# Patient Record
Sex: FEMALE | Race: WHITE | Employment: OTHER
[De-identification: names, ages, dates, MRNs, and addresses within clinical notes are randomized per-mention and may not be internally consistent; named-entity substitution may affect disease eponyms.]

---

## 2017-01-09 ENCOUNTER — OFFICE VISIT (OUTPATIENT)
Dept: FAMILY MEDICINE CLINIC | Facility: CLINIC | Age: 67
End: 2017-01-09

## 2017-01-09 VITALS
HEIGHT: 63 IN | WEIGHT: 182 LBS | SYSTOLIC BLOOD PRESSURE: 138 MMHG | DIASTOLIC BLOOD PRESSURE: 88 MMHG | BODY MASS INDEX: 32.25 KG/M2 | HEART RATE: 80 BPM | RESPIRATION RATE: 16 BRPM

## 2017-01-09 DIAGNOSIS — L30.4 ECZEMA INTERTRIGO: ICD-10-CM

## 2017-01-09 DIAGNOSIS — I10 ESSENTIAL HYPERTENSION: Primary | ICD-10-CM

## 2017-01-09 DIAGNOSIS — Z12.39 SCREENING FOR BREAST CANCER: ICD-10-CM

## 2017-01-09 DIAGNOSIS — K21.9 GASTROESOPHAGEAL REFLUX DISEASE WITHOUT ESOPHAGITIS: ICD-10-CM

## 2017-01-09 DIAGNOSIS — L72.0 EPIDERMOID CYST OF SKIN: ICD-10-CM

## 2017-01-09 DIAGNOSIS — M47.817 LUMBAR AND SACRAL OSTEOARTHRITIS: ICD-10-CM

## 2017-01-09 DIAGNOSIS — G57.91 NEUROPATHY OF RIGHT LOWER EXTREMITY: ICD-10-CM

## 2017-01-09 DIAGNOSIS — M79.7 FIBROMYALGIA SYNDROME: ICD-10-CM

## 2017-01-09 PROCEDURE — 99214 OFFICE O/P EST MOD 30 MIN: CPT | Performed by: FAMILY MEDICINE

## 2017-01-09 RX ORDER — LISINOPRIL AND HYDROCHLOROTHIAZIDE 25; 20 MG/1; MG/1
TABLET ORAL
Qty: 90 TABLET | Refills: 1 | Status: SHIPPED | OUTPATIENT
Start: 2017-01-09 | End: 2017-05-30 | Stop reason: SDUPTHER

## 2017-01-09 RX ORDER — GABAPENTIN 300 MG/1
300 CAPSULE ORAL 3 TIMES DAILY
Qty: 270 CAPSULE | Refills: 3 | Status: SHIPPED | OUTPATIENT
Start: 2017-01-09 | End: 2017-06-08 | Stop reason: SDUPTHER

## 2017-01-09 RX ORDER — TRAMADOL HYDROCHLORIDE 50 MG/1
50 TABLET ORAL EVERY 8 HOURS PRN
Qty: 270 TABLET | Refills: 1 | Status: SHIPPED | OUTPATIENT
Start: 2017-01-09 | End: 2017-06-08 | Stop reason: SDUPTHER

## 2017-01-09 RX ORDER — TRAMADOL HYDROCHLORIDE 50 MG/1
50 TABLET ORAL EVERY 8 HOURS PRN
Qty: 30 TABLET | Refills: 0 | Status: SHIPPED | OUTPATIENT
Start: 2017-01-09 | End: 2017-01-19

## 2017-01-09 RX ORDER — CLOTRIMAZOLE AND BETAMETHASONE DIPROPIONATE 10; .64 MG/G; MG/G
CREAM TOPICAL
Qty: 45 G | Refills: 1 | Status: SHIPPED | OUTPATIENT
Start: 2017-01-09 | End: 2017-02-06 | Stop reason: SDUPTHER

## 2017-01-09 RX ORDER — CLOTRIMAZOLE AND BETAMETHASONE DIPROPIONATE 10; .64 MG/G; MG/G
CREAM TOPICAL
Qty: 45 G | Refills: 1 | Status: SHIPPED | OUTPATIENT
Start: 2017-01-09 | End: 2017-01-09 | Stop reason: SDUPTHER

## 2017-01-09 ASSESSMENT — ENCOUNTER SYMPTOMS
CHOKING: 0
ANAL BLEEDING: 0
CHANGE IN BOWEL HABIT: 0
SWOLLEN GLANDS: 0
ALLERGIC/IMMUNOLOGIC NEGATIVE: 1
WHEEZING: 0
EYE REDNESS: 0
BACK PAIN: 1
SORE THROAT: 0
RHINORRHEA: 0
SINUS PRESSURE: 0
FACIAL SWELLING: 0
COUGH: 0
COLOR CHANGE: 0
BOWEL INCONTINENCE: 0
ORTHOPNEA: 0
EYE PAIN: 0
ABDOMINAL DISTENTION: 0
VISUAL CHANGE: 0
CONSTIPATION: 0
TROUBLE SWALLOWING: 0
RECTAL PAIN: 0
VOMITING: 0
DIARRHEA: 0
NAIL CHANGES: 0
VOICE CHANGE: 0
EYE DISCHARGE: 0
BLOOD IN STOOL: 0
PHOTOPHOBIA: 0
APNEA: 0
STRIDOR: 0
SHORTNESS OF BREATH: 0
BLURRED VISION: 0
EYE ITCHING: 0
CHEST TIGHTNESS: 0
ABDOMINAL PAIN: 0
NAUSEA: 0

## 2017-01-10 ENCOUNTER — TELEPHONE (OUTPATIENT)
Dept: FAMILY MEDICINE CLINIC | Facility: CLINIC | Age: 67
End: 2017-01-10

## 2017-01-10 RX ORDER — OMEPRAZOLE 20 MG/1
20 CAPSULE, DELAYED RELEASE ORAL DAILY
Qty: 90 CAPSULE | Refills: 1 | Status: SHIPPED | OUTPATIENT
Start: 2017-01-10 | End: 2017-05-30 | Stop reason: SDUPTHER

## 2017-01-16 LAB
CHOLESTEROL, TOTAL: 250 MG/DL
CHOLESTEROL/HDL RATIO: 4
HDLC SERPL-MCNC: 63 MG/DL (ref 35–70)
LDL CHOLESTEROL CALCULATED: 159 MG/DL (ref 0–160)
TRIGL SERPL-MCNC: 140 MG/DL
TSH SERPL DL<=0.05 MIU/L-ACNC: 5.86 UIU/ML
VLDLC SERPL CALC-MCNC: 28 MG/DL

## 2017-01-24 ENCOUNTER — OFFICE VISIT (OUTPATIENT)
Dept: FAMILY MEDICINE CLINIC | Facility: CLINIC | Age: 67
End: 2017-01-24

## 2017-01-24 VITALS
HEART RATE: 68 BPM | RESPIRATION RATE: 18 BRPM | DIASTOLIC BLOOD PRESSURE: 80 MMHG | HEIGHT: 63 IN | BODY MASS INDEX: 32.43 KG/M2 | WEIGHT: 183 LBS | SYSTOLIC BLOOD PRESSURE: 120 MMHG

## 2017-01-24 DIAGNOSIS — E03.9 ACQUIRED HYPOTHYROIDISM: Primary | ICD-10-CM

## 2017-01-24 DIAGNOSIS — E78.2 MIXED HYPERLIPIDEMIA: ICD-10-CM

## 2017-01-24 PROCEDURE — G8419 CALC BMI OUT NRM PARAM NOF/U: HCPCS | Performed by: FAMILY MEDICINE

## 2017-01-24 PROCEDURE — G8484 FLU IMMUNIZE NO ADMIN: HCPCS | Performed by: FAMILY MEDICINE

## 2017-01-24 PROCEDURE — 3017F COLORECTAL CA SCREEN DOC REV: CPT | Performed by: FAMILY MEDICINE

## 2017-01-24 PROCEDURE — 99212 OFFICE O/P EST SF 10 MIN: CPT | Performed by: FAMILY MEDICINE

## 2017-01-24 PROCEDURE — 1036F TOBACCO NON-USER: CPT | Performed by: FAMILY MEDICINE

## 2017-01-24 PROCEDURE — G8427 DOCREV CUR MEDS BY ELIG CLIN: HCPCS | Performed by: FAMILY MEDICINE

## 2017-01-24 PROCEDURE — 3014F SCREEN MAMMO DOC REV: CPT | Performed by: FAMILY MEDICINE

## 2017-01-24 RX ORDER — LEVOTHYROXINE SODIUM 0.03 MG/1
25 TABLET ORAL DAILY
Qty: 90 TABLET | Refills: 1 | Status: SHIPPED | OUTPATIENT
Start: 2017-01-24 | End: 2017-01-26 | Stop reason: SDUPTHER

## 2017-01-26 DIAGNOSIS — E03.9 ACQUIRED HYPOTHYROIDISM: ICD-10-CM

## 2017-01-26 RX ORDER — LEVOTHYROXINE SODIUM 0.03 MG/1
25 TABLET ORAL DAILY
Qty: 90 TABLET | Refills: 1 | Status: SHIPPED | OUTPATIENT
Start: 2017-01-26 | End: 2017-03-27 | Stop reason: DRUGHIGH

## 2017-02-06 DIAGNOSIS — L30.4 ECZEMA INTERTRIGO: ICD-10-CM

## 2017-02-06 RX ORDER — CLOTRIMAZOLE AND BETAMETHASONE DIPROPIONATE 10; .64 MG/G; MG/G
CREAM TOPICAL
Qty: 45 G | Refills: 1 | Status: SHIPPED | OUTPATIENT
Start: 2017-02-06 | End: 2017-06-08

## 2017-03-17 ENCOUNTER — OFFICE VISIT (OUTPATIENT)
Dept: FAMILY MEDICINE CLINIC | Age: 67
End: 2017-03-17
Payer: MEDICARE

## 2017-03-17 VITALS
BODY MASS INDEX: 30.39 KG/M2 | HEIGHT: 64 IN | TEMPERATURE: 98.9 F | HEART RATE: 80 BPM | DIASTOLIC BLOOD PRESSURE: 82 MMHG | RESPIRATION RATE: 16 BRPM | SYSTOLIC BLOOD PRESSURE: 128 MMHG | WEIGHT: 178 LBS

## 2017-03-17 DIAGNOSIS — H92.01 OTALGIA, RIGHT: ICD-10-CM

## 2017-03-17 DIAGNOSIS — L08.9 INFECTED SEBACEOUS CYST OF SKIN: ICD-10-CM

## 2017-03-17 DIAGNOSIS — J06.9 UPPER RESPIRATORY TRACT INFECTION, UNSPECIFIED TYPE: ICD-10-CM

## 2017-03-17 DIAGNOSIS — E55.9 VITAMIN D DEFICIENCY: ICD-10-CM

## 2017-03-17 DIAGNOSIS — E03.9 ACQUIRED HYPOTHYROIDISM: ICD-10-CM

## 2017-03-17 DIAGNOSIS — R49.0 VOICE HOARSENESSES: Primary | ICD-10-CM

## 2017-03-17 DIAGNOSIS — L72.3 INFECTED SEBACEOUS CYST OF SKIN: ICD-10-CM

## 2017-03-17 PROCEDURE — G8400 PT W/DXA NO RESULTS DOC: HCPCS | Performed by: FAMILY MEDICINE

## 2017-03-17 PROCEDURE — 1090F PRES/ABSN URINE INCON ASSESS: CPT | Performed by: FAMILY MEDICINE

## 2017-03-17 PROCEDURE — G8427 DOCREV CUR MEDS BY ELIG CLIN: HCPCS | Performed by: FAMILY MEDICINE

## 2017-03-17 PROCEDURE — 4040F PNEUMOC VAC/ADMIN/RCVD: CPT | Performed by: FAMILY MEDICINE

## 2017-03-17 PROCEDURE — 3017F COLORECTAL CA SCREEN DOC REV: CPT | Performed by: FAMILY MEDICINE

## 2017-03-17 PROCEDURE — 1123F ACP DISCUSS/DSCN MKR DOCD: CPT | Performed by: FAMILY MEDICINE

## 2017-03-17 PROCEDURE — G8484 FLU IMMUNIZE NO ADMIN: HCPCS | Performed by: FAMILY MEDICINE

## 2017-03-17 PROCEDURE — 1036F TOBACCO NON-USER: CPT | Performed by: FAMILY MEDICINE

## 2017-03-17 PROCEDURE — G8419 CALC BMI OUT NRM PARAM NOF/U: HCPCS | Performed by: FAMILY MEDICINE

## 2017-03-17 PROCEDURE — 99213 OFFICE O/P EST LOW 20 MIN: CPT | Performed by: FAMILY MEDICINE

## 2017-03-17 PROCEDURE — 3014F SCREEN MAMMO DOC REV: CPT | Performed by: FAMILY MEDICINE

## 2017-03-17 RX ORDER — CEPHALEXIN 500 MG/1
500 CAPSULE ORAL 3 TIMES DAILY
Qty: 21 CAPSULE | Refills: 0 | Status: SHIPPED | OUTPATIENT
Start: 2017-03-17 | End: 2017-03-28 | Stop reason: ALTCHOICE

## 2017-03-17 ASSESSMENT — ENCOUNTER SYMPTOMS
COLOR CHANGE: 0
VOICE CHANGE: 0
EYE REDNESS: 0
RHINORRHEA: 0
COUGH: 1
NAUSEA: 0
ALLERGIC/IMMUNOLOGIC NEGATIVE: 1
RECTAL PAIN: 0
STRIDOR: 0
APNEA: 0
HEARTBURN: 0
ANAL BLEEDING: 0
EYE PAIN: 0
EYE ITCHING: 0
BACK PAIN: 0
FACIAL SWELLING: 0
SORE THROAT: 0
ABDOMINAL PAIN: 0
TROUBLE SWALLOWING: 0
WHEEZING: 0
CONSTIPATION: 0
PHOTOPHOBIA: 0
BLOOD IN STOOL: 0
HEMOPTYSIS: 0
SHORTNESS OF BREATH: 0
VOMITING: 0
DIARRHEA: 0
EYE DISCHARGE: 0
CHEST TIGHTNESS: 0
SINUS PRESSURE: 0
CHOKING: 0
ABDOMINAL DISTENTION: 0

## 2017-03-27 DIAGNOSIS — E03.9 ACQUIRED HYPOTHYROIDISM: Primary | ICD-10-CM

## 2017-03-27 RX ORDER — LEVOTHYROXINE SODIUM 0.05 MG/1
50 TABLET ORAL DAILY
Qty: 90 TABLET | Refills: 1 | Status: SHIPPED | OUTPATIENT
Start: 2017-03-27 | End: 2017-07-10 | Stop reason: SDUPTHER

## 2017-03-28 ENCOUNTER — OFFICE VISIT (OUTPATIENT)
Dept: FAMILY MEDICINE CLINIC | Age: 67
End: 2017-03-28
Payer: MEDICARE

## 2017-03-28 ENCOUNTER — HOSPITAL ENCOUNTER (OUTPATIENT)
Age: 67
Setting detail: SPECIMEN
Discharge: HOME | End: 2017-03-28
Payer: MEDICARE

## 2017-03-28 VITALS
BODY MASS INDEX: 30.39 KG/M2 | HEIGHT: 64 IN | RESPIRATION RATE: 16 BRPM | DIASTOLIC BLOOD PRESSURE: 80 MMHG | WEIGHT: 178 LBS | HEART RATE: 80 BPM | SYSTOLIC BLOOD PRESSURE: 136 MMHG | TEMPERATURE: 98.9 F

## 2017-03-28 DIAGNOSIS — L02.92 BOIL: Primary | ICD-10-CM

## 2017-03-28 PROCEDURE — G8484 FLU IMMUNIZE NO ADMIN: HCPCS | Performed by: FAMILY MEDICINE

## 2017-03-28 PROCEDURE — 99212 OFFICE O/P EST SF 10 MIN: CPT | Performed by: FAMILY MEDICINE

## 2017-03-28 PROCEDURE — 3017F COLORECTAL CA SCREEN DOC REV: CPT | Performed by: FAMILY MEDICINE

## 2017-03-28 PROCEDURE — G8427 DOCREV CUR MEDS BY ELIG CLIN: HCPCS | Performed by: FAMILY MEDICINE

## 2017-03-28 PROCEDURE — 87070 CULTURE OTHR SPECIMN AEROBIC: CPT

## 2017-03-28 PROCEDURE — 1090F PRES/ABSN URINE INCON ASSESS: CPT | Performed by: FAMILY MEDICINE

## 2017-03-28 PROCEDURE — G8400 PT W/DXA NO RESULTS DOC: HCPCS | Performed by: FAMILY MEDICINE

## 2017-03-28 PROCEDURE — 86403 PARTICLE AGGLUT ANTBDY SCRN: CPT

## 2017-03-28 PROCEDURE — 1123F ACP DISCUSS/DSCN MKR DOCD: CPT | Performed by: FAMILY MEDICINE

## 2017-03-28 PROCEDURE — 1036F TOBACCO NON-USER: CPT | Performed by: FAMILY MEDICINE

## 2017-03-28 PROCEDURE — G8419 CALC BMI OUT NRM PARAM NOF/U: HCPCS | Performed by: FAMILY MEDICINE

## 2017-03-28 PROCEDURE — 87205 SMEAR GRAM STAIN: CPT

## 2017-03-28 PROCEDURE — 3014F SCREEN MAMMO DOC REV: CPT | Performed by: FAMILY MEDICINE

## 2017-03-28 PROCEDURE — 4040F PNEUMOC VAC/ADMIN/RCVD: CPT | Performed by: FAMILY MEDICINE

## 2017-03-28 RX ORDER — SULFAMETHOXAZOLE AND TRIMETHOPRIM 800; 160 MG/1; MG/1
1 TABLET ORAL 2 TIMES DAILY
Qty: 14 TABLET | Refills: 0 | Status: SHIPPED | OUTPATIENT
Start: 2017-03-28 | End: 2017-04-04

## 2017-03-31 LAB
CULTURE: ABNORMAL
CULTURE: ABNORMAL
DIRECT EXAM: ABNORMAL
DIRECT EXAM: ABNORMAL
Lab: ABNORMAL
SPECIMEN DESCRIPTION: ABNORMAL
SPECIMEN DESCRIPTION: ABNORMAL
STATUS: ABNORMAL

## 2017-05-30 DIAGNOSIS — I10 ESSENTIAL HYPERTENSION: ICD-10-CM

## 2017-05-30 DIAGNOSIS — K21.9 GASTROESOPHAGEAL REFLUX DISEASE WITHOUT ESOPHAGITIS: ICD-10-CM

## 2017-05-31 RX ORDER — OMEPRAZOLE 20 MG/1
CAPSULE, DELAYED RELEASE ORAL
Qty: 90 CAPSULE | Refills: 1 | Status: SHIPPED | OUTPATIENT
Start: 2017-05-31 | End: 2017-09-26 | Stop reason: DRUGHIGH

## 2017-05-31 RX ORDER — LISINOPRIL AND HYDROCHLOROTHIAZIDE 25; 20 MG/1; MG/1
TABLET ORAL
Qty: 90 TABLET | Refills: 1 | Status: SHIPPED | OUTPATIENT
Start: 2017-05-31 | End: 2017-06-16 | Stop reason: SINTOL

## 2017-06-08 ENCOUNTER — OFFICE VISIT (OUTPATIENT)
Dept: FAMILY MEDICINE CLINIC | Age: 67
End: 2017-06-08
Payer: MEDICARE

## 2017-06-08 VITALS
WEIGHT: 180 LBS | DIASTOLIC BLOOD PRESSURE: 82 MMHG | BODY MASS INDEX: 31.89 KG/M2 | HEART RATE: 68 BPM | HEIGHT: 63 IN | SYSTOLIC BLOOD PRESSURE: 160 MMHG

## 2017-06-08 DIAGNOSIS — L29.9 ITCHING OF EAR: ICD-10-CM

## 2017-06-08 DIAGNOSIS — I10 ESSENTIAL HYPERTENSION: ICD-10-CM

## 2017-06-08 DIAGNOSIS — G89.29 CHRONIC MIDLINE LOW BACK PAIN WITH RIGHT-SIDED SCIATICA: Primary | ICD-10-CM

## 2017-06-08 DIAGNOSIS — F33.1 MODERATE EPISODE OF RECURRENT MAJOR DEPRESSIVE DISORDER (HCC): ICD-10-CM

## 2017-06-08 DIAGNOSIS — Z13.31 POSITIVE DEPRESSION SCREENING: ICD-10-CM

## 2017-06-08 DIAGNOSIS — M54.41 CHRONIC MIDLINE LOW BACK PAIN WITH RIGHT-SIDED SCIATICA: Primary | ICD-10-CM

## 2017-06-08 DIAGNOSIS — M79.7 FIBROMYALGIA SYNDROME: ICD-10-CM

## 2017-06-08 DIAGNOSIS — G57.91 NEUROPATHY OF RIGHT LOWER EXTREMITY: ICD-10-CM

## 2017-06-08 PROCEDURE — 1123F ACP DISCUSS/DSCN MKR DOCD: CPT | Performed by: FAMILY MEDICINE

## 2017-06-08 PROCEDURE — G0444 DEPRESSION SCREEN ANNUAL: HCPCS | Performed by: FAMILY MEDICINE

## 2017-06-08 PROCEDURE — 99213 OFFICE O/P EST LOW 20 MIN: CPT | Performed by: FAMILY MEDICINE

## 2017-06-08 PROCEDURE — G8427 DOCREV CUR MEDS BY ELIG CLIN: HCPCS | Performed by: FAMILY MEDICINE

## 2017-06-08 PROCEDURE — 3014F SCREEN MAMMO DOC REV: CPT | Performed by: FAMILY MEDICINE

## 2017-06-08 PROCEDURE — 1036F TOBACCO NON-USER: CPT | Performed by: FAMILY MEDICINE

## 2017-06-08 PROCEDURE — G8431 POS CLIN DEPRES SCRN F/U DOC: HCPCS | Performed by: FAMILY MEDICINE

## 2017-06-08 PROCEDURE — G8417 CALC BMI ABV UP PARAM F/U: HCPCS | Performed by: FAMILY MEDICINE

## 2017-06-08 PROCEDURE — 4040F PNEUMOC VAC/ADMIN/RCVD: CPT | Performed by: FAMILY MEDICINE

## 2017-06-08 PROCEDURE — 3017F COLORECTAL CA SCREEN DOC REV: CPT | Performed by: FAMILY MEDICINE

## 2017-06-08 PROCEDURE — G8400 PT W/DXA NO RESULTS DOC: HCPCS | Performed by: FAMILY MEDICINE

## 2017-06-08 PROCEDURE — 1090F PRES/ABSN URINE INCON ASSESS: CPT | Performed by: FAMILY MEDICINE

## 2017-06-08 RX ORDER — GABAPENTIN 300 MG/1
300 CAPSULE ORAL 3 TIMES DAILY
Qty: 270 CAPSULE | Refills: 3 | Status: SHIPPED | OUTPATIENT
Start: 2017-06-08 | End: 2017-12-08 | Stop reason: SDUPTHER

## 2017-06-08 RX ORDER — SERTRALINE HYDROCHLORIDE 25 MG/1
25 TABLET, FILM COATED ORAL DAILY
Qty: 30 TABLET | Refills: 3 | Status: SHIPPED | OUTPATIENT
Start: 2017-06-08 | End: 2017-09-26 | Stop reason: ALTCHOICE

## 2017-06-08 RX ORDER — TRAMADOL HYDROCHLORIDE 50 MG/1
50 TABLET ORAL EVERY 8 HOURS PRN
Qty: 270 TABLET | Refills: 1 | Status: SHIPPED | OUTPATIENT
Start: 2017-06-08 | End: 2017-12-08 | Stop reason: SDUPTHER

## 2017-06-08 ASSESSMENT — ENCOUNTER SYMPTOMS
RHINORRHEA: 0
PHOTOPHOBIA: 0
EYE DISCHARGE: 0
CHOKING: 0
COLOR CHANGE: 0
SORE THROAT: 0
EYE PAIN: 0
ANAL BLEEDING: 0
SINUS PRESSURE: 0
ORTHOPNEA: 0
SHORTNESS OF BREATH: 0
CHEST TIGHTNESS: 0
RECTAL PAIN: 0
BACK PAIN: 1
EYE ITCHING: 0
STRIDOR: 0
EYE REDNESS: 0
NAUSEA: 0
APNEA: 0
DIARRHEA: 0
BLURRED VISION: 0
FACIAL SWELLING: 0
VOMITING: 0
BLOOD IN STOOL: 0
TROUBLE SWALLOWING: 0
ABDOMINAL DISTENTION: 0
ALLERGIC/IMMUNOLOGIC NEGATIVE: 1
VOICE CHANGE: 0
COUGH: 0
WHEEZING: 0
ABDOMINAL PAIN: 0
BOWEL INCONTINENCE: 0
CONSTIPATION: 0

## 2017-06-08 ASSESSMENT — PATIENT HEALTH QUESTIONNAIRE - PHQ9
9. THOUGHTS THAT YOU WOULD BE BETTER OFF DEAD, OR OF HURTING YOURSELF: 0
SUM OF ALL RESPONSES TO PHQ9 QUESTIONS 1 & 2: 6
1. LITTLE INTEREST OR PLEASURE IN DOING THINGS: 3
5. POOR APPETITE OR OVEREATING: 2
4. FEELING TIRED OR HAVING LITTLE ENERGY: 3
3. TROUBLE FALLING OR STAYING ASLEEP: 3
10. IF YOU CHECKED OFF ANY PROBLEMS, HOW DIFFICULT HAVE THESE PROBLEMS MADE IT FOR YOU TO DO YOUR WORK, TAKE CARE OF THINGS AT HOME, OR GET ALONG WITH OTHER PEOPLE: 1
7. TROUBLE CONCENTRATING ON THINGS, SUCH AS READING THE NEWSPAPER OR WATCHING TELEVISION: 0
8. MOVING OR SPEAKING SO SLOWLY THAT OTHER PEOPLE COULD HAVE NOTICED. OR THE OPPOSITE, BEING SO FIGETY OR RESTLESS THAT YOU HAVE BEEN MOVING AROUND A LOT MORE THAN USUAL: 0
SUM OF ALL RESPONSES TO PHQ QUESTIONS 1-9: 14
2. FEELING DOWN, DEPRESSED OR HOPELESS: 3
6. FEELING BAD ABOUT YOURSELF - OR THAT YOU ARE A FAILURE OR HAVE LET YOURSELF OR YOUR FAMILY DOWN: 0

## 2017-06-13 ENCOUNTER — HOSPITAL ENCOUNTER (OUTPATIENT)
Dept: PHYSICAL THERAPY | Age: 67
Discharge: HOME | End: 2017-06-13

## 2017-06-16 ENCOUNTER — OFFICE VISIT (OUTPATIENT)
Dept: FAMILY MEDICINE CLINIC | Age: 67
End: 2017-06-16
Payer: MEDICARE

## 2017-06-16 VITALS
HEIGHT: 63 IN | WEIGHT: 180 LBS | BODY MASS INDEX: 31.89 KG/M2 | HEART RATE: 70 BPM | DIASTOLIC BLOOD PRESSURE: 64 MMHG | SYSTOLIC BLOOD PRESSURE: 128 MMHG

## 2017-06-16 DIAGNOSIS — E87.6 HYPOKALEMIA: ICD-10-CM

## 2017-06-16 DIAGNOSIS — B97.89 VIRAL LARYNGITIS: ICD-10-CM

## 2017-06-16 DIAGNOSIS — R59.0 LYMPHADENOPATHY, CERVICAL: Primary | ICD-10-CM

## 2017-06-16 DIAGNOSIS — I10 ESSENTIAL HYPERTENSION: ICD-10-CM

## 2017-06-16 DIAGNOSIS — J04.0 VIRAL LARYNGITIS: ICD-10-CM

## 2017-06-16 PROCEDURE — 4040F PNEUMOC VAC/ADMIN/RCVD: CPT | Performed by: FAMILY MEDICINE

## 2017-06-16 PROCEDURE — 1090F PRES/ABSN URINE INCON ASSESS: CPT | Performed by: FAMILY MEDICINE

## 2017-06-16 PROCEDURE — 1123F ACP DISCUSS/DSCN MKR DOCD: CPT | Performed by: FAMILY MEDICINE

## 2017-06-16 PROCEDURE — 99213 OFFICE O/P EST LOW 20 MIN: CPT | Performed by: FAMILY MEDICINE

## 2017-06-16 PROCEDURE — 1036F TOBACCO NON-USER: CPT | Performed by: FAMILY MEDICINE

## 2017-06-16 PROCEDURE — 3014F SCREEN MAMMO DOC REV: CPT | Performed by: FAMILY MEDICINE

## 2017-06-16 PROCEDURE — G8400 PT W/DXA NO RESULTS DOC: HCPCS | Performed by: FAMILY MEDICINE

## 2017-06-16 PROCEDURE — G8427 DOCREV CUR MEDS BY ELIG CLIN: HCPCS | Performed by: FAMILY MEDICINE

## 2017-06-16 PROCEDURE — G8417 CALC BMI ABV UP PARAM F/U: HCPCS | Performed by: FAMILY MEDICINE

## 2017-06-16 PROCEDURE — 3017F COLORECTAL CA SCREEN DOC REV: CPT | Performed by: FAMILY MEDICINE

## 2017-06-16 RX ORDER — POTASSIUM CHLORIDE 20 MEQ/1
20 TABLET, EXTENDED RELEASE ORAL 3 TIMES DAILY
Qty: 30 TABLET | Refills: 0 | Status: SHIPPED | OUTPATIENT
Start: 2017-06-16 | End: 2017-09-26 | Stop reason: ALTCHOICE

## 2017-06-16 RX ORDER — BENAZEPRIL HYDROCHLORIDE 20 MG/1
20 TABLET ORAL DAILY
Qty: 90 TABLET | Refills: 3 | Status: SHIPPED | OUTPATIENT
Start: 2017-06-16 | End: 2017-09-26 | Stop reason: SDUPTHER

## 2017-06-16 ASSESSMENT — ENCOUNTER SYMPTOMS
CHOKING: 0
STRIDOR: 0
WHEEZING: 0
PHOTOPHOBIA: 0
CONSTIPATION: 0
APNEA: 0
EYE DISCHARGE: 0
ABDOMINAL DISTENTION: 0
SHORTNESS OF BREATH: 0
RECTAL PAIN: 0
BLOOD IN STOOL: 0
NAUSEA: 0
COLOR CHANGE: 0
EYE REDNESS: 0
COUGH: 0
SORE THROAT: 0
ANAL BLEEDING: 0
SINUS PRESSURE: 0
ALLERGIC/IMMUNOLOGIC NEGATIVE: 1
FACIAL SWELLING: 0
BACK PAIN: 0
EYE PAIN: 0
VOMITING: 0
ABDOMINAL PAIN: 0
CHEST TIGHTNESS: 0
EYE ITCHING: 0
TROUBLE SWALLOWING: 0
RHINORRHEA: 0
VOICE CHANGE: 0
DIARRHEA: 0

## 2017-07-06 ENCOUNTER — OFFICE VISIT (OUTPATIENT)
Dept: FAMILY MEDICINE CLINIC | Age: 67
End: 2017-07-06
Payer: MEDICARE

## 2017-07-06 VITALS
BODY MASS INDEX: 31.57 KG/M2 | DIASTOLIC BLOOD PRESSURE: 82 MMHG | SYSTOLIC BLOOD PRESSURE: 128 MMHG | HEIGHT: 63 IN | HEART RATE: 88 BPM | WEIGHT: 178.2 LBS | RESPIRATION RATE: 18 BRPM

## 2017-07-06 DIAGNOSIS — E55.9 VITAMIN D DEFICIENCY: ICD-10-CM

## 2017-07-06 DIAGNOSIS — E87.6 HYPOKALEMIA: ICD-10-CM

## 2017-07-06 DIAGNOSIS — R49.1 LOSS OF VOICE: Primary | ICD-10-CM

## 2017-07-06 PROCEDURE — G8417 CALC BMI ABV UP PARAM F/U: HCPCS | Performed by: FAMILY MEDICINE

## 2017-07-06 PROCEDURE — 4040F PNEUMOC VAC/ADMIN/RCVD: CPT | Performed by: FAMILY MEDICINE

## 2017-07-06 PROCEDURE — 1036F TOBACCO NON-USER: CPT | Performed by: FAMILY MEDICINE

## 2017-07-06 PROCEDURE — 3014F SCREEN MAMMO DOC REV: CPT | Performed by: FAMILY MEDICINE

## 2017-07-06 PROCEDURE — 99213 OFFICE O/P EST LOW 20 MIN: CPT | Performed by: FAMILY MEDICINE

## 2017-07-06 PROCEDURE — 1090F PRES/ABSN URINE INCON ASSESS: CPT | Performed by: FAMILY MEDICINE

## 2017-07-06 PROCEDURE — 3017F COLORECTAL CA SCREEN DOC REV: CPT | Performed by: FAMILY MEDICINE

## 2017-07-06 PROCEDURE — G8427 DOCREV CUR MEDS BY ELIG CLIN: HCPCS | Performed by: FAMILY MEDICINE

## 2017-07-06 PROCEDURE — 1123F ACP DISCUSS/DSCN MKR DOCD: CPT | Performed by: FAMILY MEDICINE

## 2017-07-06 PROCEDURE — G8400 PT W/DXA NO RESULTS DOC: HCPCS | Performed by: FAMILY MEDICINE

## 2017-07-06 RX ORDER — LEVOTHYROXINE SODIUM 0.03 MG/1
TABLET ORAL
COMMUNITY
Start: 2017-06-14 | End: 2017-09-26 | Stop reason: ALTCHOICE

## 2017-07-06 ASSESSMENT — ENCOUNTER SYMPTOMS
CHOKING: 0
RHINORRHEA: 0
ALLERGIC/IMMUNOLOGIC NEGATIVE: 1
EYE ITCHING: 0
BACK PAIN: 0
APNEA: 0
WHEEZING: 0
FACIAL SWELLING: 0
RECTAL PAIN: 0
STRIDOR: 0
PHOTOPHOBIA: 0
ABDOMINAL PAIN: 0
CHEST TIGHTNESS: 0
ABDOMINAL DISTENTION: 0
SHORTNESS OF BREATH: 0
EYE DISCHARGE: 0
EYE REDNESS: 0
SINUS PRESSURE: 0
TROUBLE SWALLOWING: 0
COUGH: 0
COLOR CHANGE: 0
BLOOD IN STOOL: 0
VOMITING: 0
DIARRHEA: 0
CONSTIPATION: 0
VOICE CHANGE: 1
EYE PAIN: 0
SORE THROAT: 0
ANAL BLEEDING: 0
NAUSEA: 0

## 2017-07-10 DIAGNOSIS — E03.9 ACQUIRED HYPOTHYROIDISM: ICD-10-CM

## 2017-07-11 RX ORDER — LEVOTHYROXINE SODIUM 0.05 MG/1
TABLET ORAL
Qty: 90 TABLET | Refills: 1 | Status: SHIPPED | OUTPATIENT
Start: 2017-07-11 | End: 2017-12-11 | Stop reason: SDUPTHER

## 2017-09-26 ENCOUNTER — OFFICE VISIT (OUTPATIENT)
Dept: FAMILY MEDICINE CLINIC | Age: 67
End: 2017-09-26
Payer: MEDICARE

## 2017-09-26 VITALS
HEART RATE: 68 BPM | SYSTOLIC BLOOD PRESSURE: 150 MMHG | WEIGHT: 177 LBS | BODY MASS INDEX: 31.36 KG/M2 | DIASTOLIC BLOOD PRESSURE: 90 MMHG | HEIGHT: 63 IN | RESPIRATION RATE: 18 BRPM

## 2017-09-26 DIAGNOSIS — R59.0 SUBMENTAL ADENOPATHY: ICD-10-CM

## 2017-09-26 DIAGNOSIS — I10 ESSENTIAL HYPERTENSION: Primary | ICD-10-CM

## 2017-09-26 DIAGNOSIS — Z78.0 POST-MENOPAUSE: ICD-10-CM

## 2017-09-26 PROCEDURE — 4040F PNEUMOC VAC/ADMIN/RCVD: CPT | Performed by: FAMILY MEDICINE

## 2017-09-26 PROCEDURE — 1123F ACP DISCUSS/DSCN MKR DOCD: CPT | Performed by: FAMILY MEDICINE

## 2017-09-26 PROCEDURE — 3014F SCREEN MAMMO DOC REV: CPT | Performed by: FAMILY MEDICINE

## 2017-09-26 PROCEDURE — 1036F TOBACCO NON-USER: CPT | Performed by: FAMILY MEDICINE

## 2017-09-26 PROCEDURE — 1090F PRES/ABSN URINE INCON ASSESS: CPT | Performed by: FAMILY MEDICINE

## 2017-09-26 PROCEDURE — G8417 CALC BMI ABV UP PARAM F/U: HCPCS | Performed by: FAMILY MEDICINE

## 2017-09-26 PROCEDURE — G8427 DOCREV CUR MEDS BY ELIG CLIN: HCPCS | Performed by: FAMILY MEDICINE

## 2017-09-26 PROCEDURE — G8400 PT W/DXA NO RESULTS DOC: HCPCS | Performed by: FAMILY MEDICINE

## 2017-09-26 PROCEDURE — 3017F COLORECTAL CA SCREEN DOC REV: CPT | Performed by: FAMILY MEDICINE

## 2017-09-26 PROCEDURE — 99213 OFFICE O/P EST LOW 20 MIN: CPT | Performed by: FAMILY MEDICINE

## 2017-09-26 RX ORDER — AMOXICILLIN AND CLAVULANATE POTASSIUM 875; 125 MG/1; MG/1
1 TABLET, FILM COATED ORAL 3 TIMES DAILY
Qty: 14 TABLET | Refills: 0 | Status: SHIPPED | OUTPATIENT
Start: 2017-09-26 | End: 2017-10-03

## 2017-09-26 RX ORDER — BENAZEPRIL HYDROCHLORIDE 20 MG/1
20 TABLET ORAL DAILY
Qty: 90 TABLET | Refills: 3 | Status: SHIPPED | OUTPATIENT
Start: 2017-09-26 | End: 2017-11-03 | Stop reason: SDUPTHER

## 2017-09-26 RX ORDER — RANITIDINE 300 MG/1
300 CAPSULE ORAL EVERY EVENING
COMMUNITY
End: 2017-11-03

## 2017-09-26 RX ORDER — AMOXICILLIN AND CLAVULANATE POTASSIUM 875; 125 MG/1; MG/1
1 TABLET, FILM COATED ORAL 2 TIMES DAILY
Qty: 14 TABLET | Refills: 0 | Status: SHIPPED | OUTPATIENT
Start: 2017-09-26 | End: 2017-10-06

## 2017-09-26 ASSESSMENT — ENCOUNTER SYMPTOMS
STRIDOR: 0
BLURRED VISION: 0
NAUSEA: 0
PHOTOPHOBIA: 0
BACK PAIN: 0
ANAL BLEEDING: 0
COLOR CHANGE: 0
VOICE CHANGE: 0
EYE PAIN: 0
SINUS PRESSURE: 0
ABDOMINAL PAIN: 0
CHEST TIGHTNESS: 0
APNEA: 0
FACIAL SWELLING: 0
SORE THROAT: 0
COUGH: 0
RHINORRHEA: 0
ALLERGIC/IMMUNOLOGIC NEGATIVE: 1
VOMITING: 0
CHOKING: 0
EYE REDNESS: 0
WHEEZING: 0
TROUBLE SWALLOWING: 0
ABDOMINAL DISTENTION: 0
BLOOD IN STOOL: 0
EYE ITCHING: 0
EYE DISCHARGE: 0
ORTHOPNEA: 0
RECTAL PAIN: 0
DIARRHEA: 0
SHORTNESS OF BREATH: 0
CONSTIPATION: 0

## 2017-09-29 ENCOUNTER — TELEPHONE (OUTPATIENT)
Dept: CARDIOLOGY CLINIC | Age: 67
End: 2017-09-29

## 2017-09-29 DIAGNOSIS — E78.5 HYPERLIPIDEMIA, UNSPECIFIED HYPERLIPIDEMIA TYPE: ICD-10-CM

## 2017-09-29 DIAGNOSIS — E87.6 HYPOKALEMIA: ICD-10-CM

## 2017-09-29 DIAGNOSIS — I10 ESSENTIAL HYPERTENSION: ICD-10-CM

## 2017-09-29 DIAGNOSIS — R06.02 SHORTNESS OF BREATH: Primary | ICD-10-CM

## 2017-09-29 DIAGNOSIS — E55.9 VITAMIN D DEFICIENCY: ICD-10-CM

## 2017-09-29 DIAGNOSIS — E03.9 ACQUIRED HYPOTHYROIDISM: ICD-10-CM

## 2017-10-20 DIAGNOSIS — I10 ESSENTIAL HYPERTENSION: ICD-10-CM

## 2017-10-20 DIAGNOSIS — E03.9 ACQUIRED HYPOTHYROIDISM: ICD-10-CM

## 2017-10-20 DIAGNOSIS — E87.6 HYPOKALEMIA: ICD-10-CM

## 2017-10-20 DIAGNOSIS — R06.02 SHORTNESS OF BREATH: ICD-10-CM

## 2017-10-20 DIAGNOSIS — E55.9 VITAMIN D DEFICIENCY: ICD-10-CM

## 2017-10-20 DIAGNOSIS — E78.5 HYPERLIPIDEMIA, UNSPECIFIED HYPERLIPIDEMIA TYPE: ICD-10-CM

## 2017-11-02 DIAGNOSIS — E55.9 VITAMIN D DEFICIENCY: ICD-10-CM

## 2017-11-02 DIAGNOSIS — E03.9 ACQUIRED HYPOTHYROIDISM: ICD-10-CM

## 2017-11-02 DIAGNOSIS — E78.5 HYPERLIPIDEMIA, UNSPECIFIED HYPERLIPIDEMIA TYPE: ICD-10-CM

## 2017-11-02 DIAGNOSIS — R06.02 SHORTNESS OF BREATH: ICD-10-CM

## 2017-11-02 DIAGNOSIS — I10 ESSENTIAL HYPERTENSION: ICD-10-CM

## 2017-11-02 DIAGNOSIS — E87.6 HYPOKALEMIA: ICD-10-CM

## 2017-11-03 ENCOUNTER — OFFICE VISIT (OUTPATIENT)
Dept: CARDIOLOGY CLINIC | Age: 67
End: 2017-11-03
Payer: MEDICARE

## 2017-11-03 VITALS
DIASTOLIC BLOOD PRESSURE: 90 MMHG | SYSTOLIC BLOOD PRESSURE: 180 MMHG | OXYGEN SATURATION: 97 % | BODY MASS INDEX: 30.82 KG/M2 | WEIGHT: 174 LBS | HEART RATE: 88 BPM

## 2017-11-03 DIAGNOSIS — I10 ESSENTIAL HYPERTENSION: ICD-10-CM

## 2017-11-03 DIAGNOSIS — R94.31 ABNORMAL EKG: ICD-10-CM

## 2017-11-03 DIAGNOSIS — R06.02 SOB (SHORTNESS OF BREATH): Primary | ICD-10-CM

## 2017-11-03 PROCEDURE — G8400 PT W/DXA NO RESULTS DOC: HCPCS | Performed by: INTERNAL MEDICINE

## 2017-11-03 PROCEDURE — 99204 OFFICE O/P NEW MOD 45 MIN: CPT | Performed by: INTERNAL MEDICINE

## 2017-11-03 PROCEDURE — 3014F SCREEN MAMMO DOC REV: CPT | Performed by: INTERNAL MEDICINE

## 2017-11-03 PROCEDURE — 3017F COLORECTAL CA SCREEN DOC REV: CPT | Performed by: INTERNAL MEDICINE

## 2017-11-03 PROCEDURE — 4040F PNEUMOC VAC/ADMIN/RCVD: CPT | Performed by: INTERNAL MEDICINE

## 2017-11-03 PROCEDURE — 1090F PRES/ABSN URINE INCON ASSESS: CPT | Performed by: INTERNAL MEDICINE

## 2017-11-03 PROCEDURE — 1036F TOBACCO NON-USER: CPT | Performed by: INTERNAL MEDICINE

## 2017-11-03 PROCEDURE — G8427 DOCREV CUR MEDS BY ELIG CLIN: HCPCS | Performed by: INTERNAL MEDICINE

## 2017-11-03 PROCEDURE — 1123F ACP DISCUSS/DSCN MKR DOCD: CPT | Performed by: INTERNAL MEDICINE

## 2017-11-03 PROCEDURE — G8484 FLU IMMUNIZE NO ADMIN: HCPCS | Performed by: INTERNAL MEDICINE

## 2017-11-03 PROCEDURE — G8417 CALC BMI ABV UP PARAM F/U: HCPCS | Performed by: INTERNAL MEDICINE

## 2017-11-03 RX ORDER — BENAZEPRIL HYDROCHLORIDE 40 MG/1
40 TABLET, FILM COATED ORAL DAILY
Qty: 30 TABLET | Refills: 11 | Status: SHIPPED | OUTPATIENT
Start: 2017-11-03 | End: 2017-11-30 | Stop reason: DRUGHIGH

## 2017-11-03 NOTE — LETTER
VITAL SIGNS:  Her blood pressure was 180/90, with a heart rate of 80 and regular. Respiratory rate 18. O2 saturation 97%. Weight 174 pounds. GENERAL:  She is a pleasant 72-year-old female. Denied pain. She was oriented to person, place and time. Answered questions appropriately. SKIN:  No unusual skin changes. HEENT:  The pupils are equally round and reactive to light and accommodation. Extraocular movements were intact. Mucous membranes were dry. NECK:  No JVD. Good carotid pulses. No carotid bruits. No lymphadenopathy or thyromegaly. CARDIOVASCULAR EXAM:  S1 and S2 were normal.  No S3 or S4. Soft systolic blowing type murmur. No diastolic murmur. PMI was normal.  No lifts, thrusts or pericardial friction rub. LUNGS:  Quite clear to auscultation and percussion. ABDOMEN:  Soft and nontender. Good bowel sounds. The aorta was not enlarged. No hepatomegaly, splenomegaly. EXTREMITIES:  Good femoral pulses. Good pedal pulses. No pedal edema. Skin was warm and dry. No calf tenderness. Nail beds pink. Good cap refill. PULSES:  Bilaterally symmetrical radial, brachial and carotid pulses. No carotid bruits. Good femoral and pedal pulses. NEUROLOGIC EXAM:  Within normal limits. PSYCHIATRIC EXAM:  Within normal limits. LABORATORY DATA:  Glucose was 90, BUN 8, creatinine 0.70. GFR greater than 60. Calcium 8.8, sodium 141, potassium 3.7. AST was 22, ALT was 27. Magnesium 2.0. Cholesterol 367 with triglycerides of 166, HDL was 68, . TSH was 1.36. White count was 5.1, hemoglobin 14.1, with a platelet count of 355,008. Vitamin D was 31. EKG showed sinus rhythm with nonspecific ST changes. Chest x-ray was normal.    IMPRESSION:  1.  Marked loss of energy with shortness of breath with exertion and chest pain, all consistent with angina, worse in the last 6 months. 2.  Severe hyperlipidemia, untreated, with her developing complications from side effects with Lipitor. Job#: [id number]     Doc#: [id number]

## 2017-11-03 NOTE — PROGRESS NOTES
Ov Dr Avtar Greene   Want check up was seen 5 years ago  Just got insurance  Feels tired a lot past 6mths. 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

## 2017-11-07 NOTE — PROGRESS NOTES
Ysabel Shepherd M.D. [address] [place] [address] [address], [facility] 80  [phone]          November 3, 2017      Zaira New MD  [address] [facility], [address] [place] [address]      RE:  Prabhjot Barry  :  1950      Dear Dr. Mandy Maya:    CHIEF COMPLAINT:  Fatigue, shortness of breath, chest pain. HISTORY OF PRESENT ILLNESS:  I had the pleasure of seeing Mrs. Irma Barry in our office on 2017. She is a pleasant 72-year-old female who I saw on 2012 for a cardiac evaluation. She also had hyperlipidemia, which we treated with pitavastatin. We did a Lexiscan Cardiolite stress test and echocardiogram, both of which were essentially normal.    She had no insurance until recently. She is coming back to [facility] and wanted a cardiac evaluation because of her above symptoms. She has never had a myocardial infarction, never had a cardiac catheterization. She has retired from [employer] 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 Four years ago, she adopted a girl that her mother did not want. She has been much more fatigued over the last 6 months. She has also noted more shortness of breath with activity and developed some chest heaviness with activity. Her chest heaviness is somewhat atypical in that it can occur also at rest.  Her fatigue has worsened and she has difficulty with doing her housework because of her fatigue and shortness of breath. This has worsened over the past several months. She denies any palpitations. She has had no syncope or near syncope. She did have an EGD done 2 days ago by Dr. Margarita Pena in [place] and was told she had a \"swollen esophagus. \"  She sees him again next week. She does have a history of hyperlipidemia and hypertension. We did give her Lipitor in , but she developed severe pain in her legs.   She believes that she tried several other statins, although I do not 80 and regular. Respiratory rate 18. O2 saturation 97%. Weight 174 pounds. GENERAL:  She is a pleasant 72-year-old female. Denied pain. She was oriented to person, place and time. Answered questions appropriately. SKIN:  No unusual skin changes. HEENT:  The pupils are equally round and reactive to light and accommodation. Extraocular movements were intact. Mucous membranes were dry. NECK:  No JVD. Good carotid pulses. No carotid bruits. No lymphadenopathy or thyromegaly. CARDIOVASCULAR EXAM:  S1 and S2 were normal.  No S3 or S4. Soft systolic blowing type murmur. No diastolic murmur. PMI was normal.  No lifts, thrusts or pericardial friction rub. LUNGS:  Quite clear to auscultation and percussion. ABDOMEN:  Soft and nontender. Good bowel sounds. The aorta was not enlarged. No hepatomegaly, splenomegaly. EXTREMITIES:  Good femoral pulses. Good pedal pulses. No pedal edema. Skin was warm and dry. No calf tenderness. Nail beds pink. Good cap refill. PULSES:  Bilaterally symmetrical radial, brachial and carotid pulses. No carotid bruits. Good femoral and pedal pulses. NEUROLOGIC EXAM:  Within normal limits. PSYCHIATRIC EXAM:  Within normal limits. LABORATORY DATA:  Glucose was 90, BUN 8, creatinine 0.70. GFR greater than 60. Calcium 8.8, sodium 141, potassium 3.7. AST was 22, ALT was 27. Magnesium 2.0. Cholesterol 367 with triglycerides of 166, HDL was 68, . TSH was 1.36. White count was 5.1, hemoglobin 14.1, with a platelet count of 424,918. Vitamin D was 31. EKG showed sinus rhythm with nonspecific ST changes. Chest x-ray was normal.    IMPRESSION:  1.  Marked loss of energy with shortness of breath with exertion and chest pain, all consistent with angina, worse in the last 6 months. 2.  Severe hyperlipidemia, untreated, with her developing complications from side effects with Lipitor. 3.  Long history of hypertension, markedly elevated today.   4.  Status post EGD 2 days ago, by Dr. Kristy Easley, with seeing him in 1 week and told she had \"swollen esophagus. \"  5.  Strong family history of coronary artery disease with father and son, both having myocardial infarctions. PLAN:  1. Lexiscan Cardiolite stress test (cannot exercise because of leg pain). 2.  Echocardiogram.  3.  See her in followup, decide on any procedures at that time. 4.  We will need to treat her hyperlipidemia. We will give a trial of statins again, and if she does not tolerate the usual statins, we will give her pitavastatin. 5.  Increase Lotensin to 40 mg daily. DISCUSSION:  She has multiple risk factors for CAD, including family history, hypertension, and severe hyperlipidemia. She has developed worsening symptoms in the last 6 months. She has had marked loss of energy along with shortness of breath and now chest pain. These are all consistent with angina. We will do the [place] and echocardiogram and decide on further therapy or procedures after they are done. She will need to be treated for hyperlipidemia. She was unable to take Lipitor and believes that she was on several other statins, although I do not have a record of that. She will need to be treated with her LDL at 170. Her ASCVD 10-year risk is 15.2%. I will discuss her hyperlipidemia when I see her in followup. With her multiple risk factors and her symptoms worse in the last 6 months, I would anticipate we would eventually need a cardiac catheterization. I would try full medical therapy first and if we could not control her symptoms, then would proceed with cardiac catheterization. Thank you very much for allowing me the privilege of seeing Mrs. Christoph Hansen. Any questions on my thoughts, please do not hesitate to contact me.     Sincerely,        [facility]    D: 11/05/2017 7:36:56       T: 11/06/2017 0:38:12     [email or web address]  Job#: [id number]     Doc#: [id number]

## 2017-11-10 ENCOUNTER — HOSPITAL ENCOUNTER (OUTPATIENT)
Dept: NON INVASIVE DIAGNOSTICS | Age: 67
Discharge: HOME | End: 2017-11-10
Payer: MEDICARE

## 2017-11-10 ENCOUNTER — HOSPITAL ENCOUNTER (OUTPATIENT)
Dept: NUCLEAR MEDICINE | Age: 67
Discharge: HOME | End: 2017-11-10
Payer: MEDICARE

## 2017-11-10 VITALS — HEART RATE: 80 BPM | DIASTOLIC BLOOD PRESSURE: 75 MMHG | SYSTOLIC BLOOD PRESSURE: 167 MMHG

## 2017-11-10 DIAGNOSIS — R94.31 ABNORMAL EKG: ICD-10-CM

## 2017-11-10 DIAGNOSIS — I10 ESSENTIAL HYPERTENSION: ICD-10-CM

## 2017-11-10 DIAGNOSIS — R06.02 SOB (SHORTNESS OF BREATH): ICD-10-CM

## 2017-11-10 LAB
LV EF: 55 %
LVEF MODALITY: NORMAL

## 2017-11-10 PROCEDURE — A9500 TC99M SESTAMIBI: HCPCS | Performed by: INTERNAL MEDICINE

## 2017-11-10 PROCEDURE — 2580000003 HC RX 258: Performed by: INTERNAL MEDICINE

## 2017-11-10 PROCEDURE — 3430000000 HC RX DIAGNOSTIC RADIOPHARMACEUTICAL: Performed by: INTERNAL MEDICINE

## 2017-11-10 PROCEDURE — 6360000002 HC RX W HCPCS: Performed by: INTERNAL MEDICINE

## 2017-11-10 PROCEDURE — 78452 HT MUSCLE IMAGE SPECT MULT: CPT

## 2017-11-10 PROCEDURE — 93306 TTE W/DOPPLER COMPLETE: CPT

## 2017-11-10 PROCEDURE — 93017 CV STRESS TEST TRACING ONLY: CPT

## 2017-11-10 RX ORDER — 0.9 % SODIUM CHLORIDE 0.9 %
10 VIAL (ML) INJECTION PRN
Status: DISCONTINUED | OUTPATIENT
Start: 2017-11-10 | End: 2017-11-11 | Stop reason: HOSPADM

## 2017-11-10 RX ORDER — AMINOPHYLLINE DIHYDRATE 25 MG/ML
100 INJECTION, SOLUTION INTRAVENOUS
Status: ACTIVE | OUTPATIENT
Start: 2017-11-10 | End: 2017-11-10

## 2017-11-10 RX ORDER — AMINOPHYLLINE DIHYDRATE 25 MG/ML
50 INJECTION, SOLUTION INTRAVENOUS
Status: DISPENSED | OUTPATIENT
Start: 2017-11-10 | End: 2017-11-10

## 2017-11-10 RX ADMIN — Medication 10 ML: at 10:36

## 2017-11-10 RX ADMIN — REGADENOSON 0.4 MG: 0.08 INJECTION, SOLUTION INTRAVENOUS at 10:36

## 2017-11-10 RX ADMIN — TETRAKIS(2-METHOXYISOBUTYLISOCYANIDE)COPPER(I) TETRAFLUOROBORATE 10 MILLICURIE: 1 INJECTION, POWDER, LYOPHILIZED, FOR SOLUTION INTRAVENOUS at 08:57

## 2017-11-10 RX ADMIN — TETRAKIS(2-METHOXYISOBUTYLISOCYANIDE)COPPER(I) TETRAFLUOROBORATE 30 MILLICURIE: 1 INJECTION, POWDER, LYOPHILIZED, FOR SOLUTION INTRAVENOUS at 10:36

## 2017-11-10 NOTE — PROGRESS NOTES
Stress test complete. Pt. C/o shortness of breath at beginning of test, but now states, \"back to normal\".

## 2017-11-14 ENCOUNTER — TELEPHONE (OUTPATIENT)
Dept: CARDIOLOGY CLINIC | Age: 67
End: 2017-11-14

## 2017-11-14 DIAGNOSIS — I20.9 ANGINA, CLASS III (HCC): Primary | ICD-10-CM

## 2017-11-14 RX ORDER — ISOSORBIDE MONONITRATE 30 MG/1
15 TABLET, EXTENDED RELEASE ORAL DAILY
Qty: 30 TABLET | Refills: 6 | Status: SHIPPED | OUTPATIENT
Start: 2017-11-14 | End: 2018-02-09 | Stop reason: CLARIF

## 2017-11-14 NOTE — PROCEDURES
[facility]                              [id number]  [address] [facility] 80                                CARDIAC STRESS TEST    PATIENT NAME: Mani Banda                      :        1950  MED REC NO:   [id number]                              ROOM:  ACCOUNT NO:   [de-identified]                           ADMIT DATE: 11/10/2017  PROVIDER:     Ceci Khan      DATE OF STUDY:  11/10/2017    LEXISCAN CARDIOLITE STRESS TEST:    INDICATION:  1. Chest pain. 2.  Abnormal EKG. 3.  Unable to walk the treadmill because of arthritis. IMPRESSION:  1. We gave 0.4 mg of Lexiscan intravenously. 2.  This was followed in 20 seconds by Cardiolite infusion. 3.  There was no chest pain. 4.  There was no ST depression. 5.  It was an overall negative Lexiscan stress test.  6.  Cardiolite to follow.     Kristine Serra    D: 2017 7:07:37       T: 2017 9:40:58     [email or web address]  Job#: [id number]     Doc#: [id number]    CC:  Alix Johnson
intermediate risk  for significant coronary artery disease. Depending on the patient symptoms and level of clinical suspicion,  aggressive medical management vs. additional testing by coronary  angiography may be indicated. The results of this test were discussed with Dr. Spring Rojo on 11/10/2017 at  1430. FLORENTIN HENRY    D: 11/13/2017 9:11:13       T: 11/13/2017 9:11:47     [email or web address]  Job#: [id number]    Doc#: Unknown    CC:  Jerald Mayfield

## 2017-11-30 ENCOUNTER — OFFICE VISIT (OUTPATIENT)
Dept: CARDIOLOGY CLINIC | Age: 67
End: 2017-11-30
Payer: MEDICARE

## 2017-11-30 ENCOUNTER — HOSPITAL ENCOUNTER (OUTPATIENT)
Age: 67
Setting detail: SPECIMEN
Discharge: HOME | End: 2017-11-30
Payer: MEDICARE

## 2017-11-30 VITALS
OXYGEN SATURATION: 95 % | DIASTOLIC BLOOD PRESSURE: 92 MMHG | SYSTOLIC BLOOD PRESSURE: 140 MMHG | HEART RATE: 74 BPM | WEIGHT: 180 LBS | BODY MASS INDEX: 31.89 KG/M2

## 2017-11-30 DIAGNOSIS — R94.39 ABNORMAL STRESS ECG: ICD-10-CM

## 2017-11-30 DIAGNOSIS — R07.9 CHEST PAIN, UNSPECIFIED TYPE: ICD-10-CM

## 2017-11-30 DIAGNOSIS — R55 SYNCOPE, UNSPECIFIED SYNCOPE TYPE: ICD-10-CM

## 2017-11-30 DIAGNOSIS — I10 ESSENTIAL HYPERTENSION: ICD-10-CM

## 2017-11-30 DIAGNOSIS — D63.1 ANEMIA IN CHRONIC KIDNEY DISEASE, UNSPECIFIED CKD STAGE: ICD-10-CM

## 2017-11-30 DIAGNOSIS — N18.9 ANEMIA IN CHRONIC KIDNEY DISEASE, UNSPECIFIED CKD STAGE: ICD-10-CM

## 2017-11-30 DIAGNOSIS — R55 SYNCOPE, UNSPECIFIED SYNCOPE TYPE: Primary | ICD-10-CM

## 2017-11-30 LAB
ABSOLUTE EOS #: 0.3 K/UL (ref 0–0.4)
ABSOLUTE IMMATURE GRANULOCYTE: NORMAL K/UL (ref 0–0.3)
ABSOLUTE LYMPH #: 2.1 K/UL (ref 1–4.8)
ABSOLUTE MONO #: 0.5 K/UL (ref 0–1)
ALBUMIN SERPL-MCNC: 4.1 G/DL (ref 3.5–5.2)
ALBUMIN/GLOBULIN RATIO: ABNORMAL (ref 1–2.5)
ALP BLD-CCNC: 111 U/L (ref 35–104)
ALT SERPL-CCNC: 16 U/L (ref 5–33)
ANION GAP SERPL CALCULATED.3IONS-SCNC: 12 MMOL/L (ref 9–17)
AST SERPL-CCNC: 24 U/L
BASOPHILS # BLD: 1 % (ref 0–2)
BASOPHILS ABSOLUTE: 0.1 K/UL (ref 0–0.2)
BILIRUB SERPL-MCNC: 0.37 MG/DL (ref 0.3–1.2)
BUN BLDV-MCNC: 10 MG/DL (ref 8–23)
BUN/CREAT BLD: 18 (ref 9–20)
CALCIUM SERPL-MCNC: 9.1 MG/DL (ref 8.6–10.4)
CHLORIDE BLD-SCNC: 105 MMOL/L (ref 98–107)
CO2: 25 MMOL/L (ref 20–31)
CREAT SERPL-MCNC: 0.56 MG/DL (ref 0.5–0.9)
DIFFERENTIAL TYPE: YES
EOSINOPHILS RELATIVE PERCENT: 5 % (ref 0–5)
GFR AFRICAN AMERICAN: >60 ML/MIN
GFR NON-AFRICAN AMERICAN: >60 ML/MIN
GFR SERPL CREATININE-BSD FRML MDRD: ABNORMAL ML/MIN/{1.73_M2}
GFR SERPL CREATININE-BSD FRML MDRD: ABNORMAL ML/MIN/{1.73_M2}
GLUCOSE BLD-MCNC: 131 MG/DL (ref 70–99)
HCT VFR BLD CALC: 39.9 % (ref 36–46)
HEMOGLOBIN: 13.4 G/DL (ref 12–16)
IMMATURE GRANULOCYTES: NORMAL %
LYMPHOCYTES # BLD: 34 % (ref 15–40)
MCH RBC QN AUTO: 28.3 PG (ref 26–34)
MCHC RBC AUTO-ENTMCNC: 33.6 G/DL (ref 31–37)
MCV RBC AUTO: 84.3 FL (ref 80–100)
MONOCYTES # BLD: 8 % (ref 4–8)
PDW BLD-RTO: 14.4 % (ref 12.1–15.2)
PLATELET # BLD: 330 K/UL (ref 140–450)
PLATELET ESTIMATE: NORMAL
PMV BLD AUTO: NORMAL FL (ref 6–12)
POTASSIUM SERPL-SCNC: 4.2 MMOL/L (ref 3.7–5.3)
RBC # BLD: 4.74 M/UL (ref 4–5.2)
RBC # BLD: NORMAL 10*6/UL
SEG NEUTROPHILS: 52 % (ref 47–75)
SEGMENTED NEUTROPHILS ABSOLUTE COUNT: 3.2 K/UL (ref 2.5–7)
SODIUM BLD-SCNC: 142 MMOL/L (ref 135–144)
TOTAL PROTEIN: 6.5 G/DL (ref 6.4–8.3)
TROPONIN INTERP: NORMAL
TROPONIN T: <0.03 NG/ML
TSH SERPL DL<=0.05 MIU/L-ACNC: 1.6 MIU/L (ref 0.3–5)
WBC # BLD: 6.1 K/UL (ref 3.5–11)
WBC # BLD: NORMAL 10*3/UL

## 2017-11-30 PROCEDURE — G8427 DOCREV CUR MEDS BY ELIG CLIN: HCPCS | Performed by: INTERNAL MEDICINE

## 2017-11-30 PROCEDURE — G8484 FLU IMMUNIZE NO ADMIN: HCPCS | Performed by: INTERNAL MEDICINE

## 2017-11-30 PROCEDURE — 85025 COMPLETE CBC W/AUTO DIFF WBC: CPT

## 2017-11-30 PROCEDURE — 3014F SCREEN MAMMO DOC REV: CPT | Performed by: INTERNAL MEDICINE

## 2017-11-30 PROCEDURE — 1123F ACP DISCUSS/DSCN MKR DOCD: CPT | Performed by: INTERNAL MEDICINE

## 2017-11-30 PROCEDURE — 1090F PRES/ABSN URINE INCON ASSESS: CPT | Performed by: INTERNAL MEDICINE

## 2017-11-30 PROCEDURE — G8400 PT W/DXA NO RESULTS DOC: HCPCS | Performed by: INTERNAL MEDICINE

## 2017-11-30 PROCEDURE — G8599 NO ASA/ANTIPLAT THER USE RNG: HCPCS | Performed by: INTERNAL MEDICINE

## 2017-11-30 PROCEDURE — 84443 ASSAY THYROID STIM HORMONE: CPT

## 2017-11-30 PROCEDURE — 1036F TOBACCO NON-USER: CPT | Performed by: INTERNAL MEDICINE

## 2017-11-30 PROCEDURE — 4040F PNEUMOC VAC/ADMIN/RCVD: CPT | Performed by: INTERNAL MEDICINE

## 2017-11-30 PROCEDURE — 80053 COMPREHEN METABOLIC PANEL: CPT

## 2017-11-30 PROCEDURE — 99213 OFFICE O/P EST LOW 20 MIN: CPT | Performed by: INTERNAL MEDICINE

## 2017-11-30 PROCEDURE — 84484 ASSAY OF TROPONIN QUANT: CPT

## 2017-11-30 PROCEDURE — 36415 COLL VENOUS BLD VENIPUNCTURE: CPT

## 2017-11-30 PROCEDURE — 3017F COLORECTAL CA SCREEN DOC REV: CPT | Performed by: INTERNAL MEDICINE

## 2017-11-30 PROCEDURE — G8417 CALC BMI ABV UP PARAM F/U: HCPCS | Performed by: INTERNAL MEDICINE

## 2017-11-30 RX ORDER — OMEPRAZOLE 40 MG/1
40 CAPSULE, DELAYED RELEASE ORAL
COMMUNITY
Start: 2017-08-21 | End: 2018-02-09 | Stop reason: CLARIF

## 2017-11-30 RX ORDER — RANITIDINE 150 MG/1
300 TABLET ORAL
COMMUNITY
Start: 2017-09-25 | End: 2018-02-09 | Stop reason: CLARIF

## 2017-11-30 RX ORDER — BENAZEPRIL HYDROCHLORIDE 40 MG/1
20 TABLET, FILM COATED ORAL DAILY
Status: SHIPPED | COMMUNITY
Start: 2017-11-30 | End: 2017-12-18 | Stop reason: SINTOL

## 2017-11-30 NOTE — PATIENT INSTRUCTIONS
Surgery on HOLD until after heart cath  Will decrease LOTENSIN to 1/2 tablet daily   Will proceed with heart cath on Dec   Will get labs and EKG 1-2 week prior to cath

## 2017-12-01 NOTE — PROGRESS NOTES
Marco Ramon M.D. [address] [place] [address], [facility] 80  [phone]          2017          Aminata Quach, [address] [facility],, [address] [place] [address] Drive        RE:  Edna Cotto  : 2017    Dear Dr. Akin Quach:    CHIEF COMPLAINT:  Chest pain, shortness of breath, fatigue. HISTORY OF PRESENT ILLNESS:  I met with Mrs. Radha Cotto in the office on 2017. As you know, I had seen her in the office on  because of fatigue, chest pain, and shortness of breath. She had a marked increase in shortness of breath and chest pain and fatigue over the last six months, which had markedly worsened in the last two months. She also developed much more shortness of breath with activity. She had an EGD done, which showed a hiatal hernia and she is pending having hiatal hernia surgery laparoscopically by Dr. Yosi Ugalde on  at [place]. After my initial consult, I did a Lexiscan Cardiolite stress test and echocardiogram.  Echocardiogram on , showed EF of 55% with moderately dilated left atrium and mildly dilated right atrium. She had mild-to-moderate mitral regurgitation. Her stress test was abnormal showing an area of lateral wall ischemia. It was at low to intermediate risk of significant coronary artery disease. She continues to be markedly fatigued with shortness with any activity. I placed her on Imdur 30 mg half a tablet daily along with Lopressor 25 mg one tablet b.i.d. She continues to have her chest pain with activity. She can do light house work, but if she attempts to do more activity then she develops her chest pain and marked shortness of breath. When she was in our office, she became fairly weak and had a vasovagal reaction with her blood pressure decreasing to the 80s from 140.   We laid her down and I gave her fluids and cold compress and her blood pressure gradually

## 2017-12-08 ENCOUNTER — OFFICE VISIT (OUTPATIENT)
Dept: FAMILY MEDICINE CLINIC | Age: 67
End: 2017-12-08
Payer: MEDICARE

## 2017-12-08 VITALS
SYSTOLIC BLOOD PRESSURE: 160 MMHG | HEIGHT: 63 IN | DIASTOLIC BLOOD PRESSURE: 100 MMHG | HEART RATE: 72 BPM | RESPIRATION RATE: 18 BRPM | WEIGHT: 179 LBS | BODY MASS INDEX: 31.71 KG/M2

## 2017-12-08 DIAGNOSIS — I10 ESSENTIAL HYPERTENSION: Primary | ICD-10-CM

## 2017-12-08 DIAGNOSIS — G57.91 NEUROPATHY OF RIGHT LOWER EXTREMITY: ICD-10-CM

## 2017-12-08 DIAGNOSIS — F32.A DEPRESSION, UNSPECIFIED DEPRESSION TYPE: ICD-10-CM

## 2017-12-08 DIAGNOSIS — Z23 NEED FOR PNEUMOCOCCAL VACCINATION: ICD-10-CM

## 2017-12-08 DIAGNOSIS — Z23 NEED FOR INFLUENZA VACCINATION: ICD-10-CM

## 2017-12-08 DIAGNOSIS — M79.7 FIBROMYALGIA SYNDROME: ICD-10-CM

## 2017-12-08 PROCEDURE — 1090F PRES/ABSN URINE INCON ASSESS: CPT | Performed by: INTERNAL MEDICINE

## 2017-12-08 PROCEDURE — 1123F ACP DISCUSS/DSCN MKR DOCD: CPT | Performed by: INTERNAL MEDICINE

## 2017-12-08 PROCEDURE — 3017F COLORECTAL CA SCREEN DOC REV: CPT | Performed by: INTERNAL MEDICINE

## 2017-12-08 PROCEDURE — G0008 ADMIN INFLUENZA VIRUS VAC: HCPCS | Performed by: INTERNAL MEDICINE

## 2017-12-08 PROCEDURE — 90670 PCV13 VACCINE IM: CPT | Performed by: INTERNAL MEDICINE

## 2017-12-08 PROCEDURE — G8599 NO ASA/ANTIPLAT THER USE RNG: HCPCS | Performed by: INTERNAL MEDICINE

## 2017-12-08 PROCEDURE — G8484 FLU IMMUNIZE NO ADMIN: HCPCS | Performed by: INTERNAL MEDICINE

## 2017-12-08 PROCEDURE — G8427 DOCREV CUR MEDS BY ELIG CLIN: HCPCS | Performed by: INTERNAL MEDICINE

## 2017-12-08 PROCEDURE — 4040F PNEUMOC VAC/ADMIN/RCVD: CPT | Performed by: INTERNAL MEDICINE

## 2017-12-08 PROCEDURE — G0009 ADMIN PNEUMOCOCCAL VACCINE: HCPCS | Performed by: INTERNAL MEDICINE

## 2017-12-08 PROCEDURE — G8417 CALC BMI ABV UP PARAM F/U: HCPCS | Performed by: INTERNAL MEDICINE

## 2017-12-08 PROCEDURE — 1036F TOBACCO NON-USER: CPT | Performed by: INTERNAL MEDICINE

## 2017-12-08 PROCEDURE — 90686 IIV4 VACC NO PRSV 0.5 ML IM: CPT | Performed by: INTERNAL MEDICINE

## 2017-12-08 PROCEDURE — G8400 PT W/DXA NO RESULTS DOC: HCPCS | Performed by: INTERNAL MEDICINE

## 2017-12-08 PROCEDURE — 3014F SCREEN MAMMO DOC REV: CPT | Performed by: INTERNAL MEDICINE

## 2017-12-08 PROCEDURE — 99213 OFFICE O/P EST LOW 20 MIN: CPT | Performed by: INTERNAL MEDICINE

## 2017-12-08 RX ORDER — GABAPENTIN 300 MG/1
300 CAPSULE ORAL 3 TIMES DAILY
Qty: 270 CAPSULE | Refills: 1 | Status: SHIPPED | OUTPATIENT
Start: 2017-12-08 | End: 2018-06-15 | Stop reason: SDUPTHER

## 2017-12-08 RX ORDER — TRAMADOL HYDROCHLORIDE 50 MG/1
50 TABLET ORAL EVERY 8 HOURS PRN
Qty: 270 TABLET | Refills: 1 | Status: SHIPPED | OUTPATIENT
Start: 2017-12-08 | End: 2018-06-15 | Stop reason: SDUPTHER

## 2017-12-08 ASSESSMENT — ENCOUNTER SYMPTOMS
WHEEZING: 0
COUGH: 1
BELCHING: 1
SHORTNESS OF BREATH: 0
HOARSE VOICE: 1
SORE THROAT: 0
HEARTBURN: 0
ABDOMINAL PAIN: 1
BLURRED VISION: 0
NAUSEA: 0

## 2017-12-08 NOTE — PROGRESS NOTES
wheezing. This is a chronic problem. The current episode started more than 1 year ago. The problem occurs frequently. The problem has been gradually worsening. The symptoms are aggravated by certain foods, medications and stress. Pertinent negatives include no anemia, melena or weight loss. Past procedures include an EGD. Past Medical History:     Past Medical History:   Diagnosis Date    Allergic rhinitis 2011    Anxiety 2011    Carpal tunnel syndrome 2011    Depression 2011    Dyslipidemia 2011    Fibromyalgia 2011    Hyperlipidemia     Hypertension 2011    SOB (shortness of breath)       Reviewed all health maintenance requirements and ordered appropriate tests  Health Maintenance Due   Topic Date Due    DEXA (modify frequency per FRAX score)  2017    Flu vaccine (1) 2017       Past Surgical History:     Past Surgical History:   Procedure Laterality Date     SECTION      x 4    ELBOW SURGERY Right 1996    GALLBLADDER SURGERY  1975    HYSTERECTOMY      total hysterectomy    ROTATOR CUFF REPAIR      Right    SMALL INTESTINE SURGERY      twisted bowel         Medications:       Prior to Admission medications    Medication Sig Start Date End Date Taking?  Authorizing Provider   omeprazole (PRILOSEC) 40 MG delayed release capsule Take 40 mg by mouth 17 Yes Historical Provider, MD   ranitidine (ZANTAC) 150 MG tablet Take 300 mg by mouth 17 Yes Historical Provider, MD   metoprolol tartrate (LOPRESSOR) 25 MG tablet Take 0.5 tablets by mouth 2 times daily 17  Yes Mal Vergara MD   benazepril (LOTENSIN) 40 MG tablet Take 0.5 tablets by mouth daily 17  Yes Mal Vergara MD   isosorbide mononitrate (IMDUR) 30 MG extended release tablet Take 0.5 tablets by mouth daily 17  Yes Mal Vergara MD   vitamin D (CHOLECALCIFEROL) 1000 UNIT TABS tablet Take 1,000 Units by mouth daily   Yes Historical normal.   Mouth/Throat: Oropharynx is clear and moist.   Neck: No thyromegaly present. Cardiovascular: Normal rate, regular rhythm and normal heart sounds. No murmur heard. Pulmonary/Chest: Effort normal and breath sounds normal. She has no wheezes. She has no rales. Abdominal: Soft. Bowel sounds are normal. She exhibits no distension and no mass. There is no tenderness. Musculoskeletal: Normal range of motion. She exhibits no edema or deformity. Lymphadenopathy:     She has no cervical adenopathy. Neurological: She is alert and oriented to person, place, and time. Skin: Skin is warm and dry. No rash noted. Psychiatric: She has a normal mood and affect. Her behavior is normal. Judgment normal.   Vitals reviewed. Data:     Lab Results   Component Value Date     11/30/2017    K 4.2 11/30/2017     11/30/2017    CO2 25 11/30/2017    BUN 10 11/30/2017    CREATININE 0.56 11/30/2017    GLUCOSE 131 11/30/2017    GLUCOSE 99 01/19/2012    PROT 6.5 11/30/2017    LABALBU 4.1 11/30/2017    LABALBU 4.4 01/19/2012    BILITOT 0.37 11/30/2017    ALKPHOS 111 11/30/2017    AST 24 11/30/2017    ALT 16 11/30/2017     Lab Results   Component Value Date    WBC 6.1 11/30/2017    RBC 4.74 11/30/2017    RBC 4.86 01/19/2012    HGB 13.4 11/30/2017    HCT 39.9 11/30/2017    MCV 84.3 11/30/2017    MCH 28.3 11/30/2017    MCHC 33.6 11/30/2017    RDW 14.4 11/30/2017     11/30/2017     01/19/2012    MPV NOT REPORTED 11/30/2017     Lab Results   Component Value Date    TSH 1.60 11/30/2017     Lab Results   Component Value Date    CHOL 250 01/16/2017    HDL 63 01/16/2017          Assessment & Plan       1. Essential hypertension     Continue current meds, follow up with Dr. Julianne Quevedo    2. Neuropathy of right lower extremity  gabapentin (NEURONTIN) 300 MG capsule   3. Fibromyalgia syndrome  traMADol (ULTRAM) 50 MG tablet   4.  Need for influenza vaccination  INFLUENZA, QUADV, 3 YRS AND OLDER, IM, PF,

## 2017-12-08 NOTE — LETTER
MEDICATION AGREEMENT     Roxanne Barrios  [birth year]      For certain conditions, multiple classes of medications may be used to help better manage your symptoms, and to improve your ability to function at home, work and in social settings. However, these medications do have risks, which will be discussed with you, including addiction and dependency. The following prescribed medications need frequent monitoring and will require you to partner and assist in your healthcare. Medication  Dose, instructions and quantity as indicated on current prescription bottle Diagnosis/Reason(s) for Taking Category   traMADol (ULTRAM) 50 MG tablet   Fibromyalgia syndrome                             Benefits and goals of Controlled Substance Medications: There are two potential goals for your treatment: (1) decreased pain and suffering (2) improved daily life functions. There are many possible treatments for your chronic condition(s), and, in addition to controlled substance medications, we will try alternatives such as physical therapy, yoga, massage, home daily exercise, meditation, relaxation techniques, injections, chiropractic manipulations, surgery, cognitive therapy, hypnosis and many medications that are not habit-forming. Use of controlled substance medications may be helpful, but they are unlikely to resolve all of your symptoms or restore all function. Risks of Controlled Substance Medications:    Opioid pain medications: These medications can lead to problems such as addiction/dependence, sedation, lightheadedness/dizziness, memory issues, falls, constipation, nausea, or vomiting. They may also impair the ability to drive or operate machinery. Additionally, these medications may lower testosterone levels, leading to loss of bone strength, stamina and sex drive.   They may cause problems with breathing, sleep apnea and reduced coughing, which are especially dangerous for patients with lung disease. Overdose or dangerous interactions with alcohol and other medications may occur, leading to death. Hyperalgesia may develop, in which patients receiving opioids for the treatment of pain may actually become more sensitive to certain painful stimuli, and in some cases, experience pain from ordinarily non-painful stimuli. Women between the ages of 14-53 who could become pregnant should carefully weigh the risks and benefits of opioids with their physicians, as these medications increase the risk of pregnancy complications, including miscarriage,  delivery and stillbirth. It is also possible for babies to be born addicted to opioids. Opioid dependence withdrawal symptoms may include; feelings of uneasiness, increased pain, irritability, belly pain, diarrhea, sweats and goose-flesh. Benzodiazepines and non-benzodiazepine sleep medications: These medications can lead to problems such as addiction/dependence, sedation, fatigue, lightheadedness, dizziness, incoordination, falls, depression, hallucinations, and impaired judgment, memory and concentration. The ability to drive and operate machinery may also be affected. Abnormal sleep-related behaviors have been reported, including sleep walking, driving, making telephone calls, eating, or having sex while not fully awake. These medications can suppress breathing and worsen sleep apnea, particularly when combined with alcohol or other sedating medications, potentially leading to death. Dependence withdrawal symptoms may include tremors, anxiety, hallucinations and seizures. Stimulants:  Common adverse effects include addiction/dependence, increased blood pressure and heart rate, decreased appetite, nausea, involuntary weight loss, insomnia, irritability, and headaches.   These risks may increase when these medications are combined with other stimulants, such as caffeine pills or energy drinks, certain weight loss supplements and oral decongestants. Dependence withdrawal symptoms may include depressed mood, loss of interest, suicidal thoughts, anxiety, fatigue, appetite changes and agitation. Testosterone replacement therapy:  Potential side effects include increased risk of stroke and heart attack, blood clots, increased blood pressure, increased cholesterol, enlarged prostate, sleep apnea, irritability/aggression and other mood disorders, and decreased fertility. Other:     1. I understand that I have the following responsibilities:  · I will take medications at the dose and frequency prescribed. · I will not increase or change how I take my medications without the approval of the health care provider who signs this Medication Agreement. · I will arrange for refills at the prescribed interval ONLY during regular office hours. I will not ask for refills earlier than agreed, after-hours, on holidays or on weekends. · I will obtain all refills for these medications at  [place], [place]_____  pharmacy (phone number  ·  _[phone]___________), with full consent for my provider and pharmacist to exchange information in writing or verbally. · I will not request any pain medications or controlled substances from other providers and will inform this provider of all other medications I am taking. · I will inform my other health care providers that I am taking these medications and of the existence of this [facility]. In the event of an emergency, I will provide the same information to the emergency department providers. · I will protect my prescriptions and medications. I understand that lost or misplaced prescriptions will not be replaced. · I will keep medications only for my own use and will not share them with others. I will keep all medications away from children.   · I agree to participate in any medical, psychological or psychiatric assessments recommended by my provider. · I will actively participate in any program designed to improve function, including social, physical, psychological and daily or work activities. 2. I will not use illegal or street drugs or another person's prescription. If I have an addiction problem with drugs or alcohol and my provider asks me to enter a program to address this issue, I agree to follow through. Such programs may include:  · 12-Step program and securing a sponsor  · Individual counseling   · Inpatient or outpatient treatment  · Other:_____________________________________________________________________________________________________________________________________________    If in treatment, I will request that a copy of the programs initial evaluation and treatment recommendations be sent to this provider and will not expect refills until that is received. I will also request written monthly updates be sent to this provider to verify my continuing treatment. 3. I will consent to drug screening upon my providers request to assure I am only taking the prescribed drugs, described in this MEDICATION AGREEMENT. I understand that a drug screen is a laboratory test in which a sample of my urine, blood or saliva is checked to see what drugs I have been taking. 4. I agree that I will treat the providers and staff at this office with respect at all times. I will keep all of my scheduled appointments, but if I need to cancel my appointment, I will do so a minimum of 24 hours before it is scheduled. 5. I understand that this provider may stop prescribing the medications listed if:  · I do not show any improvement in pain, or my activity has not improved. · I develop rapid tolerance or loss of improvement, as described in my treatment plan. · I develop significant side effects from the medication.   · My behavior is inconsistent with the responsibilities outlined above, which may also result in my being prevented from receiving further care from this office. · Other:____________________________________________________________________    AGREEMENT:    I have read the above and have had all of my questions answered. For chronic disease management, I know that my symptoms can be managed with many types of treatments. A chronic medication trial may be part of my treatment, but I must be an active participant in my care. Medication therapy is only one part of my symptom management plan. In some cases, there may be limited scientific evidence to support the chronic use of certain medications to improve symptoms and daily function. Furthermore, in certain circumstances, there may be scientific information that suggests that use of chronic controlled substances may actually worsen my symptoms and increase my risk of unintentional death directly related to this medication therapy. I know that if my provider feels my risk from controlled medications is greater than my benefit, I will have my controlled substance medication(s) compassionately lowered or removed altogether. I agree to a controlled substance medication trial.      I further agree to allow this office to contact family or friends if there are concerns about my safety and use of the controlled medications. I have agreed to use the following medications above as instructed by my physician and as stated in this [facility] [address].      Patient Signature:  ______________________  Date:12/8/2017 or _____________    Provider Signature:______________________  Date:12/8/2017 or _____________

## 2017-12-11 DIAGNOSIS — E03.9 ACQUIRED HYPOTHYROIDISM: ICD-10-CM

## 2017-12-12 RX ORDER — LEVOTHYROXINE SODIUM 0.05 MG/1
TABLET ORAL
Qty: 90 TABLET | Refills: 1 | Status: SHIPPED | OUTPATIENT
Start: 2017-12-12 | End: 2018-06-15 | Stop reason: SDUPTHER

## 2017-12-12 NOTE — TELEPHONE ENCOUNTER
Health Maintenance   Topic Date Due    DEXA (modify frequency per FRAX score)  01/25/2017    Cervical cancer screen  06/08/2018 (Originally 2/10/2017)    Pneumococcal low/med risk (2 of 2 - PPSV23) 12/08/2018    Breast cancer screen  01/11/2019    Diabetes screen  06/30/2019    Colon cancer screen colonoscopy  10/26/2019    Lipid screen  01/16/2022    DTaP/Tdap/Td vaccine (2 - Td) 02/10/2024    Zostavax vaccine  Addressed    Flu vaccine  Completed    Hepatitis C screen  Addressed    HIV screen  Addressed             (applicable per patient's age: Cancer Screenings, Depression Screening, Fall Risk Screening, Immunizations)    LDL Cholesterol (mg/dL)   Date Value   01/19/2012 178 (H)     LDL Calculated (mg/dL)   Date Value   01/16/2017 159     AST (U/L)   Date Value   11/30/2017 24     ALT (U/L)   Date Value   11/30/2017 16     BUN (mg/dL)   Date Value   11/30/2017 10      (goal A1C is < 7)   (goal LDL is <100) need 30-50% reduction from baseline     BP Readings from Last 3 Encounters:   12/08/17 (!) 160/100   11/30/17 (!) 140/92   11/10/17 (!) 167/75    (goal /80)      All Future Testing planned in [facility]:  Lab Frequency Next Occurrence   PEREZ Digital Screen Bilateral Once 01/09/2017   Lipid Panel Once 81/03/9812   Basic Metabolic Panel Once 19/51/6915   TSH without Reflex Once 03/18/2018   Vitamin D 25 Hydroxy Once 03/18/2018   XR LUMBAR SPINE STANDARD EXTENDED VW Once 06/08/2017   Potassium Once 01/06/2018   DEXA BONE DENSITY PERIPHERAL Once 09/26/2018   EKG 12 Lead Once 09/29/2018   CBC Auto Differential Once 07/30/2018   Comprehensive Metabolic Panel Once 51/26/8776   Lipid Panel Once 07/30/2018   EKG 12 Lead Once 02/28/2018   Type And Crossmatch Once 11/30/2017       Next Visit Date:  Future Appointments  Date Time Provider Linda Merchant   12/20/2017 10:00 AM CHAU Torres [facility] CARD PROCEDURE Evert VANGWMARCIA   6/8/2018 1:30 PM Nicholas Khan, Ligia Mckeon            Patient

## 2017-12-18 RX ORDER — CLONIDINE HYDROCHLORIDE 0.1 MG/1
0.1 TABLET ORAL 2 TIMES DAILY PRN
Qty: 10 TABLET | Refills: 0 | Status: SHIPPED | OUTPATIENT
Start: 2017-12-18 | End: 2020-02-24

## 2017-12-18 RX ORDER — LISINOPRIL 20 MG/1
40 TABLET ORAL DAILY
Qty: 30 TABLET | Refills: 3 | Status: SHIPPED | OUTPATIENT
Start: 2017-12-18 | End: 2018-01-02 | Stop reason: SDUPTHER

## 2017-12-21 ENCOUNTER — TELEPHONE (OUTPATIENT)
Dept: CARDIOLOGY CLINIC | Age: 67
End: 2017-12-21

## 2017-12-21 DIAGNOSIS — I20.9 ANGINA, CLASS III (HCC): Primary | ICD-10-CM

## 2017-12-22 DIAGNOSIS — I71.21 ASCENDING AORTIC ANEURYSM: Primary | ICD-10-CM

## 2017-12-28 ENCOUNTER — HOSPITAL ENCOUNTER (OUTPATIENT)
Dept: CT IMAGING | Age: 67
Discharge: HOME | End: 2017-12-28
Payer: MEDICARE

## 2017-12-28 DIAGNOSIS — I71.21 ASCENDING AORTIC ANEURYSM: ICD-10-CM

## 2017-12-28 PROCEDURE — 6360000004 HC RX CONTRAST MEDICATION: Performed by: INTERNAL MEDICINE

## 2017-12-28 PROCEDURE — 71275 CT ANGIOGRAPHY CHEST: CPT

## 2017-12-28 RX ADMIN — IOVERSOL 100 ML: 741 INJECTION INTRA-ARTERIAL; INTRAVENOUS at 13:58

## 2018-01-02 ENCOUNTER — TELEPHONE (OUTPATIENT)
Dept: CARDIOLOGY CLINIC | Age: 68
End: 2018-01-02

## 2018-01-02 DIAGNOSIS — I10 ESSENTIAL HYPERTENSION: ICD-10-CM

## 2018-01-02 RX ORDER — LISINOPRIL 20 MG/1
20 TABLET ORAL DAILY
Qty: 30 TABLET | Refills: 2 | Status: SHIPPED | OUTPATIENT
Start: 2018-01-02 | End: 2018-03-12 | Stop reason: SDUPTHER

## 2018-01-02 RX ORDER — BLOOD PRESSURE TEST KIT
1 KIT MISCELLANEOUS ONCE
Qty: 1 KIT | Refills: 0 | OUTPATIENT
Start: 2018-01-02 | End: 2018-01-02

## 2018-01-02 RX ORDER — LISINOPRIL AND HYDROCHLOROTHIAZIDE 25; 20 MG/1; MG/1
TABLET ORAL
Qty: 90 TABLET | Refills: 1 | OUTPATIENT
Start: 2018-01-02

## 2018-01-11 ENCOUNTER — HOSPITAL ENCOUNTER (OUTPATIENT)
Dept: CARDIAC REHAB | Age: 68
Setting detail: THERAPIES SERIES
Discharge: HOME | End: 2018-01-11
Payer: MEDICARE

## 2018-02-09 ENCOUNTER — OFFICE VISIT (OUTPATIENT)
Dept: CARDIOLOGY CLINIC | Age: 68
End: 2018-02-09
Payer: MEDICARE

## 2018-02-09 VITALS
SYSTOLIC BLOOD PRESSURE: 120 MMHG | HEART RATE: 80 BPM | OXYGEN SATURATION: 95 % | WEIGHT: 167 LBS | BODY MASS INDEX: 29.58 KG/M2 | DIASTOLIC BLOOD PRESSURE: 60 MMHG

## 2018-02-09 DIAGNOSIS — E78.5 HYPERLIPIDEMIA, UNSPECIFIED HYPERLIPIDEMIA TYPE: Primary | ICD-10-CM

## 2018-02-09 PROCEDURE — 4040F PNEUMOC VAC/ADMIN/RCVD: CPT | Performed by: INTERNAL MEDICINE

## 2018-02-09 PROCEDURE — 1090F PRES/ABSN URINE INCON ASSESS: CPT | Performed by: INTERNAL MEDICINE

## 2018-02-09 PROCEDURE — G8427 DOCREV CUR MEDS BY ELIG CLIN: HCPCS | Performed by: INTERNAL MEDICINE

## 2018-02-09 PROCEDURE — G8417 CALC BMI ABV UP PARAM F/U: HCPCS | Performed by: INTERNAL MEDICINE

## 2018-02-09 PROCEDURE — 1123F ACP DISCUSS/DSCN MKR DOCD: CPT | Performed by: INTERNAL MEDICINE

## 2018-02-09 PROCEDURE — G8400 PT W/DXA NO RESULTS DOC: HCPCS | Performed by: INTERNAL MEDICINE

## 2018-02-09 PROCEDURE — 99213 OFFICE O/P EST LOW 20 MIN: CPT | Performed by: INTERNAL MEDICINE

## 2018-02-09 PROCEDURE — G8484 FLU IMMUNIZE NO ADMIN: HCPCS | Performed by: INTERNAL MEDICINE

## 2018-02-09 PROCEDURE — 3017F COLORECTAL CA SCREEN DOC REV: CPT | Performed by: INTERNAL MEDICINE

## 2018-02-09 PROCEDURE — 1036F TOBACCO NON-USER: CPT | Performed by: INTERNAL MEDICINE

## 2018-02-09 PROCEDURE — 3014F SCREEN MAMMO DOC REV: CPT | Performed by: INTERNAL MEDICINE

## 2018-02-09 RX ORDER — LOVASTATIN 20 MG/1
20 TABLET ORAL NIGHTLY
Qty: 30 TABLET | Refills: 11 | Status: SHIPPED | OUTPATIENT
Start: 2018-02-09 | End: 2018-11-12 | Stop reason: SINTOL

## 2018-02-09 NOTE — PROGRESS NOTES
Ov DR Macario Shukla follow up  Post cath on 12/20  Had hiatal hernia repair  On 1/25 per DR Sachin Urban  Since surgery no chest pain  Just now starting to feel   Little better. Still on soft diet.   Tried lipitor   Caused pain in legs  Stopped med  Will start mevacor 20mg daily  Repeat lipids in 3 mths

## 2018-02-09 NOTE — LETTER
Malorie Chandler M.D. [address] [place], [place] [address]   [phone]        2018        Zohra Brito MD  [address] [facility], [address] [facility]    RE:   Justin Pruett  :  1950    Dear Dr. Claudell Macho:    CHIEF COMPLAINT:  Coronary artery disease. HISTORY OF PRESENT ILLNESS:  I met with Mrs. Inez Frey on 2018. As you know, she was having shortness of breath, had an abnormal stress test, and therefore, I did a cardiac catheterization on 2017. This showed 60% disease in the ostium of the right coronary artery, 60% proximal LAD, and 60% diagonal disease. Her circumflex had mild plaque disease and her EF was 60%. The FFR was 0.94 in the right coronary artery, 0.92 in the LAD, and 0.91 in the diagonal.  Therefore, we continued medical therapy with aggressive risk modification. She did have a dilated ascending aorta and therefore, I did a CTA of her chest, which showed her ascending aorta measured 4 cm, which was mildly enlarged. She had a hiatal hernia repair 2 weeks ago by Dr. Ankita Simons in [place]. She is, therefore, still very sore and tender from her surgery. We had recommended cardiac rehab, which of course she did not do because of her surgery. We had prescribed Lipitor 20 mg daily. She developed severe myalgias approximately 2 weeks after starting and stopped the Lipitor. MEDICATIONS:  Today are Neurontin 300 mg t.i.d., Synthroid 50 mcg daily, Prinivil 20 mg daily, Lopressor 25 mg half a tablet b.i.d., Ultram p.r.n., vitamin D 1000 units daily. DISCUSSION:  I went over her catheterization diagram with her. At this point, she has got disease in both the right coronary artery and LAD, which is moderate in severity at 60%. The goal, of course, is aggressive risk modification to prevent progression of her disease. This was explained in detail. Her LDL was 170. Therefore, it is going to be important with her documented coronary artery disease to be on a statin. She developed severe myalgias with Lipitor. I explained that she would need to try three statins and if she had myalgias with all three statins, then we could try pitavastatin. I would give her samples of pitavastatin at 2 mg to see if she tolerated that. If she did not tolerate any statins including pitavastatin, then she would be a candidate for one of the PCSK3 inhibitors such as Repatha. Because of her documented coronary artery disease, she should be on beta-blocker long term and she is currently on Lopressor 25 mg half a tablet b.i.d. Today, I gave her Mevacor 20 mg daily. If she begins to develop myalgias, she will call me and we will switch to pravastatin 20 mg daily. If she does tolerate Mevacor 20 mg daily, then I would see her for repeat lipid profile in three months. Once her lipids are treated, then I would see her on a yearly basis. If she would start developing further chest pain, shortness of breath, etc., in the future, I would want to see her earlier and we would rule out any progression of her coronary artery disease. Again, she was in a fair amount of pain from her recent hiatal hernia surgery. She will start the Mevacor in approximately two to three weeks after she is healed more from her hiatal hernia surgery. Thank you very much for allowing me the privilege of seeing Mrs. Shelton. If you have any questions on my thoughts, please do not hesitate to contact me.     Sincerely,        Sharon Vaughn    D: 02/09/2018 14:02:16       T: 02/10/2018 1:48:08     [email or web address]  Job#: [id number]     Doc#: [id number]

## 2018-02-13 NOTE — PROGRESS NOTES
Jacinda Vernon M.D. [address] [place] [address], [facility] [phone]        2018        Rose Ji MD  [address] [facility], [address] [facility]    RE:   Mary Faulkner  :  1950    Dear Dr. Ladonna Rojo:    CHIEF COMPLAINT:  Coronary artery disease. HISTORY OF PRESENT ILLNESS:  I met with Mrs. Liida Akhtar on 2018. As you know, she was having shortness of breath, had an abnormal stress test, and therefore, I did a cardiac catheterization on 2017. This showed 60% disease in the ostium of the right coronary artery, 60% proximal LAD, and 60% diagonal disease. Her circumflex had mild plaque disease and her EF was 60%. The FFR was 0.94 in the right coronary artery, 0.92 in the LAD, and 0.91 in the diagonal.  Therefore, we continued medical therapy with aggressive risk modification. She did have a dilated ascending aorta and therefore, I did a CTA of her chest, which showed her ascending aorta measured 4 cm, which was mildly enlarged. She had a hiatal hernia repair 2 weeks ago by Dr. Eugene Ceron in [place]. She is, therefore, still very sore and tender from her surgery. We had recommended cardiac rehab, which of course she did not do because of her surgery. We had prescribed Lipitor 20 mg daily. She developed severe myalgias approximately 2 weeks after starting and stopped the Lipitor. MEDICATIONS:  Today are Neurontin 300 mg t.i.d., Synthroid 50 mcg daily, Prinivil 20 mg daily, Lopressor 25 mg half a tablet b.i.d., Ultram p.r.n., vitamin D 1000 units daily. DISCUSSION:  I went over her catheterization diagram with her. At this point, she has got disease in both the right coronary artery and LAD, which is moderate in severity at 60%. The goal, of course, is aggressive risk modification to prevent progression of her disease. This was explained in detail. Her LDL was 170.   Therefore, it is going to be

## 2018-03-12 RX ORDER — LISINOPRIL 20 MG/1
TABLET ORAL
Qty: 90 TABLET | Refills: 2 | Status: SHIPPED | OUTPATIENT
Start: 2018-03-12 | End: 2018-08-13 | Stop reason: SDUPTHER

## 2018-03-20 ENCOUNTER — OFFICE VISIT (OUTPATIENT)
Dept: FAMILY MEDICINE CLINIC | Age: 68
End: 2018-03-20
Payer: MEDICARE

## 2018-03-20 VITALS
RESPIRATION RATE: 20 BRPM | SYSTOLIC BLOOD PRESSURE: 136 MMHG | BODY MASS INDEX: 28.88 KG/M2 | HEART RATE: 72 BPM | DIASTOLIC BLOOD PRESSURE: 80 MMHG | WEIGHT: 163 LBS | HEIGHT: 63 IN

## 2018-03-20 DIAGNOSIS — J20.9 ACUTE BRONCHITIS, UNSPECIFIED ORGANISM: Primary | ICD-10-CM

## 2018-03-20 DIAGNOSIS — J01.91 ACUTE RECURRENT SINUSITIS, UNSPECIFIED LOCATION: ICD-10-CM

## 2018-03-20 PROCEDURE — G8417 CALC BMI ABV UP PARAM F/U: HCPCS | Performed by: INTERNAL MEDICINE

## 2018-03-20 PROCEDURE — G8427 DOCREV CUR MEDS BY ELIG CLIN: HCPCS | Performed by: INTERNAL MEDICINE

## 2018-03-20 PROCEDURE — 4040F PNEUMOC VAC/ADMIN/RCVD: CPT | Performed by: INTERNAL MEDICINE

## 2018-03-20 PROCEDURE — 3014F SCREEN MAMMO DOC REV: CPT | Performed by: INTERNAL MEDICINE

## 2018-03-20 PROCEDURE — 1036F TOBACCO NON-USER: CPT | Performed by: INTERNAL MEDICINE

## 2018-03-20 PROCEDURE — 1123F ACP DISCUSS/DSCN MKR DOCD: CPT | Performed by: INTERNAL MEDICINE

## 2018-03-20 PROCEDURE — G8482 FLU IMMUNIZE ORDER/ADMIN: HCPCS | Performed by: INTERNAL MEDICINE

## 2018-03-20 PROCEDURE — 99213 OFFICE O/P EST LOW 20 MIN: CPT | Performed by: INTERNAL MEDICINE

## 2018-03-20 PROCEDURE — G8400 PT W/DXA NO RESULTS DOC: HCPCS | Performed by: INTERNAL MEDICINE

## 2018-03-20 PROCEDURE — 3017F COLORECTAL CA SCREEN DOC REV: CPT | Performed by: INTERNAL MEDICINE

## 2018-03-20 PROCEDURE — 1090F PRES/ABSN URINE INCON ASSESS: CPT | Performed by: INTERNAL MEDICINE

## 2018-03-20 RX ORDER — LEVOFLOXACIN 500 MG/1
500 TABLET, FILM COATED ORAL DAILY
Qty: 5 TABLET | Refills: 0 | Status: SHIPPED | OUTPATIENT
Start: 2018-03-20 | End: 2018-03-25

## 2018-03-20 RX ORDER — GUAIFENESIN AND CODEINE PHOSPHATE 100; 10 MG/5ML; MG/5ML
SOLUTION ORAL
Qty: 118 ML | Refills: 0 | Status: SHIPPED | OUTPATIENT
Start: 2018-03-20 | End: 2018-03-25

## 2018-03-20 RX ORDER — OXYCODONE HYDROCHLORIDE AND ACETAMINOPHEN 5; 325 MG/1; MG/1
TABLET ORAL
COMMUNITY
Start: 2018-01-27 | End: 2018-03-20 | Stop reason: ALTCHOICE

## 2018-03-20 ASSESSMENT — ENCOUNTER SYMPTOMS
SHORTNESS OF BREATH: 1
BLURRED VISION: 0
RHINORRHEA: 1
SPUTUM PRODUCTION: 1
DIARRHEA: 0
SINUS PAIN: 1
EYE PAIN: 0
ABDOMINAL PAIN: 0
SINUS COMPLAINT: 1
NAUSEA: 1
BLOOD IN STOOL: 0
HEMOPTYSIS: 0
COUGH: 1
HEARTBURN: 0
SORE THROAT: 1
SINUS PRESSURE: 1
WHEEZING: 0
VOMITING: 1

## 2018-03-20 NOTE — PROGRESS NOTES
(CATAPRES) 0.1 MG tablet Take 1 tablet by mouth 2 times daily as needed for High Blood Pressure If systolic BP above 938 24/86/71 12/23/17  Pam Juares MD        Allergies:       Patient has no known allergies. Social History:     Tobacco:    reports that she has never smoked. She has never used smokeless tobacco.  Alcohol:      reports that she does not drink alcohol. Drug Use:  reports that she does not use drugs. Family History:     Family History   Problem Relation Age of Onset    Stroke Mother     High Cholesterol Mother     Heart Attack Father     High Cholesterol Father     High Cholesterol Sister     High Cholesterol Brother        Review of Systems:         Review of Systems   Constitutional: Positive for chills and diaphoresis. Negative for fever and weight loss. HENT: Positive for congestion, ear pain, rhinorrhea, sinus pain, sinus pressure and sore throat. Negative for ear discharge, hearing loss, sneezing and tinnitus. Eyes: Negative for blurred vision and pain. Respiratory: Positive for cough, sputum production and shortness of breath. Negative for hemoptysis and wheezing. Cardiovascular: Negative for chest pain, palpitations and leg swelling. Gastrointestinal: Positive for nausea and vomiting. Negative for abdominal pain, blood in stool, diarrhea and heartburn. Genitourinary: Negative for dysuria. Musculoskeletal: Positive for myalgias. Skin: Negative for rash. Neurological: Positive for headaches. Negative for dizziness. Psychiatric/Behavioral: Negative for depression. The patient is not nervous/anxious. Physical Exam:     Vitals:  /80 (Site: Left Arm, Position: Sitting, Cuff Size: Medium Adult)   Pulse 72   Resp 20   Ht 5' 3\" (1.6 m)   Wt 163 lb (73.9 kg)   BMI 28.87 kg/m²       Physical Exam   Constitutional: She is oriented to person, place, and time. She appears well-developed and well-nourished. No distress.    HENT:   Head:

## 2018-03-23 ENCOUNTER — TELEPHONE (OUTPATIENT)
Dept: FAMILY MEDICINE CLINIC | Age: 68
End: 2018-03-23

## 2018-03-26 ENCOUNTER — TELEPHONE (OUTPATIENT)
Dept: FAMILY MEDICINE CLINIC | Age: 68
End: 2018-03-26

## 2018-03-26 NOTE — TELEPHONE ENCOUNTER
No need for more antibiotics, cough and congestion may persist for 1-2 more weeks
MD Lexis Andres [facility]            Patient Active Problem List:     Hypertension     Allergic rhinitis     Anxiety     Depression     Fibromyalgia     Carpal tunnel syndrome     Insomnia     Hyperlipidemia     Fibromyalgia syndrome     Depression, major     Abdominal pain, acute, epigastric     Chronic low back pain     Rotator cuff tendinitis     Neuropathy of right lower extremity     Epidermoid cyst of skin     Eczema intertrigo     Lumbar and sacral osteoarthritis     Acquired hypothyroidism     Lymphadenopathy, cervical

## 2018-04-23 ENCOUNTER — OFFICE VISIT (OUTPATIENT)
Dept: FAMILY MEDICINE CLINIC | Age: 68
End: 2018-04-23
Payer: MEDICARE

## 2018-04-23 VITALS
SYSTOLIC BLOOD PRESSURE: 120 MMHG | BODY MASS INDEX: 28.17 KG/M2 | WEIGHT: 159 LBS | HEIGHT: 63 IN | RESPIRATION RATE: 18 BRPM | DIASTOLIC BLOOD PRESSURE: 80 MMHG | HEART RATE: 72 BPM

## 2018-04-23 DIAGNOSIS — H60.501 ACUTE OTITIS EXTERNA OF RIGHT EAR, UNSPECIFIED TYPE: Primary | ICD-10-CM

## 2018-04-23 PROCEDURE — 1123F ACP DISCUSS/DSCN MKR DOCD: CPT | Performed by: INTERNAL MEDICINE

## 2018-04-23 PROCEDURE — 3017F COLORECTAL CA SCREEN DOC REV: CPT | Performed by: INTERNAL MEDICINE

## 2018-04-23 PROCEDURE — G8427 DOCREV CUR MEDS BY ELIG CLIN: HCPCS | Performed by: INTERNAL MEDICINE

## 2018-04-23 PROCEDURE — 99213 OFFICE O/P EST LOW 20 MIN: CPT | Performed by: INTERNAL MEDICINE

## 2018-04-23 PROCEDURE — 1036F TOBACCO NON-USER: CPT | Performed by: INTERNAL MEDICINE

## 2018-04-23 PROCEDURE — G8417 CALC BMI ABV UP PARAM F/U: HCPCS | Performed by: INTERNAL MEDICINE

## 2018-04-23 PROCEDURE — 4130F TOPICAL PREP RX AOE: CPT | Performed by: INTERNAL MEDICINE

## 2018-04-23 PROCEDURE — 4040F PNEUMOC VAC/ADMIN/RCVD: CPT | Performed by: INTERNAL MEDICINE

## 2018-04-23 PROCEDURE — 1090F PRES/ABSN URINE INCON ASSESS: CPT | Performed by: INTERNAL MEDICINE

## 2018-04-23 PROCEDURE — G8400 PT W/DXA NO RESULTS DOC: HCPCS | Performed by: INTERNAL MEDICINE

## 2018-04-23 RX ORDER — AMOXICILLIN AND CLAVULANATE POTASSIUM 875; 125 MG/1; MG/1
1 TABLET, FILM COATED ORAL 2 TIMES DAILY
Qty: 14 TABLET | Refills: 0 | Status: SHIPPED | OUTPATIENT
Start: 2018-04-23 | End: 2018-04-30

## 2018-04-23 RX ORDER — RANITIDINE 150 MG/1
TABLET ORAL
COMMUNITY
Start: 2018-04-16 | End: 2018-06-08 | Stop reason: ALTCHOICE

## 2018-04-23 ASSESSMENT — ENCOUNTER SYMPTOMS
HEARTBURN: 0
ABDOMINAL PAIN: 0
EYE DISCHARGE: 1
NAUSEA: 0
SORE THROAT: 1
COUGH: 1
SHORTNESS OF BREATH: 0
DIARRHEA: 0
SINUS PAIN: 0
RHINORRHEA: 0
VOMITING: 0
BLURRED VISION: 0

## 2018-05-08 ENCOUNTER — OFFICE VISIT (OUTPATIENT)
Dept: FAMILY MEDICINE CLINIC | Age: 68
End: 2018-05-08
Payer: MEDICARE

## 2018-05-08 VITALS
HEART RATE: 68 BPM | WEIGHT: 159 LBS | RESPIRATION RATE: 18 BRPM | HEIGHT: 63 IN | SYSTOLIC BLOOD PRESSURE: 120 MMHG | DIASTOLIC BLOOD PRESSURE: 74 MMHG | BODY MASS INDEX: 28.17 KG/M2

## 2018-05-08 DIAGNOSIS — H92.09 EAR ACHE: Primary | ICD-10-CM

## 2018-05-08 DIAGNOSIS — R59.0 LYMPHADENOPATHY, CERVICAL: ICD-10-CM

## 2018-05-08 PROCEDURE — 1036F TOBACCO NON-USER: CPT | Performed by: INTERNAL MEDICINE

## 2018-05-08 PROCEDURE — 1123F ACP DISCUSS/DSCN MKR DOCD: CPT | Performed by: INTERNAL MEDICINE

## 2018-05-08 PROCEDURE — 3017F COLORECTAL CA SCREEN DOC REV: CPT | Performed by: INTERNAL MEDICINE

## 2018-05-08 PROCEDURE — 1090F PRES/ABSN URINE INCON ASSESS: CPT | Performed by: INTERNAL MEDICINE

## 2018-05-08 PROCEDURE — 4040F PNEUMOC VAC/ADMIN/RCVD: CPT | Performed by: INTERNAL MEDICINE

## 2018-05-08 PROCEDURE — G8427 DOCREV CUR MEDS BY ELIG CLIN: HCPCS | Performed by: INTERNAL MEDICINE

## 2018-05-08 PROCEDURE — G8417 CALC BMI ABV UP PARAM F/U: HCPCS | Performed by: INTERNAL MEDICINE

## 2018-05-08 PROCEDURE — 99213 OFFICE O/P EST LOW 20 MIN: CPT | Performed by: INTERNAL MEDICINE

## 2018-05-08 PROCEDURE — G8400 PT W/DXA NO RESULTS DOC: HCPCS | Performed by: INTERNAL MEDICINE

## 2018-05-08 ASSESSMENT — ENCOUNTER SYMPTOMS
NAUSEA: 0
HEARTBURN: 0
COUGH: 0
SHORTNESS OF BREATH: 0
BLURRED VISION: 0
ABDOMINAL PAIN: 0
SORE THROAT: 0

## 2018-05-10 ENCOUNTER — HOSPITAL ENCOUNTER (OUTPATIENT)
Age: 68
Discharge: HOME | End: 2018-05-10
Payer: MEDICARE

## 2018-05-10 ENCOUNTER — HOSPITAL ENCOUNTER (OUTPATIENT)
Dept: CT IMAGING | Age: 68
Discharge: HOME | End: 2018-05-12
Payer: MEDICARE

## 2018-05-10 DIAGNOSIS — R59.0 LYMPHADENOPATHY, CERVICAL: ICD-10-CM

## 2018-05-10 DIAGNOSIS — H92.09 EAR ACHE: ICD-10-CM

## 2018-05-10 LAB
ANION GAP SERPL CALCULATED.3IONS-SCNC: 9 MMOL/L (ref 9–17)
BUN BLDV-MCNC: 8 MG/DL (ref 8–23)
BUN/CREAT BLD: 17 (ref 9–20)
CALCIUM SERPL-MCNC: 9.3 MG/DL (ref 8.6–10.4)
CHLORIDE BLD-SCNC: 101 MMOL/L (ref 98–107)
CO2: 31 MMOL/L (ref 20–31)
CREAT SERPL-MCNC: 0.47 MG/DL (ref 0.5–0.9)
GFR AFRICAN AMERICAN: >60 ML/MIN
GFR NON-AFRICAN AMERICAN: >60 ML/MIN
GFR SERPL CREATININE-BSD FRML MDRD: ABNORMAL ML/MIN/{1.73_M2}
GFR SERPL CREATININE-BSD FRML MDRD: ABNORMAL ML/MIN/{1.73_M2}
GLUCOSE BLD-MCNC: 97 MG/DL (ref 70–99)
POTASSIUM SERPL-SCNC: 3.3 MMOL/L (ref 3.7–5.3)
SODIUM BLD-SCNC: 141 MMOL/L (ref 135–144)

## 2018-05-10 PROCEDURE — 80048 BASIC METABOLIC PNL TOTAL CA: CPT

## 2018-05-10 PROCEDURE — 6360000004 HC RX CONTRAST MEDICATION: Performed by: INTERNAL MEDICINE

## 2018-05-10 PROCEDURE — 70491 CT SOFT TISSUE NECK W/DYE: CPT

## 2018-05-10 PROCEDURE — 36415 COLL VENOUS BLD VENIPUNCTURE: CPT

## 2018-05-10 RX ADMIN — IOPAMIDOL 75 ML: 755 INJECTION, SOLUTION INTRAVENOUS at 14:28

## 2018-05-17 DIAGNOSIS — E04.1 THYROID NODULE: Primary | ICD-10-CM

## 2018-06-07 DIAGNOSIS — R94.39 ABNORMAL STRESS ECG: ICD-10-CM

## 2018-06-07 DIAGNOSIS — R07.9 CHEST PAIN, UNSPECIFIED TYPE: ICD-10-CM

## 2018-06-07 DIAGNOSIS — I10 ESSENTIAL HYPERTENSION: ICD-10-CM

## 2018-06-08 ENCOUNTER — OFFICE VISIT (OUTPATIENT)
Dept: CARDIOLOGY CLINIC | Age: 68
End: 2018-06-08
Payer: MEDICARE

## 2018-06-08 VITALS
WEIGHT: 153 LBS | DIASTOLIC BLOOD PRESSURE: 70 MMHG | OXYGEN SATURATION: 97 % | HEART RATE: 82 BPM | SYSTOLIC BLOOD PRESSURE: 142 MMHG | BODY MASS INDEX: 27.1 KG/M2

## 2018-06-08 DIAGNOSIS — E55.9 VITAMIN D DEFICIENCY DISEASE: ICD-10-CM

## 2018-06-08 DIAGNOSIS — I10 ESSENTIAL HYPERTENSION: Primary | ICD-10-CM

## 2018-06-08 DIAGNOSIS — E78.5 HYPERLIPIDEMIA, UNSPECIFIED HYPERLIPIDEMIA TYPE: ICD-10-CM

## 2018-06-08 PROCEDURE — 1036F TOBACCO NON-USER: CPT | Performed by: INTERNAL MEDICINE

## 2018-06-08 PROCEDURE — 99213 OFFICE O/P EST LOW 20 MIN: CPT | Performed by: INTERNAL MEDICINE

## 2018-06-08 PROCEDURE — G8400 PT W/DXA NO RESULTS DOC: HCPCS | Performed by: INTERNAL MEDICINE

## 2018-06-08 PROCEDURE — 1090F PRES/ABSN URINE INCON ASSESS: CPT | Performed by: INTERNAL MEDICINE

## 2018-06-08 PROCEDURE — 4040F PNEUMOC VAC/ADMIN/RCVD: CPT | Performed by: INTERNAL MEDICINE

## 2018-06-08 PROCEDURE — G8417 CALC BMI ABV UP PARAM F/U: HCPCS | Performed by: INTERNAL MEDICINE

## 2018-06-08 PROCEDURE — G8427 DOCREV CUR MEDS BY ELIG CLIN: HCPCS | Performed by: INTERNAL MEDICINE

## 2018-06-08 PROCEDURE — 1123F ACP DISCUSS/DSCN MKR DOCD: CPT | Performed by: INTERNAL MEDICINE

## 2018-06-08 PROCEDURE — 3017F COLORECTAL CA SCREEN DOC REV: CPT | Performed by: INTERNAL MEDICINE

## 2018-06-15 ENCOUNTER — OFFICE VISIT (OUTPATIENT)
Dept: FAMILY MEDICINE CLINIC | Age: 68
End: 2018-06-15
Payer: MEDICARE

## 2018-06-15 VITALS
WEIGHT: 151 LBS | DIASTOLIC BLOOD PRESSURE: 80 MMHG | HEART RATE: 75 BPM | SYSTOLIC BLOOD PRESSURE: 130 MMHG | HEIGHT: 63 IN | RESPIRATION RATE: 18 BRPM | BODY MASS INDEX: 26.75 KG/M2

## 2018-06-15 DIAGNOSIS — I10 ESSENTIAL HYPERTENSION: Primary | ICD-10-CM

## 2018-06-15 DIAGNOSIS — G57.91 NEUROPATHY OF RIGHT LOWER EXTREMITY: ICD-10-CM

## 2018-06-15 DIAGNOSIS — M79.7 FIBROMYALGIA SYNDROME: ICD-10-CM

## 2018-06-15 DIAGNOSIS — G89.29 CHRONIC MIDLINE LOW BACK PAIN WITH RIGHT-SIDED SCIATICA: ICD-10-CM

## 2018-06-15 DIAGNOSIS — F33.1 MAJOR DEPRESSIVE DISORDER, RECURRENT, MODERATE (HCC): ICD-10-CM

## 2018-06-15 DIAGNOSIS — M54.41 CHRONIC MIDLINE LOW BACK PAIN WITH RIGHT-SIDED SCIATICA: ICD-10-CM

## 2018-06-15 DIAGNOSIS — F33.9 RECURRENT MAJOR DEPRESSIVE DISORDER, REMISSION STATUS UNSPECIFIED (HCC): ICD-10-CM

## 2018-06-15 DIAGNOSIS — E03.9 ACQUIRED HYPOTHYROIDISM: ICD-10-CM

## 2018-06-15 PROCEDURE — G8417 CALC BMI ABV UP PARAM F/U: HCPCS | Performed by: INTERNAL MEDICINE

## 2018-06-15 PROCEDURE — G8400 PT W/DXA NO RESULTS DOC: HCPCS | Performed by: INTERNAL MEDICINE

## 2018-06-15 PROCEDURE — 1090F PRES/ABSN URINE INCON ASSESS: CPT | Performed by: INTERNAL MEDICINE

## 2018-06-15 PROCEDURE — 4040F PNEUMOC VAC/ADMIN/RCVD: CPT | Performed by: INTERNAL MEDICINE

## 2018-06-15 PROCEDURE — 3017F COLORECTAL CA SCREEN DOC REV: CPT | Performed by: INTERNAL MEDICINE

## 2018-06-15 PROCEDURE — 1036F TOBACCO NON-USER: CPT | Performed by: INTERNAL MEDICINE

## 2018-06-15 PROCEDURE — 1123F ACP DISCUSS/DSCN MKR DOCD: CPT | Performed by: INTERNAL MEDICINE

## 2018-06-15 PROCEDURE — 99214 OFFICE O/P EST MOD 30 MIN: CPT | Performed by: INTERNAL MEDICINE

## 2018-06-15 PROCEDURE — G8427 DOCREV CUR MEDS BY ELIG CLIN: HCPCS | Performed by: INTERNAL MEDICINE

## 2018-06-15 RX ORDER — FLUTICASONE PROPIONATE 50 MCG
2 SPRAY, SUSPENSION (ML) NASAL
COMMUNITY
Start: 2018-06-15 | End: 2018-11-12 | Stop reason: SDUPTHER

## 2018-06-15 RX ORDER — LEVOTHYROXINE SODIUM 0.05 MG/1
TABLET ORAL
Qty: 90 TABLET | Refills: 1 | Status: SHIPPED | OUTPATIENT
Start: 2018-06-15 | End: 2018-11-05 | Stop reason: SDUPTHER

## 2018-06-15 RX ORDER — GABAPENTIN 300 MG/1
300 CAPSULE ORAL 3 TIMES DAILY
Qty: 270 CAPSULE | Refills: 1 | Status: SHIPPED | OUTPATIENT
Start: 2018-06-15 | End: 2018-11-12 | Stop reason: SDUPTHER

## 2018-06-15 RX ORDER — TRAMADOL HYDROCHLORIDE 50 MG/1
50 TABLET ORAL EVERY 8 HOURS PRN
Qty: 90 TABLET | Refills: 3 | Status: SHIPPED | OUTPATIENT
Start: 2018-06-15 | End: 2018-07-15

## 2018-06-15 ASSESSMENT — ENCOUNTER SYMPTOMS
HEARTBURN: 0
ABDOMINAL PAIN: 0
NAUSEA: 0
COUGH: 0
SORE THROAT: 0
SHORTNESS OF BREATH: 0
BLURRED VISION: 0
ORTHOPNEA: 0

## 2018-08-09 ENCOUNTER — OFFICE VISIT (OUTPATIENT)
Dept: FAMILY MEDICINE CLINIC | Age: 68
End: 2018-08-09
Payer: MEDICARE

## 2018-08-09 VITALS
HEART RATE: 72 BPM | DIASTOLIC BLOOD PRESSURE: 84 MMHG | HEIGHT: 63 IN | BODY MASS INDEX: 26.22 KG/M2 | WEIGHT: 148 LBS | SYSTOLIC BLOOD PRESSURE: 136 MMHG | RESPIRATION RATE: 18 BRPM

## 2018-08-09 DIAGNOSIS — F41.9 ANXIETY: ICD-10-CM

## 2018-08-09 DIAGNOSIS — R10.13 EPIGASTRIC PAIN: Primary | ICD-10-CM

## 2018-08-09 PROCEDURE — 1090F PRES/ABSN URINE INCON ASSESS: CPT | Performed by: INTERNAL MEDICINE

## 2018-08-09 PROCEDURE — G8400 PT W/DXA NO RESULTS DOC: HCPCS | Performed by: INTERNAL MEDICINE

## 2018-08-09 PROCEDURE — G8417 CALC BMI ABV UP PARAM F/U: HCPCS | Performed by: INTERNAL MEDICINE

## 2018-08-09 PROCEDURE — 99213 OFFICE O/P EST LOW 20 MIN: CPT | Performed by: INTERNAL MEDICINE

## 2018-08-09 PROCEDURE — 1101F PT FALLS ASSESS-DOCD LE1/YR: CPT | Performed by: INTERNAL MEDICINE

## 2018-08-09 PROCEDURE — 1036F TOBACCO NON-USER: CPT | Performed by: INTERNAL MEDICINE

## 2018-08-09 PROCEDURE — 3017F COLORECTAL CA SCREEN DOC REV: CPT | Performed by: INTERNAL MEDICINE

## 2018-08-09 PROCEDURE — 1123F ACP DISCUSS/DSCN MKR DOCD: CPT | Performed by: INTERNAL MEDICINE

## 2018-08-09 PROCEDURE — G8427 DOCREV CUR MEDS BY ELIG CLIN: HCPCS | Performed by: INTERNAL MEDICINE

## 2018-08-09 PROCEDURE — 4040F PNEUMOC VAC/ADMIN/RCVD: CPT | Performed by: INTERNAL MEDICINE

## 2018-08-09 RX ORDER — OMEPRAZOLE 40 MG/1
40 CAPSULE, DELAYED RELEASE ORAL DAILY
Qty: 30 CAPSULE | Refills: 2 | Status: SHIPPED | OUTPATIENT
Start: 2018-08-09 | End: 2019-02-12 | Stop reason: SDUPTHER

## 2018-08-09 RX ORDER — HYDROXYZINE HYDROCHLORIDE 25 MG/1
25 TABLET, FILM COATED ORAL 3 TIMES DAILY PRN
Qty: 90 TABLET | Refills: 0 | Status: SHIPPED | OUTPATIENT
Start: 2018-08-09 | End: 2018-08-19

## 2018-08-09 ASSESSMENT — PATIENT HEALTH QUESTIONNAIRE - PHQ9
SUM OF ALL RESPONSES TO PHQ QUESTIONS 1-9: 2
1. LITTLE INTEREST OR PLEASURE IN DOING THINGS: 1
SUM OF ALL RESPONSES TO PHQ QUESTIONS 1-9: 2
2. FEELING DOWN, DEPRESSED OR HOPELESS: 1
SUM OF ALL RESPONSES TO PHQ9 QUESTIONS 1 & 2: 2

## 2018-08-09 ASSESSMENT — ENCOUNTER SYMPTOMS
CONSTIPATION: 0
ABDOMINAL PAIN: 1
COUGH: 0
SHORTNESS OF BREATH: 0
VOMITING: 0
HEMATOCHEZIA: 0
BELCHING: 0
FLATUS: 0
BLURRED VISION: 0
SORE THROAT: 0
HEARTBURN: 0
DIARRHEA: 1
NAUSEA: 1

## 2018-08-09 NOTE — PROGRESS NOTES
HPI Notes    Name: Eduardo Bundy  : 1950         Chief Complaint:     Chief Complaint   Patient presents with    Abdominal Pain     after eating a meal-gets diarrhea-has had this problem since her hernia surgery in        History of Present Illness:        Roxanne presents to office for evaluation of abdominal pain and anxiety. She says she had hiatal hernia surgery/repair by Dr. Eusebio Agustin in January of this year and since then has been Having intermittent pains in epigastric area. Last 1 month or so the pain is more frequent and more severe  She states that after each meal her stomach hurts, she develops bloating and diarrhea. She lost almost 20 pounds in the past 10 months. She denies vomiting but does feel nauseous. She is not taking omeprazole since her surgery    She also complains of episodes of significant anxiety due to her 's alcohol problems. She is resting her 11year-old child with her  and he has been drinking a lot lately  She states that he is not physically or verbally abusive but she is worried about his health issues and her family well-being. She is Asking for Xanax to be used as needed During anxiety attacks. She reports no problems with insomnia. She does not feel she is depressed. Has no suicidal ideations      Abdominal Pain   This is a new problem. The current episode started more than 1 month ago. The onset quality is gradual. The problem occurs 2 to 4 times per day. The problem has been gradually worsening. The pain is located in the epigastric region and LUQ. The pain is moderate. The quality of the pain is aching and cramping. The abdominal pain does not radiate. Associated symptoms include diarrhea, nausea and weight loss. Pertinent negatives include no anorexia, arthralgias, belching, constipation, dysuria, fever, flatus, headaches, hematochezia, melena or vomiting. The pain is aggravated by eating. The pain is relieved by nothing.  She has tried nothing for the symptoms. Her past medical history is significant for abdominal surgery. Past Medical History:     Past Medical History:   Diagnosis Date    Allergic rhinitis 2011    Anxiety 2011    Carpal tunnel syndrome 2011    Depression 2011    Dyslipidemia 2011    Fibromyalgia 2011    Hyperlipidemia     Hypertension 2011    SOB (shortness of breath)       Reviewed all health maintenance requirements and ordered appropriate tests  Health Maintenance Due   Topic Date Due    Shingles Vaccine (1 of 2 - 2 Dose Series) 2002    DEXA (modify frequency per FRAX score)  2017       Past Surgical History:     Past Surgical History:   Procedure Laterality Date    CARDIAC CATHETERIZATION Left 2017    Dr. Darren Weir @ [facility]--CAD, 50-60% ostial right CAD with an FFR of 0.94.  60% disease in the proximal LAD with an FFR of 0.92 with bridging of the mid LAD. Normal LV function at 60%. Mildly dilated ascending aorta -will do a CT scan to measure.   SECTION      x 4    ELBOW SURGERY Right 1996    GALLBLADDER SURGERY      HYSTERECTOMY      total hysterectomy    ROTATOR CUFF REPAIR      Right    SMALL INTESTINE SURGERY      twisted bowel         Medications:       Prior to Admission medications    Medication Sig Start Date End Date Taking?  Authorizing Provider   omeprazole (PRILOSEC) 40 MG delayed release capsule Take 1 capsule by mouth daily 18 Yes Rosey Larkin MD   hydrOXYzine (ATARAX) 25 MG tablet Take 1 tablet by mouth 3 times daily as needed for Itching 18 Yes Rosey Larkin MD   fluticasone (FLONASE) 50 MCG/ACT nasal spray 2 sprays by Nasal route 6/15/18 6/15/19 Yes Historical Provider, MD   metoprolol tartrate (LOPRESSOR) 25 MG tablet Take 0.5 tablets by mouth 2 times daily 6/15/18  Yes Rosey Larkin MD   levothyroxine (SYNTHROID) 50 MCG tablet TAKE 1 TABLET EVERY DAY 6/15/18  Yes Rosey Larkin MD Calcium Polycarbophil (FIBER-CAPS PO) Take by mouth   Yes Historical Provider, MD   lisinopril (PRINIVIL;ZESTRIL) 20 MG tablet TAKE 1 TABLET ONE TIME DAILY 3/12/18  Yes Carolina Casey MD   lovastatin (MEVACOR) 20 MG tablet Take 1 tablet by mouth nightly 2/9/18  Yes Renella Dancer, MD   vitamin D (CHOLECALCIFEROL) 1000 UNIT TABS tablet Take 1,000 Units by mouth daily   Yes Historical Provider, MD   gabapentin (NEURONTIN) 300 MG capsule Take 1 capsule by mouth 3 times daily for 30 days. . 6/15/18 7/15/18  Carolina Casey MD   cloNIDine (CATAPRES) 0.1 MG tablet Take 1 tablet by mouth 2 times daily as needed for High Blood Pressure If systolic BP above 408 30/82/86 12/23/17  Carolina Casey MD        Allergies:       Patient has no known allergies. Social History:     Tobacco:    reports that she has never smoked. She has never used smokeless tobacco.  Alcohol:      reports that she does not drink alcohol. Drug Use:  reports that she does not use drugs. Family History:     Family History   Problem Relation Age of Onset    Stroke Mother     High Cholesterol Mother     Heart Attack Father     High Cholesterol Father     High Cholesterol Sister     High Cholesterol Brother        Review of Systems:         Review of Systems   Constitutional: Positive for weight loss. Negative for chills, fever and malaise/fatigue. HENT: Negative for congestion and sore throat. Eyes: Negative for blurred vision. Respiratory: Negative for cough and shortness of breath. Cardiovascular: Negative for chest pain and palpitations. Gastrointestinal: Positive for abdominal pain, diarrhea and nausea. Negative for anorexia, constipation, flatus, heartburn, hematochezia, melena and vomiting. Genitourinary: Negative for dysuria. Musculoskeletal: Negative for arthralgias. Skin: Negative for rash. Neurological: Negative for dizziness and headaches.    Psychiatric/Behavioral: Negative for depression, substance abuse tablet 90 tablet 0     Sig: Take 1 tablet by mouth 3 times daily as needed for Itching

## 2018-08-14 RX ORDER — LISINOPRIL 20 MG/1
TABLET ORAL
Qty: 90 TABLET | Refills: 1 | Status: SHIPPED | OUTPATIENT
Start: 2018-08-14 | End: 2019-01-08 | Stop reason: SDUPTHER

## 2018-08-27 ENCOUNTER — TELEPHONE (OUTPATIENT)
Dept: FAMILY MEDICINE CLINIC | Age: 68
End: 2018-08-27

## 2018-08-27 NOTE — TELEPHONE ENCOUNTER
Since she had the hiatal hernia repair done by Dr. Li Quintero, she needs to follow up with him regarding epigastric pain  Thanks

## 2018-11-05 DIAGNOSIS — E03.9 ACQUIRED HYPOTHYROIDISM: ICD-10-CM

## 2018-11-05 RX ORDER — LEVOTHYROXINE SODIUM 0.05 MG/1
TABLET ORAL
Qty: 90 TABLET | Refills: 1 | Status: SHIPPED | OUTPATIENT
Start: 2018-11-05 | End: 2019-03-19 | Stop reason: SDUPTHER

## 2018-11-12 ENCOUNTER — OFFICE VISIT (OUTPATIENT)
Dept: FAMILY MEDICINE CLINIC | Age: 68
End: 2018-11-12
Payer: MEDICARE

## 2018-11-12 VITALS
WEIGHT: 143 LBS | SYSTOLIC BLOOD PRESSURE: 120 MMHG | HEIGHT: 63 IN | BODY MASS INDEX: 25.34 KG/M2 | HEART RATE: 68 BPM | DIASTOLIC BLOOD PRESSURE: 66 MMHG | RESPIRATION RATE: 18 BRPM

## 2018-11-12 DIAGNOSIS — I10 ESSENTIAL HYPERTENSION: Primary | ICD-10-CM

## 2018-11-12 DIAGNOSIS — M79.7 FIBROMYALGIA SYNDROME: ICD-10-CM

## 2018-11-12 DIAGNOSIS — R14.0 BLOATING: ICD-10-CM

## 2018-11-12 DIAGNOSIS — E03.9 HYPOTHYROIDISM, UNSPECIFIED TYPE: ICD-10-CM

## 2018-11-12 DIAGNOSIS — F33.9 RECURRENT MAJOR DEPRESSIVE DISORDER, REMISSION STATUS UNSPECIFIED (HCC): ICD-10-CM

## 2018-11-12 DIAGNOSIS — M47.817 LUMBAR AND SACRAL OSTEOARTHRITIS: ICD-10-CM

## 2018-11-12 DIAGNOSIS — E78.5 HYPERLIPIDEMIA, UNSPECIFIED HYPERLIPIDEMIA TYPE: ICD-10-CM

## 2018-11-12 PROCEDURE — 4040F PNEUMOC VAC/ADMIN/RCVD: CPT | Performed by: INTERNAL MEDICINE

## 2018-11-12 PROCEDURE — G8400 PT W/DXA NO RESULTS DOC: HCPCS | Performed by: INTERNAL MEDICINE

## 2018-11-12 PROCEDURE — 1036F TOBACCO NON-USER: CPT | Performed by: INTERNAL MEDICINE

## 2018-11-12 PROCEDURE — 1123F ACP DISCUSS/DSCN MKR DOCD: CPT | Performed by: INTERNAL MEDICINE

## 2018-11-12 PROCEDURE — G8484 FLU IMMUNIZE NO ADMIN: HCPCS | Performed by: INTERNAL MEDICINE

## 2018-11-12 PROCEDURE — G8428 CUR MEDS NOT DOCUMENT: HCPCS | Performed by: INTERNAL MEDICINE

## 2018-11-12 PROCEDURE — 99214 OFFICE O/P EST MOD 30 MIN: CPT | Performed by: INTERNAL MEDICINE

## 2018-11-12 PROCEDURE — 3017F COLORECTAL CA SCREEN DOC REV: CPT | Performed by: INTERNAL MEDICINE

## 2018-11-12 PROCEDURE — G8417 CALC BMI ABV UP PARAM F/U: HCPCS | Performed by: INTERNAL MEDICINE

## 2018-11-12 PROCEDURE — 1090F PRES/ABSN URINE INCON ASSESS: CPT | Performed by: INTERNAL MEDICINE

## 2018-11-12 PROCEDURE — 1101F PT FALLS ASSESS-DOCD LE1/YR: CPT | Performed by: INTERNAL MEDICINE

## 2018-11-12 RX ORDER — FLUTICASONE PROPIONATE 50 MCG
2 SPRAY, SUSPENSION (ML) NASAL DAILY
Qty: 3 BOTTLE | Refills: 1 | Status: SHIPPED | OUTPATIENT
Start: 2018-11-12 | End: 2019-04-01 | Stop reason: SDUPTHER

## 2018-11-12 RX ORDER — MULTIVITAMIN WITH IRON
100 TABLET ORAL DAILY
COMMUNITY
End: 2021-02-05 | Stop reason: SDUPTHER

## 2018-11-12 RX ORDER — TRAMADOL HYDROCHLORIDE 50 MG/1
50 TABLET ORAL EVERY 8 HOURS PRN
Qty: 90 TABLET | Refills: 1 | Status: SHIPPED | OUTPATIENT
Start: 2018-11-12 | End: 2018-12-12

## 2018-11-12 RX ORDER — CHOLECALCIFEROL (VITAMIN D3) 125 MCG
1 CAPSULE ORAL DAILY
COMMUNITY
End: 2021-02-05 | Stop reason: SDUPTHER

## 2018-11-12 RX ORDER — GABAPENTIN 300 MG/1
300 CAPSULE ORAL 3 TIMES DAILY
Qty: 270 CAPSULE | Refills: 1 | Status: SHIPPED | OUTPATIENT
Start: 2018-11-12 | End: 2019-01-08 | Stop reason: SDUPTHER

## 2018-11-12 ASSESSMENT — ENCOUNTER SYMPTOMS
SORE THROAT: 0
SHORTNESS OF BREATH: 0
VOMITING: 0
ABDOMINAL DISTENTION: 1
NAUSEA: 1
COUGH: 0
BOWEL INCONTINENCE: 0
ABDOMINAL PAIN: 0
BLURRED VISION: 0
BACK PAIN: 1

## 2019-01-08 DIAGNOSIS — M79.7 FIBROMYALGIA SYNDROME: ICD-10-CM

## 2019-01-10 RX ORDER — GABAPENTIN 300 MG/1
CAPSULE ORAL
Qty: 270 CAPSULE | Refills: 1 | Status: SHIPPED | OUTPATIENT
Start: 2019-01-10 | End: 2019-02-12 | Stop reason: SDUPTHER

## 2019-01-10 RX ORDER — LISINOPRIL 20 MG/1
TABLET ORAL
Qty: 90 TABLET | Refills: 1 | Status: SHIPPED | OUTPATIENT
Start: 2019-01-10 | End: 2019-09-12 | Stop reason: SDUPTHER

## 2019-01-14 ENCOUNTER — OFFICE VISIT (OUTPATIENT)
Dept: FAMILY MEDICINE CLINIC | Age: 69
End: 2019-01-14
Payer: MEDICARE

## 2019-01-14 VITALS
WEIGHT: 141 LBS | SYSTOLIC BLOOD PRESSURE: 162 MMHG | HEIGHT: 63 IN | DIASTOLIC BLOOD PRESSURE: 92 MMHG | BODY MASS INDEX: 24.98 KG/M2

## 2019-01-14 DIAGNOSIS — Z23 NEED FOR PNEUMOCOCCAL VACCINE: ICD-10-CM

## 2019-01-14 DIAGNOSIS — J01.00 ACUTE MAXILLARY SINUSITIS, RECURRENCE NOT SPECIFIED: Primary | ICD-10-CM

## 2019-01-14 DIAGNOSIS — Z23 NEED FOR INFLUENZA VACCINATION: ICD-10-CM

## 2019-01-14 PROCEDURE — G8420 CALC BMI NORM PARAMETERS: HCPCS | Performed by: INTERNAL MEDICINE

## 2019-01-14 PROCEDURE — G8484 FLU IMMUNIZE NO ADMIN: HCPCS | Performed by: INTERNAL MEDICINE

## 2019-01-14 PROCEDURE — 4040F PNEUMOC VAC/ADMIN/RCVD: CPT | Performed by: INTERNAL MEDICINE

## 2019-01-14 PROCEDURE — 1101F PT FALLS ASSESS-DOCD LE1/YR: CPT | Performed by: INTERNAL MEDICINE

## 2019-01-14 PROCEDURE — G8400 PT W/DXA NO RESULTS DOC: HCPCS | Performed by: INTERNAL MEDICINE

## 2019-01-14 PROCEDURE — 1090F PRES/ABSN URINE INCON ASSESS: CPT | Performed by: INTERNAL MEDICINE

## 2019-01-14 PROCEDURE — G8427 DOCREV CUR MEDS BY ELIG CLIN: HCPCS | Performed by: INTERNAL MEDICINE

## 2019-01-14 PROCEDURE — 1123F ACP DISCUSS/DSCN MKR DOCD: CPT | Performed by: INTERNAL MEDICINE

## 2019-01-14 PROCEDURE — 1036F TOBACCO NON-USER: CPT | Performed by: INTERNAL MEDICINE

## 2019-01-14 PROCEDURE — 3017F COLORECTAL CA SCREEN DOC REV: CPT | Performed by: INTERNAL MEDICINE

## 2019-01-14 PROCEDURE — 99213 OFFICE O/P EST LOW 20 MIN: CPT | Performed by: INTERNAL MEDICINE

## 2019-01-14 RX ORDER — PREDNISONE 20 MG/1
20 TABLET ORAL DAILY
Qty: 5 TABLET | Refills: 0 | Status: SHIPPED | OUTPATIENT
Start: 2019-01-14 | End: 2019-01-19

## 2019-01-14 RX ORDER — AMOXICILLIN AND CLAVULANATE POTASSIUM 875; 125 MG/1; MG/1
1 TABLET, FILM COATED ORAL 2 TIMES DAILY
Qty: 14 TABLET | Refills: 0 | Status: SHIPPED | OUTPATIENT
Start: 2019-01-14 | End: 2019-01-21

## 2019-01-14 ASSESSMENT — ENCOUNTER SYMPTOMS
SINUS PRESSURE: 1
SHORTNESS OF BREATH: 0
ABDOMINAL PAIN: 0
VISUAL CHANGE: 0
PHOTOPHOBIA: 1
NAUSEA: 0
SORE THROAT: 0
COUGH: 1
SINUS COMPLAINT: 1
FACIAL SWEATING: 0
RHINORRHEA: 1

## 2019-02-12 ENCOUNTER — OFFICE VISIT (OUTPATIENT)
Dept: FAMILY MEDICINE CLINIC | Age: 69
End: 2019-02-12
Payer: MEDICARE

## 2019-02-12 VITALS
HEIGHT: 63 IN | OXYGEN SATURATION: 98 % | HEART RATE: 58 BPM | BODY MASS INDEX: 24.1 KG/M2 | DIASTOLIC BLOOD PRESSURE: 68 MMHG | SYSTOLIC BLOOD PRESSURE: 158 MMHG | WEIGHT: 136 LBS

## 2019-02-12 DIAGNOSIS — M47.817 LUMBAR AND SACRAL OSTEOARTHRITIS: ICD-10-CM

## 2019-02-12 DIAGNOSIS — Z23 NEED FOR INFLUENZA VACCINATION: ICD-10-CM

## 2019-02-12 DIAGNOSIS — Z12.39 SCREENING FOR BREAST CANCER: ICD-10-CM

## 2019-02-12 DIAGNOSIS — R10.13 EPIGASTRIC PAIN: ICD-10-CM

## 2019-02-12 DIAGNOSIS — M79.7 FIBROMYALGIA SYNDROME: ICD-10-CM

## 2019-02-12 DIAGNOSIS — F33.1 MAJOR DEPRESSIVE DISORDER, RECURRENT, MODERATE (HCC): ICD-10-CM

## 2019-02-12 DIAGNOSIS — F33.9 RECURRENT MAJOR DEPRESSIVE DISORDER, REMISSION STATUS UNSPECIFIED (HCC): ICD-10-CM

## 2019-02-12 DIAGNOSIS — Z23 NEED FOR PNEUMOCOCCAL VACCINATION: ICD-10-CM

## 2019-02-12 DIAGNOSIS — I10 ESSENTIAL HYPERTENSION: Primary | ICD-10-CM

## 2019-02-12 DIAGNOSIS — E03.9 ACQUIRED HYPOTHYROIDISM: ICD-10-CM

## 2019-02-12 PROBLEM — G57.91 NEUROPATHY OF RIGHT LOWER EXTREMITY: Status: RESOLVED | Noted: 2017-01-09 | Resolved: 2019-02-12

## 2019-02-12 PROBLEM — L72.0 EPIDERMOID CYST OF SKIN: Status: RESOLVED | Noted: 2017-01-09 | Resolved: 2019-02-12

## 2019-02-12 PROCEDURE — 90732 PPSV23 VACC 2 YRS+ SUBQ/IM: CPT | Performed by: INTERNAL MEDICINE

## 2019-02-12 PROCEDURE — G8420 CALC BMI NORM PARAMETERS: HCPCS | Performed by: INTERNAL MEDICINE

## 2019-02-12 PROCEDURE — 1090F PRES/ABSN URINE INCON ASSESS: CPT | Performed by: INTERNAL MEDICINE

## 2019-02-12 PROCEDURE — G8482 FLU IMMUNIZE ORDER/ADMIN: HCPCS | Performed by: INTERNAL MEDICINE

## 2019-02-12 PROCEDURE — G8400 PT W/DXA NO RESULTS DOC: HCPCS | Performed by: INTERNAL MEDICINE

## 2019-02-12 PROCEDURE — 4040F PNEUMOC VAC/ADMIN/RCVD: CPT | Performed by: INTERNAL MEDICINE

## 2019-02-12 PROCEDURE — 1101F PT FALLS ASSESS-DOCD LE1/YR: CPT | Performed by: INTERNAL MEDICINE

## 2019-02-12 PROCEDURE — G8427 DOCREV CUR MEDS BY ELIG CLIN: HCPCS | Performed by: INTERNAL MEDICINE

## 2019-02-12 PROCEDURE — 1036F TOBACCO NON-USER: CPT | Performed by: INTERNAL MEDICINE

## 2019-02-12 PROCEDURE — 99214 OFFICE O/P EST MOD 30 MIN: CPT | Performed by: INTERNAL MEDICINE

## 2019-02-12 PROCEDURE — 1123F ACP DISCUSS/DSCN MKR DOCD: CPT | Performed by: INTERNAL MEDICINE

## 2019-02-12 PROCEDURE — 3017F COLORECTAL CA SCREEN DOC REV: CPT | Performed by: INTERNAL MEDICINE

## 2019-02-12 PROCEDURE — 90686 IIV4 VACC NO PRSV 0.5 ML IM: CPT | Performed by: INTERNAL MEDICINE

## 2019-02-12 PROCEDURE — G0008 ADMIN INFLUENZA VIRUS VAC: HCPCS | Performed by: INTERNAL MEDICINE

## 2019-02-12 PROCEDURE — G0009 ADMIN PNEUMOCOCCAL VACCINE: HCPCS | Performed by: INTERNAL MEDICINE

## 2019-02-12 RX ORDER — GABAPENTIN 300 MG/1
CAPSULE ORAL
Qty: 270 CAPSULE | Refills: 1 | Status: SHIPPED | OUTPATIENT
Start: 2019-02-12 | End: 2019-08-13 | Stop reason: SDUPTHER

## 2019-02-12 RX ORDER — OMEPRAZOLE 40 MG/1
40 CAPSULE, DELAYED RELEASE ORAL DAILY
Qty: 30 CAPSULE | Refills: 2 | Status: SHIPPED | OUTPATIENT
Start: 2019-02-12 | End: 2019-04-15 | Stop reason: SDUPTHER

## 2019-02-12 RX ORDER — TRAMADOL HYDROCHLORIDE 50 MG/1
50 TABLET ORAL 3 TIMES DAILY PRN
Qty: 90 TABLET | Refills: 2 | Status: SHIPPED | OUTPATIENT
Start: 2019-02-12 | End: 2019-04-12 | Stop reason: SDUPTHER

## 2019-02-12 RX ORDER — HYDRALAZINE HYDROCHLORIDE 25 MG/1
25 TABLET, FILM COATED ORAL 2 TIMES DAILY
Qty: 60 TABLET | Refills: 1 | Status: SHIPPED | OUTPATIENT
Start: 2019-02-12 | End: 2019-03-28 | Stop reason: SDUPTHER

## 2019-02-12 RX ORDER — TRAMADOL HYDROCHLORIDE 50 MG/1
TABLET ORAL
COMMUNITY
Start: 2019-01-07 | End: 2019-02-12 | Stop reason: SDUPTHER

## 2019-02-12 ASSESSMENT — ENCOUNTER SYMPTOMS
VOMITING: 0
ABDOMINAL DISTENTION: 1
SORE THROAT: 0
COUGH: 0
SHORTNESS OF BREATH: 0
DIARRHEA: 0
ABDOMINAL PAIN: 1
BACK PAIN: 1
BELCHING: 0
NAUSEA: 0

## 2019-03-04 ENCOUNTER — TELEPHONE (OUTPATIENT)
Dept: FAMILY MEDICINE CLINIC | Age: 69
End: 2019-03-04

## 2019-03-04 DIAGNOSIS — Z12.39 SCREENING FOR BREAST CANCER: ICD-10-CM

## 2019-03-04 LAB — TSH SERPL DL<=0.05 MIU/L-ACNC: 2.36 UIU/ML

## 2019-03-04 RX ORDER — ADHESIVE BANDAGE 3/4"
BANDAGE TOPICAL
Qty: 1 EACH | Refills: 0 | Status: SHIPPED | OUTPATIENT
Start: 2019-03-04

## 2019-03-05 DIAGNOSIS — E03.9 ACQUIRED HYPOTHYROIDISM: ICD-10-CM

## 2019-03-19 DIAGNOSIS — E03.9 ACQUIRED HYPOTHYROIDISM: ICD-10-CM

## 2019-03-20 RX ORDER — LEVOTHYROXINE SODIUM 0.05 MG/1
TABLET ORAL
Qty: 90 TABLET | Refills: 1 | Status: SHIPPED | OUTPATIENT
Start: 2019-03-20 | End: 2019-08-21 | Stop reason: SDUPTHER

## 2019-03-28 DIAGNOSIS — I10 ESSENTIAL HYPERTENSION: ICD-10-CM

## 2019-03-28 RX ORDER — LOVASTATIN 20 MG/1
TABLET ORAL
Qty: 30 TABLET | Refills: 9 | Status: SHIPPED | OUTPATIENT
Start: 2019-03-28 | End: 2019-12-27 | Stop reason: SDUPTHER

## 2019-03-28 RX ORDER — HYDRALAZINE HYDROCHLORIDE 25 MG/1
TABLET, FILM COATED ORAL
Qty: 120 TABLET | Refills: 1 | Status: SHIPPED | OUTPATIENT
Start: 2019-03-28 | End: 2019-11-06 | Stop reason: ALTCHOICE

## 2019-04-01 RX ORDER — FLUTICASONE PROPIONATE 50 MCG
SPRAY, SUSPENSION (ML) NASAL
Qty: 48 G | Refills: 1 | Status: SHIPPED | OUTPATIENT
Start: 2019-04-01 | End: 2019-08-12 | Stop reason: SDUPTHER

## 2019-04-01 NOTE — TELEPHONE ENCOUNTER
Last OV: 2/12/2019  Last RX: 11/12/18   Next scheduled apt: 4/12/2019      Medication pending      Health Maintenance   Topic Date Due    Shingles Vaccine (1 of 2) 01/25/2002    DEXA (modify frequency per FRAX score)  01/25/2017    Potassium monitoring  05/10/2019    Creatinine monitoring  05/10/2019    Colon cancer screen colonoscopy  10/26/2019    TSH testing  03/04/2020    Breast cancer screen  03/05/2021    Lipid screen  06/04/2023    DTaP/Tdap/Td vaccine (2 - Td) 02/10/2024    Flu vaccine  Completed    Pneumococcal 65+ years Vaccine  Completed    Hepatitis C screen  Addressed             (applicable per patient's age: Cancer Screenings, Depression Screening, Fall Risk Screening, Immunizations)    LDL Cholesterol (mg/dL)   Date Value   01/19/2012 178 (H)     LDL Calculated (mg/dL)   Date Value   01/16/2017 159     AST (U/L)   Date Value   11/30/2017 24     ALT (U/L)   Date Value   11/30/2017 16     BUN (mg/dL)   Date Value   05/10/2018 8      (goal A1C is < 7)   (goal LDL is <100) need 30-50% reduction from baseline     BP Readings from Last 3 Encounters:   02/12/19 (!) 158/68   01/14/19 (!) 162/92   11/12/18 120/66    (goal /80)      All Future Testing planned in [facility]:  Lab Frequency Next Occurrence   CBC Auto Differential Once 06/08/2019   Comprehensive Metabolic Panel Once 84/98/3944   Vitamin D 25 Hydroxy Once 06/08/2019   Lipid Panel Once 06/08/2019   TSH with Reflex Once 06/08/2019   Magnesium Once 06/08/2019   EKG 12 Lead Once 06/08/2019   XR CHEST STANDARD (2 VW) Once 06/08/2019   CBC Auto Differential Once 37/20/1373   Basic Metabolic Panel Once 93/93/2056   Hepatic Function Panel Once 05/01/2019       Next Visit Date:  Future Appointments   Date Time Provider Linda Merchant   4/12/2019 10:00 AM MD Price Griffin  [facility]   5/13/2019 11:00 AM MD Price Griffin  [facility]   6/7/2019  2:00 PM MD Ashley Marinelli [facility]            Patient Active

## 2019-04-12 ENCOUNTER — OFFICE VISIT (OUTPATIENT)
Dept: FAMILY MEDICINE CLINIC | Age: 69
End: 2019-04-12
Payer: MEDICARE

## 2019-04-12 VITALS
DIASTOLIC BLOOD PRESSURE: 80 MMHG | WEIGHT: 137 LBS | HEART RATE: 64 BPM | BODY MASS INDEX: 24.27 KG/M2 | HEIGHT: 63 IN | SYSTOLIC BLOOD PRESSURE: 132 MMHG | OXYGEN SATURATION: 98 %

## 2019-04-12 DIAGNOSIS — M47.817 LUMBAR AND SACRAL OSTEOARTHRITIS: ICD-10-CM

## 2019-04-12 DIAGNOSIS — F33.1 MAJOR DEPRESSIVE DISORDER, RECURRENT, MODERATE (HCC): ICD-10-CM

## 2019-04-12 DIAGNOSIS — G47.00 INSOMNIA, UNSPECIFIED TYPE: ICD-10-CM

## 2019-04-12 DIAGNOSIS — I10 ESSENTIAL HYPERTENSION: Primary | ICD-10-CM

## 2019-04-12 PROCEDURE — 4040F PNEUMOC VAC/ADMIN/RCVD: CPT | Performed by: INTERNAL MEDICINE

## 2019-04-12 PROCEDURE — G8400 PT W/DXA NO RESULTS DOC: HCPCS | Performed by: INTERNAL MEDICINE

## 2019-04-12 PROCEDURE — 1090F PRES/ABSN URINE INCON ASSESS: CPT | Performed by: INTERNAL MEDICINE

## 2019-04-12 PROCEDURE — 3017F COLORECTAL CA SCREEN DOC REV: CPT | Performed by: INTERNAL MEDICINE

## 2019-04-12 PROCEDURE — 1036F TOBACCO NON-USER: CPT | Performed by: INTERNAL MEDICINE

## 2019-04-12 PROCEDURE — 99214 OFFICE O/P EST MOD 30 MIN: CPT | Performed by: INTERNAL MEDICINE

## 2019-04-12 PROCEDURE — 1123F ACP DISCUSS/DSCN MKR DOCD: CPT | Performed by: INTERNAL MEDICINE

## 2019-04-12 PROCEDURE — 3014F SCREEN MAMMO DOC REV: CPT | Performed by: INTERNAL MEDICINE

## 2019-04-12 PROCEDURE — G8510 SCR DEP NEG, NO PLAN REQD: HCPCS | Performed by: INTERNAL MEDICINE

## 2019-04-12 PROCEDURE — G8420 CALC BMI NORM PARAMETERS: HCPCS | Performed by: INTERNAL MEDICINE

## 2019-04-12 PROCEDURE — G8427 DOCREV CUR MEDS BY ELIG CLIN: HCPCS | Performed by: INTERNAL MEDICINE

## 2019-04-12 RX ORDER — TRAMADOL HYDROCHLORIDE 50 MG/1
50 TABLET ORAL 3 TIMES DAILY PRN
Qty: 90 TABLET | Refills: 1 | Status: SHIPPED | OUTPATIENT
Start: 2019-04-12 | End: 2019-05-12

## 2019-04-12 RX ORDER — TRAZODONE HYDROCHLORIDE 50 MG/1
50 TABLET ORAL NIGHTLY PRN
Qty: 30 TABLET | Refills: 1 | Status: SHIPPED | OUTPATIENT
Start: 2019-04-12 | End: 2019-08-13 | Stop reason: DRUGHIGH

## 2019-04-12 RX ORDER — VENLAFAXINE HYDROCHLORIDE 75 MG/1
75 CAPSULE, EXTENDED RELEASE ORAL DAILY
Qty: 30 CAPSULE | Refills: 2 | Status: SHIPPED | OUTPATIENT
Start: 2019-04-12 | End: 2019-08-13 | Stop reason: SDUPTHER

## 2019-04-12 ASSESSMENT — PATIENT HEALTH QUESTIONNAIRE - PHQ9
SUM OF ALL RESPONSES TO PHQ QUESTIONS 1-9: 2
1. LITTLE INTEREST OR PLEASURE IN DOING THINGS: 1
SUM OF ALL RESPONSES TO PHQ9 QUESTIONS 1 & 2: 2
SUM OF ALL RESPONSES TO PHQ QUESTIONS 1-9: 2
2. FEELING DOWN, DEPRESSED OR HOPELESS: 1

## 2019-04-12 ASSESSMENT — ENCOUNTER SYMPTOMS
COUGH: 0
BACK PAIN: 1
ABDOMINAL PAIN: 0
SORE THROAT: 0
NAUSEA: 0
BOWEL INCONTINENCE: 0
SHORTNESS OF BREATH: 0

## 2019-04-12 NOTE — PROGRESS NOTES
HPI Notes    Name: Jose Delacruz  : 1950         Chief Complaint:     Chief Complaint   Patient presents with    Hypertension    Weight Loss    Back Pain     has been having alot of rt sided back pain that goes all the way down the leg. History of Present Illness:        Roxanne came to office to follow up for HTN, depression, chronic back pain and  insomnia. Also reports weight loss, not eating much due to high stress level. Says feels stressed because her brother and his family still  live with her. They don't work and it's causing financial strain to her family. In addition her 51 yo son lives with her, he does not work, he is addicted to drugs . Roxanne says his friends come to her house often, they are on drugs too. The even dare to eat,sleep and  take shower in her house. She is unable to sleep due to above-mentioned stressors. She has hard time falling asleep, staying asleep. She sleeps on average 2 hours. Says her heart is racing, her mind does not rest, she is afraid of her sons drug addicted friends, she is crying a lot. She wishes she could get some rest to help with stress. Roxanne presents with the complaint of depression. Current symptoms of depression include anhedonia, depressed mood, fatigue, feelings of worthlessness/guilt, hopelessness, insomnia and weight loss. Roxanne has felt depressed for many years years. Roxanne does have a history of depression. There is  a family history of depression or anxiety. Current identifiable stressors include problems with her son, family members, financial problems. Roxanne  has a  sleep disturbance and sleeps 2 hours per night. Roxanne denies the use of drugs or alcohol. Roxanne denies current suicidal ideation. She is not in counseling. She's not interested in seeing a psychiatrist at this time. Hypertension   This is a chronic problem. The current episode started more than 1 year ago. The problem is unchanged.  The problem is controlled. Associated symptoms include anxiety and palpitations. Pertinent negatives include no chest pain, headaches, peripheral edema or shortness of breath. There are no associated agents to hypertension. Past treatments include ACE inhibitors and beta blockers. The current treatment provides significant improvement. There are no compliance problems. Back Pain   This is a chronic problem. The current episode started more than 1 year ago. The problem occurs constantly. The pain is present in the lumbar spine. The quality of the pain is described as stabbing. The pain radiates to the right thigh. The pain is at a severity of 8/10. The symptoms are aggravated by bending, standing and position. Associated symptoms include paresthesias and tingling. Pertinent negatives include no abdominal pain, bladder incontinence, bowel incontinence, chest pain, dysuria, fever, headaches or perianal numbness. She has tried heat (tramadol) for the symptoms. The treatment provided significant relief. Past Medical History:     Past Medical History:   Diagnosis Date    Allergic rhinitis 5/4/2011    Anxiety 5/4/2011    Carpal tunnel syndrome 5/4/2011    Depression 5/4/2011    Dyslipidemia 5/4/2011    Fibromyalgia 5/4/2011    Hyperlipidemia     Hypertension 5/4/2011    SOB (shortness of breath)       Reviewed all health maintenance requirements and orderedappropriate tests  Health Maintenance Due   Topic Date Due    Shingles Vaccine (1 of 2) 01/25/2002    DEXA (modify frequency per FRAX score)  01/25/2017       Past Surgical History:     Past Surgical History:   Procedure Laterality Date    CARDIAC CATHETERIZATION Left 12/20/2017    Dr. Alvarez Hair @ [facility]--CAD, 50-60% ostial right CAD with an FFR of 0.94.  60% disease in the proximal LAD with an FFR of 0.92 with bridging of the mid LAD. Normal LV function at 60%. Mildly dilated ascending aorta -will do a CT scan to measure.    [address] [facility] x 4    ELBOW SURGERY Right 1996    GALLBLADDER SURGERY  1975    HYSTERECTOMY      total hysterectomy    ROTATOR CUFF REPAIR      Right    SMALL INTESTINE SURGERY      twisted bowel 1985        Medications:       Prior to Admission medications    Medication Sig Start Date End Date Taking? Authorizing Provider   traMADol (ULTRAM) 50 MG tablet Take 1 tablet by mouth 3 times daily as needed for Pain for up to 30 days. 4/12/19 5/12/19 Yes Rodrick Liu MD   venlafaxine (EFFEXOR XR) 75 MG extended release capsule Take 1 capsule by mouth daily 4/12/19  Yes Rodrick Liu MD   traZODone (DESYREL) 50 MG tablet Take 1 tablet by mouth nightly as needed for Sleep 4/12/19  Yes Rodrick Liu MD   fluticasone (FLONASE) 50 MCG/ACT nasal spray USE 2 SPRAYS  NASALLY   DAILY 4/1/19  Yes Rodrick Liu MD   lovastatin (MEVACOR) 20 MG tablet TAKE 1 TABLET BY MOUTH ONCE DAILY AT  NIGHT 3/28/19  Yes Nico Beal MD   hydrALAZINE (APRESOLINE) 25 MG tablet TAKE 1 TABLET TWICE DAILY 3/28/19  Yes Rodrick Liu MD   metoprolol tartrate (LOPRESSOR) 25 MG tablet TAKE 1/2 TABLET TWICE DAILY 3/20/19  Yes Rodrick Liu MD   levothyroxine (SYNTHROID) 50 MCG tablet TAKE 1 TABLET EVERY DAY 3/20/19  Yes Rodrick Liu MD   Blood Pressure Monitoring (BLOOD PRESSURE CUFF) MISC Use to monitor Blood pressure 1-2 times daily 3/4/19  Yes Rodrick Liu MD   gabapentin (NEURONTIN) 300 MG capsule TAKE 1 CAPSULE THREE TIMES DAILY.  2/12/19 5/12/19 Yes Rodrick Liu MD   omeprazole (PRILOSEC) 40 MG delayed release capsule Take 1 capsule by mouth daily 2/12/19 4/12/19 Yes Rodrick Liu MD   lisinopril (PRINIVIL;ZESTRIL) 20 MG tablet TAKE 1 TABLET EVERY DAY 1/10/19  Yes Rodrick Liu MD   vitamin B-6 (PYRIDOXINE) 100 MG tablet Take 100 mg by mouth daily   Yes Historical Provider, MD   Coenzyme Q-10 100 MG CAPS Take 1 capsule by mouth daily   Yes Historical Provider, MD   Calcium Polycarbophil (FIBER-CAPS PO) Take by mouth Yes Historical Provider, MD   cloNIDine (CATAPRES) 0.1 MG tablet Take 1 tablet by mouth 2 times daily as needed for High Blood Pressure If systolic BP above 522 59/71/52 4/12/19 Yes Dequan Farr MD   vitamin D (CHOLECALCIFEROL) 1000 UNIT TABS tablet Take 1,000 Units by mouth daily   Yes Historical Provider, MD   Simethicone 80 MG TABS Take 80 mg by mouth 2 times daily as needed (bloating) 11/12/18   Dequan Farr MD        Allergies:       Patient has no known allergies. Social History:     Tobacco: reports that she has never smoked. She has never used smokeless tobacco.  Alcohol:      reports that she does not drink alcohol. Drug Use:  reports that she does not use drugs. Family History:     Family History   Problem Relation Age of Onset    Stroke Mother     High Cholesterol Mother     Heart Attack Father     High Cholesterol Father     High Cholesterol Sister     High Cholesterol Brother        Review of Systems:         Review of Systems   Constitutional: Positive for appetite change, fatigue and unexpected weight change. Negative for activity change, chills and fever. HENT: Negative for congestion and sore throat. Respiratory: Negative for cough, chest tightness and shortness of breath. Cardiovascular: Positive for palpitations. Negative for chest pain. Gastrointestinal: Negative for abdominal pain, bowel incontinence and nausea. Genitourinary: Negative for bladder incontinence and dysuria. Musculoskeletal: Positive for back pain. Skin: Negative for rash. Neurological: Positive for tingling and paresthesias. Negative for dizziness, tremors and headaches. Psychiatric/Behavioral: Positive for dysphoric mood and sleep disturbance. Negative for agitation, self-injury and suicidal ideas. The patient is nervous/anxious.           Physical Exam:     Vitals:  /80 (Site: Left Upper Arm, Position: Sitting, Cuff Size: Medium Adult)   Pulse 64   Ht 5' 3\" (1.6 m)   Wt 137 lb (62.1 kg)   SpO2 98%   BMI 24.27 kg/m²       Physical Exam   Constitutional: She is oriented to person, place, and time. She appears well-developed and well-nourished. No distress. HENT:   Head: Normocephalic and atraumatic. Neck: No thyromegaly present. Cardiovascular: Normal rate, regular rhythm and normal heart sounds. No murmur heard. Pulmonary/Chest: Effort normal and breath sounds normal. She has no wheezes. She has no rales. Abdominal: Soft. Bowel sounds are normal. She exhibits no distension and no mass. There is no tenderness. Musculoskeletal: Normal range of motion. Lymphadenopathy:     She has no cervical adenopathy. Neurological: She is alert and oriented to person, place, and time. Skin: Skin is warm and dry. No rash noted. Psychiatric: She has a normal mood and affect. Her behavior is normal. Judgment normal.   Vitals reviewed. Data:     Lab Results   Component Value Date     05/10/2018    K 3.3 05/10/2018     05/10/2018    CO2 31 05/10/2018    BUN 8 05/10/2018    CREATININE 0.47 05/10/2018    GLUCOSE 97 05/10/2018    GLUCOSE 99 01/19/2012    PROT 6.5 11/30/2017    LABALBU 4.1 11/30/2017    LABALBU 4.4 01/19/2012    BILITOT 0.37 11/30/2017    ALKPHOS 111 11/30/2017    AST 24 11/30/2017    ALT 16 11/30/2017     Lab Results   Component Value Date    WBC 6.1 11/30/2017    RBC 4.74 11/30/2017    RBC 4.86 01/19/2012    HGB 13.4 11/30/2017    HCT 39.9 11/30/2017    MCV 84.3 11/30/2017    MCH 28.3 11/30/2017    MCHC 33.6 11/30/2017    RDW 14.4 11/30/2017     11/30/2017     01/19/2012    MPV NOT REPORTED 11/30/2017     Lab Results   Component Value Date    TSH 2.36 03/04/2019     Lab Results   Component Value Date    CHOL 250 01/16/2017    HDL 63 01/16/2017          Assessment & Plan        Diagnosis Orders   1. Essential hypertension   Continue on current medications, blood pressure controlled      2.  Lumbar and sacral osteoarthritis   Will order as needed tramadol for pain control. OARRS reviewed  traMADol (ULTRAM) 50 MG tablet   3. Major depressive disorder, recurrent, moderate ([address]   Started on Effexor. . She is advised to try to make changes in her life to eliminate stressors contributing to her depression and anxiety, she is planning on having her son to leave her house and be on his own. She is also hoping her brother and sister-in-law  will move out soon . She is told to call 911 if she ever experiences suicidal thoughts/plans  venlafaxine (EFFEXOR XR) 75 MG extended release capsule   4. Insomnia, unspecified type   Ordered trazodone  traZODone (DESYREL) 50 MG tablet                   Completed Refills   Requested Prescriptions     Signed Prescriptions Disp Refills    traMADol (ULTRAM) 50 MG tablet 90 tablet 1     Sig: Take 1 tablet by mouth 3 times daily as needed for Pain for up to 30 days.  venlafaxine (EFFEXOR XR) 75 MG extended release capsule 30 capsule 2     Sig: Take 1 capsule by mouth daily    traZODone (DESYREL) 50 MG tablet 30 tablet 1     Sig: Take 1 tablet by mouth nightly as needed for Sleep     Return in about 1 month (around 5/10/2019) for anxiety, depression. Orders Placed This Encounter   Medications    traMADol (ULTRAM) 50 MG tablet     Sig: Take 1 tablet by mouth 3 times daily as needed for Pain for up to 30 days. Dispense:  90 tablet     Refill:  1     Reduce doses taken as pain becomes manageable    venlafaxine (EFFEXOR XR) 75 MG extended release capsule     Sig: Take 1 capsule by mouth daily     Dispense:  30 capsule     Refill:  2    traZODone (DESYREL) 50 MG tablet     Sig: Take 1 tablet by mouth nightly as needed for Sleep     Dispense:  30 tablet     Refill:  1     No orders of the defined types were placed in this encounter. Patient Instructions     SURVEY:    You may be receiving a survey from [employer] regarding your visit today.     Please complete the survey to enable us to provide the highest quality of care to you and your family. If you cannot score us a very good on any question, please call the office to discuss how we could of made your experience a very good one. Thank you. Electronically signed by Maribel Wilks MD on 4/14/2019 at 11:40 AM           Completed Refills      Requested Prescriptions     Signed Prescriptions Disp Refills    traMADol (ULTRAM) 50 MG tablet 90 tablet 1     Sig: Take 1 tablet by mouth 3 times daily as needed for Pain for up to 30 days.     venlafaxine (EFFEXOR XR) 75 MG extended release capsule 30 capsule 2     Sig: Take 1 capsule by mouth daily    traZODone (DESYREL) 50 MG tablet 30 tablet 1     Sig: Take 1 tablet by mouth nightly as needed for Sleep

## 2019-04-12 NOTE — PATIENT INSTRUCTIONS
SURVEY:    You may be receiving a survey from [employer] regarding your visit today. Please complete the survey to enable us to provide the highest quality of care to you and your family. If you cannot score us a very good on any question, please call the office to discuss how we could of made your experience a very good one. Thank you.

## 2019-04-14 ASSESSMENT — ENCOUNTER SYMPTOMS: CHEST TIGHTNESS: 0

## 2019-04-15 DIAGNOSIS — R10.13 EPIGASTRIC PAIN: ICD-10-CM

## 2019-04-16 RX ORDER — OMEPRAZOLE 40 MG/1
CAPSULE, DELAYED RELEASE ORAL
Qty: 90 CAPSULE | Refills: 2 | Status: SHIPPED | OUTPATIENT
Start: 2019-04-16 | End: 2020-04-24 | Stop reason: ALTCHOICE

## 2019-04-16 NOTE — TELEPHONE ENCOUNTER
Last OV: 4/12/2019  Last RX: 02/12/19   Next scheduled apt: 5/13/2019    Medication pending        Health Maintenance   Topic Date Due    Shingles Vaccine (1 of 2) 01/25/2002    DEXA (modify frequency per FRAX score)  01/25/2017    Potassium monitoring  05/10/2019    Creatinine monitoring  05/10/2019    Colon cancer screen colonoscopy  10/26/2019    TSH testing  03/04/2020    Breast cancer screen  03/05/2021    Lipid screen  06/04/2023    DTaP/Tdap/Td vaccine (2 - Td) 02/10/2024    Flu vaccine  Completed    Pneumococcal 65+ years Vaccine  Completed    Hepatitis C screen  Addressed             (applicable per patient's age: Cancer Screenings, Depression Screening, Fall Risk Screening, Immunizations)    LDL Cholesterol (mg/dL)   Date Value   01/19/2012 178 (H)     LDL Calculated (mg/dL)   Date Value   01/16/2017 159     AST (U/L)   Date Value   11/30/2017 24     ALT (U/L)   Date Value   11/30/2017 16     BUN (mg/dL)   Date Value   05/10/2018 8      (goal A1C is < 7)   (goal LDL is <100) need 30-50% reduction from baseline     BP Readings from Last 3 Encounters:   04/12/19 132/80   02/12/19 (!) 158/68   01/14/19 (!) 162/92    (goal /80)      All Future Testing planned in [facility]:  Lab Frequency Next Occurrence   CBC Auto Differential Once 06/08/2019   Comprehensive Metabolic Panel Once 80/11/5387   Vitamin D 25 Hydroxy Once 06/08/2019   Lipid Panel Once 06/08/2019   TSH with Reflex Once 06/08/2019   Magnesium Once 06/08/2019   EKG 12 Lead Once 06/08/2019   XR CHEST STANDARD (2 VW) Once 06/08/2019   CBC Auto Differential Once 87/22/9910   Basic Metabolic Panel Once 20/47/7425   Hepatic Function Panel Once 05/01/2019       Next Visit Date:  Future Appointments   Date Time Provider Linda Merchant   5/13/2019 11:00 AM Otilio Cano, [address] [place]   6/7/2019  2:00 PM MD Db Wilson [facility]            Patient Active Problem List:     Essential hypertension     Allergic rhinitis     Anxiety     Insomnia     Hyperlipidemia     Fibromyalgia syndrome     Depression, major     Chronic low back pain     Eczema intertrigo     Lumbar and sacral osteoarthritis     Acquired hypothyroidism     Lymphadenopathy, cervical

## 2019-05-13 ENCOUNTER — OFFICE VISIT (OUTPATIENT)
Dept: FAMILY MEDICINE CLINIC | Age: 69
End: 2019-05-13
Payer: MEDICARE

## 2019-05-13 ENCOUNTER — TELEPHONE (OUTPATIENT)
Dept: CARDIOLOGY CLINIC | Age: 69
End: 2019-05-13

## 2019-05-13 VITALS
SYSTOLIC BLOOD PRESSURE: 130 MMHG | OXYGEN SATURATION: 98 % | BODY MASS INDEX: 23.74 KG/M2 | HEIGHT: 63 IN | WEIGHT: 134 LBS | DIASTOLIC BLOOD PRESSURE: 80 MMHG | HEART RATE: 68 BPM

## 2019-05-13 DIAGNOSIS — E03.9 ACQUIRED HYPOTHYROIDISM: ICD-10-CM

## 2019-05-13 DIAGNOSIS — Z87.19 HISTORY OF REPAIR OF HIATAL HERNIA: ICD-10-CM

## 2019-05-13 DIAGNOSIS — I10 ESSENTIAL HYPERTENSION: Primary | ICD-10-CM

## 2019-05-13 DIAGNOSIS — E78.5 HYPERLIPIDEMIA, UNSPECIFIED HYPERLIPIDEMIA TYPE: ICD-10-CM

## 2019-05-13 DIAGNOSIS — E55.9 VITAMIN D DEFICIENCY: ICD-10-CM

## 2019-05-13 DIAGNOSIS — I25.10 CORONARY ARTERY DISEASE INVOLVING NATIVE CORONARY ARTERY OF NATIVE HEART WITHOUT ANGINA PECTORIS: ICD-10-CM

## 2019-05-13 DIAGNOSIS — F33.1 MAJOR DEPRESSIVE DISORDER, RECURRENT, MODERATE (HCC): ICD-10-CM

## 2019-05-13 DIAGNOSIS — R10.13 EPIGASTRIC PAIN: ICD-10-CM

## 2019-05-13 DIAGNOSIS — Z98.890 HISTORY OF REPAIR OF HIATAL HERNIA: ICD-10-CM

## 2019-05-13 PROCEDURE — 4040F PNEUMOC VAC/ADMIN/RCVD: CPT | Performed by: INTERNAL MEDICINE

## 2019-05-13 PROCEDURE — 1123F ACP DISCUSS/DSCN MKR DOCD: CPT | Performed by: INTERNAL MEDICINE

## 2019-05-13 PROCEDURE — G8400 PT W/DXA NO RESULTS DOC: HCPCS | Performed by: INTERNAL MEDICINE

## 2019-05-13 PROCEDURE — G8420 CALC BMI NORM PARAMETERS: HCPCS | Performed by: INTERNAL MEDICINE

## 2019-05-13 PROCEDURE — 3017F COLORECTAL CA SCREEN DOC REV: CPT | Performed by: INTERNAL MEDICINE

## 2019-05-13 PROCEDURE — 3014F SCREEN MAMMO DOC REV: CPT | Performed by: INTERNAL MEDICINE

## 2019-05-13 PROCEDURE — 1090F PRES/ABSN URINE INCON ASSESS: CPT | Performed by: INTERNAL MEDICINE

## 2019-05-13 PROCEDURE — G8427 DOCREV CUR MEDS BY ELIG CLIN: HCPCS | Performed by: INTERNAL MEDICINE

## 2019-05-13 PROCEDURE — 99214 OFFICE O/P EST MOD 30 MIN: CPT | Performed by: INTERNAL MEDICINE

## 2019-05-13 PROCEDURE — 1036F TOBACCO NON-USER: CPT | Performed by: INTERNAL MEDICINE

## 2019-05-13 ASSESSMENT — ENCOUNTER SYMPTOMS
TROUBLE SWALLOWING: 0
VOMITING: 0
ABDOMINAL PAIN: 1
SHORTNESS OF BREATH: 0
VOICE CHANGE: 0
CONSTIPATION: 0
BLOOD IN STOOL: 0
BLURRED VISION: 0
SORE THROAT: 0
CHEST TIGHTNESS: 0
FLATUS: 1
NAUSEA: 0
DIARRHEA: 0
COUGH: 0
BELCHING: 1

## 2019-05-13 NOTE — PROGRESS NOTES
has been gradually worsening. The pain is located in the epigastric region. The pain is moderate. The quality of the pain is burning. The abdominal pain does not radiate. Associated symptoms include belching, flatus and headaches. Pertinent negatives include no constipation, diarrhea, dysuria, fever, nausea or vomiting. The pain is aggravated by eating. The pain is relieved by certain positions. She has tried proton pump inhibitors for the symptoms. The treatment provided no relief. Her past medical history is significant for abdominal surgery. Past Medical History:     Past Medical History:   Diagnosis Date    Allergic rhinitis 2011    Anxiety 2011    Carpal tunnel syndrome 2011    Depression 2011    Dyslipidemia 2011    Fibromyalgia 2011    Hyperlipidemia     Hypertension 2011    SOB (shortness of breath)       Reviewed all health maintenance requirements and orderedappropriate tests  Health Maintenance Due   Topic Date Due    Shingles Vaccine (1 of 2) 2002    DEXA (modify frequency per FRAX score)  2017    Potassium monitoring  05/10/2019    Creatinine monitoring  05/10/2019       Past Surgical History:     Past Surgical History:   Procedure Laterality Date    CARDIAC CATHETERIZATION Left 2017    [facility] Alvarez Hair [facility]--CAD, 50-60% ostial right CAD with an FFR of 0.94.  60% disease in the proximal LAD with an FFR of 0.92 with bridging of the mid LAD. Normal LV function at 60%. Mildly dilated ascending aorta -will do a CT scan to measure.   SECTION      x 4    ELBOW SURGERY Right     GALLBLADDER SURGERY      HYSTERECTOMY      total hysterectomy    ROTATOR CUFF REPAIR      Right    SMALL INTESTINE SURGERY      twisted bowel         Medications:       Prior to Admission medications    Medication Sig Start Date End Date Taking?  Authorizing Provider   omeprazole (PRILOSEC) 40 MG delayed release capsule TAKE 1 CAPSULE EVERY DAY 4/16/19  Yes Kalyani Leyva MD   venlafaxine (EFFEXOR XR) 75 MG extended release capsule Take 1 capsule by mouth daily 4/12/19  Yes Kalyani Leyva MD   traZODone (DESYREL) 50 MG tablet Take 1 tablet by mouth nightly as needed for Sleep 4/12/19  Yes Kalyani Leyva MD   fluticasone (FLONASE) 50 MCG/ACT nasal spray USE 2 SPRAYS  NASALLY   DAILY 4/1/19  Yes Kalyani Leyva MD   lovastatin (MEVACOR) 20 MG tablet TAKE 1 TABLET BY MOUTH ONCE DAILY AT  NIGHT 3/28/19  Yes Gibson Noble MD   hydrALAZINE (APRESOLINE) 25 MG tablet TAKE 1 TABLET TWICE DAILY 3/28/19  Yes Kalyani Leyva MD   metoprolol tartrate (LOPRESSOR) 25 MG tablet TAKE 1/2 TABLET TWICE DAILY 3/20/19  Yes Kalyani Leyva MD   levothyroxine (SYNTHROID) 50 MCG tablet TAKE 1 TABLET EVERY DAY 3/20/19  Yes Kalyani Leyva MD   Blood Pressure Monitoring (BLOOD PRESSURE CUFF) MISC Use to monitor Blood pressure 1-2 times daily 3/4/19  Yes Kalyani Leyva MD   gabapentin (NEURONTIN) 300 MG capsule TAKE 1 CAPSULE THREE TIMES DAILY. 2/12/19 5/13/19 Yes Kalyani Leyva MD   lisinopril (PRINIVIL;ZESTRIL) 20 MG tablet TAKE 1 TABLET EVERY DAY 1/10/19  Yes Kalyani Leyva MD   vitamin B-6 (PYRIDOXINE) 100 MG tablet Take 100 mg by mouth daily   Yes Historical Provider, MD   Coenzyme Q-10 100 MG CAPS Take 1 capsule by mouth daily   Yes Historical Provider, MD   Simethicone 80 MG TABS Take 80 mg by mouth 2 times daily as needed (bloating) 11/12/18  Yes Kalyani Leyva MD   Calcium Polycarbophil (FIBER-CAPS PO) Take by mouth   Yes Historical Provider, MD   cloNIDine (CATAPRES) 0.1 MG tablet Take 1 tablet by mouth 2 times daily as needed for High Blood Pressure If systolic BP above 343 57/81/32 5/13/19 Yes Kalyani Leyva MD   vitamin D (CHOLECALCIFEROL) 1000 UNIT TABS tablet Take 1,000 Units by mouth daily   Yes Historical Provider, MD        Allergies:       Patient has no known allergies.     Social History:     Tobacco: reports that she has never regular rhythm and normal heart sounds. No murmur heard. Pulmonary/Chest: Effort normal and breath sounds normal. She has no wheezes. She has no rales. Abdominal: Soft. Bowel sounds are normal. She exhibits no distension and no mass. There is no tenderness. Musculoskeletal: Normal range of motion. She exhibits no edema. Lymphadenopathy:     She has no cervical adenopathy. Neurological: She is alert and oriented to person, place, and time. No cranial nerve deficit. Skin: Skin is warm and dry. No rash noted. Psychiatric: She has a normal mood and affect. Her behavior is normal. Judgment normal.   Vitals reviewed. Data:     Lab Results   Component Value Date     05/10/2018    K 3.3 05/10/2018     05/10/2018    CO2 31 05/10/2018    BUN 8 05/10/2018    CREATININE 0.47 05/10/2018    GLUCOSE 97 05/10/2018    GLUCOSE 99 01/19/2012    PROT 6.5 11/30/2017    LABALBU 4.1 11/30/2017    LABALBU 4.4 01/19/2012    BILITOT 0.37 11/30/2017    ALKPHOS 111 11/30/2017    AST 24 11/30/2017    ALT 16 11/30/2017     Lab Results   Component Value Date    WBC 6.1 11/30/2017    RBC 4.74 11/30/2017    RBC 4.86 01/19/2012    HGB 13.4 11/30/2017    HCT 39.9 11/30/2017    MCV 84.3 11/30/2017    MCH 28.3 11/30/2017    MCHC 33.6 11/30/2017    RDW 14.4 11/30/2017     11/30/2017     01/19/2012    MPV NOT REPORTED 11/30/2017     Lab Results   Component Value Date    TSH 2.36 03/04/2019     Lab Results   Component Value Date    CHOL 250 01/16/2017    HDL 63 01/16/2017          Assessment & Plan        Diagnosis Orders   1. Essential hypertension   BP stable on current meds, continue same, check BMP Basic Metabolic Panel   2. Major depressive disorder, recurrent, moderate (HCC)   Symptoms improved with Effexor, continue    3.  Acquired hypothyroidism   TSH normal, continue on current dose Synthroid    4. Epigastric pain   Will check labs, refer to GI for evaluation CBC Auto Differential    External Referral To Gastroenterology    Hepatic Function Panel   5. History of repair of hiatal hernia  External Referral To Gastroenterology                   Completed Refills   Requested Prescriptions      No prescriptions requested or ordered in this encounter     Return in about 3 months (around 8/13/2019) for hyperlipidemia,fibromyalgia, depression. No orders of the defined types were placed in this encounter. Orders Placed This Encounter   Procedures    Basic Metabolic Panel     Standing Status:   Future     Standing Expiration Date:   5/13/2020    CBC Auto Differential     Standing Status:   Future     Standing Expiration Date:   5/12/2020    Hepatic Function Panel     Standing Status:   Future     Standing Expiration Date:   5/13/2020    External Referral To Gastroenterology     Referral Priority:   Routine     Referral Type:   Eval and Treat     Referral Reason:   Specialty Services Required     Referred to Provider:   Amando Padron MD     Requested Specialty:   Gastroenterology     Number of Visits Requested:   1         There are no Patient Instructions on file for this visit.     Electronically signed by Kalani Scott MD on 5/13/2019 at 11:47 AM           Completed Refills      Requested Prescriptions      No prescriptions requested or ordered in this encounter

## 2019-05-28 LAB
A/G RATIO: 1.6
ALBUMIN SERPL-MCNC: 3.8 G/DL
ALP BLD-CCNC: 74 U/L
ALT SERPL-CCNC: 26 U/L
AST SERPL-CCNC: 25 U/L
BASOPHILS ABSOLUTE: ABNORMAL /ΜL
BASOPHILS RELATIVE PERCENT: ABNORMAL %
BILIRUB SERPL-MCNC: 0.8 MG/DL (ref 0.1–1.4)
BILIRUBIN DIRECT: 0.1 MG/DL
BILIRUBIN, INDIRECT: 0.7
BUN BLDV-MCNC: 12 MG/DL
CALCIUM SERPL-MCNC: NORMAL MG/DL
CHLORIDE BLD-SCNC: 105 MMOL/L
CO2: 27 MMOL/L
CREAT SERPL-MCNC: 0.5 MG/DL
EOSINOPHILS ABSOLUTE: ABNORMAL /ΜL
EOSINOPHILS RELATIVE PERCENT: ABNORMAL %
GFR CALCULATED: NORMAL
GLOBULIN: 2.4
GLUCOSE BLD-MCNC: NORMAL MG/DL
HCT VFR BLD CALC: 40.5 % (ref 36–46)
HEMOGLOBIN: 13.6 G/DL (ref 12–16)
LYMPHOCYTES ABSOLUTE: ABNORMAL /ΜL
LYMPHOCYTES RELATIVE PERCENT: ABNORMAL %
MCH RBC QN AUTO: 29 PG
MCHC RBC AUTO-ENTMCNC: 33.6 G/DL
MCV RBC AUTO: 86.1 FL
MONOCYTES ABSOLUTE: ABNORMAL /ΜL
MONOCYTES RELATIVE PERCENT: ABNORMAL %
NEUTROPHILS ABSOLUTE: ABNORMAL /ΜL
NEUTROPHILS RELATIVE PERCENT: ABNORMAL %
PDW BLD-RTO: 14.9 %
PLATELET # BLD: 429 K/ΜL
PMV BLD AUTO: 7.8 FL
POTASSIUM SERPL-SCNC: 3.6 MMOL/L
PROTEIN TOTAL: 6.2 G/DL
RBC # BLD: 4.7 10^6/ΜL
SODIUM BLD-SCNC: 139 MMOL/L
TSH SERPL DL<=0.05 MIU/L-ACNC: 0.9 UIU/ML
WBC # BLD: 5.9 10^3/ML

## 2019-05-29 DIAGNOSIS — I10 ESSENTIAL HYPERTENSION: ICD-10-CM

## 2019-05-29 DIAGNOSIS — R10.13 EPIGASTRIC PAIN: ICD-10-CM

## 2019-05-30 ENCOUNTER — TELEPHONE (OUTPATIENT)
Dept: FAMILY MEDICINE CLINIC | Age: 69
End: 2019-05-30

## 2019-05-30 NOTE — TELEPHONE ENCOUNTER
Guerda Sat not to follow up with me as long as follows up with Dr. Femi St  Thanks
Pt informed
Metabolic Panel Once 52/19/3265   TSH with Reflex Once 07/22/2019   EKG 12 Lead Once 07/22/2019   XR CHEST STANDARD (2 VW) Once 07/22/2019   Magnesium Once 07/22/2019       Next Visit Date:  Future Appointments   Date Time Provider Linda Merchant   7/22/2019  1:00 PM MD [facility]   8/13/2019 11:15 AM [facility]            Patient Active Problem List:     Essential hypertension     Allergic rhinitis     Anxiety     Insomnia     Hyperlipidemia     Fibromyalgia syndrome     Depression, major     Chronic low back pain     Eczema intertrigo     Lumbar and sacral osteoarthritis     Acquired hypothyroidism     Lymphadenopathy, cervical

## 2019-07-08 ENCOUNTER — TELEPHONE (OUTPATIENT)
Dept: FAMILY MEDICINE CLINIC | Age: 69
End: 2019-07-08

## 2019-07-09 NOTE — TELEPHONE ENCOUNTER
Pt notified and scheduled 7/16/19
Hyperlipidemia     Fibromyalgia syndrome     Depression, major     Chronic low back pain     Eczema intertrigo     Lumbar and sacral osteoarthritis     Acquired hypothyroidism     Lymphadenopathy, cervical

## 2019-07-18 ENCOUNTER — TELEPHONE (OUTPATIENT)
Dept: FAMILY MEDICINE CLINIC | Age: 69
End: 2019-07-18

## 2019-07-18 NOTE — TELEPHONE ENCOUNTER
Pt decline AWV    Health Maintenance   Topic Date Due    Shingles Vaccine (1 of 2) 01/25/2002    Annual Wellness Visit (AWV)  01/25/2015    DEXA (modify frequency per FRAX score)  01/25/2017    Flu vaccine (1) 09/01/2019    Colon cancer screen colonoscopy  10/26/2019    TSH testing  05/28/2020    Potassium monitoring  05/28/2020    Creatinine monitoring  05/28/2020    Breast cancer screen  03/05/2021    Lipid screen  06/04/2023    DTaP/Tdap/Td vaccine (2 - Td) 02/10/2024    Pneumococcal 65+ years Vaccine  Completed    Hepatitis C screen  Addressed             (applicable per patient's age: Cancer Screenings, Depression Screening, Fall Risk Screening, Immunizations)    LDL Cholesterol (mg/dL)   Date Value   01/19/2012 178 (H)     LDL Calculated (mg/dL)   Date Value   01/16/2017 159     AST (U/L)   Date Value   05/28/2019 25     ALT (U/L)   Date Value   05/28/2019 26     BUN (mg/dL)   Date Value   05/28/2019 12      (goal A1C is < 7)   (goal LDL is <100) need 30-50% reduction from baseline     BP Readings from Last 3 Encounters:   05/13/19 130/80   04/12/19 132/80   02/12/19 (!) 158/68    (goal /80)      All Future Testing planned in [facility]:  Lab Frequency Next Occurrence   EKG 12 Lead Once 07/22/2019   CBC Auto Differential Once 07/22/2019   Lipid Panel Once 07/22/2019   Vitamin D 25 Hydroxy Once 07/22/2019   Comprehensive Metabolic Panel Once 19/06/5364   TSH with Reflex Once 07/22/2019   EKG 12 Lead Once 07/22/2019   XR CHEST STANDARD (2 VW) Once 07/22/2019   Magnesium Once 07/22/2019       Next Visit Date:  Future Appointments   Date Time Provider Linda Merchant   7/22/2019  1:00 PM [facility]   8/13/2019 11:15 AM [facility]            Patient Active Problem List:     Essential hypertension     Allergic rhinitis     Anxiety     Insomnia     Hyperlipidemia     Fibromyalgia syndrome     Depression, major     Chronic low back pain

## 2019-07-22 ENCOUNTER — OFFICE VISIT (OUTPATIENT)
Dept: CARDIOLOGY CLINIC | Age: 69
End: 2019-07-22
Payer: MEDICARE

## 2019-07-22 VITALS — OXYGEN SATURATION: 96 % | WEIGHT: 133 LBS | BODY MASS INDEX: 23.56 KG/M2 | HEART RATE: 60 BPM

## 2019-07-22 DIAGNOSIS — E78.5 HYPERLIPIDEMIA, UNSPECIFIED HYPERLIPIDEMIA TYPE: ICD-10-CM

## 2019-07-22 DIAGNOSIS — E55.9 VITAMIN D DEFICIENCY: ICD-10-CM

## 2019-07-22 DIAGNOSIS — I10 ESSENTIAL HYPERTENSION: Primary | ICD-10-CM

## 2019-07-22 DIAGNOSIS — E03.9 ACQUIRED HYPOTHYROIDISM: ICD-10-CM

## 2019-07-22 PROCEDURE — 1036F TOBACCO NON-USER: CPT | Performed by: INTERNAL MEDICINE

## 2019-07-22 PROCEDURE — 3017F COLORECTAL CA SCREEN DOC REV: CPT | Performed by: INTERNAL MEDICINE

## 2019-07-22 PROCEDURE — G8420 CALC BMI NORM PARAMETERS: HCPCS | Performed by: INTERNAL MEDICINE

## 2019-07-22 PROCEDURE — G8599 NO ASA/ANTIPLAT THER USE RNG: HCPCS | Performed by: INTERNAL MEDICINE

## 2019-07-22 PROCEDURE — G8400 PT W/DXA NO RESULTS DOC: HCPCS | Performed by: INTERNAL MEDICINE

## 2019-07-22 PROCEDURE — G8427 DOCREV CUR MEDS BY ELIG CLIN: HCPCS | Performed by: INTERNAL MEDICINE

## 2019-07-22 PROCEDURE — 99214 OFFICE O/P EST MOD 30 MIN: CPT | Performed by: INTERNAL MEDICINE

## 2019-07-22 PROCEDURE — 3014F SCREEN MAMMO DOC REV: CPT | Performed by: INTERNAL MEDICINE

## 2019-07-22 PROCEDURE — 4040F PNEUMOC VAC/ADMIN/RCVD: CPT | Performed by: INTERNAL MEDICINE

## 2019-07-22 PROCEDURE — 1123F ACP DISCUSS/DSCN MKR DOCD: CPT | Performed by: INTERNAL MEDICINE

## 2019-07-22 PROCEDURE — 1090F PRES/ABSN URINE INCON ASSESS: CPT | Performed by: INTERNAL MEDICINE

## 2019-07-22 NOTE — LETTER
diverticulosis, which was otherwise negative. An endoscopy was done. She had mild gastritis in her stomach biopsy. She had moderate active Candida esophagitis by her esophageal biopsy. An antral polyp biopsy showed hyperplastic gastric mucosa with intestinal metaplasia and mild chronic inflammation. She has not had followup yet with Dr. Bertha Hurt.    She has had no exertional chest pain or chest discomfort. No PND, orthopnea or pedal edema. No syncope or near syncope. Her main complaint is that of difficulty swallowing with pain in her throat when she swallows. She has been dealing with this for approximately a year. She does have a mildly dilated aorta of 4 cm in the ascending aorta. She also has hepatitic steatosis by CT scans. She has a history of intermittently mildly elevated liver function tests. CARDIAC RISK FACTORS:  Known CAD:  Positive. Hypertension:  Positive. Hyperlipidemia:  Positive. Other Family Members:  Positive. Peripheral Vascular Disease:  Negative. Smoking:  Negative. Diabetes:  Negative. MEDICATIONS AT HOME:  At this time, she is on Catapres 0.1 mg b.i.d., CoQ10 daily, Flonase daily, Neurontin 300 mg t.i.d., hydralazine 25 mg b.i.d., Synthroid 50 mcg daily, lisinopril 20 mg daily, Mevacor 20 mg at night, Lopressor 25 mg half a tablet b.i.d., Prilosec 40 mg daily, Desyrel 50 mg p.r.n. for sleep, Effexor XR 75 mg daily, vitamin D 1000 units daily. PAST MEDICAL HISTORY:  1. She had hypertension for many years, is very labile. 2.  Long history of hyperlipidemia. 3.  Four C-sections in 1970, 1971, 1973 and [address] [place] [address], she had a torsion. 5.  Right shoulder surgery in 1987 and 1999.  6.  Hysterectomy in 1987.  7.  Several surgeries for adhesions. 8.  She has fibromyalgia. 9.  Depression. 10. She had right elbow surgery in 1996. 11.  Cholecystectomy in 1975. 12. She has candidal esophagitis, just diagnosed. 13.  Small intestine surgery in 1985.

## 2019-07-29 NOTE — PROGRESS NOTES
Kimani Carrillo M.D. [address] [place], [place] [address]   [phone]        2019        Marylene Minder, MD  [address] [facility], [address] [facility]    RE:   Devora Walters  :  1950    Dear Dr. Jed Smith:    CHIEF COMPLAINT:  1. Hypertension. 2.  Coronary artery disease. HISTORY OF PRESENT ILLNESS:  I had the pleasure of seeing Mrs. Aung Ibarra in our office on 2019. She is a pleasant 28-year-old female who I first met on 2012, for cardiac evaluation. We did a stress test and echocardiogram, which were unremarkable. I did not see her again until 2017. She had lost her insurance, but got insurance back, and therefore, she came back to be reevaluated. She had marked fatigue in 2017. She has history of hyperlipidemia and hypertension. She had marked myalgias with the Lipitor. We did a Lexiscan Cardiolite stress test and an echocardiogram, which were abnormal showing lateral wall ischemia and a low to intermediate risk for coronary artery disease. An echocardiogram on 11/10/2017, showed an EF of 55% with mild-to-moderate mitral regurgitation. I then did a cardiac catheterization on 2017, that showed 60% disease at the ostium of the right coronary artery, 60% disease in the proximal LAD and 60% disease in the circumflex. The FFR of 0.94 in the right coronary artery, 0.92 in the LAD, 0.91 in the diagonal and medical therapy was recommended. I last saw her on 2018. Since that time, she has done well. She has occasional chest pain but it is more of a fleeting discomfort, not related to activity. She has had abdominal pain and also pain with swallowing, and therefore, she had an EGD by Dr. Charlotte Driver at [facility]. They could not do colonoscopy, and therefore, a barium edema was done on 2019, that showed mild sigmoid diverticulosis, which was otherwise negative.   An endoscopy was monitored. She does have a history of steatosis. She has no symptoms to indicate I need to repeat a stress test at this time. Her blood pressure was fairly elevated in my office and I asked her to monitor it and follow it at home to make sure it is less than 130/80 range. She does state that when she takes it, it generally is in this range. Thank you very much for allowing me the privilege of seeing Mrs. Devaughn Salazar. If you have any questions on my thoughts, please do not hesitate to contact me.     Sincerely,        Benito Ba    D: 07/22/2019 13:39:34     T: 07/23/2019 2:03:16     [id number]  Job#: [id number]   Doc#: [id number]

## 2019-08-12 RX ORDER — FLUTICASONE PROPIONATE 50 MCG
SPRAY, SUSPENSION (ML) NASAL
Qty: 48 G | Refills: 1 | Status: SHIPPED | OUTPATIENT
Start: 2019-08-12 | End: 2019-12-28

## 2019-08-13 ENCOUNTER — OFFICE VISIT (OUTPATIENT)
Dept: FAMILY MEDICINE CLINIC | Age: 69
End: 2019-08-13
Payer: MEDICARE

## 2019-08-13 VITALS
OXYGEN SATURATION: 98 % | HEIGHT: 63 IN | DIASTOLIC BLOOD PRESSURE: 76 MMHG | HEART RATE: 66 BPM | BODY MASS INDEX: 23.04 KG/M2 | WEIGHT: 130 LBS | SYSTOLIC BLOOD PRESSURE: 130 MMHG

## 2019-08-13 DIAGNOSIS — M47.817 LUMBAR AND SACRAL OSTEOARTHRITIS: ICD-10-CM

## 2019-08-13 DIAGNOSIS — M79.7 FIBROMYALGIA SYNDROME: ICD-10-CM

## 2019-08-13 DIAGNOSIS — G47.00 INSOMNIA, UNSPECIFIED TYPE: ICD-10-CM

## 2019-08-13 DIAGNOSIS — F33.1 MAJOR DEPRESSIVE DISORDER, RECURRENT, MODERATE (HCC): ICD-10-CM

## 2019-08-13 DIAGNOSIS — M25.551 PAIN OF RIGHT HIP JOINT: Primary | ICD-10-CM

## 2019-08-13 PROCEDURE — 1090F PRES/ABSN URINE INCON ASSESS: CPT | Performed by: INTERNAL MEDICINE

## 2019-08-13 PROCEDURE — 3014F SCREEN MAMMO DOC REV: CPT | Performed by: INTERNAL MEDICINE

## 2019-08-13 PROCEDURE — 3288F FALL RISK ASSESSMENT DOCD: CPT | Performed by: INTERNAL MEDICINE

## 2019-08-13 PROCEDURE — 4040F PNEUMOC VAC/ADMIN/RCVD: CPT | Performed by: INTERNAL MEDICINE

## 2019-08-13 PROCEDURE — G8400 PT W/DXA NO RESULTS DOC: HCPCS | Performed by: INTERNAL MEDICINE

## 2019-08-13 PROCEDURE — G8599 NO ASA/ANTIPLAT THER USE RNG: HCPCS | Performed by: INTERNAL MEDICINE

## 2019-08-13 PROCEDURE — 3017F COLORECTAL CA SCREEN DOC REV: CPT | Performed by: INTERNAL MEDICINE

## 2019-08-13 PROCEDURE — 1036F TOBACCO NON-USER: CPT | Performed by: INTERNAL MEDICINE

## 2019-08-13 PROCEDURE — G8427 DOCREV CUR MEDS BY ELIG CLIN: HCPCS | Performed by: INTERNAL MEDICINE

## 2019-08-13 PROCEDURE — 99214 OFFICE O/P EST MOD 30 MIN: CPT | Performed by: INTERNAL MEDICINE

## 2019-08-13 PROCEDURE — 1123F ACP DISCUSS/DSCN MKR DOCD: CPT | Performed by: INTERNAL MEDICINE

## 2019-08-13 PROCEDURE — G8420 CALC BMI NORM PARAMETERS: HCPCS | Performed by: INTERNAL MEDICINE

## 2019-08-13 RX ORDER — TRAMADOL HYDROCHLORIDE 50 MG/1
TABLET ORAL
Refills: 2 | COMMUNITY
Start: 2019-07-15 | End: 2019-11-05 | Stop reason: SDUPTHER

## 2019-08-13 RX ORDER — TRAZODONE HYDROCHLORIDE 100 MG/1
100 TABLET ORAL NIGHTLY PRN
Qty: 90 TABLET | Refills: 1 | Status: SHIPPED | OUTPATIENT
Start: 2019-08-13 | End: 2020-04-24 | Stop reason: SDUPTHER

## 2019-08-13 RX ORDER — TRAMADOL HYDROCHLORIDE 50 MG/1
TABLET ORAL
Refills: 2 | Status: CANCELLED | OUTPATIENT
Start: 2019-08-13

## 2019-08-13 RX ORDER — TRAMADOL HYDROCHLORIDE 50 MG/1
50 TABLET ORAL 3 TIMES DAILY PRN
Qty: 90 TABLET | Refills: 2 | Status: SHIPPED | OUTPATIENT
Start: 2019-08-13 | End: 2019-09-12

## 2019-08-13 RX ORDER — TRAZODONE HYDROCHLORIDE 50 MG/1
50 TABLET ORAL NIGHTLY PRN
Qty: 30 TABLET | Refills: 1 | Status: CANCELLED | OUTPATIENT
Start: 2019-08-13

## 2019-08-13 RX ORDER — VENLAFAXINE HYDROCHLORIDE 75 MG/1
75 CAPSULE, EXTENDED RELEASE ORAL DAILY
Qty: 30 CAPSULE | Refills: 2 | Status: SHIPPED
Start: 2019-08-13 | End: 2020-03-05

## 2019-08-13 RX ORDER — GABAPENTIN 300 MG/1
CAPSULE ORAL
Qty: 270 CAPSULE | Refills: 1 | Status: SHIPPED | OUTPATIENT
Start: 2019-08-13 | End: 2020-04-06 | Stop reason: SDUPTHER

## 2019-08-13 ASSESSMENT — ENCOUNTER SYMPTOMS
BOWEL INCONTINENCE: 0
COUGH: 0
SORE THROAT: 0
CHEST TIGHTNESS: 0
BACK PAIN: 1
NAUSEA: 0
SHORTNESS OF BREATH: 0
ABDOMINAL PAIN: 0
DIARRHEA: 0

## 2019-08-13 NOTE — PROGRESS NOTES
1,000 Units by mouth daily   Yes Historical Provider, MD   cloNIDine (CATAPRES) 0.1 MG tablet Take 1 tablet by mouth 2 times daily as needed for High Blood Pressure If systolic BP above 165 55/40/50 5/13/19  Edie León MD        Allergies:       Patient has no known allergies. Social History:     Tobacco: reports that she has never smoked. She has never used smokeless tobacco.  Alcohol:      reports that she does not drink alcohol. Drug Use:  reports that she does not use drugs. Family History:     Family History   Problem Relation Age of Onset    Stroke Mother     High Cholesterol Mother     Heart Attack Father     High Cholesterol Father     High Cholesterol Sister     High Cholesterol Brother        Review of Systems:         Review of Systems   Constitutional: Negative for activity change, appetite change, chills, fatigue, fever and unexpected weight change. HENT: Negative for congestion and sore throat. Respiratory: Negative for cough, chest tightness and shortness of breath. Cardiovascular: Negative for chest pain, palpitations and leg swelling. Gastrointestinal: Negative for abdominal pain, bowel incontinence, diarrhea and nausea. Genitourinary: Negative for bladder incontinence and dysuria. Musculoskeletal: Positive for arthralgias, back pain, gait problem and myalgias. Negative for joint swelling. Skin: Negative for rash. Neurological: Positive for numbness (right leg). Negative for dizziness, tingling and headaches. Psychiatric/Behavioral: Positive for dysphoric mood and sleep disturbance. The patient is not nervous/anxious. Physical Exam:     Vitals:  /76 (Site: Left Upper Arm, Position: Sitting, Cuff Size: Medium Adult)   Pulse 66   Ht 5' 3\" (1.6 m)   Wt 130 lb (59 kg)   SpO2 98%   BMI 23.03 kg/m²       Physical Exam   Constitutional: She is oriented to person, place, and time. She appears well-developed and well-nourished. No distress.    HENT: for Pain for up to 30 days. Dispense:  90 tablet     Refill:  2     Reduce doses taken as pain becomes manageable     Orders Placed This Encounter   Procedures    XR HIP 2-3 VW W PELVIS RIGHT     Standing Status:   Future     Standing Expiration Date:   8/13/2020         Patient Instructions     SURVEY:    You may be receiving a survey from [employer] regarding your visit today. Please complete the survey to enable us to provide the highest quality of care to you and your family. If you cannot score us a very good on any question, please call the office to discuss how we could of made your experience a very good one. Thank you. Electronically signed by Adina Craig MD on 8/13/2019 at 2:34 PM           Completed Refills      Requested Prescriptions     Signed Prescriptions Disp Refills    gabapentin (NEURONTIN) 300 MG capsule 270 capsule 1     Sig: TAKE 1 CAPSULE THREE TIMES DAILY    venlafaxine (EFFEXOR XR) 75 MG extended release capsule 30 capsule 2     Sig: Take 1 capsule by mouth daily    traZODone (DESYREL) 100 MG tablet 90 tablet 1     Sig: Take 1 tablet by mouth nightly as needed for Sleep    traMADol (ULTRAM) 50 MG tablet 90 tablet 2     Sig: Take 1 tablet by mouth 3 times daily as needed for Pain for up to 30 days.

## 2019-08-21 DIAGNOSIS — E03.9 ACQUIRED HYPOTHYROIDISM: ICD-10-CM

## 2019-08-21 RX ORDER — LEVOTHYROXINE SODIUM 0.05 MG/1
TABLET ORAL
Qty: 90 TABLET | Refills: 1 | Status: SHIPPED | OUTPATIENT
Start: 2019-08-21 | End: 2020-04-24 | Stop reason: SDUPTHER

## 2019-09-12 RX ORDER — LISINOPRIL 20 MG/1
TABLET ORAL
Qty: 90 TABLET | Refills: 1 | Status: SHIPPED | OUTPATIENT
Start: 2019-09-12 | End: 2020-02-04 | Stop reason: SDUPTHER

## 2019-11-05 ENCOUNTER — OFFICE VISIT (OUTPATIENT)
Dept: FAMILY MEDICINE CLINIC | Age: 69
End: 2019-11-05
Payer: MEDICARE

## 2019-11-05 VITALS
HEIGHT: 63 IN | SYSTOLIC BLOOD PRESSURE: 134 MMHG | BODY MASS INDEX: 23.92 KG/M2 | OXYGEN SATURATION: 98 % | DIASTOLIC BLOOD PRESSURE: 74 MMHG | WEIGHT: 135 LBS | HEART RATE: 66 BPM

## 2019-11-05 DIAGNOSIS — M25.551 PAIN OF RIGHT HIP JOINT: ICD-10-CM

## 2019-11-05 DIAGNOSIS — M47.817 LUMBAR AND SACRAL OSTEOARTHRITIS: ICD-10-CM

## 2019-11-05 DIAGNOSIS — F33.1 MAJOR DEPRESSIVE DISORDER, RECURRENT, MODERATE (HCC): ICD-10-CM

## 2019-11-05 DIAGNOSIS — I10 ESSENTIAL HYPERTENSION: Primary | ICD-10-CM

## 2019-11-05 PROBLEM — K22.70 BARRETT'S ESOPHAGUS: Status: ACTIVE | Noted: 2018-11-09

## 2019-11-05 PROCEDURE — G8484 FLU IMMUNIZE NO ADMIN: HCPCS | Performed by: INTERNAL MEDICINE

## 2019-11-05 PROCEDURE — G8427 DOCREV CUR MEDS BY ELIG CLIN: HCPCS | Performed by: INTERNAL MEDICINE

## 2019-11-05 PROCEDURE — 3017F COLORECTAL CA SCREEN DOC REV: CPT | Performed by: INTERNAL MEDICINE

## 2019-11-05 PROCEDURE — G9899 SCRN MAM PERF RSLTS DOC: HCPCS | Performed by: INTERNAL MEDICINE

## 2019-11-05 PROCEDURE — 4040F PNEUMOC VAC/ADMIN/RCVD: CPT | Performed by: INTERNAL MEDICINE

## 2019-11-05 PROCEDURE — G8599 NO ASA/ANTIPLAT THER USE RNG: HCPCS | Performed by: INTERNAL MEDICINE

## 2019-11-05 PROCEDURE — 1036F TOBACCO NON-USER: CPT | Performed by: INTERNAL MEDICINE

## 2019-11-05 PROCEDURE — G8400 PT W/DXA NO RESULTS DOC: HCPCS | Performed by: INTERNAL MEDICINE

## 2019-11-05 PROCEDURE — 1123F ACP DISCUSS/DSCN MKR DOCD: CPT | Performed by: INTERNAL MEDICINE

## 2019-11-05 PROCEDURE — 99214 OFFICE O/P EST MOD 30 MIN: CPT | Performed by: INTERNAL MEDICINE

## 2019-11-05 PROCEDURE — G8420 CALC BMI NORM PARAMETERS: HCPCS | Performed by: INTERNAL MEDICINE

## 2019-11-05 PROCEDURE — 1090F PRES/ABSN URINE INCON ASSESS: CPT | Performed by: INTERNAL MEDICINE

## 2019-11-05 RX ORDER — TRAMADOL HYDROCHLORIDE 50 MG/1
50 TABLET ORAL EVERY 8 HOURS PRN
Qty: 90 TABLET | Refills: 2 | Status: SHIPPED | OUTPATIENT
Start: 2019-11-05 | End: 2019-12-05

## 2019-11-05 RX ORDER — TIZANIDINE 2 MG/1
2 TABLET ORAL 3 TIMES DAILY PRN
Qty: 90 TABLET | Refills: 1 | Status: SHIPPED | OUTPATIENT
Start: 2019-11-05 | End: 2020-04-24 | Stop reason: SDUPTHER

## 2019-11-05 ASSESSMENT — ENCOUNTER SYMPTOMS
SORE THROAT: 0
NAUSEA: 0
SHORTNESS OF BREATH: 0
ABDOMINAL PAIN: 0
COUGH: 0

## 2019-11-06 DIAGNOSIS — M25.551 PAIN OF RIGHT HIP JOINT: ICD-10-CM

## 2019-11-06 ASSESSMENT — ENCOUNTER SYMPTOMS
BACK PAIN: 1
BLURRED VISION: 0

## 2019-12-16 ENCOUNTER — OFFICE VISIT (OUTPATIENT)
Dept: FAMILY MEDICINE CLINIC | Age: 69
End: 2019-12-16
Payer: MEDICARE

## 2019-12-16 VITALS
OXYGEN SATURATION: 98 % | BODY MASS INDEX: 23.21 KG/M2 | SYSTOLIC BLOOD PRESSURE: 122 MMHG | HEIGHT: 63 IN | HEART RATE: 44 BPM | WEIGHT: 131 LBS | DIASTOLIC BLOOD PRESSURE: 80 MMHG

## 2019-12-16 DIAGNOSIS — H60.393 OTHER INFECTIVE ACUTE OTITIS EXTERNA OF BOTH EARS: Primary | ICD-10-CM

## 2019-12-16 PROCEDURE — G8427 DOCREV CUR MEDS BY ELIG CLIN: HCPCS | Performed by: INTERNAL MEDICINE

## 2019-12-16 PROCEDURE — G8484 FLU IMMUNIZE NO ADMIN: HCPCS | Performed by: INTERNAL MEDICINE

## 2019-12-16 PROCEDURE — G8420 CALC BMI NORM PARAMETERS: HCPCS | Performed by: INTERNAL MEDICINE

## 2019-12-16 PROCEDURE — G8400 PT W/DXA NO RESULTS DOC: HCPCS | Performed by: INTERNAL MEDICINE

## 2019-12-16 PROCEDURE — 1123F ACP DISCUSS/DSCN MKR DOCD: CPT | Performed by: INTERNAL MEDICINE

## 2019-12-16 PROCEDURE — 99213 OFFICE O/P EST LOW 20 MIN: CPT | Performed by: INTERNAL MEDICINE

## 2019-12-16 PROCEDURE — G8599 NO ASA/ANTIPLAT THER USE RNG: HCPCS | Performed by: INTERNAL MEDICINE

## 2019-12-16 PROCEDURE — 4040F PNEUMOC VAC/ADMIN/RCVD: CPT | Performed by: INTERNAL MEDICINE

## 2019-12-16 PROCEDURE — 1036F TOBACCO NON-USER: CPT | Performed by: INTERNAL MEDICINE

## 2019-12-16 PROCEDURE — G9899 SCRN MAM PERF RSLTS DOC: HCPCS | Performed by: INTERNAL MEDICINE

## 2019-12-16 PROCEDURE — 3017F COLORECTAL CA SCREEN DOC REV: CPT | Performed by: INTERNAL MEDICINE

## 2019-12-16 PROCEDURE — 4130F TOPICAL PREP RX AOE: CPT | Performed by: INTERNAL MEDICINE

## 2019-12-16 PROCEDURE — 1090F PRES/ABSN URINE INCON ASSESS: CPT | Performed by: INTERNAL MEDICINE

## 2019-12-16 RX ORDER — AMOXICILLIN AND CLAVULANATE POTASSIUM 875; 125 MG/1; MG/1
1 TABLET, FILM COATED ORAL 2 TIMES DAILY
Qty: 14 TABLET | Refills: 0 | Status: SHIPPED | OUTPATIENT
Start: 2019-12-16 | End: 2019-12-23

## 2019-12-16 RX ORDER — DICYCLOMINE HYDROCHLORIDE 10 MG/1
CAPSULE ORAL
COMMUNITY
Start: 2019-11-26

## 2019-12-16 ASSESSMENT — ENCOUNTER SYMPTOMS
WHEEZING: 0
ABDOMINAL PAIN: 0
NAUSEA: 0
SHORTNESS OF BREATH: 0
EYE DISCHARGE: 0
TROUBLE SWALLOWING: 0
DIARRHEA: 0
SORE THROAT: 0
CHEST TIGHTNESS: 0
SINUS PRESSURE: 1
SINUS PAIN: 0
CONSTIPATION: 0
VOICE CHANGE: 1
COUGH: 0

## 2019-12-27 RX ORDER — LOVASTATIN 20 MG/1
TABLET ORAL
Qty: 90 TABLET | Refills: 3 | Status: SHIPPED | OUTPATIENT
Start: 2019-12-27 | End: 2020-02-24 | Stop reason: SDUPTHER

## 2019-12-28 RX ORDER — FLUTICASONE PROPIONATE 50 MCG
SPRAY, SUSPENSION (ML) NASAL
Qty: 48 G | Refills: 1 | Status: SHIPPED | OUTPATIENT
Start: 2019-12-28

## 2020-01-23 ENCOUNTER — OFFICE VISIT (OUTPATIENT)
Dept: FAMILY MEDICINE CLINIC | Age: 70
End: 2020-01-23
Payer: MEDICARE

## 2020-01-23 VITALS
DIASTOLIC BLOOD PRESSURE: 82 MMHG | HEART RATE: 69 BPM | OXYGEN SATURATION: 98 % | WEIGHT: 133 LBS | SYSTOLIC BLOOD PRESSURE: 122 MMHG | BODY MASS INDEX: 23.56 KG/M2

## 2020-01-23 PROCEDURE — 90686 IIV4 VACC NO PRSV 0.5 ML IM: CPT | Performed by: INTERNAL MEDICINE

## 2020-01-23 PROCEDURE — G8482 FLU IMMUNIZE ORDER/ADMIN: HCPCS | Performed by: INTERNAL MEDICINE

## 2020-01-23 PROCEDURE — 99214 OFFICE O/P EST MOD 30 MIN: CPT | Performed by: INTERNAL MEDICINE

## 2020-01-23 PROCEDURE — 4040F PNEUMOC VAC/ADMIN/RCVD: CPT | Performed by: INTERNAL MEDICINE

## 2020-01-23 PROCEDURE — 1090F PRES/ABSN URINE INCON ASSESS: CPT | Performed by: INTERNAL MEDICINE

## 2020-01-23 PROCEDURE — G9899 SCRN MAM PERF RSLTS DOC: HCPCS | Performed by: INTERNAL MEDICINE

## 2020-01-23 PROCEDURE — G8427 DOCREV CUR MEDS BY ELIG CLIN: HCPCS | Performed by: INTERNAL MEDICINE

## 2020-01-23 PROCEDURE — 3017F COLORECTAL CA SCREEN DOC REV: CPT | Performed by: INTERNAL MEDICINE

## 2020-01-23 PROCEDURE — G8420 CALC BMI NORM PARAMETERS: HCPCS | Performed by: INTERNAL MEDICINE

## 2020-01-23 PROCEDURE — 1123F ACP DISCUSS/DSCN MKR DOCD: CPT | Performed by: INTERNAL MEDICINE

## 2020-01-23 PROCEDURE — 1036F TOBACCO NON-USER: CPT | Performed by: INTERNAL MEDICINE

## 2020-01-23 PROCEDURE — G0008 ADMIN INFLUENZA VIRUS VAC: HCPCS | Performed by: INTERNAL MEDICINE

## 2020-01-23 PROCEDURE — G8400 PT W/DXA NO RESULTS DOC: HCPCS | Performed by: INTERNAL MEDICINE

## 2020-01-23 RX ORDER — TRAMADOL HYDROCHLORIDE 50 MG/1
TABLET ORAL
COMMUNITY
Start: 2020-01-22 | End: 2020-04-24 | Stop reason: SDUPTHER

## 2020-01-23 ASSESSMENT — ENCOUNTER SYMPTOMS
WHEEZING: 0
EYE DISCHARGE: 1
VOICE CHANGE: 1
BACK PAIN: 1
TROUBLE SWALLOWING: 0
EYE PAIN: 0
SHORTNESS OF BREATH: 0
VISUAL CHANGE: 0
EYE ITCHING: 1
VOMITING: 0
RHINORRHEA: 1
COUGH: 0
NAUSEA: 0
ABDOMINAL PAIN: 1
CHEST TIGHTNESS: 0
EYE REDNESS: 1
SINUS PRESSURE: 1
SORE THROAT: 0

## 2020-01-23 NOTE — PROGRESS NOTES
HPI Notes    Name: Nithya So  : 1950         Chief Complaint:     Chief Complaint   Patient presents with    Referral - General     Would like referral to ENT    Leg Pain     Started about a month ago with pain in the back of both legs, leg cramps, thinking maybe it has to do with her potassium maybe    Fatigue     Has been alot of episodes where she will get dizzy and start sweating, will have to sit down for awhile. states she tested her sugar once at her sisters and it was about 76. History of Present Illness:        Roxanne comes to office for evaluation of pain in her legs, spells of sweating, fatigue and depression. She also c/o chronic low back pain. She is seeing Dr. Debora Seth. Had steroid injection on 19. States it initially helped with the pain but now pain is back. Supposed to follow up with Dr. Debora Seth. . May need MRI back if pain is persisting. Unable to take NSAIDs due to chronic stomach pain, had surgery for hiatal hernia  Takes gabapentin. Was taking tramadol. I advised against narcotic use at this time and try Tylenol and back patches  with lidocaine. Has a h/o CAD , s/p cardiac catheterization on 2017, that showed 60% disease of all 3 major vessels, with an FFR of 0.94 in the right coronary artery, 0.92 in the LAD and 0.91 in the circumflex, with normal EF of 60%. Follows up with Dr. Amy Garcia.  has  a lot of allergies. Apparently her GI doctor Dr. Jose Guadalupe Sanon was supposed to refer her to ENT for evaluation. States for some reason she never got an appointment.  feels congested all the time, ears feel full of fluid. Eyes are itching and watery. Used to get  \"allergy shots\" in the past.   loses her voice, it becomes raspy. Has a h/o multiple allergies. Roxanne presents as a follow up on her depression. Current medication for depression includes nothing. Used to take Effexor. Thinks depression is \"OK\".  Not interested in taking medications or seeing a counselor/psychiatrist.    Roxanne  is not in counciling. Roxanne denies suicidal ideation. Fatigue   This is a chronic problem. The current episode started more than 1 year ago. The problem has been gradually worsening. Associated symptoms include abdominal pain (chronic epigastric pain), congestion, diaphoresis, fatigue, myalgias and vertigo. Pertinent negatives include no anorexia, chest pain, chills, coughing, fever, headaches, nausea, rash, sore throat, visual change or vomiting. Associated symptoms comments: Has dizziness. Nothing aggravates the symptoms. She has tried nothing for the symptoms. Leg Pain    The incident occurred more than 1 week ago (about one month). There was no injury mechanism. The pain is present in the right leg and left leg (spasms type pains BL below knee). The pain is moderate. She has tried nothing for the symptoms. Past Medical History:     Past Medical History:   Diagnosis Date    Allergic rhinitis 2011    Anxiety 2011    Carpal tunnel syndrome 2011    Depression 2011    Dyslipidemia 2011    Fibromyalgia 2011    Hyperlipidemia     Hypertension 2011    SOB (shortness of breath)       Reviewed all health maintenance requirements and orderedappropriate tests  Health Maintenance Due   Topic Date Due    Shingles Vaccine (1 of 2) 2002    DEXA (modify frequency per FRAX score)  2017    Lipid screen  2019    Colon cancer screen colonoscopy  10/26/2019       Past Surgical History:     Past Surgical History:   Procedure Laterality Date    CARDIAC CATHETERIZATION Left 2017    Dr. Fern Aiken @ [facility]--CAD, 50-60% ostial right CAD with an FFR of 0.94.  60% disease in the proximal LAD with an FFR of 0.92 with bridging of the mid LAD. Normal LV function at 60%. Mildly dilated ascending aorta -will do a CT scan to measure.      SECTION      x 4    ELBOW SURGERY Right     GALLBLADDER SURGERY  1975    HYSTERECTOMY      total hysterectomy    ROTATOR CUFF REPAIR      Right    SMALL INTESTINE SURGERY      twisted bowel 1985        Medications:       Prior to Admission medications    Medication Sig Start Date End Date Taking?  Authorizing Provider   traMADol (ULTRAM) 50 MG tablet  1/22/20  Yes Historical Provider, MD   fluticasone (FLONASE) 50 MCG/ACT nasal spray USE 2 SPRAYS NASALLY EVERY DAY 12/28/19  Yes Karuna Bah MD   lovastatin (MEVACOR) 20 MG tablet TAKE 1 TABLET BY MOUTH ONCE DAILY AT  NIGHT 12/27/19  Yes Matt Trinidad MD   dicyclomine (BENTYL) 10 MG capsule  11/26/19  Yes Historical Provider, MD   tiZANidine (ZANAFLEX) 2 MG tablet Take 1 tablet by mouth 3 times daily as needed (muscle spasms) 11/5/19  Yes Karuna Bah MD   lisinopril (PRINIVIL;ZESTRIL) 20 MG tablet TAKE 1 TABLET EVERY DAY 9/12/19  Yes Karuna Bah MD   metoprolol tartrate (LOPRESSOR) 25 MG tablet TAKE 1/2 TABLET TWICE DAILY 8/21/19  Yes Karuna Bah MD   levothyroxine (SYNTHROID) 50 MCG tablet TAKE 1 TABLET EVERY DAY 8/21/19  Yes Karuna Bah MD   gabapentin (NEURONTIN) 300 MG capsule TAKE 1 CAPSULE THREE TIMES DAILY 8/13/19 2/11/20 Yes Karuna Bah MD   traZODone (DESYREL) 100 MG tablet Take 1 tablet by mouth nightly as needed for Sleep 8/13/19  Yes Karuna Bah MD   omeprazole (PRILOSEC) 40 MG delayed release capsule TAKE 1 CAPSULE EVERY DAY 4/16/19  Yes Karuna Bah MD   Blood Pressure Monitoring (BLOOD PRESSURE CUFF) MISC Use to monitor Blood pressure 1-2 times daily 3/4/19  Yes Karuna Bah MD   vitamin B-6 (PYRIDOXINE) 100 MG tablet Take 100 mg by mouth daily   Yes Historical Provider, MD   Coenzyme Q-10 100 MG CAPS Take 1 capsule by mouth daily   Yes Historical Provider, MD   Simethicone 80 MG TABS Take 80 mg by mouth 2 times daily as needed (bloating) 11/12/18  Yes Karuna Bah MD   Calcium Polycarbophil (FIBER-CAPS PO) Take by mouth   Yes Historical Provider, MD vitamin D (CHOLECALCIFEROL) 1000 UNIT TABS tablet Take 1,000 Units by mouth daily   Yes Historical Provider, MD   venlafaxine (EFFEXOR XR) 75 MG extended release capsule Take 1 capsule by mouth daily 8/13/19   Betito Soares MD   cloNIDine (CATAPRES) 0.1 MG tablet Take 1 tablet by mouth 2 times daily as needed for High Blood Pressure If systolic BP above 109 80/99/59 5/13/19  Betito Soares MD        Allergies:       Patient has no known allergies. Social History:     Tobacco: reports that she has never smoked. She has never used smokeless tobacco.  Alcohol:      reports no history of alcohol use. Drug Use:  reports no history of drug use. Family History:     Family History   Problem Relation Age of Onset    Stroke Mother     High Cholesterol Mother     Heart Attack Father     High Cholesterol Father     High Cholesterol Sister     High Cholesterol Brother        Review of Systems:         Review of Systems   Constitutional: Positive for diaphoresis and fatigue. Negative for activity change, appetite change, chills and fever. HENT: Positive for congestion, ear pain, rhinorrhea, sinus pressure and voice change. Negative for hearing loss, nosebleeds, postnasal drip, sore throat and trouble swallowing. Eyes: Positive for discharge, redness and itching. Negative for pain. Respiratory: Negative for cough, chest tightness, shortness of breath and wheezing. Cardiovascular: Negative for chest pain, palpitations and leg swelling. Gastrointestinal: Positive for abdominal pain (chronic epigastric pain). Negative for anorexia, nausea and vomiting. Genitourinary: Negative for dysuria. Musculoskeletal: Positive for back pain and myalgias. Skin: Negative for rash. Neurological: Positive for vertigo. Negative for dizziness, tremors and headaches. Psychiatric/Behavioral: Positive for dysphoric mood. Negative for sleep disturbance and suicidal ideas. The patient is not nervous/anxious. Data:     Lab Results   Component Value Date     05/28/2019    K 3.6 05/28/2019     05/28/2019    CO2 27 05/28/2019    BUN 12 05/28/2019    CREATININE 0.5 05/28/2019    GLUCOSE 97 05/10/2018    GLUCOSE 99 01/19/2012    PROT 6.2 05/28/2019    LABALBU 3.8 05/28/2019    LABALBU 4.4 01/19/2012    BILITOT 0.8 05/28/2019    ALKPHOS 74 05/28/2019    AST 25 05/28/2019    ALT 26 05/28/2019     Lab Results   Component Value Date    WBC 5.9 05/28/2019    RBC 4.7 05/28/2019    RBC 4.86 01/19/2012    HGB 13.6 05/28/2019    HCT 40.5 05/28/2019    MCV 86.1 05/28/2019    MCH 29.0 05/28/2019    MCHC 33.6 05/28/2019    RDW 14.9 05/28/2019    .0 05/28/2019     01/19/2012    MPV 7.8 05/28/2019     Lab Results   Component Value Date    TSH 0.90 05/28/2019     Lab Results   Component Value Date    CHOL 250 01/16/2017    HDL 63 01/16/2017          Assessment & Plan        Diagnosis Orders   1. Other fatigue   Will order pertinent labs. Also refer to Dr. Sommer Keyes and due to her history of underlying coronary artery disease. May need stress test for evaluation. Basic Metabolic Panel    Magnesium    TSH with Reflex    CBC Auto Differential    Hepatic Function Panel    Mary Lopez MD, Cardiology, [facility]   2. Allergic rhinitis, unspecified seasonality, unspecified trigger  Referred to ENT Dr. Yahaira Joya in [facility] per patient's request. External Referral To ENT   3. Recurrent major depressive disorder, remission status unspecified ([facility] [address]   She declined taking medications at this time stating that has a strong social network, goes to [employer] which helps her a lot. States \"I am able to keep the symptoms at bay\". Declined referral to counseling and psychiatrist.    4. Pain in both lower extremities   She has no swelling or tenderness on examination. Will check electrolytes to rule out electrolyte disorder.     5. Need for influenza vaccination   Received influenza vaccine  INFLUENZA, Survey: You may be receiving a survey from [employer] regarding your visit today. You may get this in the mail, through your MyChart or in your email. Please complete the survey to enable us to provide the highest quality of care to you and your family. If you cannot score us as very good ( 5 Stars) on any question, please feel free to call the office to discuss how we could have made your experience exceptional.     Thank You! MD Meg Denton, [address], [address] [facility]. Department of Health and Human Services  Centers for Disease Control and Prevention    VACCINE INFORMATION STATEMENT  Many Vaccine Information Statements are available in Haitian and other languages. See www.immunize.org/vis  Hojas de información sobre vacunas están  disponibles en español y en muchos otros  idiomas. Visite www.immunize.org/vis    Influenza (Flu) Vaccine (Inactivated or Recombinant): What you need to know    1 Why get vaccinated? Influenza (flu) is a contagious disease that spreads  around the [facility] every year, usually between  October and May. Flu is caused by influenza viruses, and is spread mainly  by coughing, sneezing, and close contact. Anyone can get flu. Flu strikes suddenly and can last  several days. Symptoms vary by age, but can include:   fever/chills   sore throat   muscle aches   fatigue   cough   headache   runny or stuffy nose    Flu can also lead to pneumonia and blood infections, and  cause diarrhea and seizures in children. If you have a  medical condition, such as heart or lung disease, flu can  make it worse. Flu is more dangerous for some people. Infants and  young children, people 72years of age and older,  pregnant women, and people with certain health  conditions or a weakened immune system are at  greatest risk. Each year thousands of people in the [facility] die  from flu, and many more are hospitalized.     Flu vaccine can:   keep you from getting flu,   make flu less severe if you do get it, and   keep you from spreading flu to your family and  other people. 2 Inactivated and recombinant flu vaccines  A dose of flu vaccine is recommended every flu season. Children 6 months through 6years of age may need two  doses during the same flu season. Everyone else needs  only one dose each flu season. Some inactivated flu vaccines contain a very small  amount of a mercury-based preservative called  thimerosal. Studies have not shown thimerosal in  vaccines to be harmful, but flu vaccines that do not  contain thimerosal are available. There is no live flu virus in flu shots. They cannot cause  the flu. There are many flu viruses, and they are always  changing. Each year a new flu vaccine is made to protect  against three or four viruses that are likely to cause  disease in the upcoming flu season. But even when the  vaccine doesnt exactly match these viruses, it may still  provide some protection. Flu vaccine cannot prevent:   flu that is caused by a virus not covered by the vaccine,  or   illnesses that look like flu but are not. It takes about 2 weeks for protection to develop after  vaccination, and protection lasts through the flu season. 3 Some people should not get this vaccine  Tell the person who is giving you the vaccine:     If you have any severe, life-threatening allergies. If you ever had a life-threatening allergic reaction  after a dose of flu vaccine, or have a severe allergy to  any part of this vaccine, you may be advised not to  get vaccinated. Most, but not all, types of flu vaccine  contain a small amount of egg protein.  If you ever had Guillain-Barré Syndrome (also  called GBS). Some people with a history of GBS should not get this  vaccine. This should be discussed with your doctor.  If you are not feeling well.   It is usually okay to get flu vaccine when you have  a mild illness, but you might be asked to come back  when you feel better. 4 Risks of a vaccine reaction  With any medicine, including vaccines, there is a chance  of reactions. These are usually mild and go away on their  own, but serious reactions are also possible. Most people who get a flu shot do not have any problems  with it. Minor problems following a flu shot include:   soreness, redness, or swelling where the shot was  given   hoarseness   sore, red or itchy eyes   cough   fever   aches   headache   itching   fatigue  If these problems occur, they usually begin soon after the  shot and last 1 or 2 days. More serious problems following a flu shot can include  the following:   There may be a small increased risk of Guillain-Barré  Syndrome (GBS) after inactivated flu vaccine. This  risk has been estimated at 1 or 2 additional cases per  million people vaccinated. This is much lower than the  risk of severe complications from flu, which can be  prevented by flu vaccine.  Young children who get the flu shot along with  pneumococcal vaccine (PCV13) and/or DTaP vaccine  at the same time might be slightly more likely to have  a seizure caused by fever. Ask your doctor for more  information. Tell your doctor if a child who is getting  flu vaccine has ever had a seizure. Problems that could happen after any injected  vaccine:   People sometimes faint after a medical procedure,  including vaccination. Sitting or lying down for about  15 minutes can help prevent fainting, and injuries  caused by a fall. Tell your doctor if you feel dizzy, or  have vision changes or ringing in the ears.  Some people get severe pain in the shoulder and have  difficulty moving the arm where a shot was given. This  happens very rarely.  Any medication can cause a severe allergic reaction.   Such reactions from a vaccine are very rare, estimated  at about 1 in a million doses, and would happen within  a few minutes to a few hours after the vaccination. As with any medicine, there is a very remote chance of a  vaccine causing a serious injury or death. The safety of vaccines is always being monitored. For  more information, visit: www.cdc.gov/vaccinesafety/              5 What if there is a serious reaction? What should I look for?  Look for anything that concerns you, such as signs  of a severe allergic reaction, very high fever, or  unusual behavior. Signs of a severe allergic reaction can include hives,  swelling of the face and throat, difficulty breathing,  a fast heartbeat, dizziness, and weakness. These  would start a few minutes to a few hours after the  Vaccination. What should I do?  If you think it is a severe allergic reaction or other  emergency that cant wait, call 9-1-1 and get the person  to the nearest hospital. Otherwise, call your doctor.  Reactions should be reported to the Vaccine Adverse  Event Reporting System (VAERS). Your doctor should  file this report, or you can do it yourself through the  VAERS web site at www.vaers. hhs.gov, or by calling  3-512.147.6078. VAERS does not give medical advice. 6 The National Vaccine Injury Compensation Program  The [facility] Vaccine Injury Compensation Program  (VICP) is a federal program that was created to  compensate people who may have been injured by  certain vaccines. Persons who believe they may have been injured by a  vaccine can learn about the program and about filing a  claim by calling 8-530.433.9075 or visiting the [employer]  website at www.[facility].gov/vaccinecompensation. There  is a time limit to file a claim for compensation. 7 How can I learn more?  Ask your healthcare provider. He or she can give you  the vaccine package insert or suggest other sources of  information.  Call your local or state health department.    Contact the Centers for Disease Control and  Prevention (CDC):  - Call 3-469.802.5491 (1-800-CDC-INFO) or  - Visit CDCs website at www.cdc.gov/flu  Vaccine Information Statement  Inactivated Influenza Vaccine  42 CARLINE Sandoval [id number]  08/07/2015        Electronically signed by Ana Griffith MD on 1/23/2020 at 11:27 AM           Completed Refills      Requested Prescriptions      No prescriptions requested or ordered in this encounter

## 2020-01-23 NOTE — PROGRESS NOTES
Vaccine Information Sheet, \"Influenza - Inactivated\"  given to Sachin Shaikh, or parent/legal guardian of  Roxanne John and verbalized understanding. Patient responses:    Have you ever had a reaction to a flu vaccine? No  Are you able to eat eggs without adverse effects? Yes  Do you have any current illness? No  Have you ever had Guillian Fleetwood Syndrome? No    Flu vaccine given per order. Please see immunization tab.

## 2020-01-23 NOTE — PATIENT INSTRUCTIONS
from getting flu,   make flu less severe if you do get it, and   keep you from spreading flu to your family and  other people. 2 Inactivated and recombinant flu vaccines  A dose of flu vaccine is recommended every flu season. Children 6 months through 6years of age may need two  doses during the same flu season. Everyone else needs  only one dose each flu season. Some inactivated flu vaccines contain a very small  amount of a mercury-based preservative called  thimerosal. Studies have not shown thimerosal in  vaccines to be harmful, but flu vaccines that do not  contain thimerosal are available. There is no live flu virus in flu shots. They cannot cause  the flu. There are many flu viruses, and they are always  changing. Each year a new flu vaccine is made to protect  against three or four viruses that are likely to cause  disease in the upcoming flu season. But even when the  vaccine doesnt exactly match these viruses, it may still  provide some protection. Flu vaccine cannot prevent:   flu that is caused by a virus not covered by the vaccine,  or   illnesses that look like flu but are not. It takes about 2 weeks for protection to develop after  vaccination, and protection lasts through the flu season. 3 Some people should not get this vaccine  Tell the person who is giving you the vaccine:     If you have any severe, life-threatening allergies. If you ever had a life-threatening allergic reaction  after a dose of flu vaccine, or have a severe allergy to  any part of this vaccine, you may be advised not to  get vaccinated. Most, but not all, types of flu vaccine  contain a small amount of egg protein.  If you ever had Guillain-Barré Syndrome (also  called GBS). Some people with a history of GBS should not get this  vaccine. This should be discussed with your doctor.  If you are not feeling well.   It is usually okay to get flu vaccine when you have  a mild illness, but you might vaccination. As with any medicine, there is a very remote chance of a  vaccine causing a serious injury or death. The safety of vaccines is always being monitored. For  more information, visit: www.cdc.gov/vaccinesafety/              5 What if there is a serious reaction? What should I look for?  Look for anything that concerns you, such as signs  of a severe allergic reaction, very high fever, or  unusual behavior. Signs of a severe allergic reaction can include hives,  swelling of the face and throat, difficulty breathing,  a fast heartbeat, dizziness, and weakness. These  would start a few minutes to a few hours after the  Vaccination. What should I do?  If you think it is a severe allergic reaction or other  emergency that cant wait, call 9-1-1 and get the person  to the nearest hospital. Otherwise, call your doctor.  Reactions should be reported to the Vaccine Adverse  Event Reporting System (VAERS). Your doctor should  file this report, or you can do it yourself through the  VAERS web site at www.vaers. hhs.gov, or by calling  6-403.907.6981. VAERS does not give medical advice. 6 The National Vaccine Injury Compensation Program  The [facility] Vaccine Injury Compensation Program  (VICP) is a federal program that was created to  compensate people who may have been injured by  certain vaccines. Persons who believe they may have been injured by a  vaccine can learn about the program and about filing a  claim by calling 6-762.523.8225 or visiting the [employer]  website at www.[facility].gov/vaccinecompensation. There  is a time limit to file a claim for compensation. 7 How can I learn more?  Ask your healthcare provider. He or she can give you  the vaccine package insert or suggest other sources of  information.  Call your local or state health department.    Contact the Centers for Disease Control and  Prevention (CDC):  - Call 7-919.260.6896 (1-800-CDC-INFO) or  - Visit CDCs website at www.cdc.gov/flu  Vaccine Information Statement  Inactivated Influenza Vaccine  42 U. Tacey Kand [id number]  08/07/2015

## 2020-01-24 LAB
A/G RATIO: NORMAL
ALBUMIN SERPL-MCNC: 3.6 G/DL
ALP BLD-CCNC: 88 U/L
ALT SERPL-CCNC: 77 U/L
AST SERPL-CCNC: 58 U/L
BASOPHILS ABSOLUTE: 0.04 /ΜL
BASOPHILS RELATIVE PERCENT: 0.4 %
BILIRUB SERPL-MCNC: 0.6 MG/DL (ref 0.1–1.4)
BILIRUBIN DIRECT: 0.1 MG/DL
BILIRUBIN, INDIRECT: NORMAL
BUN BLDV-MCNC: 8 MG/DL
CALCIUM SERPL-MCNC: 8.4 MG/DL
CHLORIDE BLD-SCNC: 109 MMOL/L
CO2: 29 MMOL/L
CREAT SERPL-MCNC: 0.79 MG/DL
EOSINOPHILS ABSOLUTE: 0.18 /ΜL
EOSINOPHILS RELATIVE PERCENT: 1.9 %
GFR CALCULATED: 78
GLOBULIN: NORMAL
GLUCOSE BLD-MCNC: 97 MG/DL
HCT VFR BLD CALC: 43.4 % (ref 36–46)
HEMOGLOBIN: 14.6 G/DL (ref 12–16)
LYMPHOCYTES ABSOLUTE: 1.59 /ΜL
LYMPHOCYTES RELATIVE PERCENT: 17.1 %
MAGNESIUM: 2.3 MG/DL
MCH RBC QN AUTO: 29.2 PG
MCHC RBC AUTO-ENTMCNC: 33.6 G/DL
MCV RBC AUTO: 86.8 FL
MONOCYTES ABSOLUTE: 0.55 /ΜL
MONOCYTES RELATIVE PERCENT: 5.9 %
NEUTROPHILS ABSOLUTE: 6.9 /ΜL
NEUTROPHILS RELATIVE PERCENT: 74.5 %
PDW BLD-RTO: 14.3 %
PLATELET # BLD: 504 K/ΜL
PMV BLD AUTO: 9 FL
POTASSIUM SERPL-SCNC: 3.2 MMOL/L
PROTEIN TOTAL: 6.5 G/DL
RBC # BLD: 5 10^6/ΜL
SODIUM BLD-SCNC: 146 MMOL/L
TSH SERPL DL<=0.05 MIU/L-ACNC: 1.31 UIU/ML
WBC # BLD: 9.25 10^3/ML

## 2020-01-28 RX ORDER — POTASSIUM CHLORIDE 750 MG/1
10 TABLET, EXTENDED RELEASE ORAL DAILY
Qty: 30 TABLET | Refills: 0 | Status: SHIPPED | OUTPATIENT
Start: 2020-01-28 | End: 2020-03-05 | Stop reason: ALTCHOICE

## 2020-02-04 ENCOUNTER — TELEPHONE (OUTPATIENT)
Dept: FAMILY MEDICINE CLINIC | Age: 70
End: 2020-02-04

## 2020-02-04 RX ORDER — LISINOPRIL 20 MG/1
TABLET ORAL
Qty: 90 TABLET | Refills: 1 | Status: SHIPPED | OUTPATIENT
Start: 2020-02-04 | End: 2020-02-24 | Stop reason: SDUPTHER

## 2020-02-04 NOTE — TELEPHONE ENCOUNTER
Rx refill request    [facility]    Lisinopril 20 mg qd  Last Rx 9/12/19 #90 + 1 refill  Last seen 1/23/2020    Health Maintenance   Topic Date Due    Shingles Vaccine (1 of 2) 01/25/2002    DEXA (modify frequency per FRAX score)  01/25/2017    Lipid screen  06/04/2019    Colon cancer screen colonoscopy  10/26/2019    Annual Wellness Visit (AWV)  07/22/2021 (Originally 5/29/2019)    TSH testing  01/24/2021    Potassium monitoring  01/24/2021    Creatinine monitoring  01/24/2021    Breast cancer screen  03/05/2021    DTaP/Tdap/Td vaccine (2 - Td) 02/10/2024    Flu vaccine  Completed    Pneumococcal 65+ years Vaccine  Completed    Hepatitis C screen  Addressed    Hepatitis A vaccine  Aged Out    Hepatitis B vaccine  Aged Out    Hib vaccine  Aged Out    Meningococcal (ACWY) vaccine  Aged Out             (applicable per patient's age: Cancer Screenings, Depression Screening, Fall Risk Screening, Immunizations)    LDL Cholesterol (mg/dL)   Date Value   01/19/2012 178 (H)     LDL Calculated (mg/dL)   Date Value   01/16/2017 159     AST (U/L)   Date Value   01/24/2020 58     ALT (U/L)   Date Value   01/24/2020 77     BUN (mg/dL)   Date Value   01/24/2020 8      (goal A1C is < 7)   (goal LDL is <100) need 30-50% reduction from baseline     BP Readings from Last 3 Encounters:   01/23/20 122/82   12/16/19 122/80   11/05/19 134/74    (goal /80)      All Future Testing planned in [facility]:  Lab Frequency Next Occurrence   CBC Auto Differential Once 07/22/2019   Lipid Panel Once 07/22/2019   Vitamin D 25 Hydroxy Once 07/22/2019   Comprehensive Metabolic Panel Once 03/76/5736   TSH with Reflex Once 07/22/2019   EKG 12 Lead Once 07/22/2019   Magnesium Once 07/22/2019   TSH with Reflex Once 07/22/2020   CBC Auto Differential Once 07/22/2020   Comprehensive Metabolic Panel Once 51/20/9349   Vitamin D 25 Hydroxy Once 07/22/2020   Lipid Panel Once 07/22/2020   Magnesium Once 07/22/2020   EKG 12 Lead Once

## 2020-02-24 ENCOUNTER — OFFICE VISIT (OUTPATIENT)
Dept: CARDIOLOGY CLINIC | Age: 70
End: 2020-02-24
Payer: MEDICARE

## 2020-02-24 ENCOUNTER — HOSPITAL ENCOUNTER (OUTPATIENT)
Age: 70
Discharge: HOME | End: 2020-02-24
Payer: MEDICARE

## 2020-02-24 VITALS
SYSTOLIC BLOOD PRESSURE: 120 MMHG | WEIGHT: 134 LBS | DIASTOLIC BLOOD PRESSURE: 60 MMHG | OXYGEN SATURATION: 95 % | BODY MASS INDEX: 23.74 KG/M2 | HEART RATE: 86 BPM

## 2020-02-24 LAB
ALBUMIN SERPL-MCNC: 4.1 G/DL (ref 3.5–5.2)
ALBUMIN/GLOBULIN RATIO: ABNORMAL (ref 1–2.5)
ALP BLD-CCNC: 118 U/L (ref 35–104)
ALT SERPL-CCNC: 49 U/L (ref 5–33)
ANION GAP SERPL CALCULATED.3IONS-SCNC: 12 MMOL/L (ref 9–17)
AST SERPL-CCNC: 37 U/L
BILIRUB SERPL-MCNC: 0.44 MG/DL (ref 0.3–1.2)
BUN BLDV-MCNC: 10 MG/DL (ref 8–23)
BUN/CREAT BLD: 16 (ref 9–20)
CALCIUM SERPL-MCNC: 10 MG/DL (ref 8.6–10.4)
CHLORIDE BLD-SCNC: 104 MMOL/L (ref 98–107)
CO2: 25 MMOL/L (ref 20–31)
CREAT SERPL-MCNC: 0.64 MG/DL (ref 0.5–0.9)
GFR AFRICAN AMERICAN: >60 ML/MIN
GFR NON-AFRICAN AMERICAN: >60 ML/MIN
GFR SERPL CREATININE-BSD FRML MDRD: ABNORMAL ML/MIN/{1.73_M2}
GFR SERPL CREATININE-BSD FRML MDRD: ABNORMAL ML/MIN/{1.73_M2}
GLUCOSE BLD-MCNC: 127 MG/DL (ref 70–99)
POTASSIUM SERPL-SCNC: 3.5 MMOL/L (ref 3.7–5.3)
SODIUM BLD-SCNC: 141 MMOL/L (ref 135–144)
TOTAL PROTEIN: 6.8 G/DL (ref 6.4–8.3)
TSH SERPL DL<=0.05 MIU/L-ACNC: 1.77 MIU/L (ref 0.3–5)

## 2020-02-24 PROCEDURE — G8400 PT W/DXA NO RESULTS DOC: HCPCS | Performed by: INTERNAL MEDICINE

## 2020-02-24 PROCEDURE — G8427 DOCREV CUR MEDS BY ELIG CLIN: HCPCS | Performed by: INTERNAL MEDICINE

## 2020-02-24 PROCEDURE — 1036F TOBACCO NON-USER: CPT | Performed by: INTERNAL MEDICINE

## 2020-02-24 PROCEDURE — 80053 COMPREHEN METABOLIC PANEL: CPT

## 2020-02-24 PROCEDURE — 1123F ACP DISCUSS/DSCN MKR DOCD: CPT | Performed by: INTERNAL MEDICINE

## 2020-02-24 PROCEDURE — 4040F PNEUMOC VAC/ADMIN/RCVD: CPT | Performed by: INTERNAL MEDICINE

## 2020-02-24 PROCEDURE — G8482 FLU IMMUNIZE ORDER/ADMIN: HCPCS | Performed by: INTERNAL MEDICINE

## 2020-02-24 PROCEDURE — 1090F PRES/ABSN URINE INCON ASSESS: CPT | Performed by: INTERNAL MEDICINE

## 2020-02-24 PROCEDURE — 36415 COLL VENOUS BLD VENIPUNCTURE: CPT

## 2020-02-24 PROCEDURE — 3017F COLORECTAL CA SCREEN DOC REV: CPT | Performed by: INTERNAL MEDICINE

## 2020-02-24 PROCEDURE — G8420 CALC BMI NORM PARAMETERS: HCPCS | Performed by: INTERNAL MEDICINE

## 2020-02-24 PROCEDURE — G9899 SCRN MAM PERF RSLTS DOC: HCPCS | Performed by: INTERNAL MEDICINE

## 2020-02-24 PROCEDURE — 99214 OFFICE O/P EST MOD 30 MIN: CPT | Performed by: INTERNAL MEDICINE

## 2020-02-24 PROCEDURE — 84443 ASSAY THYROID STIM HORMONE: CPT

## 2020-02-24 RX ORDER — LISINOPRIL 20 MG/1
TABLET ORAL
Qty: 90 TABLET | Refills: 3 | Status: SHIPPED | OUTPATIENT
Start: 2020-02-24 | End: 2020-12-30 | Stop reason: SDUPTHER

## 2020-02-24 RX ORDER — LOVASTATIN 20 MG/1
TABLET ORAL
Qty: 90 TABLET | Refills: 3 | Status: SHIPPED
Start: 2020-02-24 | End: 2021-01-08 | Stop reason: SINTOL

## 2020-02-24 NOTE — PROGRESS NOTES
Ov DR Dave Ozuna consult  C/o feeling fatigue for past   couple month. occ feeling chest heaviness  occ sob walking . No ankle edema  occ dizziness   When gets dizzy gets  Diaphoretic. When sitting down rest  Goes away. Had EGD and colonoscopy done  Last fall. Still has stomach problems   Thought this was cause of these  Symptoms. Has 10year old Mylee. Son had 52 just had bypass. Bedside echo done. Will do cmp and tsh today  Schedule echo and stress test.     Lab results called to pt.

## 2020-02-24 NOTE — LETTER
Tahira Stauffer M.D. [address] [place] [address] [place] [address]  [phone]        2020        Darrion Canas MD  [address] [facility], [address] [place] [address]    RE:   Sonia Malagon  :  1950    Dear Dr. Federico Andino:    CHIEF COMPLAINT:  1. Fatigue. 2.  Shortness of breath. 3.  Chest pain consistent with angina. 4.  Coronary artery disease. HISTORY OF PRESENT ILLNESS:  I had the pleasure of seeing Mrs. Lisa Naqvi in our office on 2020. She is a pleasant 80-year-old female I first met on 2012 for cardiac evaluation. We did a stress test and an echocardiogram, which were unremarkable. In , she developed marked fatigue. We did a Lexiscan stress test with an echocardiogram, which was abnormal showing lateral wall ischemia with a low to intermediate risk of coronary artery disease. Echocardiogram showed EF of 55% with mild-to-moderate mitral regurgitation. I did a catheterization on 2017 that showed 60% disease in the ostium of the right coronary artery, 60% disease in the proximal LAD, and 60% disease in the circumflex. The FFR was 0.94 in the right coronary artery, 0.92 in the LAD, and 0.91 in the diagonal.  Medical therapy was recommended. I saw her on 2019, and at that time, she was doing well with no chest pain or chest discomfort. Her main complaint was difficulty swallowing, which she had been dealing with for approximately a year. In approximately December, she began developing increasing fatigue with shortness of breath. She also developed a feeling of heaviness in her chest with activity. She would get also markedly dizzy with associated diaphoresis. Her energy level has markedly worsened, and she has difficulty with doing activity because of shortness of breath and chest pain.     She saw Dr. Federico Andino on  and she relayed the same symptoms to  MI.  One son had myocardial infarction by the name of Aruna Damico. SOCIAL HISTORY:  She is , 78years old, retired from [employer] She has 6 children with her son, Aruna Damico, who is a patient of ours. She has foster-children, a girl Alex, adopted at birth, who is 10years old. She has a son, age 52, who had cardiomyopathy that now was found to have severe coronary artery disease and just had bypass surgery. It was done in [place]. She does not smoke or drink alcohol. Does not exercise. REVIEW OF SYSTEMS:  Cardiac as above. Other systems reviewed including constitutional, eyes, ears, nose and throat, cardiovascular, respiratory, GI, , musculoskeletal, integumentary, neurologic, psychiatric, endocrine, hematologic, allergic and immunologic, are negative except for what is described above. No weight loss or weight gain. No change in bowel habits. No blood in stool. No fevers, sweats or chills. PHYSICAL EXAMINATION:  VITAL SIGNS:  Her blood pressure was 120/60 with a heart rate of 86 and regular. Respirations were 18. O2 saturation 95%. Weight 134 pounds. GENERAL:  She is a pleasant 72-year-old female. Denied pain. She was oriented to person, place and time. Answered questions appropriately. SKIN:  No unusual skin changes. HEENT:  The pupils are equally round and reactive to light and accommodation. Extraocular movements were intact. Mucous membranes were dry. NECK:  No JVD. Good carotid pulses. No carotid bruits. No lymphadenopathy or thyromegaly. CARDIOVASCULAR EXAM:  S1 and S2 were normal.  No S3 or S4. Soft systolic blowing type murmur. No diastolic murmur. PMI was normal.  No lift, thrust, or pericardial friction rub. LUNGS:  Quite clear to auscultation and percussion. ABDOMEN:  Soft and nontender. Good bowel sounds. EXTREMITIES:  Good femoral pulses. Good pedal pulses. No pedal edema.

## 2020-02-25 ENCOUNTER — TELEPHONE (OUTPATIENT)
Dept: FAMILY MEDICINE CLINIC | Age: 70
End: 2020-02-25

## 2020-02-27 NOTE — PROGRESS NOTES
Marian Flor M.D. [address] [place] [address] [place] [address]  [phone]        2020        Ruel Coy MD  [address] [facility], [address] [place] [address]    RE:   Jennie Shafer  :  1950    Dear Dr. Ana Bender:    CHIEF COMPLAINT:  1. Fatigue. 2.  Shortness of breath. 3.  Chest pain consistent with angina. 4.  Coronary artery disease. HISTORY OF PRESENT ILLNESS:  I had the pleasure of seeing Mrs. Alisia Palomares in our office on 2020. She is a pleasant 68-year-old female I first met on 2012 for cardiac evaluation. We did a stress test and an echocardiogram, which were unremarkable. In , she developed marked fatigue. We did a Lexiscan stress test with an echocardiogram, which was abnormal showing lateral wall ischemia with a low to intermediate risk of coronary artery disease. Echocardiogram showed EF of 55% with mild-to-moderate mitral regurgitation. I did a catheterization on 2017 that showed 60% disease in the ostium of the right coronary artery, 60% disease in the proximal LAD, and 60% disease in the circumflex. The FFR was 0.94 in the right coronary artery, 0.92 in the LAD, and 0.91 in the diagonal.  Medical therapy was recommended. I saw her on 2019, and at that time, she was doing well with no chest pain or chest discomfort. Her main complaint was difficulty swallowing, which she had been dealing with for approximately a year. In approximately December, she began developing increasing fatigue with shortness of breath. She also developed a feeling of heaviness in her chest with activity. She would get also markedly dizzy with associated diaphoresis. Her energy level has markedly worsened, and she has difficulty with doing activity because of shortness of breath and chest pain.     She saw Dr. Ana Bender on  and she relayed the same symptoms to Dr. Ana Bender, who referred her to us for possible progression of her coronary artery disease with this as manifestation of angina. She denies any syncope or near syncope. She is still able to function at home but finds it difficult because of her new onset of chest pain and shortness of breath and marked loss of energy. She does have admitted history of depression but does not wish to have it treated. She has a history of hepatic steatosis by CT scans and a history of intermittently elevated liver function tests. She has had no PND or orthopnea. No unusual pedal edema. Denies any palpitations. She has had no fevers, sweats or chills, although she did have a flu syndrome several months ago. CARDIAC RISK FACTORS:  Known CAD:  Positive. Hypertension:  Positive. Hyperlipidemia:  Positive. Other Family Members:  Positive. Peripheral Vascular Disease:  Negative. Smoking:  Negative. Diabetes:  Negative. MEDICATIONS AT HOME:  She is currently on CoQ10 100 mg daily, Flonase p.r.n., Neurontin 300 mg t.i.d., Synthroid 50 mcg daily, lisinopril 20 mg daily, Mevacor 20 mg daily, Lopressor 25 mg half a tablet b.i.d., Prilosec 40 mg daily, potassium 10 mEq daily, Zanaflex 2 mg t.i.d. p.r.n., Ultram p.r.n., trazodone 100 mg nightly p.r.n., Effexor XR 75 mg daily, and vitamin D 1000 units daily. PAST MEDICAL HISTORY:  1. Hypertension, many years, very labile. 2.  Hyperlipidemia. 3.  Four C-sections in , , , and .  4.  She had a torsion in .  5.  Right shoulder surgery in  and .  6.  Hysterectomy in .  7.  Several surgeries for adhesions. 8.  Fibromyalgia. 9.  Depression. 10.  Right elbow surgery in . 11.  Cholecystectomy in . 12.  Candida esophagitis 1 year ago. 13.  Small intestine surgery in . 14.  Right rotator cuff repair. 15.  Catheterization on 2017. FAMILY HISTORY:  Mother  at [de-identified] of a stroke. Father  at 71 of an MI.   One son had myocardial infarction by the name did not wish to treat. However, her symptoms are concerning for possible progression of the coronary artery disease as noted by Dr. Carie Paiz. She did have 60% disease in all major vessels in 12/2017. I will do a Lexiscan stress test and an echocardiogram and compare to the previous stress test.  If there is any significant change, then I would treat for angina and consider repeat cardiac catheterization. I made no change in her medications today. I do note that she had elevated liver function tests on 01/24, which are now down on her repeat blood work today. Again, she has a history of hepatic steatosis and there is a history of also intermittently mildly elevated liver function tests. Thank you very much for allowing me the privilege of seeing Mrs. Aleta Glass. We will schedule the stress test and echocardiogram, and if they are unremarkable, call her with the results. If there is any significant abnormality, then I will bring her back for reevaluation and discussion of further testing.     Sincerely,        Florian Hdez    D: 02/25/2020 4:08:24     T: 02/25/2020 5:47:11     [id number]  Job#: [id number]   Doc#: [id number]

## 2020-03-05 ENCOUNTER — HOSPITAL ENCOUNTER (OUTPATIENT)
Dept: NUCLEAR MEDICINE | Age: 70
Discharge: HOME | End: 2020-03-07
Payer: MEDICARE

## 2020-03-05 ENCOUNTER — HOSPITAL ENCOUNTER (OUTPATIENT)
Dept: NON INVASIVE DIAGNOSTICS | Age: 70
Discharge: HOME | End: 2020-03-05
Payer: MEDICARE

## 2020-03-05 VITALS — HEART RATE: 78 BPM | SYSTOLIC BLOOD PRESSURE: 143 MMHG | DIASTOLIC BLOOD PRESSURE: 71 MMHG

## 2020-03-05 LAB
LV EF: 58 %
LVEF MODALITY: NORMAL

## 2020-03-05 PROCEDURE — 93017 CV STRESS TEST TRACING ONLY: CPT

## 2020-03-05 PROCEDURE — 3430000000 HC RX DIAGNOSTIC RADIOPHARMACEUTICAL: Performed by: INTERNAL MEDICINE

## 2020-03-05 PROCEDURE — 6360000002 HC RX W HCPCS: Performed by: INTERNAL MEDICINE

## 2020-03-05 PROCEDURE — 2580000003 HC RX 258: Performed by: INTERNAL MEDICINE

## 2020-03-05 PROCEDURE — 93306 TTE W/DOPPLER COMPLETE: CPT

## 2020-03-05 PROCEDURE — A9500 TC99M SESTAMIBI: HCPCS | Performed by: INTERNAL MEDICINE

## 2020-03-05 PROCEDURE — 78452 HT MUSCLE IMAGE SPECT MULT: CPT

## 2020-03-05 RX ORDER — AMINOPHYLLINE DIHYDRATE 25 MG/ML
100 INJECTION, SOLUTION INTRAVENOUS
Status: DISCONTINUED | OUTPATIENT
Start: 2020-03-05 | End: 2020-03-05 | Stop reason: HOSPADM

## 2020-03-05 RX ORDER — 0.9 % SODIUM CHLORIDE 0.9 %
10 VIAL (ML) INJECTION PRN
Status: DISCONTINUED | OUTPATIENT
Start: 2020-03-05 | End: 2020-03-06 | Stop reason: HOSPADM

## 2020-03-05 RX ORDER — AMINOPHYLLINE DIHYDRATE 25 MG/ML
75 INJECTION, SOLUTION INTRAVENOUS
Status: DISCONTINUED | OUTPATIENT
Start: 2020-03-05 | End: 2020-03-05 | Stop reason: HOSPADM

## 2020-03-05 RX ADMIN — REGADENOSON 0.4 MG: 0.08 INJECTION, SOLUTION INTRAVENOUS at 10:12

## 2020-03-05 RX ADMIN — TETRAKIS(2-METHOXYISOBUTYLISOCYANIDE)COPPER(I) TETRAFLUOROBORATE 10 MILLICURIE: 1 INJECTION, POWDER, LYOPHILIZED, FOR SOLUTION INTRAVENOUS at 09:00

## 2020-03-05 RX ADMIN — Medication 10 ML: at 10:12

## 2020-03-05 RX ADMIN — TETRAKIS(2-METHOXYISOBUTYLISOCYANIDE)COPPER(I) TETRAFLUOROBORATE 30 MILLICURIE: 1 INJECTION, POWDER, LYOPHILIZED, FOR SOLUTION INTRAVENOUS at 09:07

## 2020-03-12 ENCOUNTER — OFFICE VISIT (OUTPATIENT)
Dept: CARDIOLOGY CLINIC | Age: 70
End: 2020-03-12
Payer: MEDICARE

## 2020-03-12 VITALS
WEIGHT: 135 LBS | BODY MASS INDEX: 23.91 KG/M2 | HEART RATE: 64 BPM | SYSTOLIC BLOOD PRESSURE: 160 MMHG | DIASTOLIC BLOOD PRESSURE: 80 MMHG | OXYGEN SATURATION: 97 %

## 2020-03-12 PROCEDURE — 1123F ACP DISCUSS/DSCN MKR DOCD: CPT | Performed by: INTERNAL MEDICINE

## 2020-03-12 PROCEDURE — 99213 OFFICE O/P EST LOW 20 MIN: CPT | Performed by: INTERNAL MEDICINE

## 2020-03-12 PROCEDURE — G8420 CALC BMI NORM PARAMETERS: HCPCS | Performed by: INTERNAL MEDICINE

## 2020-03-12 PROCEDURE — G8400 PT W/DXA NO RESULTS DOC: HCPCS | Performed by: INTERNAL MEDICINE

## 2020-03-12 PROCEDURE — G8427 DOCREV CUR MEDS BY ELIG CLIN: HCPCS | Performed by: INTERNAL MEDICINE

## 2020-03-12 PROCEDURE — 3017F COLORECTAL CA SCREEN DOC REV: CPT | Performed by: INTERNAL MEDICINE

## 2020-03-12 PROCEDURE — 4040F PNEUMOC VAC/ADMIN/RCVD: CPT | Performed by: INTERNAL MEDICINE

## 2020-03-12 PROCEDURE — G8482 FLU IMMUNIZE ORDER/ADMIN: HCPCS | Performed by: INTERNAL MEDICINE

## 2020-03-12 PROCEDURE — 1090F PRES/ABSN URINE INCON ASSESS: CPT | Performed by: INTERNAL MEDICINE

## 2020-03-12 PROCEDURE — G9899 SCRN MAM PERF RSLTS DOC: HCPCS | Performed by: INTERNAL MEDICINE

## 2020-03-12 PROCEDURE — 1036F TOBACCO NON-USER: CPT | Performed by: INTERNAL MEDICINE

## 2020-03-12 RX ORDER — AMLODIPINE BESYLATE 2.5 MG/1
2.5 TABLET ORAL DAILY
Qty: 30 TABLET | Refills: 11 | Status: SHIPPED | OUTPATIENT
Start: 2020-03-12 | End: 2021-01-08 | Stop reason: ALTCHOICE

## 2020-03-16 NOTE — PROGRESS NOTES
Ov Dr Ashely Ramon 1 week follow up   To review stress test and echo  Still having occ chest heaviness and  Sob. Will start cardiac rehab. Will start norvasc 2.5mg daily  See in 2 mths.
cardiac catheterization at that time. We discussed these options in detail and she is in full agreement. Obviously, if she would worsen or became apparent in cardiac rehab that she was continuing to have angina, then I would proceed earlier with a cardiac catheterization. Thank you very much for allowing me the privilege of seeing Mrs. Pratik Gunter. If you have any questions on my thoughts, please do not hesitate to contact me.     Sincerely,        Oly Talavera    D: 03/12/2020 14:39:16     T: 03/12/2020 14:48:13     [id number]  Job#: [id number]   Doc#: [id number]

## 2020-03-20 NOTE — PROGRESS NOTES
This rehabilitation patient has been placed on hold effective 3/23/2020 due to cardiopulmonary rehabilitation department at [facility] is imposing a mandated closure related to the Covid-19 threat. Individualized treatment plans for each patient will be updated and restored upon reopening of cardiopulmonary rehabilitation.

## 2020-03-24 ENCOUNTER — HOSPITAL ENCOUNTER (OUTPATIENT)
Dept: CARDIAC REHAB | Age: 70
Setting detail: THERAPIES SERIES
Discharge: HOME | End: 2020-03-24
Payer: MEDICARE

## 2020-04-06 RX ORDER — GABAPENTIN 300 MG/1
CAPSULE ORAL
Qty: 270 CAPSULE | Refills: 1 | Status: SHIPPED | OUTPATIENT
Start: 2020-04-06 | End: 2020-07-14 | Stop reason: SDUPTHER

## 2020-04-24 ENCOUNTER — TELEMEDICINE (OUTPATIENT)
Dept: FAMILY MEDICINE CLINIC | Age: 70
End: 2020-04-24
Payer: MEDICARE

## 2020-04-24 PROCEDURE — 99214 OFFICE O/P EST MOD 30 MIN: CPT | Performed by: INTERNAL MEDICINE

## 2020-04-24 PROCEDURE — G8400 PT W/DXA NO RESULTS DOC: HCPCS | Performed by: INTERNAL MEDICINE

## 2020-04-24 PROCEDURE — G9899 SCRN MAM PERF RSLTS DOC: HCPCS | Performed by: INTERNAL MEDICINE

## 2020-04-24 PROCEDURE — G8427 DOCREV CUR MEDS BY ELIG CLIN: HCPCS | Performed by: INTERNAL MEDICINE

## 2020-04-24 PROCEDURE — 1090F PRES/ABSN URINE INCON ASSESS: CPT | Performed by: INTERNAL MEDICINE

## 2020-04-24 PROCEDURE — 3017F COLORECTAL CA SCREEN DOC REV: CPT | Performed by: INTERNAL MEDICINE

## 2020-04-24 PROCEDURE — 1123F ACP DISCUSS/DSCN MKR DOCD: CPT | Performed by: INTERNAL MEDICINE

## 2020-04-24 PROCEDURE — 4040F PNEUMOC VAC/ADMIN/RCVD: CPT | Performed by: INTERNAL MEDICINE

## 2020-04-24 RX ORDER — TRAZODONE HYDROCHLORIDE 100 MG/1
100 TABLET ORAL NIGHTLY PRN
Qty: 90 TABLET | Refills: 1 | Status: SHIPPED | OUTPATIENT
Start: 2020-04-24 | End: 2021-01-08 | Stop reason: SDUPTHER

## 2020-04-24 RX ORDER — TIZANIDINE 2 MG/1
2 TABLET ORAL 3 TIMES DAILY PRN
Qty: 90 TABLET | Refills: 1 | Status: SHIPPED | OUTPATIENT
Start: 2020-04-24 | End: 2021-01-08 | Stop reason: SDUPTHER

## 2020-04-24 RX ORDER — TRAMADOL HYDROCHLORIDE 50 MG/1
50 TABLET ORAL EVERY 8 HOURS PRN
Qty: 90 TABLET | Refills: 2 | Status: SHIPPED | OUTPATIENT
Start: 2020-04-24 | End: 2020-05-24

## 2020-04-24 RX ORDER — LEVOTHYROXINE SODIUM 0.05 MG/1
50 TABLET ORAL DAILY
Qty: 90 TABLET | Refills: 1 | Status: SHIPPED | OUTPATIENT
Start: 2020-04-24 | End: 2020-11-18 | Stop reason: SDUPTHER

## 2020-04-24 RX ORDER — POTASSIUM CHLORIDE 750 MG/1
10 TABLET, EXTENDED RELEASE ORAL DAILY
Qty: 90 TABLET | Refills: 1 | Status: SHIPPED | OUTPATIENT
Start: 2020-04-24 | End: 2021-01-08

## 2020-04-24 ASSESSMENT — ENCOUNTER SYMPTOMS
NAUSEA: 0
CONSTIPATION: 0
COUGH: 0
SINUS PAIN: 0
ABDOMINAL PAIN: 1
RHINORRHEA: 0
CHEST TIGHTNESS: 1
WHEEZING: 0
BACK PAIN: 1
SHORTNESS OF BREATH: 0
DIARRHEA: 0
TROUBLE SWALLOWING: 0
VOMITING: 0
SORE THROAT: 0
VOICE CHANGE: 0

## 2020-04-24 NOTE — PROGRESS NOTES
2020    TELEHEALTH EVALUATION -- Audio/Visual (During SBZFW-51 public health emergency)    HPI:    Roxanne Walters (:  1950) has requested an audio/video evaluation for the following concern(s):    HTN, Hypothyroidism, fibromyalgia, chronic low back pain. HTN is a chronic problem. Had it for many years. States is is stable. On multiple meds, including Lisinopril, Lopressor,amlodipine. Has a h/o CAD, no stents. She is complaint with meds, reports no side effects  Recently has been having increased fatigue, SOB, chest pain. She had a stress test and echo ordered by Dr. Anders Saez. Per Dr Mehreen Kaiser stress test actually looked good with no significant areas of stenosis of ischemia present. Echocardiogram showed normal LV size and function. Her EF was 55% to 60% with moderate-to-severe LVH. She was recommended cardiac rehab vs. Repeat cardiac cath. She decided to proceed with cardiac rehab. Unfortunately, due to Covid-19 pandemia rehab was closed. She reports having occasional CP. She is supposed to follow up with Dr. Anders Saez on 20. Roxanne confirms that she is compliant with  thyroid medication. she denies  constipation, increased skin dryness, or increased lower extremity edema. The last TSH was 1.77 on 20. Has a h/o chronic low back pain for many years due to lumbar degenerative disk disease. 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 Worked for more than 20 years. Has pain most of the day. The pain is radiating to the right hip and leg. The pain is sharp, achy, burning. Most of the time 8/10. Unable to take NSAIDs due to stomach pain/hiatal hernia and also due to CAD. Tried Tylenol and muscle relaxants. Also taking Gabapentin. All these measures are not controlling her pain. We prescribed Tramadol 50 mg tid prn in the past. She states although she \" hates \" taking narcotics, it is the only thing that more or less helps.  She Refill: 2  - tiZANidine (ZANAFLEX) 2 MG tablet; Take 1 tablet by mouth 3 times daily as needed (muscle spasms)  Dispense: 90 tablet; Refill: 1    3. Acquired hypothyroidism  Refill Synthroid  - levothyroxine (SYNTHROID) 50 MCG tablet; Take 1 tablet by mouth Daily  Dispense: 90 tablet; Refill: 1    4. Fibromyalgia syndrome  Doing well on current dose of Gabapentin    5. Lumbar and sacral osteoarthritis  - tiZANidine (ZANAFLEX) 2 MG tablet; Take 1 tablet by mouth 3 times daily as needed (muscle spasms)  Dispense: 90 tablet; Refill: 1      No follow-ups on file. Rosa Maria Dunn is a 78 y.o. female being evaluated by a Virtual Visit (video visit) encounter to address concerns as mentioned above. A caregiver was present when appropriate. Due to this being a TeleHealth encounter (During GWNDB-22 public health emergency), evaluation of the following organ systems was limited: Vitals/Constitutional/EENT/Resp/CV/GI//MS/Neuro/Skin/Heme-Lymph-Imm. Pursuant to the emergency declaration under the [address] [place] [address] and the [employer] and [employer] Act, this Virtual Visit was conducted with patient's (and/or legal guardian's) consent, to reduce the patient's risk of exposure to COVID-19 and provide necessary medical care. The patient (and/or legal guardian) has also been advised to contact this office for worsening conditions or problems, and seek emergency medical treatment and/or call 911 if deemed necessary. Patient identification was verified at the start of the visit: No. Established patient    Total time spent on this encounter: 28-30 minutes    Services were provided through a video synchronous discussion virtually to substitute for in-person clinic visit. Patient and provider were located at their individual homes.     --Florin Mata MD on 4/24/2020 at 1:18 PM    An electronic signature was used to authenticate this note.

## 2020-05-18 ENCOUNTER — TELEPHONE (OUTPATIENT)
Dept: CARDIAC REHAB | Age: 70
End: 2020-05-18

## 2020-06-30 ENCOUNTER — OFFICE VISIT (OUTPATIENT)
Dept: CARDIOLOGY CLINIC | Age: 70
End: 2020-06-30
Payer: MEDICARE

## 2020-06-30 VITALS
DIASTOLIC BLOOD PRESSURE: 70 MMHG | OXYGEN SATURATION: 96 % | WEIGHT: 139 LBS | SYSTOLIC BLOOD PRESSURE: 160 MMHG | BODY MASS INDEX: 24.62 KG/M2 | HEART RATE: 71 BPM

## 2020-06-30 PROCEDURE — 3017F COLORECTAL CA SCREEN DOC REV: CPT | Performed by: INTERNAL MEDICINE

## 2020-06-30 PROCEDURE — 99214 OFFICE O/P EST MOD 30 MIN: CPT | Performed by: INTERNAL MEDICINE

## 2020-06-30 PROCEDURE — 1090F PRES/ABSN URINE INCON ASSESS: CPT | Performed by: INTERNAL MEDICINE

## 2020-06-30 PROCEDURE — 1123F ACP DISCUSS/DSCN MKR DOCD: CPT | Performed by: INTERNAL MEDICINE

## 2020-06-30 PROCEDURE — G8420 CALC BMI NORM PARAMETERS: HCPCS | Performed by: INTERNAL MEDICINE

## 2020-06-30 PROCEDURE — G8400 PT W/DXA NO RESULTS DOC: HCPCS | Performed by: INTERNAL MEDICINE

## 2020-06-30 PROCEDURE — 1036F TOBACCO NON-USER: CPT | Performed by: INTERNAL MEDICINE

## 2020-06-30 PROCEDURE — G9899 SCRN MAM PERF RSLTS DOC: HCPCS | Performed by: INTERNAL MEDICINE

## 2020-06-30 PROCEDURE — G8427 DOCREV CUR MEDS BY ELIG CLIN: HCPCS | Performed by: INTERNAL MEDICINE

## 2020-06-30 PROCEDURE — 4040F PNEUMOC VAC/ADMIN/RCVD: CPT | Performed by: INTERNAL MEDICINE

## 2020-06-30 NOTE — PROGRESS NOTES
Ov DR Adri Lux follow up  Still has occ twinges chest pain  Dull ache. Sob if walks too fast.   After taking meds has some  Lightheadedness. Pt did not do cardiac rehab   DR Venancio Merino told her not   To go and to do a heart   Cath instead. Will set up cath on 7/17.

## 2020-06-30 NOTE — LETTER
Kristine Krause M.D. [address] [place], [place] [address]   [phone]        2020        Baylee Bryan MD  [address] [facility], [address] [facility]    RE:   Krzysztof Agustin  :  1950    Dear Dr. David May:    CHIEF COMPLAINT:  1. Chest pain and shortness of breath, consistent with angina, on full medical therapy. 2.  Known coronary artery disease. HISTORY OF PRESENT ILLNESS:  I had the pleasure of seeing Mrs. Ollie Hay in our office on 2020. She is a pleasant 22-year-old female, who I first met on 2012, for cardiac evaluation. She had an unremarkable stress test and echocardiogram.    In , she had a Lexiscan stress test with an echocardiogram; this was abnormal showing lateral wall ischemia. Her echocardiogram showed an EF of 55% with mild-to-moderate mitral regurgitation. This resulted in a catheterization on 2017. This showed 60% disease in the ostium of the right coronary artery, 60% disease in the proximal LAD, and 60% disease in the circumflex. The FFR was 0.94 in the right coronary artery, 0.92 in the LAD, and 0.91 in the diagonal.  Medical therapy recommended. In 2019, she began developing fatigue with shortness of breath. She would also get markedly dizzy with associated diaphoresis. She had difficulty doing activity because of her shortness of breath and chest pain. She saw Dr. David May, who sent her to me for cardiac workup. She denied any syncope or near syncope. I saw her on 2020. At that time, we ordered a Lexiscan Cardiolite stress test and echocardiogram.  These were done on . The stress test looked actually fairly good with normal perfusion. Her echocardiogram on 2020, also looked good with EF of 55% to 60%. I gave her the option of cardiac rehab versus cardiac catheterization.   We She does not smoke or drink alcohol. Does not exercise. REVIEW OF SYSTEMS:  Cardiac as above. Other systems reviewed including constitutional, eyes, ears, nose and throat, cardiovascular, respiratory, GI, , musculoskeletal, integumentary, neurologic, endocrine, hematologic and allergic/immunologic are negative except for what is described above. No weight loss or weight gain. No change in bowel habits. No blood in stools. No fevers, sweats or chills. PHYSICAL EXAMINATION:  VITAL SIGNS:  Her blood pressure was 160/70 with a heart rate of 71 and regular. Respiratory rate 18. O2 saturation 96%. Weight 179 pounds. GENERAL:  She is a pleasant 72-year-old female. Denied pain. She was oriented to person, place and time. Answered questions appropriately. SKIN:  No unusual skin changes. HEENT:  The pupils are equally round and intact. Mucous membranes were dry. NECK:  No JVD. Good carotid pulses. No carotid bruits. No lymphadenopathy or thyromegaly. CARDIOVASCULAR EXAM:  S1 and S2 were normal.  No S3 or S4. Soft systolic blowing type murmur. No diastolic murmur. PMI was normal.  No lift, thrust, or pericardial friction rub. LUNGS:  Quite clear to auscultation and percussion. ABDOMEN:  Soft and nontender. Good bowel sounds. EXTREMITIES:  Good femoral pulses. Good pedal pulses. No pedal edema. Skin was warm and dry. No calf tenderness. Nail beds pink. Good cap refill. PULSES:  Bilateral symmetrical radial, brachial and carotid pulses. No carotid bruits. Good femoral and pedal pulses. NEUROLOGIC EXAM:  Within normal limits. PSYCHIATRIC EXAM:  Within normal limits. LABORATORY DATA:  Pending. IMPRESSION:  1. Fatigue, loss of energy, shortness of breath and chest pain, consistent with angina, on full medical therapy.   2.  Known CAD, with a catheterization on 12/20/2017, that showed 60% disease in all 3 major vessels with an FFR of 0.94 in the right coronary artery, 0.92 in the LAD, and 0.91 in the circumflex, normal LV function. 3.  History of mildly dilated ascending aorta, measuring 4 cm. 4.  Strong family history of coronary artery disease. 5.  Very labile hypertension. 6.  Unremarkable Lexiscan Cardiolite stress test on 03/05/2020, with an overall low risk for coronary artery disease. 7.  Normal LV function by an echocardiogram on 03/05/2020, with an EF of 55% to 60%, with mild-to-moderate mitral regurgitation. 8.  History of depression, untreated by her choice. 9.  History of intermittent elevated liver function tests secondary to hepatic steatosis. PLAN:  Proceed on with cardiac catheterization because of her continued chest pain in spite of full medical therapy. DISCUSSION:  Mrs. Mary Fitzpatrick continues to have her chest pain, loss of energy and shortness of breath. She has known coronary artery disease, with 60% disease in all 3 major vessels on 12/20/2017. With her continued symptoms, we discussed proceeding with cardiac catheterization to define her anatomy. She had intended to do cardiac rehab; however, with the COVID virus isolation, this was unable to be done. Her symptoms have continued and she would like now to proceed directly with a cardiac catheterization. I went over risks and benefits and we will proceed on 07/17. Thank you very much for allowing me the privilege of seeing Mrs. Mary Fitzpatrick. If you have any questions on my thoughts, please do not hesitate to contact me.     Sincerely,        Franco Wilson    D: 06/30/2020 14:57:17     T: 07/01/2020 5:09:46     [id number]  Job#: [id number]   Doc#: [id number]

## 2020-07-09 NOTE — PROGRESS NOTES
Ervin Hansen M.D. [address] [place] [address]  [phone]        2020        Lisa Carey MD  [address] [facility], [address] [place] [address]    RE:   Hernandez Salazar  :  1950    Dear Dr. Viviane Allen:    CHIEF COMPLAINT:  1. Chest pain and shortness of breath, consistent with angina, on full medical therapy. 2.  Known coronary artery disease. HISTORY OF PRESENT ILLNESS:  I had the pleasure of seeing Mrs. Tod Soto in our office on 2020. She is a pleasant 51-year-old female, who I first met on 2012, for cardiac evaluation. She had an unremarkable stress test and echocardiogram.    In , she had a Lexiscan stress test with an echocardiogram; this was abnormal showing lateral wall ischemia. Her echocardiogram showed an EF of 55% with mild-to-moderate mitral regurgitation. This resulted in a catheterization on 2017. This showed 60% disease in the ostium of the right coronary artery, 60% disease in the proximal LAD, and 60% disease in the circumflex. The FFR was 0.94 in the right coronary artery, 0.92 in the LAD, and 0.91 in the diagonal.  Medical therapy recommended. In 2019, she began developing fatigue with shortness of breath. She would also get markedly dizzy with associated diaphoresis. She had difficulty doing activity because of her shortness of breath and chest pain. She saw Dr. Viviane Allen, who sent her to me for cardiac workup. She denied any syncope or near syncope. I saw her on 2020. At that time, we ordered a Lexiscan Cardiolite stress test and echocardiogram.  These were done on . The stress test looked actually fairly good with normal perfusion. Her echocardiogram on 2020, also looked good with EF of 55% to 60%. I gave her the option of cardiac rehab versus cardiac catheterization. We did add Norvasc 2.5 mg daily to her regimen.   Our plan was to do cardiac rehab and reevaluate in 2 months. COVID virus isolated cardiac rehab, and therefore, she did not do this. She came back today for reevaluation. She continues to have chest pain, described as a dull ache. She also continues to have her shortness of breath, which is essentially unchanged from what it had been when I first saw her in February. She has had no syncope or near syncope, lightheadedness or dizziness. CARDIAC RISK FACTORS:  Known CAD:  Positive. Hypertension:  Positive. Hyperlipidemia:  Positive. Other Family Members:  Positive. Peripheral Vascular Disease:  Negative. Smoking:  Negative. Diabetes:  Negative. MEDICATIONS AT THIS TIME:  She is on Norvasc 2.5 mg daily, CoQ10 daily, Flonase daily, Neurontin 300 mg t.i.d., Synthroid 50 mcg daily, Mevacor 20 mg daily, Lopressor 25 mg half a tablet b.i.d., Zanaflex 2 mg t.i.d., Desyrel 100 mg nightly, vitamin B6 100 mg daily, vitamin D 1000 units daily. PAST MEDICAL HISTORY:  1. Hypertension many years, very labile. 2.  Hyperlipidemia. 3.  Four C-sections in , ,  and .  4.  Right shoulder surgery in  and .  5.  Hysterectomy in .  6.  Several surgeries for adhesions. 7.  Fibromyalgia. 8.  Depression, untreated by her choice. 9.  Right elbow surgery in .  10.  Cholecystectomy in . 11.  Small intestine surgery in . 12.  Rotator cuff repair. 13.  Cardiac catheterization on 2017, as stated previously. FAMILY HISTORY:  Mother  of CVA at age [de-identified]. Father  at 71 of an MI. One son had an MI, by the name of Bebe Fraire. SOCIAL HISTORY:  She is , 78years old. Retired from [employer] Has 6 children; her son, Bebe Fraire, is also a patient of ours. She has foster children with a girl named Estefany, adopted at birth, who is 10years old. Son, 52, has cardiomyopathy, had bypass surgery 6 months ago. She does not smoke or drink alcohol.   Does not exercise. REVIEW OF SYSTEMS:  Cardiac as above. Other systems reviewed including constitutional, eyes, ears, nose and throat, cardiovascular, respiratory, GI, , musculoskeletal, integumentary, neurologic, endocrine, hematologic and allergic/immunologic are negative except for what is described above. No weight loss or weight gain. No change in bowel habits. No blood in stools. No fevers, sweats or chills. PHYSICAL EXAMINATION:  VITAL SIGNS:  Her blood pressure was 160/70 with a heart rate of 71 and regular. Respiratory rate 18. O2 saturation 96%. Weight 179 pounds. GENERAL:  She is a pleasant 59-year-old female. Denied pain. She was oriented to person, place and time. Answered questions appropriately. SKIN:  No unusual skin changes. HEENT:  The pupils are equally round and intact. Mucous membranes were dry. NECK:  No JVD. Good carotid pulses. No carotid bruits. No lymphadenopathy or thyromegaly. CARDIOVASCULAR EXAM:  S1 and S2 were normal.  No S3 or S4. Soft systolic blowing type murmur. No diastolic murmur. PMI was normal.  No lift, thrust, or pericardial friction rub. LUNGS:  Quite clear to auscultation and percussion. ABDOMEN:  Soft and nontender. Good bowel sounds. EXTREMITIES:  Good femoral pulses. Good pedal pulses. No pedal edema. Skin was warm and dry. No calf tenderness. Nail beds pink. Good cap refill. PULSES:  Bilateral symmetrical radial, brachial and carotid pulses. No carotid bruits. Good femoral and pedal pulses. NEUROLOGIC EXAM:  Within normal limits. PSYCHIATRIC EXAM:  Within normal limits. LABORATORY DATA:  Pending. IMPRESSION:  1. Fatigue, loss of energy, shortness of breath and chest pain, consistent with angina, on full medical therapy.   2.  Known CAD, with a catheterization on 12/20/2017, that showed 60% disease in all 3 major vessels with an FFR of 0.94 in the right coronary artery, 0.92 in the LAD, and 0.91 in the circumflex, normal LV

## 2020-07-14 RX ORDER — GABAPENTIN 300 MG/1
CAPSULE ORAL
Qty: 270 CAPSULE | Refills: 1 | Status: SHIPPED | OUTPATIENT
Start: 2020-07-14 | End: 2020-09-15 | Stop reason: SDUPTHER

## 2020-07-14 NOTE — TELEPHONE ENCOUNTER
Medication pending    Health Maintenance   Topic Date Due    Shingles Vaccine (1 of 2) 01/25/2002    DEXA (modify frequency per FRAX score)  01/25/2007    Lipid screen  06/04/2019    Colon cancer screen colonoscopy  10/26/2019    Annual Wellness Visit (AWV)  07/22/2021 (Originally 5/29/2019)    Flu vaccine (1) 09/01/2020    TSH testing  02/24/2021    Potassium monitoring  02/24/2021    Creatinine monitoring  02/24/2021    Breast cancer screen  02/28/2022    DTaP/Tdap/Td vaccine (2 - Td) 02/10/2024    Pneumococcal 65+ years Vaccine  Completed    Hepatitis C screen  Addressed    Hepatitis A vaccine  Aged Out    Hepatitis B vaccine  Aged Out    Hib vaccine  Aged Out    Meningococcal (ACWY) vaccine  Aged Out             (applicable per patient's age: Cancer Screenings, Depression Screening, Fall Risk Screening, Immunizations)    LDL Cholesterol (mg/dL)   Date Value   01/19/2012 178 (H)     LDL Calculated (mg/dL)   Date Value   01/16/2017 159     AST (U/L)   Date Value   02/24/2020 37 (H)     ALT (U/L)   Date Value   02/24/2020 49 (H)     BUN (mg/dL)   Date Value   02/24/2020 10      (goal A1C is < 7)   (goal LDL is <100) need 30-50% reduction from baseline     BP Readings from Last 3 Encounters:   06/30/20 (!) 160/70   03/12/20 (!) 160/80   03/05/20 (!) 143/71    (goal /80)      All Future Testing planned in [facility]:  Lab Frequency Next Occurrence   TSH with Reflex Once 07/22/2020   CBC Auto Differential Once 07/22/2020   Comprehensive Metabolic Panel Once 63/43/8542   Magnesium Once 07/22/2020   EKG 12 Lead Once 07/22/2020   XR CHEST STANDARD (2 VW) Once 07/22/2020   CBC Auto Differential Once 07/07/2020   Comprehensive Metabolic Panel Once 45/94/5218   Lipid Panel Once 07/07/2020   EKG 12 Lead Once 07/07/2020       Next Visit Date:  Future Appointments   Date Time Provider Linda Merchant   7/17/2020  8:00 AM SCHEDULE, [facility]            Patient Active Problem List:     Essential hypertension     Allergic rhinitis     Anxiety     Insomnia     Hyperlipidemia     Fibromyalgia syndrome     Depression, major     Chronic low back pain     Eczema intertrigo     Lumbar and sacral osteoarthritis     Acquired hypothyroidism     Lymphadenopathy, cervical     Schaefer's esophagus

## 2020-07-20 ENCOUNTER — TELEPHONE (OUTPATIENT)
Dept: CARDIOLOGY CLINIC | Age: 70
End: 2020-07-20

## 2020-07-22 ENCOUNTER — TELEPHONE (OUTPATIENT)
Dept: CARDIAC REHAB | Age: 70
End: 2020-07-22

## 2020-07-31 ENCOUNTER — HOSPITAL ENCOUNTER (OUTPATIENT)
Dept: CARDIAC REHAB | Age: 70
Setting detail: THERAPIES SERIES
Discharge: HOME | End: 2020-07-31
Payer: MEDICARE

## 2020-07-31 VITALS — BODY MASS INDEX: 24.27 KG/M2 | HEIGHT: 63 IN | WEIGHT: 137 LBS

## 2020-07-31 PROCEDURE — 93798 PHYS/QHP OP CAR RHAB W/ECG: CPT

## 2020-07-31 NOTE — PROGRESS NOTES
Cardiac Rehabilitation   Physician Order Form    Roxanne Aragon  1950  [id number]  7/31/2020    [x] Phase 2 ECG Monitored Cardiac Rehabilitation    [] MI   [] PTCA with/without stents  [] CABG  [] Heart Valve Repair/Replaced   [x] Stable Angina [] Other:     Onset Date: 7/17/2020                    Cardiac Education Goals: (see individualized treatment plan for specific goals, progression & compliance)    [x] Hypertension [x] Physical Inactivity  [x] A & P  [] Smoking  [x] Coping/Depression  [x] Medications  [] Diabetes  [x] Obesity   [x] Angina  [x] Hyperlipidemia [x] Stress   [x] Home Exercise                     Prescribed Exercise Plan:    Target Hr: 72 - 84      Duration: 31 - 60 Minutes  Frequency: 3 Days per week  Initial Met Level: 3.1 - 3.6 METS  Limitations:  [x] Joint discomfort Where? BILATERAL SHOULDER \"ACHE\" / RT > LT / CHRONIC INTERMITTENT PAIN    Modalities:  [x]Treadmill   [x] UBE  [x] Seated Stepper  [x] Rowing Machine  [x] Weights/therabands  [] Elliptical    · Aerobic exercise to total 31-60 minutes. Progressing by 1-2 minutes per week and/or 1-2 levels per week per patient tolerance using various modalities; according to Bk Scale 12-16 and THR  · Introduce 8-12 bilateral UE and LE progressive resistance exercises at 1-3 sets per lift, on 2-3 non-consecutive days using weights/ GREEN  therabands AND WTS TO 8-24 # for 8-15 reps to progressive overload by increasing resistance once reps progressed to at least 15 reps on at least 2 occasions                 Per patient symptoms use:  · Appropriate ACLS Algorhythm for Cardiac Events. · Nitroglycerine 0.4mg SLq 5mins X 3 for angina pain. · 12 lead EKG for c/o chest pain or change in rhythm. · Nasal O2 for SaO2 <90% or symptoms warranted. · Blood sugar monitoring for Hyper/Hypoglycemia symptoms.

## 2020-07-31 NOTE — PROGRESS NOTES
Cardiac Rehab Initial History and Assessment    Roxanne Peralta   1950  [id number]  2020    Primary Diagnosis: STABLE ANGINA PECTORIS W/ ONSET OF 2020  Living Will: [] Yes   [x] No  On File: [] Yes   [x] No   [] N/A  Durable Power of : [] Yes   [x] No    Medical History  Past Medical History:   Diagnosis Date    Allergic rhinitis 2011    Anxiety 2011    Carpal tunnel syndrome 2011    Depression 2011    Dyslipidemia 2011    Fibromyalgia 2011    Hyperlipidemia     Hypertension 2011    SOB (shortness of breath)     Thyroid disease      Past Surgical History:   Procedure Laterality Date    CARDIAC CATHETERIZATION Left 2017    Dr. Wen Ferguson @ [facility]--CAD, 50-60% ostial right CAD with an FFR of 0.94.  60% disease in the proximal LAD with an FFR of 0.92 with bridging of the mid LAD. Normal LV function at 60%. Mildly dilated ascending aorta -will do a CT scan to measure.  CARDIAC CATHETERIZATION Left 2020    Dr. Wen Ferguson @ [facility]--Medical therapy     SECTION      x 4    ELBOW SURGERY Right 1996    GALLBLADDER SURGERY  1975    HYSTERECTOMY      total hysterectomy    ROTATOR CUFF REPAIR      Right    SMALL INTESTINE SURGERY      twisted bowel 1985       Family History  Family History   Problem Relation Age of Onset    Stroke Mother     High Cholesterol Mother     Heart Attack Mother     Heart Attack Father     High Cholesterol Father     High Cholesterol Sister     High Cholesterol Brother          Symptoms:  1. Angina   [] None   [] Tightness   [x] Shortness of Breath   [x] Pressure \"HEAVINESS\"    [] Nausea   [] Sharp, Stabbing  [] Pallor   [] Indigestion, Heartburn [] Sweaty    Where was discomfort located? MID-CHEST  Precipitating Factors? EXERTION  Relieved by:REST    2. Arrhythmia   [] None   [x] Irregular Beats (skips) \"FEEL FLUTTERING\" [] Pacer    [] Atrial Fibrillation  [] AICD    On any Medications?   METOPROLOL 25 MG, BID    3. Congestive Heart Failure   [x] None   [] Pedal Edema  [] Unusual weight gain   [] SOB with mild exertion [] Fatigue    4. Vascular   [x] None   [] Carotid Narrowing  [] R [] L   [] Peripheral claudication [] R [] L    5. Musculoskeletal    [] None   [] Back Pain  Where? [x] Joint discomfort Where? BILATERAL SHOULDER \"ACHE\" / RT > LT / CHRONIC INTERMITTENT PAIN    Socio-Economic    Marital Status:     Nutrition    Appetite:  []  Too Good    []  Good  [x]  FAIR    Diet: \"EATING HEALTHY DIET\"  Eating out 1 times/wk  Alcohol Consumption: [] Yes [x] No   Type:  Frequency:    Caffeine: [x] Yes [] No  Type:COFFEE  Amount: 1 CUP PER DAY    Water intake per day: 4 BOTTLES / DAY  Vitamins/Natural herbal products: YES--SEE MAR    Psychological    [] Depression  [] Tearful  [] Fearful  [x] Cheerful  [] Anxious  [x] Motivated  [] Overwhelmed    Treatment: TRAZODONE 100 MG qHS / PRN    Diabetes    [] Yes  [x] No:     Stress    Source: SONS \"NOT GETTING ALONG AND ONE SON LIVING IN HOME  Relaxation techniques: LISTEN TO MUSIC  Hobbies: PLAY [employer]    Level of Education    [] 8th Grade  [] Associates  [] Masters  [x] High School [] Bachelor  []  Other:    Depression Screening:  PHQ-9 SCORE:  13  Interpretation of Total Score Depression Severity: 1-4 = Minimal depression, 5-9 = Mild depression, 10-14 = Moderate depression, 15-19 = Moderately severe depression, 20-27 = Severe depression. Cardiac Rehab Pre - Test  1. The heart is a muscle that acts like a pump to deliver oxygen and blood to the rest of the body. [x] True   [] False  2. Healing from the damage of a heart attach is complete in two weeks. [] True   [x] False  3. Smoking has no direct effect on the heart - it only effects your lungs. [] True   [x] False  4. Using all the salt you want is acceptable for all heart patients. [] True   [x] False  5. Chest pain that is relieved by rest or nitroglycerine is called Angina.    [x] True   [] False  6. Shortness of breath, indigestion, sweating, tightness or pain in your chest are symptoms of a heart attack. [] True   [] False  7. Swelling of the feet and ankles only means you've been on your feet too much. [] True   [x] False  8. Saturated fats raised your blood cholesterol level more than anything else in your diet. [x] True   [] False  9. High Blood pressure can take care of itself by rest alone. [] True   [x] False  10. Walking is one of the best exercises for heart attack patients. [x] True   [] False  SCORE:  100%    Physical Findings  Weight:137 LB  Height: 63 IN  BMI: 24.3  Cardiovascular- No edema, S1-S2 and apically regular to auscultation, strong bilateral upper and lower extremity pulses at +2, no carotid bruits. Monitor rhythm-SINUS RHYTHM  VR- 54  TN- 0.18  QRS-0.08  QT- 0.40        Ejection Fraction- 60%  Pulmonary- Clear to auscultation bilaterally through out. Neurological- No deficit noted or reported. Peripheral Vascular- No deficit noted or reported. Muscular Skeletal- [x] Joint discomfort Where? BILATERAL SHOULDER \"ACHE\" / RT > LT / CHRONIC INTERMITTENT PAIN   Pain Assessment-DENIES AT PRESENT   Pain Level Tolerable <4+/10 on 10 point Likert scale  Location/ radiation/ Frequency/ Duration-   Endocrine- No deficit noted or reported. Gastrointestinal- No deficit noted or reported. Genitourinary- No deficit noted or reported.   Physical limitations- only as per above     Goals:\"BUILD MY STRENGTH BACK UP\"     - increased stamina/strength to 30-50 total exercise by increasing 1-2 level/wk and 1-2   min/wk  to achieve THR and RPE 12-16 / INCREASE AVG CARDIO FC BY AT LEAST 0.5-1.0 MET IN THE NEXT 30 DAYS AND TOLERATE >31-45 MIN W/IN EX RX TARGETS    - MAINTAIN CURRENT BODY WEIGHT     - Introduce 8-12 bilateral UE and LE progressive resistance exercises at 1-3 sets per lift, on 2-3 non-consecutive days using weights/ GREEN therabands AND WTS TO 5-24 # for 8-15 reps to progressive overload by increasing resistance once reps progressed to at least 15 reps on at least 2 occasions     - LOWER BP TO <130/80 AT REST     - Improved cholesterol and  Triglycerides      - Develop regular exercise 30 min daily, AT LEAST 3-5 DAYS PER WEEK CONSISTENTLY W/IN THE NEXT 30 DAYS    - To reduce self-reported psycho-social feelings of stress in next 30 days    - To eat at least 2 servings of fruit per day and at least 4-5 servings of vegetables per day     MILENA HILL RN, CEP

## 2020-07-31 NOTE — PROGRESS NOTES
Phase II Cardiac Rehab Individualized Treatment Plan-Initial     Patient Name: Steve Brewster  ACCOUNT #: [de-identified]  Date of Initial Assessment: 7/31/2020  Diagnosis: Stable Angina Pectoris   Onset Date: 7/17/2020  Referring Physician: Ariela Brandon MD  Risk Stratification: MODERATE  Session Number: 1   EXERCISE    Stages of Change:   [] pre-contemplation  [x] Action   [] Contemplate   [] Maintainence   [x] Prep   [] Relapse          Exercise Prescription:  Mode: [x] TM [x] UBE [x] STP [] EL [x] R  Frequency: 3 DAYS PER WEEK  Duration: 31-60 MINUTES  Intensity: 3.1 - 3.7 METS  Target HR 72 - 84   Progression: INCREASE DURATION PER ABOVE F.I.T.T. Rx  PARAMETERS ON AVG OF 5-10 MIN / 1-2 WEEKS FOR THE FIRST 4-6 WEEKS. AFTER 3-4 WEEKS ARE COMPLETED, CONTINUE TO GRADUALLY INCREASE F. I.T. PARAMETERS GRADUALLY UPWARD AT THE ESTABLISHED DURATION ON AVERAGE 0.5-1.0 METS PER 30 DAYS OVER THE COARSE OF REMAINING PROGRAM AS ESTABLISHED BY PT-CENTERED GOALS AND GUIDELINES. Plan/Goal: Increase 1-2 levels/week or 1-2 min/week to achieve target HR and RPE   12-16.    [x] Angina with Exertion THR:  <96   [x] Resistance Training  PROGRESS 8-12 bilateral UE and LE progressive resistance exercises at 1-3 sets per lift, on 2-3 non-consecutive days using weights/ GREEN therabands AND WTS TO 5-24 # for 8-15 reps to progressive overload by increasing resistance once reps progressed to at least 15 reps on at least 2 occasions     Hypertension:  [x] Yes  [] No  Resting BP: 142/72  Peak Exercise BP: 146/62  BP Meds: LISINOPRIL 10 MG, ONCE DAILY / METOPROLOL 12.5 MG, BID / NORVASC 2.5 MG, QD    Intervention:  Home Exercise:  Type: ELLIPTICAL AND WALKING  Duration: 20 - 60 MIN  Frequency: AT LEAST 2 NO REHAB DAYS PER WEEK   [x] Resistance Training   PROGRESS 8-12 bilateral UE and LE progressive resistance exercises at 1-3 sets per lift, on 2-3 non-consecutive days using weights/ green therabands AND WTS TO 5-24 # for 8-15 reps to progressive overload by increasing resistance once reps progressed to at least 15 reps on at least 2 occasions     Education:   [x] Equipment Lucan  [x] Self pulse   [x] Proper use weights/therabands   [x] S/S to report  [x] Low Na Diet    [x] Warm up/ Cool down  [x] BP Medication    [x]RPE Scale   [x] Understand BP   [x] Ex Safety   [x] Exercise specialist class-Home Exercise       Target Goal:   -Individual Exercise Plan  -BP<130/80  -Aerobic active 30 + minutes 5-7 days per week    Nutrition    Stages of Change:   [] pre-contemplation  [x] Action   [] Contemplate   [] Maintainence   [x] Prep   [] Relapse    Lipids:    Total Cholesterol: 214  Triglycerides: 115  HDL: 72  LDL: 119  Lipid Meds: LOVASTATIN 20 MG, qHS     Diabetes:  [] Yes  [x] No     Weight Management:  Weight:137 LB  Height: 63 IN  BMI: 24.3  Wt Goal: MAINTAIN CURRENT OPTIMAL WEIGHT  Alcohol:   Social History     Substance and Sexual Activity   Alcohol Use No    Comment: one glass once or twice a month       Diet Assessment Tool: RATE MY PLATE 43  Special Diet: Special Diet: 2,000 Kcal / day, 2 GM Na+, 30% total fat, <10% saturated fat, 25-35 GM fiber, cholesterol reduced, balanced nutrition plan to be promoted and taught in rehab      Intervention:   [] Dietitian Consult       [x] Nurse/Patient Discussion     [x] Diet Class           [] Referred to Diabetes Education     Education:  [] S&S hypo/hyperglycemia  [x] Low fat/low cholesterol diet  [x] Weight loss methods      [] Relate Diabetes/CAD     [x] Eating heart healthy handout    Target Goal:  -LDL-C<100 if triglycerides are > 200  -LDL-C < 70 for high risk patients  -HbA1c < 7%  -BMI < 25   Education    Stages of Change:    [] pre-contemplation  [x] Action   [] Contemplate   [] Maintainence   [x] Prep   [] Relapse    Learning Barriers:   [] Speech   [] Cognitive   [] Literacy   [] Visions   [] Hearing    [x] Ready Learn    Knowledge test score: 100%      Family support: [x] Yes  [] Cane  [] Walker [] Wheel Chair  [] Gait belt    Patient/Program goal:   :\"BUILD MY STRENGTH BACK UP\"      - increased stamina/strength to 30-50 total exercise by increasing 1-2 level/wk and 1-2   min/wk  to achieve THR and RPE 12-16 / INCREASE AVG CARDIO FC BY AT LEAST 0.5-1.0 MET IN THE NEXT 30 DAYS AND TOLERATE >31-45 MIN W/IN EX RX TARGETS     - MAINTAIN CURRENT BODY WEIGHT     - Introduce 8-12 bilateral UE and LE progressive resistance exercises at 1-3 sets per lift, on 2-3 non-consecutive days using weights/ GREEN therabands AND WTS TO 5-24 # for 8-15 reps to progressive overload by increasing resistance once reps progressed to at least 15 reps on at least 2 occasions     - LOWER BP TO <130/80 AT REST     - Improved cholesterol and  Triglycerides      - Develop regular exercise 30 min daily, AT LEAST 3-5 DAYS PER WEEK CONSISTENTLY W/IN THE NEXT 30 DAYS     - To reduce self-reported psycho-social feelings of stress in next 30 days     Q.OL. HEALTH & FUNCTIONING RATING 17.60   Q. O.L. SOCIAL & ECONOMIC RATING 25.00   Q. O.L. PSYCHOLOGICAL/SPIRITUAL RATING 15.40   Q. O.L FAMILY SELF-RATING 9.60   Q. O.L. OVERALL INDEX SELF-RATING 17.30   PHQ-9 DEPRESSION AND MOOD RATING 13.00     - To eat at least 2 servings of fruit per day and at least 4-5 servings of vegetables per day      Physician Changes/Comments:      Cardiac Rehab Staff:  Karlo Rosario RN, CEP

## 2020-08-07 ENCOUNTER — HOSPITAL ENCOUNTER (OUTPATIENT)
Dept: CARDIAC REHAB | Age: 70
Setting detail: THERAPIES SERIES
Discharge: HOME | End: 2020-08-07
Payer: MEDICARE

## 2020-08-07 PROCEDURE — 93798 PHYS/QHP OP CAR RHAB W/ECG: CPT

## 2020-08-10 ENCOUNTER — OFFICE VISIT (OUTPATIENT)
Dept: FAMILY MEDICINE CLINIC | Age: 70
End: 2020-08-10
Payer: MEDICARE

## 2020-08-10 ENCOUNTER — HOSPITAL ENCOUNTER (OUTPATIENT)
Dept: CARDIAC REHAB | Age: 70
Setting detail: THERAPIES SERIES
Discharge: HOME | End: 2020-08-10
Payer: MEDICARE

## 2020-08-10 VITALS
HEIGHT: 63 IN | WEIGHT: 139 LBS | OXYGEN SATURATION: 98 % | SYSTOLIC BLOOD PRESSURE: 136 MMHG | DIASTOLIC BLOOD PRESSURE: 78 MMHG | BODY MASS INDEX: 24.63 KG/M2 | HEART RATE: 78 BPM | RESPIRATION RATE: 18 BRPM

## 2020-08-10 PROCEDURE — 4040F PNEUMOC VAC/ADMIN/RCVD: CPT | Performed by: INTERNAL MEDICINE

## 2020-08-10 PROCEDURE — 1123F ACP DISCUSS/DSCN MKR DOCD: CPT | Performed by: INTERNAL MEDICINE

## 2020-08-10 PROCEDURE — 3017F COLORECTAL CA SCREEN DOC REV: CPT | Performed by: INTERNAL MEDICINE

## 2020-08-10 PROCEDURE — 93798 PHYS/QHP OP CAR RHAB W/ECG: CPT

## 2020-08-10 PROCEDURE — G0438 PPPS, INITIAL VISIT: HCPCS | Performed by: INTERNAL MEDICINE

## 2020-08-10 RX ORDER — TRAMADOL HYDROCHLORIDE 50 MG/1
TABLET ORAL
COMMUNITY
Start: 2020-07-12 | End: 2020-09-24 | Stop reason: SDUPTHER

## 2020-08-10 RX ORDER — POTASSIUM CHLORIDE 750 MG/1
TABLET, FILM COATED, EXTENDED RELEASE ORAL
COMMUNITY
Start: 2020-07-20 | End: 2021-01-08 | Stop reason: SDUPTHER

## 2020-08-10 ASSESSMENT — PATIENT HEALTH QUESTIONNAIRE - PHQ9
SUM OF ALL RESPONSES TO PHQ QUESTIONS 1-9: 0
SUM OF ALL RESPONSES TO PHQ QUESTIONS 1-9: 0

## 2020-08-10 ASSESSMENT — LIFESTYLE VARIABLES: HOW OFTEN DO YOU HAVE A DRINK CONTAINING ALCOHOL: 0

## 2020-08-10 NOTE — PROGRESS NOTES
Medicare Annual Wellness Visit  Name: Rin Patel Date: 8/10/2020   MRN: [id number] Sex: Female   Age: 78 y.o. Ethnicity: Non-/Non    : 1950 Race: White      Roxanne Gonzalez is here for Medicare AWV    Screenings for behavioral, psychosocial and functional/safety risks, and cognitive dysfunction are all negative except as indicated below. These results, as well as other patient data from the [address] form, are documented in Flowsheets linked to this Encounter. No Known Allergies    Prior to Visit Medications    Medication Sig Taking?  Authorizing Provider   traMADol (ULTRAM) 50 MG tablet TAKE 1 TABLET BY MOUTH EVERY 8 HOURS AS NEEDED FOR PAIN FOR 30 DAYS Yes Historical Provider, MD   potassium chloride (KLOR-CON) 10 MEQ extended release tablet  Yes Historical Provider, MD   gabapentin (NEURONTIN) 300 MG capsule TAKE 1 CAPSULE THREE TIMES DAILY Yes Magalys Hensley MD   levothyroxine (SYNTHROID) 50 MCG tablet Take 1 tablet by mouth Daily Yes Magalys Hensley MD   potassium chloride (KLOR-CON M) 10 MEQ extended release tablet Take 1 tablet by mouth daily Yes Magalys Hensley MD   tiZANidine (ZANAFLEX) 2 MG tablet Take 1 tablet by mouth 3 times daily as needed (muscle spasms) Yes Magalys Hensley MD   traZODone (DESYREL) 100 MG tablet Take 1 tablet by mouth nightly as needed for Sleep Yes Magalys Hensley MD   amLODIPine (NORVASC) 2.5 MG tablet Take 1 tablet by mouth daily Yes Reynold Timmons MD   lisinopril (PRINIVIL;ZESTRIL) 20 MG tablet TAKE 1 TABLET EVERY DAY Yes Reynold Timmons MD   lovastatin (MEVACOR) 20 MG tablet TAKE 1 TABLET BY MOUTH ONCE DAILY AT  NIGHT Yes Reynold Timmons MD   metoprolol tartrate (LOPRESSOR) 25 MG tablet TAKE 1/2 TABLET TWICE DAILY Yes Reynold Timmons MD   fluticasone (FLONASE) 50 MCG/ACT nasal spray USE 2 Suzie Brake Yes Magalys Hensley MD   dicyclomine (BENTYL) 10 MG capsule  Yes Historical Provider, MD   Blood Pressure Monitoring (BLOOD PRESSURE CUFF) MISC Use to monitor Blood pressure 1-2 times daily Yes Remington Deng MD   vitamin B-6 (PYRIDOXINE) 100 MG tablet Take 100 mg by mouth daily Yes Historical Provider, MD   Coenzyme Q-10 100 MG CAPS Take 1 capsule by mouth daily Yes Historical Provider, MD   Simethicone 80 MG TABS Take 80 mg by mouth 2 times daily as needed (bloating) Yes Remington Deng MD   Calcium Polycarbophil (FIBER-CAPS PO) Take by mouth Yes Historical Provider, MD   vitamin D (CHOLECALCIFEROL) 1000 UNIT TABS tablet Take 1,000 Units by mouth daily Yes Historical Provider, MD       Past Medical History:   Diagnosis Date    Allergic rhinitis 2011    Anxiety 2011    Carpal tunnel syndrome 2011    Depression 2011    Dyslipidemia 2011    Fibromyalgia 2011    Hyperlipidemia     Hypertension 2011    SOB (shortness of breath)     Thyroid disease        Past Surgical History:   Procedure Laterality Date    CARDIAC CATHETERIZATION Left 2017    Dr. Fátmia Kamara @ [facility]--CAD, 50-60% ostial right CAD with an FFR of 0.94.  60% disease in the proximal LAD with an FFR of 0.92 with bridging of the mid LAD. Normal LV function at 60%. Mildly dilated ascending aorta -will do a CT scan to measure.     CARDIAC CATHETERIZATION Left 2020    Dr. Fátima Kamara @ [facility]--Medical therapy     SECTION      x 4    ELBOW SURGERY Right 1996    GALLBLADDER SURGERY  1975    HYSTERECTOMY      total hysterectomy    ROTATOR CUFF REPAIR      Right    SMALL INTESTINE SURGERY      twisted bowel 1985       Family History   Problem Relation Age of Onset    Stroke Mother     High Cholesterol Mother     Heart Attack Mother     Heart Attack Father     High Cholesterol Father     High Cholesterol Sister     High Cholesterol Brother        CareTeam (Including outside providers/suppliers regularly involved in providing care):   Patient Care Team:  Remington Deng MD as PCP - General general, how would you say your health is?: Good  In the past 7 days, have you experienced any of the following? New or Increased Pain, New or Increased Fatigue, Loneliness, Social Isolation, Stress or Anger?: (!) Anger  Do you get the social and emotional support that you need?: Yes  Do you have a Living Will?: (!) No  General Health Risk Interventions:  · Stress: patient's comments regarding reasons for stress and/or anger: 's drinking problems causin a lot of stress. states going to try to make her  go to rehab  · Anger: patient declines any further evaluation/treatment for this issue  · No Living Will: additional information provided regarding how to obtain a living will    Health Habits/Nutrition:  Health Habits/Nutrition  Do you exercise for at least 20 minutes 2-3 times per week?: Yes  Have you lost any weight without trying in the past 3 months?: No  Do you eat fewer than 2 meals per day?: No  Have you seen a dentist within the past year?: (!) No  Body mass index is 24.62 kg/m². Health Habits/Nutrition Interventions:  · Dental exam overdue:  patient encouraged to make appointment with his/her dentist    Hearing/Vision:  No exam data present  Hearing/Vision  Do you or your family notice any trouble with your hearing?: No  Do you have difficulty driving, watching TV, or doing any of your daily activities because of your eyesight?: No  Have you had an eye exam within the past year?: (!) No  Hearing/Vision Interventions:  · Vision concerns:  states insurance does not cover eye exams.  has glasses that still work     Safety:  Safety  Do you have working smoke detectors?: Yes  Have all throw rugs been removed or fastened?: (!) No  Do you have non-slip mats or surfaces in all bathtubs/showers?: (!) No  Do all of your stairways have a railing or banister?: Yes  Are your doorways, halls and stairs free of clutter?: Yes  Do you always fasten your seatbelt when you are in a car?: Yes  Safety Interventions:  · Home safety tips provided    Personalized Preventive Plan   Current Health Maintenance Status  Immunization History   Administered Date(s) Administered    Influenza, Quadv, IM, PF (6 mo and older Fluzone, Flulaval, Fluarix, and 3 yrs and older Afluria) 12/08/2017, 02/12/2019, 01/23/2020    Pneumococcal Conjugate 13-valent ([id number]) 12/08/2017    Pneumococcal Polysaccharide ([id number]) 02/12/2019    Tdap (Boostrix, Adacel) 02/10/2014        Health Maintenance   Topic Date Due    Shingles Vaccine (1 of 2) 01/25/2002    DEXA (modify frequency per FRAX score)  01/25/2007    Colon cancer screen colonoscopy  10/26/2019    Annual Wellness Visit (AWV)  07/22/2021 (Originally 5/29/2019)    Flu vaccine (1) 09/01/2020    TSH testing  02/24/2021    Lipid screen  07/13/2021    Potassium monitoring  07/13/2021    Creatinine monitoring  07/13/2021    Breast cancer screen  02/28/2022    DTaP/Tdap/Td vaccine (2 - Td) 02/10/2024    Pneumococcal 65+ years Vaccine  Completed    Hepatitis C screen  Addressed    Hepatitis A vaccine  Aged Out    Hepatitis B vaccine  Aged Out    Hib vaccine  Aged Out    Meningococcal (ACWY) vaccine  Aged Out     Recommendations for [employer] Due: see orders and patient instructions/AVS.  . Recommended screening schedule for the next 5-10 years is provided to the patient in written form: see Patient Instructions/AVS.    There are no diagnoses linked to this encounter.

## 2020-08-10 NOTE — PATIENT INSTRUCTIONS
Personalized Preventive Plan for Roxanne Chaparro Caro - 8/10/2020  Medicare offers a range of preventive health benefits. Some of the tests and screenings are paid in full while other may be subject to a deductible, co-insurance, and/or copay. Some of these benefits include a comprehensive review of your medical history including lifestyle, illnesses that may run in your family, and various assessments and screenings as appropriate. After reviewing your medical record and screening and assessments performed today your provider may have ordered immunizations, labs, imaging, and/or referrals for you. A list of these orders (if applicable) as well as your Preventive Care list are included within your After Visit Summary for your review. Other Preventive Recommendations:    · A preventive eye exam performed by an eye specialist is recommended every 1-2 years to screen for glaucoma; cataracts, macular degeneration, and other eye disorders. · A preventive dental visit is recommended every 6 months. · Try to get at least 150 minutes of exercise per week or 10,000 steps per day on a pedometer . · Order or download the FREE \"Exercise & Physical Activity: Your Everyday Guide\" from [employer]. Call 8-331.803.9628 or search [employer] online. · You need 5623-0927 mg of calcium and 5541-0850 IU of vitamin D per day. It is possible to meet your calcium requirement with diet alone, but a vitamin D supplement is usually necessary to meet this goal.  · When exposed to the sun, use a sunscreen that protects against both UVA and UVB radiation with an SPF of 30 or greater. Reapply every 2 to 3 hours or after sweating, drying off with a towel, or swimming. · Always wear a seat belt when traveling in a car. Always wear a helmet when riding a bicycle or motorcycle.

## 2020-08-12 ENCOUNTER — HOSPITAL ENCOUNTER (OUTPATIENT)
Dept: CARDIAC REHAB | Age: 70
Setting detail: THERAPIES SERIES
Discharge: HOME | End: 2020-08-12
Payer: MEDICARE

## 2020-08-12 PROCEDURE — 93798 PHYS/QHP OP CAR RHAB W/ECG: CPT

## 2020-08-14 ENCOUNTER — HOSPITAL ENCOUNTER (OUTPATIENT)
Dept: CARDIAC REHAB | Age: 70
Setting detail: THERAPIES SERIES
Discharge: HOME | End: 2020-08-14
Payer: MEDICARE

## 2020-08-14 PROCEDURE — 93798 PHYS/QHP OP CAR RHAB W/ECG: CPT

## 2020-08-17 ENCOUNTER — HOSPITAL ENCOUNTER (OUTPATIENT)
Dept: CARDIAC REHAB | Age: 70
Setting detail: THERAPIES SERIES
Discharge: HOME | End: 2020-08-17
Payer: MEDICARE

## 2020-08-17 PROCEDURE — 93798 PHYS/QHP OP CAR RHAB W/ECG: CPT

## 2020-08-19 ENCOUNTER — HOSPITAL ENCOUNTER (OUTPATIENT)
Dept: CARDIAC REHAB | Age: 70
Setting detail: THERAPIES SERIES
Discharge: HOME | End: 2020-08-19
Payer: MEDICARE

## 2020-08-19 PROCEDURE — 93798 PHYS/QHP OP CAR RHAB W/ECG: CPT

## 2020-08-21 ENCOUNTER — APPOINTMENT (OUTPATIENT)
Dept: CARDIAC REHAB | Age: 70
End: 2020-08-21
Payer: MEDICARE

## 2020-08-24 ENCOUNTER — HOSPITAL ENCOUNTER (OUTPATIENT)
Dept: CARDIAC REHAB | Age: 70
Setting detail: THERAPIES SERIES
Discharge: HOME | End: 2020-08-24
Payer: MEDICARE

## 2020-08-24 PROCEDURE — 93798 PHYS/QHP OP CAR RHAB W/ECG: CPT

## 2020-08-26 ENCOUNTER — APPOINTMENT (OUTPATIENT)
Dept: CARDIAC REHAB | Age: 70
End: 2020-08-26
Payer: MEDICARE

## 2020-08-28 ENCOUNTER — APPOINTMENT (OUTPATIENT)
Dept: CARDIAC REHAB | Age: 70
End: 2020-08-28
Payer: MEDICARE

## 2020-08-28 NOTE — PROGRESS NOTES
least 2 occasions     Education:   [x] Equipment [place]  [x] Self pulse   [x] Proper use weights/therabands   [x] S/S to report  [x] Low Na Diet    [x] Warm up/ Cool down  [x] BP Medication    [x] RPE Scale   [x] Understand BP   [x] Ex Safety   [x] Exercise specialist class-Home Exercise       Target Goal:   -Individual Exercise Plan  -Bp<130/80  -Aerobic active 30 + minutes 5-7 days per week    Nutrition    Stages of Change:   [] pre-contemplation  [x] Action   [] Contemplate   [] Maintainence   [] Prep   [] Relapse    Lipids: NO NEW VALUES  Total Cholesterol:   Triglycerides:   HDL:   LDL:   [] Med Change?      Diabetes:  [] Yes  [x] No    Weight Management:  Current Wt 139 LB  Wt Goal: 1-2 lbs/wk    Intervention:   [] Dietitian Consult       [x] Nurse/Patient Discussion     [x] Diet Class           [] Referred to Diabetes Education     Education:  [] S&S hypo/hyperglycemia  [x] Low fat/low cholesterol diet  [x] Weight loss methods      [] Relate Diabetes/CAD     [x] Eating heart healthy handout    Target Goal:  -LDL-C<100 if triglycerides are > 200  -LDL-C < 70 for high risk patients  -HbA1c < 7%  -BMI < 25   Education    Stages of Change:    [] pre-contemplation  [x] Action   [] Contemplate   [] Maintainence   [] Prep   [] Relapse    Family support: [x] Yes  [] No    Tobacco use: [] Yes  [x] No    Intervention:  [] Referred to smoking cessation counselor     [] Individual education and counseling  [] Tobacco adjunct  [] Informed of education class schedule     Education:   [x] Risk Factors/Modifications  [x] Psychological aspects  [x] Angina    [x] Medications  [] CHF      [x] Cardiac A&P    Target Goal:  -Complete cessation of tobacco use (if applicable)  -Continued risk factor modifications  -Recognizing signs/symptoms to report  -Proper use of meds    Psychosocial  Stages of Change:    [] pre-contemplation  [x] Action   [] Contemplate   [] Maintainence   [] Prep   [] Relapse      Intervention:   [] Psych THE NEXT 30 DAYS     - To reduce self-reported psycho-social feelings of stress in next 30 days / PT SELF REPORTED MEETING GOAL / CONTINUE TO PROGRESS GOAL AS WRITTEN OVER THE NEXT 30 DAYS    To eat at least 2 servings of fruit per day and at least 4-5 servings of vegetables per day  / Andrez Mosqueda / Sonia Rand NEXT 27 DAYS    Physician Changes/Comments:      Cardiac Rehab Staff  Samantha Jimenez, RRT

## 2020-08-31 ENCOUNTER — HOSPITAL ENCOUNTER (OUTPATIENT)
Dept: CARDIAC REHAB | Age: 70
Setting detail: THERAPIES SERIES
Discharge: HOME | End: 2020-08-31
Payer: MEDICARE

## 2020-08-31 PROCEDURE — 93798 PHYS/QHP OP CAR RHAB W/ECG: CPT

## 2020-09-02 ENCOUNTER — HOSPITAL ENCOUNTER (OUTPATIENT)
Dept: CARDIAC REHAB | Age: 70
Setting detail: THERAPIES SERIES
Discharge: HOME | End: 2020-09-02
Payer: MEDICARE

## 2020-09-02 PROCEDURE — 93798 PHYS/QHP OP CAR RHAB W/ECG: CPT

## 2020-09-04 ENCOUNTER — HOSPITAL ENCOUNTER (OUTPATIENT)
Dept: CARDIAC REHAB | Age: 70
Setting detail: THERAPIES SERIES
Discharge: HOME | End: 2020-09-04
Payer: MEDICARE

## 2020-09-09 ENCOUNTER — APPOINTMENT (OUTPATIENT)
Dept: CARDIAC REHAB | Age: 70
End: 2020-09-09
Payer: MEDICARE

## 2020-09-09 ENCOUNTER — HOSPITAL ENCOUNTER (OUTPATIENT)
Dept: CARDIAC REHAB | Age: 70
Setting detail: THERAPIES SERIES
Discharge: HOME | End: 2020-09-09
Payer: MEDICARE

## 2020-09-09 PROCEDURE — 93798 PHYS/QHP OP CAR RHAB W/ECG: CPT

## 2020-09-11 ENCOUNTER — HOSPITAL ENCOUNTER (OUTPATIENT)
Dept: CARDIAC REHAB | Age: 70
Setting detail: THERAPIES SERIES
Discharge: HOME | End: 2020-09-11
Payer: MEDICARE

## 2020-09-11 PROCEDURE — 93798 PHYS/QHP OP CAR RHAB W/ECG: CPT

## 2020-09-14 ENCOUNTER — HOSPITAL ENCOUNTER (OUTPATIENT)
Dept: CARDIAC REHAB | Age: 70
Setting detail: THERAPIES SERIES
Discharge: HOME | End: 2020-09-14
Payer: MEDICARE

## 2020-09-14 PROCEDURE — 93798 PHYS/QHP OP CAR RHAB W/ECG: CPT

## 2020-09-15 RX ORDER — GABAPENTIN 300 MG/1
CAPSULE ORAL
Qty: 270 CAPSULE | Refills: 1 | Status: SHIPPED | OUTPATIENT
Start: 2020-09-15 | End: 2021-01-08 | Stop reason: SDUPTHER

## 2020-09-15 NOTE — TELEPHONE ENCOUNTER
Medication pending    Health Maintenance   Topic Date Due    Shingles Vaccine (1 of 2) 01/25/2002    DEXA (modify frequency per FRAX score)  01/25/2007    Colon cancer screen colonoscopy  10/26/2019    Flu vaccine (1) 09/01/2020    TSH testing  02/24/2021    Lipid screen  07/13/2021    Potassium monitoring  07/13/2021    Creatinine monitoring  07/13/2021    Annual Wellness Visit (AWV)  08/11/2021    Breast cancer screen  02/28/2022    DTaP/Tdap/Td vaccine (2 - Td) 02/10/2024    Pneumococcal 65+ years Vaccine  Completed    Hepatitis C screen  Addressed    Hepatitis A vaccine  Aged Out    Hepatitis B vaccine  Aged Out    Hib vaccine  Aged Out    Meningococcal (ACWY) vaccine  Aged Out             (applicable per patient's age: Cancer Screenings, Depression Screening, Fall Risk Screening, Immunizations)    LDL Cholesterol (mg/dL)   Date Value   01/19/2012 178 (H)     LDL Calculated (mg/dL)   Date Value   01/16/2017 159     AST (U/L)   Date Value   02/24/2020 37 (H)     ALT (U/L)   Date Value   02/24/2020 49 (H)     BUN (mg/dL)   Date Value   02/24/2020 10      (goal A1C is < 7)   (goal LDL is <100) need 30-50% reduction from baseline     BP Readings from Last 3 Encounters:   08/10/20 136/78   06/30/20 (!) 160/70   03/12/20 (!) 160/80    (goal /80)      All Future Testing planned in [facility]:  Lab Frequency Next Occurrence   EKG 12 Lead Once 07/07/2020       Next Visit Date:  Future Appointments   Date Time Provider Linda Merchant   9/16/2020  1:00  [address] CARD [place]   9/18/2020  1:00 PM [facility] DIVISION CARD REHAB [facility] CARD [place]   9/21/2020  1:00 PM [facility] CARD REHAB [facility] CARD [place]   9/23/2020  1:00 PM [facility] CARD REHAB [facility] CARD [place]   9/25/2020  1:00 PM [facility] DIVISION CARD REHAB [facility] CARD [place]   9/28/2020  1:00 PM [facility] DIV[facility] CARD REHAB [facility] CARD [place]   9/30/2020  1:00 PM [facility] DIVISION CARD REHAB [id number] [place]            Patient Active Problem List:

## 2020-09-16 ENCOUNTER — HOSPITAL ENCOUNTER (OUTPATIENT)
Dept: CARDIAC REHAB | Age: 70
Setting detail: THERAPIES SERIES
End: 2020-09-16
Payer: MEDICARE

## 2020-09-18 ENCOUNTER — HOSPITAL ENCOUNTER (OUTPATIENT)
Dept: CARDIAC REHAB | Age: 70
Setting detail: THERAPIES SERIES
Discharge: HOME | End: 2020-09-18
Payer: MEDICARE

## 2020-09-18 PROCEDURE — 93798 PHYS/QHP OP CAR RHAB W/ECG: CPT

## 2020-09-21 ENCOUNTER — HOSPITAL ENCOUNTER (OUTPATIENT)
Dept: CARDIAC REHAB | Age: 70
Setting detail: THERAPIES SERIES
Discharge: HOME | End: 2020-09-21
Payer: MEDICARE

## 2020-09-21 PROCEDURE — 93798 PHYS/QHP OP CAR RHAB W/ECG: CPT

## 2020-09-23 ENCOUNTER — HOSPITAL ENCOUNTER (OUTPATIENT)
Dept: CARDIAC REHAB | Age: 70
Setting detail: THERAPIES SERIES
Discharge: HOME | End: 2020-09-23
Payer: MEDICARE

## 2020-09-23 PROCEDURE — 93798 PHYS/QHP OP CAR RHAB W/ECG: CPT

## 2020-09-24 ENCOUNTER — OFFICE VISIT (OUTPATIENT)
Dept: FAMILY MEDICINE CLINIC | Age: 70
End: 2020-09-24
Payer: MEDICARE

## 2020-09-24 VITALS
RESPIRATION RATE: 18 BRPM | SYSTOLIC BLOOD PRESSURE: 145 MMHG | WEIGHT: 136 LBS | BODY MASS INDEX: 24.1 KG/M2 | HEART RATE: 65 BPM | DIASTOLIC BLOOD PRESSURE: 82 MMHG | HEIGHT: 63 IN | OXYGEN SATURATION: 95 %

## 2020-09-24 PROCEDURE — 1123F ACP DISCUSS/DSCN MKR DOCD: CPT | Performed by: INTERNAL MEDICINE

## 2020-09-24 PROCEDURE — 4040F PNEUMOC VAC/ADMIN/RCVD: CPT | Performed by: INTERNAL MEDICINE

## 2020-09-24 PROCEDURE — G8400 PT W/DXA NO RESULTS DOC: HCPCS | Performed by: INTERNAL MEDICINE

## 2020-09-24 PROCEDURE — G8427 DOCREV CUR MEDS BY ELIG CLIN: HCPCS | Performed by: INTERNAL MEDICINE

## 2020-09-24 PROCEDURE — 99214 OFFICE O/P EST MOD 30 MIN: CPT | Performed by: INTERNAL MEDICINE

## 2020-09-24 PROCEDURE — 1090F PRES/ABSN URINE INCON ASSESS: CPT | Performed by: INTERNAL MEDICINE

## 2020-09-24 PROCEDURE — G9899 SCRN MAM PERF RSLTS DOC: HCPCS | Performed by: INTERNAL MEDICINE

## 2020-09-24 PROCEDURE — 3017F COLORECTAL CA SCREEN DOC REV: CPT | Performed by: INTERNAL MEDICINE

## 2020-09-24 PROCEDURE — G8420 CALC BMI NORM PARAMETERS: HCPCS | Performed by: INTERNAL MEDICINE

## 2020-09-24 PROCEDURE — 1036F TOBACCO NON-USER: CPT | Performed by: INTERNAL MEDICINE

## 2020-09-24 RX ORDER — MONTELUKAST SODIUM 10 MG/1
10 TABLET ORAL DAILY
Qty: 30 TABLET | Refills: 3 | Status: SHIPPED | OUTPATIENT
Start: 2020-09-24 | End: 2021-02-02

## 2020-09-24 RX ORDER — PREDNISONE 20 MG/1
20 TABLET ORAL DAILY
Qty: 5 TABLET | Refills: 0 | Status: SHIPPED | OUTPATIENT
Start: 2020-09-24 | End: 2020-09-29

## 2020-09-24 RX ORDER — OLOPATADINE HYDROCHLORIDE 1 MG/ML
1 SOLUTION/ DROPS OPHTHALMIC 2 TIMES DAILY
Qty: 5 ML | Refills: 1 | Status: SHIPPED | OUTPATIENT
Start: 2020-09-24 | End: 2020-10-24

## 2020-09-24 RX ORDER — TRAMADOL HYDROCHLORIDE 50 MG/1
50 TABLET ORAL 3 TIMES DAILY PRN
Qty: 90 TABLET | Refills: 1 | Status: SHIPPED | OUTPATIENT
Start: 2020-09-24 | End: 2020-10-24

## 2020-09-24 ASSESSMENT — ENCOUNTER SYMPTOMS
EYE DISCHARGE: 1
BOWEL INCONTINENCE: 0
ABDOMINAL PAIN: 1
RHINORRHEA: 1
FOREIGN BODY SENSATION: 1
BLOOD IN STOOL: 0
FACIAL SWELLING: 0
COUGH: 0
BACK PAIN: 1
VOICE CHANGE: 0
SINUS PRESSURE: 1
SHORTNESS OF BREATH: 0
WHEEZING: 0
EYE ITCHING: 1
DIARRHEA: 0
TROUBLE SWALLOWING: 0
VOMITING: 0
CHEST TIGHTNESS: 0
SORE THROAT: 0
CONSTIPATION: 0
NAUSEA: 0

## 2020-09-24 NOTE — PATIENT INSTRUCTIONS
SURVEY:    You may be receiving a survey from [employer] regarding your visit today. Please complete the survey to enable us to provide the highest quality of care to you and your family. If you cannot score us a very good on any question, please call the office to discuss how we could have made your experience a very good one. Thank you.

## 2020-09-25 ENCOUNTER — HOSPITAL ENCOUNTER (OUTPATIENT)
Dept: CARDIAC REHAB | Age: 70
Setting detail: THERAPIES SERIES
Discharge: HOME | End: 2020-09-25
Payer: MEDICARE

## 2020-09-25 NOTE — PROGRESS NOTES
Phase II Cardiac Rehab Individualized Treatment Plan-60 Day      Patient Name: Zaira Johnson #: [de-identified]   Date of Initial Assessment: 7/31/2020  Diagnosis: Stable Angina Pectoris   Onset Date: 7/17/2020  Referring Lindsay Monreal MD  Risk Stratification: MODERATE  Session Number:  16  EXERCISE    Stages of Change:   [] pre-contemplation  [x] Action   [] Contemplate   [] Maintainence   [] Prep   [] Relapse          Exercise Prescription:  Mode: [x]? TM [x]? UBE [x]? STP []? EL [x]? R  Frequency: 3 DAYS PER WEEK  Duration: 31-60 MINUTES  Intensity: 5 - 5.7 mets  Plan/Goal: Increase 1-2 levels/week or 1-2 min/week to achieve target HR and RPE   12-16. Progression: INCREASE DURATION PER ABOVE F.I.T.T. Rx  PARAMETERS ON AVG OF 5-10 MIN / 1-2 WEEKS FOR THE FIRST 4-6 WEEKS. AFTER 3-4 WEEKS ARE COMPLETED, CONTINUE TO GRADUALLY INCREASE F. I.T. PARAMETERS GRADUALLY UPWARD AT THE ESTABLISHED DURATION ON AVERAGE 0.5-1.0 METS PER 30 DAYS OVER THE COARSE OF REMAINING PROGRAM AS ESTABLISHED BY PT-CENTERED GOALS AND GUIDELINES. Target  - 114 bpm       [x]? Angina with Exertion / MSHR 120           [x]? Resistance Training  PROGRESS 8-12 bilateral UE and LE progressive resistance exercises at 1-3 sets per lift, on 2-3 non-consecutive days using weights/ green  therabands AND WTS TO 8-24 # for 8-15 reps to progressive overload by increasing resistance once reps progressed to at least 15 reps on at least 2 occasions     Hypertension:  [x] Yes  [] No  Resting BP: 142/64  Peak Exercise BP: 150/78  [] Med change? NO    Intervention:  Home Exercise:  Type: WALKING  Duration: 30-60 MINUTES  Frequency: 2-3 DAYS PER WEEK   [x]?  Resistance Training  PROGRESS 8-12 bilateral UE and LE progressive resistance exercises at 1-3 sets per lift, on 2-3 non-consecutive days using weights/ GREEN  therabands AND WTS TO 8-24 # for 8-15 reps to progressive overload by increasing resistance once reps progressed to at least 15 reps on at least 2 occasions      Education:   [x] Equipment [place]  [x] Self pulse   [x] Proper use weights/therabands   [x] S/S to report  [x] Low Na Diet    [x] Warm up/ Cool down  [x] BP Medication    [x] RPE Scale   [x] Understand BP   [x] Ex Safety   [x] Exercise specialist class-Home Exercise       Target Goal:   -Individual Exercise Plan  -Bp<130/80  -Aerobic active 30 + minutes 5-7 days per week    Nutrition    Stages of Change:   [] pre-contemplation  [x] Action   [] Contemplate   [] Maintainence   [] Prep   [] Relapse    Lipids:   NO NEW VALUES  [] Med Change?  NO     Diabetes:  [] Yes  [x] No    Weight Management:  Current Wt 136  Wt Goal: MAINTAIN CURRENT OPTIMAL BODY WT    Intervention:   [] Dietitian Consult       [x] Nurse/Patient Discussion     [x] Diet Class           [] Referred to Diabetes Education     Education:  [] S&S hypo/hyperglycemia  [x] Low fat/low cholesterol diet  [x] Weight loss methods      [] Relate Diabetes/CAD     [x] Eating heart healthy handout    Target Goal:  -LDL-C<100 if triglycerides are > 200  -LDL-C < 70 for high risk patients  -HbA1c < 7%  -BMI < 25   Education    Stages of Change:    [] pre-contemplation  [x] Action   [] Contemplate   [] Maintainence   [] Prep   [] Relapse    Family support: [x] Yes  [] No    Tobacco use: [] Yes  [x] No    Intervention:  [] Referred to smoking cessation counselor     [] Individual education and counseling  [] Tobacco adjunct  [] Informed of education class schedule     Education:   [x] Risk Factors/Modifications  [x] Psychological aspects  [x] Angina    [x] Medications  [] CHF      [x] Cardiac A&P    Target Goal:  -Complete cessation of tobacco use (if applicable)  -Continued risk factor modifications  -Recognizing signs/symptoms to report  -Proper use of meds    Psychosocial  Stages of Change:    [] pre-contemplation  [x] Action   [] Contemplate   [] Maintainence   [] Prep   [] Relapse      Intervention:   [] Psych Consult/  [x] Uses stress management skills    [] Physician Referral    [x] Stress management class   [] Med Change? NO    Education:    [x] Coping Techniques   [x] Relaxation techniques   [x] S/S of Depression    Target Goal:  -Assess presence or absence of depression using a valid screening tool. -Maximize coping skills.  -Positive support system.     Preventative Medication:   [] Aspirin       [x] Beta Blockade      [x] Statin or other lipid lowering agent     [] Clopidogrel   [x] ACE Inhibitor   [] Other anticoagulation medications     Fall Risk assess: [x] Yes  [] No / REMAINS LOW RISK  Assistive Device:   [] Cane  [] Walker [] Wheel Chair  [] Gait belt    Patient/Program goal:   :\"BUILD MY STRENGTH BACK UP\" / PROGRESSING TOWARD GOAL PER PT SELF-REPORT     - increased stamina/strength to 30-50 total exercise by increasing 1-2 level/wk and 1-2   min/wk  to achieve THR and RPE 12-16 / INCREASE AVG CARDIO FC BY AT LEAST 0.5-1.0 MET IN THE NEXT 30 DAYS AND TOLERATE >31-45 MIN W/IN EX RX TARGETS / MEETING GOAL/ AVG FC INCREASED FROM 4.3 METS TO 5.2 METS / CONTINUE TO PROGRESS GOAL AS WRITTEN OVER THE NEXT 30 DAYS     - MAINTAIN CURRENT BODY WEIGHT / MEETING GOAL / CURRENT WT  LB / CONTINUE TO PROGRESS GOAL AS WRITTEN OVER THE NEXT 30 DAYS     - PROGRESS 8-12 bilateral UE and LE progressive resistance exercises at 1-3 sets per lift, on 2-3 non-consecutive days using weights/ GREEN therabands AND WTS TO 5-24 # for 8-15 reps to progressive overload by increasing resistance once reps progressed to at least 15 reps on at least 2 occasions / Ulus Brunner / CONTINUE TO PROGRESS GOAL AS WRITTEN OVER THE NEXT 30 DAYS     - LOWER BP TO <130/80 AT REST //62 NOT MEETING GOAL / CONTINUE TO PROGRESS GOAL AS WRITTEN OVER THE NEXT 30 DAYS     - Improved cholesterol and  Triglycerides / NO NEW VALUES      - Develop regular exercise 30 min daily, AT LEAST 3-5 DAYS PER WEEK CONSISTENTLY W/IN THE NEXT 30 DAYS / MEETING GOAL WITHIN REHAB / CONTINUE TO PROGRESS GOAL AS WRITTEN OVER THE NEXT 30 DAYS     - To reduce self-reported psycho-social feelings of stress in next 30 days / PT SELF REPORTED MEETING GOAL / CONTINUE TO PROGRESS GOAL AS WRITTEN OVER THE NEXT 30 DAYS     To eat at least 2 servings of fruit per day and at least 4-5 servings of vegetables per day  / Andrez Mosqueda / Nancy Figueroa NEXT 27 DAYS     Physician Changes/Comments:      Cardiac Rehab Staff:  MILENA HILL RN, CEP

## 2020-09-28 ENCOUNTER — APPOINTMENT (OUTPATIENT)
Dept: CARDIAC REHAB | Age: 70
End: 2020-09-28
Payer: MEDICARE

## 2020-09-30 ENCOUNTER — HOSPITAL ENCOUNTER (OUTPATIENT)
Dept: CARDIAC REHAB | Age: 70
Setting detail: THERAPIES SERIES
Discharge: HOME | End: 2020-09-30
Payer: MEDICARE

## 2020-09-30 PROCEDURE — 93798 PHYS/QHP OP CAR RHAB W/ECG: CPT

## 2020-10-05 ENCOUNTER — HOSPITAL ENCOUNTER (OUTPATIENT)
Dept: CARDIAC REHAB | Age: 70
Setting detail: THERAPIES SERIES
Discharge: HOME | End: 2020-10-05
Payer: MEDICARE

## 2020-10-05 PROCEDURE — 93798 PHYS/QHP OP CAR RHAB W/ECG: CPT

## 2020-10-07 ENCOUNTER — HOSPITAL ENCOUNTER (OUTPATIENT)
Dept: CARDIAC REHAB | Age: 70
Setting detail: THERAPIES SERIES
End: 2020-10-07
Payer: MEDICARE

## 2020-10-09 ENCOUNTER — HOSPITAL ENCOUNTER (OUTPATIENT)
Dept: CARDIAC REHAB | Age: 70
Setting detail: THERAPIES SERIES
End: 2020-10-09
Payer: MEDICARE

## 2020-10-12 ENCOUNTER — HOSPITAL ENCOUNTER (OUTPATIENT)
Dept: CARDIAC REHAB | Age: 70
Setting detail: THERAPIES SERIES
Discharge: HOME | End: 2020-10-12
Payer: MEDICARE

## 2020-10-12 PROCEDURE — 93798 PHYS/QHP OP CAR RHAB W/ECG: CPT

## 2020-10-14 ENCOUNTER — HOSPITAL ENCOUNTER (OUTPATIENT)
Dept: CARDIAC REHAB | Age: 70
Setting detail: THERAPIES SERIES
Discharge: HOME | End: 2020-10-14
Payer: MEDICARE

## 2020-10-14 PROCEDURE — 93798 PHYS/QHP OP CAR RHAB W/ECG: CPT

## 2020-10-16 ENCOUNTER — HOSPITAL ENCOUNTER (OUTPATIENT)
Dept: CARDIAC REHAB | Age: 70
Setting detail: THERAPIES SERIES
End: 2020-10-16
Payer: MEDICARE

## 2020-10-19 ENCOUNTER — HOSPITAL ENCOUNTER (OUTPATIENT)
Dept: CARDIAC REHAB | Age: 70
Setting detail: THERAPIES SERIES
Discharge: HOME | End: 2020-10-19
Payer: MEDICARE

## 2020-10-19 PROCEDURE — 93798 PHYS/QHP OP CAR RHAB W/ECG: CPT

## 2020-10-21 ENCOUNTER — HOSPITAL ENCOUNTER (OUTPATIENT)
Dept: CARDIAC REHAB | Age: 70
Setting detail: THERAPIES SERIES
End: 2020-10-21
Payer: MEDICARE

## 2020-10-23 ENCOUNTER — APPOINTMENT (OUTPATIENT)
Dept: CARDIAC REHAB | Age: 70
End: 2020-10-23
Payer: MEDICARE

## 2020-10-26 ENCOUNTER — HOSPITAL ENCOUNTER (OUTPATIENT)
Dept: CARDIAC REHAB | Age: 70
Setting detail: THERAPIES SERIES
Discharge: HOME | End: 2020-10-26
Payer: MEDICARE

## 2020-10-26 NOTE — PROGRESS NOTES
Phase II Cardiac Rehab Individualized Treatment Plan-90 Day      Patient Name: David Hudson #: [de-identified]   Date of Initial Assessment: 7/31/2020  Diagnosis: Stable Angina Pectoris   Onset Date: 7/17/2020  Referring Ashley Nunez MD  Risk Stratification: MODERATE  Session Number:  21   EXERCISE    Stages of Change:   [] pre-contemplation  [x] Action   [] Contemplate   [] Maintainence   [] Prep   [] Relapse          Exercise Prescription:  Mode: [x] TM [x] UBE [x] STP [] EL [x] R  Frequency: 3 DAYS PER WEEK  Duration: 31-60 MINUTES  Intensity: 3.8 - 4.5 avg mets  Plan/Goal: Increase 1-2 levels/week or 1-2 min/week to achieve target HR and RPE   12-16. Progression: INCREASE DURATION PER ABOVE F.I.T.T. Rx  PARAMETERS ON AVG OF 5-10 MIN / 1-2 WEEKS FOR THE FIRST 4-6 WEEKS. AFTER 3-4 WEEKS ARE COMPLETED, CONTINUE TO GRADUALLY INCREASE F. I.T. PARAMETERS GRADUALLY UPWARD AT THE ESTABLISHED DURATION ON AVERAGE 0.5-1.0 METS PER 30 DAYS OVER THE COARSE OF REMAINING PROGRAM AS ESTABLISHED BY PT-CENTERED GOALS AND GUIDELINES. Target HR 72-84 bpm      [] Angina with Exertion    [x] Resistance Training  introduce 8-12 bilateral UE and LE progressive resistance exercises at 1-3 sets per lift, on 2-3 non-consecutive days using weights/ green  therabands AND WTS TO 8-24 # for 8-15 reps to progressive overload by increasing resistance once reps progressed to at least 15 reps on at least 2 occasions       Hypertension:  [x] Yes  [] No  Resting BP: 136/62  Peak Exercise BP: 146/68  [] Med change?     Intervention:  Home Exercise:  Type: walking  Duration: 30-60 MINUTES  Frequency: 2-3 DAYS PER WEEK   [x] Resistance Training  introduce 8-12 bilateral UE and LE progressive resistance exercises at 1-3 sets per lift, on 2-3 non-consecutive days using weights/ green  therabands AND WTS TO 8-24 # for 8-15 reps to progressive overload by increasing resistance once reps progressed to at least 15 reps on at least 2 occasions     Education:   [x] Equipment [place]  [x] Self pulse   [x] Proper use weights/therabands   [x] S/S to report  [x] Low Na Diet    [x] Warm up/ Cool down  [x] BP Medication    [x] RPE Scale   [x] Understand BP   [x] Ex Safety   [x] Exercise specialist class-Home Exercise       Target Goal:   -Individual Exercise Plan  -Bp<130/80  -Aerobic active 30 + minutes 5-7 days per week    Nutrition    Stages of Change:   [] pre-contemplation  [x] Action   [] Contemplate   [] Maintainence   [] Prep   [] Relapse    Lipids: no new values  Total Cholesterol:   Triglycerides:   HDL:   LDL:   [] Med Change?      Diabetes:  [] Yes  [x] No      Weight Management:  Current Wt 135 lb  Wt Goal: 1-2 lbs/wk    Intervention:   [] Dietitian Consult       [x] Nurse/Patient Discussion     [x] Diet Class           [] Referred to Diabetes Education     Education:  [] S&S hypo/hyperglycemia  [x] Low fat/low cholesterol diet  [x] Weight loss methods      [] Relate Diabetes/CAD     [x] Eating heart healthy handout    Target Goal:  -LDL-C<100 if triglycerides are > 200  -LDL-C < 70 for high risk patients  -HbA1c < 7%  -BMI < 25   Education    Stages of Change:    [] pre-contemplation  [x] Action   [] Contemplate   [] Maintainence   [] Prep   [] Relapse    Family support: [x] Yes  [] No    Tobacco use: [] Yes  [x] No    Intervention:  [] Referred to smoking cessation counselor     [] Individual education and counseling  [] Tobacco adjunct  [] Informed of education class schedule     Education:   [x] Risk Factors/Modifications  [x] Psychological aspects  [x] Angina    [x] Medications  [] CHF      [x] Cardiac A&P    Target Goal:  -Complete cessation of tobacco use (if applicable)  -Continued risk factor modifications  -Recognizing signs/symptoms to report  -Proper use of meds    Psychosocial  Stages of Change:    [] pre-contemplation  [x] Action   [] Contemplate   [] Maintainence   [] Prep   [] Relapse      Intervention:   [] Psych Consult/  [x] Uses stress management skills    [] Physician Referral    [x] Stress management class   [] Med Change? Education:    [x] Coping Techniques   [x] Relaxation techniques   [x] S/S of Depression    Target Goal:  -Assess presence or absence of depression using a valid screening tool. -Maximize coping skills.  -Positive support system.     Preventative Medication:   [] Aspirin       [x] Beta Blockade      [x] Statin or other lipid lowering agent     [] Clopidogrel   [x] ACE Inhibitor   [] Other anticoagulation medications     Fall Risk assess: [x] Yes  [] No / REMAINS LOW RISK  Assistive Device:   [] Cane  [] Walker [] Wheel Chair  [] Gait belt    Patient/Program goal:   :\"BUILD MY STRENGTH BACK UP\" / PROGRESSING TOWARD GOAL PER PT SELF-REPORT     - increased stamina/strength to 30-50 total exercise by increasing 1-2 level/wk and 1-2   min/wk  to achieve THR and RPE 12-16 / INCREASE AVG CARDIO FC BY AT LEAST 0.5-1.0 MET IN THE NEXT 30 DAYS AND TOLERATE >31-45 MIN W/IN EX RX TARGETS / MEETING GOAL/ AVG FC STALLED AT 3.8 MEYTS METS / CONTINUE TO PROGRESS GOAL AS WRITTEN OVER THE NEXT 30 DAYS     - MAINTAIN CURRENT BODY WEIGHT / MEETING GOAL / CURRENT WT IS 135LB / CONTINUE TO PROGRESS GOAL AS WRITTEN OVER THE NEXT 30 DAYS     - PROGRESS 8-12 bilateral UE and LE progressive resistance exercises at 1-3 sets per lift, on 2-3 non-consecutive days using weights/ GREEN therabands AND WTS TO 5-24 # for 8-15 reps to progressive overload by increasing resistance once reps progressed to at least 15 reps on at least 2 occasions / MEETING GOAL / CONTINUE TO PROGRESS GOAL AS WRITTEN OVER THE NEXT 30 DAYS     - LOWER BP TO <130/80 AT REST / /62 / PARTIALLY  MEETING GOAL / CONTINUE TO PROGRESS GOAL AS WRITTEN OVER THE NEXT 30 DAYS     - Improved cholesterol and  Triglycerides / NO NEW VALUES      - Develop regular exercise 30 min daily, AT LEAST 3-5 DAYS PER WEEK CONSISTENTLY W/IN THE NEXT 30 DAYS / MEETING GOAL WITHIN REHAB / CONTINUE TO PROGRESS GOAL AS WRITTEN OVER THE NEXT 30 DAYS     - To reduce self-reported psycho-social feelings of stress in next 30 days / PT SELF REPORTED MEETING GOAL / CONTINUE TO PROGRESS GOAL AS WRITTEN OVER THE NEXT 30 DAYS     To eat at least 2 servings of fruit per day and at least 4-5 servings of vegetables per day  / Algade 49 / CONTINUE TO PROGRESS GOAL AS WRITTEN OVER THE NEXT 30 DAYS     Physician Changes/Comments:      Cardiac Rehab Staff  Boo Or, RRT

## 2020-10-27 NOTE — PROGRESS NOTES
Cardiopulmonary Rehab   Medical Nutrition Therapy Food Diary Evaluation    Patient Name: Damaso Doll  Registered Dietitian:  Chales Bosworth, RDN, LD  Date:  10/27/2020    Dear Dontae Gregorio,    Thank you for sharing your information about your eating patterns, it serves not only to help me see what you are eating, but you as well. Eating 3 meals a day is great way to start, I am pleased to see that you are doing that. Whole grains, fruits and vegetables, along with lean meats and low fat dairy are the cornerstones of your health. I did not notice any vegetables or whole grains at your meals, I was pleased to see a banana for snack. It was good to see you are drinking water, aim to increase it to 48 oz each day. Exercise is an important part of heart health and including a minimum of 30 minutes, 5 days each week is recommended by the American Heart Association. Whether it is chair exercises, resistance training, or youtube videos, all of it is important. Consider trying whole wheat pasta and whole grain breads at your meals. I would suggest to omit the cookie as they are high in fat and calories and choose peanut butter on whole grain toast with fruit instead. I would also suggest having half a plate of spaghetti and 1 slice of garlic toast, and add a tossed salad with low fat dressing and fruit instead. It is good to see you chose low fat creamer for your coffee. With that in mind, look below for some suggestions. Your Rate Your Plate (RYP) Score was: 37, which means: there are some ways you can make y our eating habits healthier    Recommendations from the Dietitian based on your RYP and Food Diary / activity record to improve health and decrease risk factors    1. Include 3 servings of fruits/non-starchy vegetables (carrots, broccoli, green beans,etc.) every day. 2. Choose more whole grains such as oatmeal, toast, whole wheat pasta. 3. Increase water to 48 oz each day.    4. Include 30 minutes of physical activity a minimum of 5 days each week. 5. Consider having peanut butter toast and fruit instead of a cookie for breakfast.   6. Eat half a plate of spaghetti and 1 slice garlic toast, add tossed salad with low fat dressing and fruit. If there are many things to change, try making change with one recommendation, then move on from there. Recommendations are based on guidelines from the American Heart Association, American Diabetes Association and current literature.     Feel free to call with questions or concerns [phone] or [address] RDN, LD

## 2020-10-28 ENCOUNTER — HOSPITAL ENCOUNTER (OUTPATIENT)
Dept: CARDIAC REHAB | Age: 70
Setting detail: THERAPIES SERIES
End: 2020-10-28
Payer: MEDICARE

## 2020-10-30 ENCOUNTER — HOSPITAL ENCOUNTER (OUTPATIENT)
Dept: CARDIAC REHAB | Age: 70
Setting detail: THERAPIES SERIES
End: 2020-10-30
Payer: MEDICARE

## 2020-11-05 NOTE — PROGRESS NOTES
HPI Notes    Name: Jamilah Coffman  : 1950         Chief Complaint:     Chief Complaint   Patient presents with    Allergies     c/o watery eyes and sometimes rash. \"itching all over\"    Back Pain       History of Present Illness:        Roxanne presents to office for evaluation of allergies, eye problem  and chronic low back pain    Also c/o chronic low back pain that is getting worse  States went to Dr. Alexia Hdez in 2019 and had an injection to the  lower back. It helped to some degree but the effect wore off. Then she could not follow-up with him due to Covid  pandemic. Rates pain /10 . Unable to take NSAIDs due to chronic gastritis , h/o hiatal hernia for which she underwent a surgical repair. Has chronic stomach pains . Tylenol not helping with back pain. On gabapentin but still has significant amount of pain preventing her from performing ADLs. States needs to sit and rest frequently. States sometimes her right leg \"gives out\". Has numbness in the right lower extremity. Reports having allergies  to a lot of things including mold, dust, trees, cat , flowers etc.  Has been on Zyrtec for the past 2 months and it is not helping  Feels congested in her nose, sinuses. Tried Flonase and it did not help  States has watery and itchy eyes. Symptoms are getting worse. States feels miserable     Back Pain   This is a chronic problem. The current episode started more than 1 year ago. The problem occurs constantly. The problem has been gradually worsening since onset. The pain is present in the lumbar spine. The pain radiates to the right thigh and right foot. The pain is at a severity of 7/10. The symptoms are aggravated by standing and position. Associated symptoms include abdominal pain (chronic epigastric pain ) and numbness (right leg). Pertinent negatives include no bladder incontinence, bowel incontinence, chest pain, dysuria, fever, headaches, pelvic pain or perianal numbness.  Treatments tried: Tylenol, gabapentin, OTC back patches, steroid injection. The treatment provided mild relief. Eye Problem    Both eyes are affected. This is a recurrent problem. The current episode started in the past 7 days. The problem occurs constantly. The problem has been unchanged. The injury mechanism was contact lenses. The patient is experiencing no pain. There is no known exposure to pink eye. She does not wear contacts. Associated symptoms include an eye discharge, a foreign body sensation and itching. Pertinent negatives include no fever, nausea or vomiting. She has tried nothing for the symptoms. Past Medical History:     Past Medical History:   Diagnosis Date    Allergic rhinitis 2011    Anxiety 2011    Carpal tunnel syndrome 2011    Depression 2011    Dyslipidemia 2011    Fibromyalgia 2011    Hyperlipidemia     Hypertension 2011    SOB (shortness of breath)     Thyroid disease       Reviewed all health maintenance requirements and orderedappropriate tests  Health Maintenance Due   Topic Date Due    DEXA (modify frequency per FRAX score)  2007    Colon cancer screen colonoscopy  10/26/2019    Flu vaccine (1) 2020       Past Surgical History:     Past Surgical History:   Procedure Laterality Date    CARDIAC CATHETERIZATION Left 2017    Dr. Spencer House @ [facility]--CAD, 50-60% ostial right CAD with an FFR of 0.94.  60% disease in the proximal LAD with an FFR of 0.92 with bridging of the mid LAD. Normal LV function at 60%. Mildly dilated ascending aorta -will do a CT scan to measure.     CARDIAC CATHETERIZATION Left 2020    Dr. Spencer House @ [facility]--Medical therapy     SECTION      x 4    ELBOW SURGERY Right     GALLBLADDER SURGERY      HYSTERECTOMY      total hysterectomy    ROTATOR CUFF REPAIR      Right    SMALL INTESTINE SURGERY      twisted bowel         Medications:       Prior to Admission medications Medication Sig Start Date End Date Taking? Authorizing Provider   montelukast (SINGULAIR) 10 MG tablet Take 1 tablet by mouth daily 9/24/20  Yes Laverne Lawrence MD   olopatadine (PATANOL) 0.1 % ophthalmic solution Place 1 drop into both eyes 2 times daily 9/24/20 10/24/20 Yes Laverne Lawrence MD   predniSONE (DELTASONE) 20 MG tablet Take 1 tablet by mouth daily for 5 days 9/24/20 9/29/20 Yes Laverne Lawrence MD   traMADol (ULTRAM) 50 MG tablet Take 1 tablet by mouth 3 times daily as needed for Pain for up to 30 days.  9/24/20 10/24/20 Yes Laverne Lawrence MD   gabapentin (NEURONTIN) 300 MG capsule TAKE 1 CAPSULE THREE TIMES DAILY 9/15/20 3/16/21 Yes Laverne Lawrence MD   potassium chloride (KLOR-CON) 10 MEQ extended release tablet  7/20/20  Yes Historical Provider, MD   levothyroxine (SYNTHROID) 50 MCG tablet Take 1 tablet by mouth Daily 4/24/20  Yes Laverne Lawrence MD   potassium chloride (KLOR-CON M) 10 MEQ extended release tablet Take 1 tablet by mouth daily 4/24/20  Yes Laverne Lawrence MD   tiZANidine (ZANAFLEX) 2 MG tablet Take 1 tablet by mouth 3 times daily as needed (muscle spasms) 4/24/20  Yes Laverne Lawrence MD   traZODone (DESYREL) 100 MG tablet Take 1 tablet by mouth nightly as needed for Sleep 4/24/20  Yes Laverne Lawrence MD   amLODIPine (NORVASC) 2.5 MG tablet Take 1 tablet by mouth daily 3/12/20  Yes Ebenezre Campos MD   lisinopril (PRINIVIL;ZESTRIL) 20 MG tablet TAKE 1 TABLET EVERY DAY 2/24/20  Yes Ebenezer Campos MD   lovastatin (MEVACOR) 20 MG tablet TAKE 1 TABLET BY MOUTH ONCE DAILY AT  NIGHT 2/24/20  Yes Ebenezer Campos MD   metoprolol tartrate (LOPRESSOR) 25 MG tablet TAKE 1/2 TABLET TWICE DAILY 2/24/20  Yes Ebenezer Campos MD   fluticasone (FLONASE) 50 MCG/ACT nasal spray USE 2 SPRAYS NASALLY EVERY DAY 12/28/19  Yes Laverne Lawrence MD   dicyclomine (BENTYL) 10 MG capsule  11/26/19  Yes Historical Provider, MD   Blood Pressure Monitoring (BLOOD PRESSURE CUFF) MISC Use to monitor Blood pressure 1-2 times daily 3/4/19  Yes Herve Magana MD   vitamin B-6 (PYRIDOXINE) 100 MG tablet Take 100 mg by mouth daily   Yes Historical Provider, MD   Coenzyme Q-10 100 MG CAPS Take 1 capsule by mouth daily   Yes Historical Provider, MD   Simethicone 80 MG TABS Take 80 mg by mouth 2 times daily as needed (bloating) 11/12/18  Yes Herve Magana MD   Calcium Polycarbophil (FIBER-CAPS PO) Take by mouth   Yes Historical Provider, MD   vitamin D (CHOLECALCIFEROL) 1000 UNIT TABS tablet Take 1,000 Units by mouth daily   Yes Historical Provider, MD        Allergies:       Patient has no known allergies. Social History:     Tobacco: reports that she has never smoked. She has never used smokeless tobacco.  Alcohol:      reports no history of alcohol use. Drug Use:  reports no history of drug use. Family History:     Family History   Problem Relation Age of Onset    Stroke Mother     High Cholesterol Mother     Heart Attack Mother     Heart Attack Father     High Cholesterol Father     High Cholesterol Sister     High Cholesterol Brother        Review of Systems:         Review of Systems   Constitutional: Negative for activity change, appetite change, chills, fatigue, fever and unexpected weight change. HENT: Positive for congestion, rhinorrhea, sinus pressure and sneezing. Negative for ear pain, facial swelling, hearing loss, mouth sores, sore throat, trouble swallowing and voice change. Eyes: Positive for discharge and itching. Negative for visual disturbance. Respiratory: Negative for cough, chest tightness, shortness of breath and wheezing. Cardiovascular: Negative for chest pain, palpitations and leg swelling. Gastrointestinal: Positive for abdominal pain (chronic epigastric pain ). Negative for blood in stool, bowel incontinence, constipation, diarrhea, nausea and vomiting. Genitourinary: Negative for bladder incontinence, difficulty urinating, dysuria and pelvic pain. Musculoskeletal: Positive for back pain. Skin: Negative for rash. Neurological: Positive for numbness (right leg). Negative for dizziness, tremors, syncope and headaches. Psychiatric/Behavioral: Negative for behavioral problems, dysphoric mood and sleep disturbance. The patient is not nervous/anxious. Physical Exam:     Vitals:  BP (!) 145/82   Pulse 65   Resp 18   Ht 5' 3\" (1.6 m)   Wt 136 lb (61.7 kg)   SpO2 95%   BMI 24.09 kg/m²       Physical Exam  Vitals signs reviewed. Constitutional:       General: She is not in acute distress. Appearance: She is well-developed. HENT:      Head: Normocephalic and atraumatic. Right Ear: Tympanic membrane and ear canal normal. There is no impacted cerumen. Left Ear: Tympanic membrane and ear canal normal. There is no impacted cerumen. Nose: Congestion and rhinorrhea present. Mouth/Throat:      Mouth: Mucous membranes are moist.      Pharynx: Oropharynx is clear. No oropharyngeal exudate. Eyes:      General: No scleral icterus. Right eye: Discharge present. Left eye: Discharge present. Comments: Bilateral conjunctival hyperemia   Neck:      Thyroid: No thyromegaly. Cardiovascular:      Rate and Rhythm: Normal rate and regular rhythm. Heart sounds: Normal heart sounds. No murmur. Pulmonary:      Effort: Pulmonary effort is normal.      Breath sounds: Normal breath sounds. No wheezing or rales. Abdominal:      General: Bowel sounds are normal. There is no distension. Palpations: Abdomen is soft. There is no mass. Tenderness: There is no abdominal tenderness. Musculoskeletal: Normal range of motion. General: Tenderness (lumbar spine tenderness, right hip and right posterior thigh tenderness) present. Right lower leg: No edema. Left lower leg: No edema. Lymphadenopathy:      Cervical: No cervical adenopathy. Skin:     General: Skin is warm and dry.       Coloration: Skin is not pale. Findings: No rash. Neurological:      General: No focal deficit present. Mental Status: She is alert and oriented to person, place, and time. Mental status is at baseline. Psychiatric:         Mood and Affect: Mood normal.         Behavior: Behavior normal.         Thought Content: Thought content normal.         Judgment: Judgment normal.               Data:     Lab Results   Component Value Date     02/24/2020    K 3.5 02/24/2020     02/24/2020    CO2 25 02/24/2020    BUN 10 02/24/2020    CREATININE 0.64 02/24/2020    GLUCOSE 127 02/24/2020    GLUCOSE 99 01/19/2012    PROT 6.8 02/24/2020    PROT 6.5 01/24/2020    LABALBU 4.1 02/24/2020    LABALBU 4.4 01/19/2012    BILITOT 0.44 02/24/2020    ALKPHOS 118 02/24/2020    AST 37 02/24/2020    ALT 49 02/24/2020     Lab Results   Component Value Date    WBC 9.25 01/24/2020    RBC 5.00 01/24/2020    RBC 4.86 01/19/2012    HGB 14.6 01/24/2020    HCT 43.4 01/24/2020    MCV 86.8 01/24/2020    MCH 29.2 01/24/2020    MCHC 33.6 01/24/2020    RDW 14.3 01/24/2020     01/24/2020     01/19/2012    MPV 9.0 01/24/2020     Lab Results   Component Value Date    TSH 1.77 02/24/2020     Lab Results   Component Value Date    CHOL 250 01/16/2017    HDL 63 01/16/2017          Assessment & Plan        Diagnosis Orders   1. Allergic conjunctivitis of both eyes   Will prescribe Patanol eyedrops,  treat underlying seasonal allergies olopatadine (PATANOL) 0.1 % ophthalmic solution   2. Allergic state, subsequent encounter   Prescribed singular. Also advised to take OTC Allegra-D and stop Zyrtec since it is not helping. Also prescribed prednisone 20 mg daily for 5 days montelukast (SINGULAIR) 10 MG tablet    predniSONE (DELTASONE) 20 MG tablet   3. Lumbar and sacral osteoarthritis   Patient reports worsening pain in the lower back, has a history of lumbar and sacral osteoarthritis. She is unable to take NSAIDs due to chronic gastritis. Advised to follow-up with Dr. pickett for possible steroid injection. She is on gabapentin, try Tylenol and OTC back patches unfortunately continues to have significant pain preventing him from performing her ADLs. For this reason prescribed tramadol 3 times daily as needed. PDMP was reviewed. Discussed potential side effects from tramadol including addiction. She is also advised to consider referral to pain management and she will think about it. traMADol (ULTRAM) 50 MG tablet                   Completed Refills   Requested Prescriptions     Signed Prescriptions Disp Refills    montelukast (SINGULAIR) 10 MG tablet 30 tablet 3     Sig: Take 1 tablet by mouth daily    olopatadine (PATANOL) 0.1 % ophthalmic solution 5 mL 1     Sig: Place 1 drop into both eyes 2 times daily    predniSONE (DELTASONE) 20 MG tablet 5 tablet 0     Sig: Take 1 tablet by mouth daily for 5 days    traMADol (ULTRAM) 50 MG tablet 90 tablet 1     Sig: Take 1 tablet by mouth 3 times daily as needed for Pain for up to 30 days. Return if symptoms worsen or fail to improve. Orders Placed This Encounter   Medications    montelukast (SINGULAIR) 10 MG tablet     Sig: Take 1 tablet by mouth daily     Dispense:  30 tablet     Refill:  3    olopatadine (PATANOL) 0.1 % ophthalmic solution     Sig: Place 1 drop into both eyes 2 times daily     Dispense:  5 mL     Refill:  1    predniSONE (DELTASONE) 20 MG tablet     Sig: Take 1 tablet by mouth daily for 5 days     Dispense:  5 tablet     Refill:  0    traMADol (ULTRAM) 50 MG tablet     Sig: Take 1 tablet by mouth 3 times daily as needed for Pain for up to 30 days. Dispense:  90 tablet     Refill:  1     Reduce doses taken as pain becomes manageable     No orders of the defined types were placed in this encounter. Patient Instructions     SURVEY:    You may be receiving a survey from [employer] regarding your visit today.     Please complete the survey to enable us to provide the highest quality of care to you and your family. If you cannot score us a very good on any question, please call the office to discuss how we could have made your experience a very good one. Thank you. Electronically signed by Beltran Arceo MD on 9/24/2020 at 11:12 AM           Completed Refills      Requested Prescriptions     Signed Prescriptions Disp Refills    montelukast (SINGULAIR) 10 MG tablet 30 tablet 3     Sig: Take 1 tablet by mouth daily    olopatadine (PATANOL) 0.1 % ophthalmic solution 5 mL 1     Sig: Place 1 drop into both eyes 2 times daily    predniSONE (DELTASONE) 20 MG tablet 5 tablet 0     Sig: Take 1 tablet by mouth daily for 5 days    traMADol (ULTRAM) 50 MG tablet 90 tablet 1     Sig: Take 1 tablet by mouth 3 times daily as needed for Pain for up to 30 days. RW/yes

## 2020-11-13 ENCOUNTER — HOSPITAL ENCOUNTER (OUTPATIENT)
Dept: CARDIAC REHAB | Age: 70
Setting detail: THERAPIES SERIES
Discharge: HOME | End: 2020-11-13
Payer: MEDICARE

## 2020-11-18 RX ORDER — LEVOTHYROXINE SODIUM 0.05 MG/1
50 TABLET ORAL DAILY
Qty: 90 TABLET | Refills: 1 | Status: SHIPPED | OUTPATIENT
Start: 2020-11-18 | End: 2021-01-08 | Stop reason: SDUPTHER

## 2020-11-18 NOTE — TELEPHONE ENCOUNTER
Pending med    Health Maintenance   Topic Date Due    DEXA (modify frequency per FRAX score)  01/25/2007    Flu vaccine (1) 09/01/2020    Shingles Vaccine (1 of 2) 09/24/2021 (Originally 1/25/2002)    TSH testing  02/24/2021    Lipid screen  07/13/2021    Potassium monitoring  07/13/2021    Creatinine monitoring  07/13/2021    Annual Wellness Visit (AWV)  08/11/2021    Breast cancer screen  02/28/2022    DTaP/Tdap/Td vaccine (2 - Td) 02/10/2024    Colon cancer screen colonoscopy  06/24/2029    Pneumococcal 65+ years Vaccine  Completed    Hepatitis C screen  Addressed    Hepatitis A vaccine  Aged Out    Hepatitis B vaccine  Aged Out    Hib vaccine  Aged Out    Meningococcal (ACWY) vaccine  Aged Out             (applicable per patient's age: Cancer Screenings, Depression Screening, Fall Risk Screening, Immunizations)    LDL Cholesterol (mg/dL)   Date Value   01/19/2012 178 (H)     LDL Calculated (mg/dL)   Date Value   01/16/2017 159     AST (U/L)   Date Value   02/24/2020 37 (H)     ALT (U/L)   Date Value   02/24/2020 49 (H)     BUN (mg/dL)   Date Value   02/24/2020 10      (goal A1C is < 7)   (goal LDL is <100) need 30-50% reduction from baseline     BP Readings from Last 3 Encounters:   09/24/20 (!) 145/82   08/10/20 136/78   06/30/20 (!) 160/70    (goal /80)      All Future Testing planned in [facility]:  Lab Frequency Next Occurrence   EKG 12 Lead Once 07/07/2020       Next Visit Date:  Future Appointments   Date Time Provider Linda Merchant   11/18/2020  1:00  [address] [facility] [place]   11/20/2020  1:00 [place] [facility] [place]   11/23/2020  1:00 [place] [facility] [place]   11/25/2020  1:00 [place] [facility] [place]   11/27/2020  1:00 [place] [facility] [place]   11/30/2020  1:00 [place] [facility] [place]            Patient Active Problem List:     Essential hypertension     Allergic rhinitis     Anxiety     Insomnia     Hyperlipidemia     Fibromyalgia syndrome     Depression, major     Chronic low back pain     Eczema intertrigo     Lumbar and sacral osteoarthritis     Acquired hypothyroidism     Lymphadenopathy, cervical     Schaefer's esophagus

## 2020-11-30 ENCOUNTER — TELEPHONE (OUTPATIENT)
Dept: CARDIAC REHAB | Age: 70
End: 2020-11-30

## 2020-11-30 NOTE — TELEPHONE ENCOUNTER
Attempted call to this patient to ascertain whether she wishes to be discharged from rehab or kept on hold d/t personal reason r/t family

## 2020-12-02 ENCOUNTER — HOSPITAL ENCOUNTER (OUTPATIENT)
Dept: CARDIAC REHAB | Age: 70
Setting detail: THERAPIES SERIES
Discharge: HOME | End: 2020-12-02
Payer: MEDICARE

## 2020-12-02 NOTE — PROGRESS NOTES
Phase II Cardiac Rehab Individualized Treatment Plan-Discharge  NOT COMPLETED D/T PATIENT DID NOT RETURN TO COMPLETE PROGRAM      Patient Name: Dimitris Munoz  ACCOUNT #: [de-identified]  Date of Initial Assessment: 7/31/2020  Diagnosis: Stable Angina Pectoris   Onset Date: 7/17/2020  Referring Adam Daniels MD  Risk Stratification: MODERATE  Session Number:  21   EXERCISE    Stages of Change:   [] pre-contemplation  [x] Action   [] Contemplate   [] Maintainence   [] Prep   [x] Relapse          Exercise Prescription:  Mode: [x] TM [x] UBE [x] STP [] EL [x] R  Frequency: 3 DAYS PER WEEK  Duration: 31-60 MINUTES  Intensity: 3.8 avg mets    Progression: INCREASE DURATION PER ABOVE F.I.T.T. Rx  PARAMETERS ON AVG OF 5-10 MIN / 1-2 WEEKS FOR THE FIRST 4-6 WEEKS. AFTER 3-4 WEEKS ARE COMPLETED, CONTINUE TO GRADUALLY INCREASE F. I.T. PARAMETERS GRADUALLY UPWARD AT THE ESTABLISHED DURATION ON AVERAGE 0.5-1.0 METS PER 30 DAYS OVER THE COARSE OF REMAINING PROGRAM AS ESTABLISHED BY PT-CENTERED GOALS AND GUIDELINES. Target HR 72 - 84 BPM      [] Angina with Exertion    [x] Resistance Training  introduce 8-12 bilateral UE and LE progressive resistance exercises at 1-3 sets per lift, on 2-3 non-consecutive days using weights/ GREEN  therabands AND WTS TO 8-24 # for 8-15 reps to progressive overload by increasing resistance once reps progressed to at least 15 reps on at least 2 occasions     Hypertension:  [x] Yes  [] No  Resting BP: 136/62  Peak Exercise BP: 146/68  [] Med Change?     Intervention:  Home Exercise:  Type: WALKING  Duration: 30-60 minutes  Frequency: 2-3 DAYS PER WEEK   [x] Resistance Training  introduce 8-12 bilateral UE and LE progressive resistance exercises at 1-3 sets per lift, on 2-3 non-consecutive days using weights/ GREEN  therabands AND WTS TO 8-24 # for 8-15 reps to progressive overload by increasing resistance once reps progressed to at least 15 reps on at least 2 occasions     Depression Screening: NOT COMPLETED D/T PATIENT DID NOT RETURN TO COMPLETE PROGRAM  [] Have you been feeling sad. ..down in the dumps? [] Have you lost interest in your job, sports, hobbies, friends? [] Do you often feel tired? [] Do you have trouble sleeping or do you sleep too much? [] Have you been gaining or losing weight? [] Do you often feel down on yourself, that everything is your fault? [] Do you have troubled making decisions or concentrating on your work? [] Do you often feel agitated or like you can barely move? [] Do you ever feel that life isn't worth living?    *If greater than 5 symptoms listed,  notified. Education:   [] Education Goals met        Target Goal:   -Individual Exercise Plan  -Bp< 130/80  -Aerobic active 30 + minutes 5-7 days per week    Nutrition    Stages of Change:   [] pre-contemplation  [] Action   [] Contemplate   [] Maintainenc   [] Prep   [x] Relapse    Lipids:  NO NEW VALUES  Total Cholesterol:   Triglycerides:   HDL:   LDL:   [] Med Change?      Diabetes:  [] Yes  [x] No    Weight Management:  Weight: 135 LB  Height: 63 IN   BMI: 24.8  Wt Goal: 1-2 lbs/wk  Special Diet: Special Diet:  1,800 Kcal / day, 2 GM Na+, 30% total fat, <10% saturated fat, 25-35 GM fiber, cholesterol reduced, balanced nutrition plan to be promoted and taught in rehab    Rate My Plate: NOT COMPLETED D/T PATIENT DID NOT RETURN TO COMPLETE PROGRAM  Intervention:   [] Dietitian Consult       [x] Nurse/Patient Discussion     [x] Diet Class           [] Referred to Diabetes Education     Education:  [] Education Goals met    Target Goal:  -LDL-C<100 if triglycerides are > 200  -LDL-C < 70 for high risk patients  -HbA1c < 7%  -BMI < 25   Education    Stages of Change:    [] pre-contemplation  [x] Action   [] Contemplate   [] Maintainence   [] Prep   [] Relapse    Knowledge test score: 100%      Family support: [x] Yes  [] No    Tobacco use: [] Yes  [x] No    Intervention:  [] Referred to smoking cessation counselor     [] Individual education and counseling  [] Tobacco adjunct  [] Informed of education class schedule     Education:   [] Education goals met    Target Goal:  -Complete cessation of tobacco use (if applicable)  -Continued risk factor modifications  -Recognizing signs/symptoms to report  -Proper use of meds    Psychosocial  Stages of Change:    [] pre-contemplation  [] Action   [] Contemplate   [] Maintainence   [] Prep   [x] Relapse    Psychosocial Test:  Tool Used: Emile Cuenca Quality of Life  Score: NOT COMPLETED D/T PATIENT DID NOT RETURN TO COMPLETE PROGRAM  Depression screening score PHQ-9: NOT COMPLETED D/T PATIENT DID NOT RETURN TO COMPLETE PROGRAM  []Medication change? NONE    Education:    [x] Education Goals met    Target Goal:  -Assess presence or absence of depression using a valid screening tool. -Maximize coping skills.  -Positive support system.     Preventative Medication:   [] Aspirin       [x] Beta Blockade      [x] Statin or other lipid lowering agent     [] Clopidogrel   [x] ACE Inhibitor   [] Other anticoagulation medications     Fall Risk assess: [x] Yes  [] No / LOW FALL RISK  Assistive Device:   [] Cane  [] Sheng Ponceos [] Wheel Chair  [] Gait belt    Patient/Program goal:   :\"BUILD MY STRENGTH BACK UP\"      - increased stamina/strength to 30-50 total exercise by increasing 1-2 level/wk and 1-2   min/wk  to achieve THR and RPE 12-16 / INCREASE AVG CARDIO FC BY AT LEAST 0.5-1.0 MET IN THE NEXT 30 DAYS AND TOLERATE >31-45 MIN W/IN EX RX TARGETS  GOAL PARTIALLY MET D/T INCOMPLETION OF PROGRAM     - MAINTAIN CURRENT BODY WEIGHT / GOAL MET     - Introduce 8-12 bilateral UE and LE progressive resistance exercises at 1-3 sets per lift, on 2-3 non-consecutive days using weights/ GREEN therabands AND WTS TO 5-24 # for 8-15 reps to progressive overload by increasing resistance once reps progressed to at least 15 reps on at least 2 occasions / GOAL NOT MET     - LOWER BP TO <130/80 AT REST / GOAL MET      - Improved cholesterol and  Triglycerides / NO NEW VALUES      - Develop regular exercise 30 min daily, AT LEAST 3-5 DAYS PER WEEK CONSISTENTLY W/IN THE NEXT 30 DAYS / GOAL NOT MET      - To reduce self-reported psycho-social feelings of stress in next 30 days /GOAL NOT MET      To eat at least 2 servings of fruit per day and at least 4-5 servings of vegetables per day  / GOAL NOT MET    Physician Changes/Comments:      Cardiac Rehab Staff  Minh Francis, RRT

## 2020-12-30 RX ORDER — LISINOPRIL 20 MG/1
TABLET ORAL
Qty: 90 TABLET | Refills: 3 | Status: SHIPPED | OUTPATIENT
Start: 2020-12-30 | End: 2021-11-17 | Stop reason: SDUPTHER

## 2021-01-07 NOTE — PATIENT INSTRUCTIONS
SURVEY:    You may be receiving a survey from [employer] regarding your visit today. Please complete the survey to enable us to provide the highest quality of care to you and your family. If you cannot score us a very good on any question, please call the office to discuss how we could of made your experience a very good one. Thank you. You may be receiving a survey from [employer] regarding your visit today. You may get this in the mail, through your MyChart or in your email. Please complete the survey to enable us to provide the highest quality of care to you and your family. If you cannot score us as very good ( 5 Stars) on any question, please feel free to call the office to discuss how we could have made your experience exceptional.     Thank You!       MD Snow Godinez, TETO Badillo, PCA

## 2021-01-08 ENCOUNTER — OFFICE VISIT (OUTPATIENT)
Dept: FAMILY MEDICINE CLINIC | Age: 71
End: 2021-01-08
Payer: MEDICARE

## 2021-01-08 VITALS
BODY MASS INDEX: 23.99 KG/M2 | DIASTOLIC BLOOD PRESSURE: 73 MMHG | SYSTOLIC BLOOD PRESSURE: 123 MMHG | HEART RATE: 75 BPM | TEMPERATURE: 97.3 F | WEIGHT: 135.4 LBS | OXYGEN SATURATION: 97 %

## 2021-01-08 DIAGNOSIS — G89.29 CHRONIC MIDLINE LOW BACK PAIN WITH RIGHT-SIDED SCIATICA: ICD-10-CM

## 2021-01-08 DIAGNOSIS — M47.817 LUMBAR AND SACRAL OSTEOARTHRITIS: ICD-10-CM

## 2021-01-08 DIAGNOSIS — M79.7 FIBROMYALGIA SYNDROME: ICD-10-CM

## 2021-01-08 DIAGNOSIS — Z23 NEED FOR INFLUENZA VACCINATION: ICD-10-CM

## 2021-01-08 DIAGNOSIS — I10 ESSENTIAL HYPERTENSION: Primary | ICD-10-CM

## 2021-01-08 DIAGNOSIS — E03.9 ACQUIRED HYPOTHYROIDISM: ICD-10-CM

## 2021-01-08 DIAGNOSIS — M54.41 CHRONIC MIDLINE LOW BACK PAIN WITH RIGHT-SIDED SCIATICA: ICD-10-CM

## 2021-01-08 DIAGNOSIS — G47.00 INSOMNIA, UNSPECIFIED TYPE: ICD-10-CM

## 2021-01-08 PROCEDURE — G8400 PT W/DXA NO RESULTS DOC: HCPCS | Performed by: INTERNAL MEDICINE

## 2021-01-08 PROCEDURE — 90686 IIV4 VACC NO PRSV 0.5 ML IM: CPT | Performed by: INTERNAL MEDICINE

## 2021-01-08 PROCEDURE — G0008 ADMIN INFLUENZA VIRUS VAC: HCPCS | Performed by: INTERNAL MEDICINE

## 2021-01-08 PROCEDURE — 4040F PNEUMOC VAC/ADMIN/RCVD: CPT | Performed by: INTERNAL MEDICINE

## 2021-01-08 PROCEDURE — 1036F TOBACCO NON-USER: CPT | Performed by: INTERNAL MEDICINE

## 2021-01-08 PROCEDURE — 99214 OFFICE O/P EST MOD 30 MIN: CPT | Performed by: INTERNAL MEDICINE

## 2021-01-08 PROCEDURE — 3017F COLORECTAL CA SCREEN DOC REV: CPT | Performed by: INTERNAL MEDICINE

## 2021-01-08 PROCEDURE — 1090F PRES/ABSN URINE INCON ASSESS: CPT | Performed by: INTERNAL MEDICINE

## 2021-01-08 PROCEDURE — G8482 FLU IMMUNIZE ORDER/ADMIN: HCPCS | Performed by: INTERNAL MEDICINE

## 2021-01-08 PROCEDURE — G8420 CALC BMI NORM PARAMETERS: HCPCS | Performed by: INTERNAL MEDICINE

## 2021-01-08 PROCEDURE — 1123F ACP DISCUSS/DSCN MKR DOCD: CPT | Performed by: INTERNAL MEDICINE

## 2021-01-08 PROCEDURE — G8427 DOCREV CUR MEDS BY ELIG CLIN: HCPCS | Performed by: INTERNAL MEDICINE

## 2021-01-08 RX ORDER — TRAZODONE HYDROCHLORIDE 100 MG/1
100 TABLET ORAL NIGHTLY PRN
Qty: 90 TABLET | Refills: 1 | Status: SHIPPED | OUTPATIENT
Start: 2021-01-08 | End: 2021-10-28

## 2021-01-08 RX ORDER — TIZANIDINE 2 MG/1
2 TABLET ORAL 3 TIMES DAILY PRN
Qty: 90 TABLET | Refills: 1 | Status: SHIPPED | OUTPATIENT
Start: 2021-01-08 | End: 2021-03-08

## 2021-01-08 RX ORDER — TRAMADOL HYDROCHLORIDE 50 MG/1
TABLET ORAL
Status: CANCELLED | OUTPATIENT
Start: 2021-01-08

## 2021-01-08 RX ORDER — POTASSIUM CHLORIDE 750 MG/1
10 TABLET, FILM COATED, EXTENDED RELEASE ORAL DAILY
Qty: 60 TABLET | Refills: 1 | Status: SHIPPED | OUTPATIENT
Start: 2021-01-08 | End: 2022-06-13

## 2021-01-08 RX ORDER — MULTIVIT WITH MINERALS/LUTEIN
500 TABLET ORAL DAILY
COMMUNITY
End: 2021-02-05 | Stop reason: SDUPTHER

## 2021-01-08 RX ORDER — GABAPENTIN 300 MG/1
CAPSULE ORAL
Qty: 270 CAPSULE | Refills: 1 | Status: SHIPPED | OUTPATIENT
Start: 2021-01-08 | End: 2021-10-28

## 2021-01-08 RX ORDER — TRAMADOL HYDROCHLORIDE 50 MG/1
TABLET ORAL
COMMUNITY
Start: 2020-10-27 | End: 2021-01-08

## 2021-01-08 RX ORDER — TRAMADOL HYDROCHLORIDE 50 MG/1
50 TABLET ORAL 3 TIMES DAILY PRN
Qty: 20 TABLET | Refills: 0 | Status: SHIPPED | OUTPATIENT
Start: 2021-01-08 | End: 2021-01-15

## 2021-01-08 RX ORDER — LEVOTHYROXINE SODIUM 0.05 MG/1
50 TABLET ORAL DAILY
Qty: 90 TABLET | Refills: 1 | Status: SHIPPED | OUTPATIENT
Start: 2021-01-08 | End: 2021-10-28

## 2021-01-08 ASSESSMENT — ENCOUNTER SYMPTOMS
NAUSEA: 0
COUGH: 0
SHORTNESS OF BREATH: 0
ABDOMINAL PAIN: 0
SORE THROAT: 0

## 2021-01-08 NOTE — PROGRESS NOTES
After obtaining consent, and per orders of Dr. Denys Arzola, injection of afluria given in Left deltoid by Aleks Rios. Patient instructed to remain in clinic for 20 minutes afterwards, and to report any adverse reaction to me immediately. Vaccine Information Sheet, \"Influenza - Inactivated\"  given to Sachin Shaikh, or parent/legal guardian of  Roxanne Martin and verbalized understanding. Patient responses:    Have you ever had a reaction to a flu vaccine? No  Are you able to eat eggs without adverse effects? Yes  Do you have any current illness? No  Have you ever had Guillian [place]grande Syndrome? No    Flu vaccine given per order. Please see immunization tab.

## 2021-01-08 NOTE — PROGRESS NOTES
HPI Notes    Name: Sri Hansen  : 1950         Chief Complaint:     Chief Complaint   Patient presents with    Leg Pain     Patient presents today with bilateral leg pain     Hypertension    Hypothyroidism    Insomnia    Back Pain       History of Present Illness:        Roxanne comes to office for evaluation of HTN  Hypothyroidism, Insomnia and chronic back pain. .    Having pain in her both legs and back of both calves. States had that pain before due to low potassium. Used to take potassium supplement, asking labs to check potassium and asking for prescription for potassium supplement    Stopped taking Amlodipine several months ago due to low BP. Also stopped taking her cholesterol pill due to hair loss. States unable to take NSAIDs for her chronic back pain due to chronic gastritis, history of hiatal hernia for which she underwent surgical intervention. She develops moderate to severe stomach pains when she takes NSAIDs. She is tried Tylenol and gabapentin but still having a lot of pain in the back preventing her from performing ADLs. Used to follow-up with  for steroid injections to the back, has not seen him since 2019. Has a history of insomnia. States the problem has been worsening since her  passed away last October. Sleeps to 3 hours per night, has frequent awakenings and difficulty falling asleep. Feels stressed and tired in the daytime. Does not take naps. Roxanne confirms that she is compliant on their thyroid medication. She denies an increase in fatigue, constipation, increased skin dryness, or increased lower extremity edema. The last TSH was 1.77 on 20    Hypertension  This is a chronic problem. The current episode started more than 1 year ago. The problem is unchanged. The problem is controlled. Pertinent negatives include no chest pain, headaches, palpitations or shortness of breath. There are no associated agents to hypertension.  Past treatments include ACE inhibitors and beta blockers. The current treatment provides significant improvement. There are no compliance problems. Hypertensive end-organ damage includes CAD/MI. There is no history of CVA. Past Medical History:     Past Medical History:   Diagnosis Date    Allergic rhinitis 2011    Anxiety 2011    Carpal tunnel syndrome 2011    Depression 2011    Dyslipidemia 2011    Fibromyalgia 2011    Hyperlipidemia     Hypertension 2011    SOB (shortness of breath)     Thyroid disease       Reviewed all health maintenance requirements and orderedappropriate tests  Health Maintenance Due   Topic Date Due    DEXA (modify frequency per FRAX score)  2007       Past Surgical History:     Past Surgical History:   Procedure Laterality Date    CARDIAC CATHETERIZATION Left 2017    Dr. Gentry Cruz @ [facility]--CAD, 50-60% ostial right CAD with an FFR of 0.94.  60% disease in the proximal LAD with an FFR of 0.92 with bridging of the mid LAD. Normal LV function at 60%. Mildly dilated ascending aorta -will do a CT scan to measure.  CARDIAC CATHETERIZATION Left 2020    Dr. Gentry Cruz @ [facility]--Medical therapy     SECTION      x 4    ELBOW SURGERY Right 1996    GALLBLADDER SURGERY  1975    HYSTERECTOMY      total hysterectomy    ROTATOR CUFF REPAIR      Right    SMALL INTESTINE SURGERY      twisted bowel         Medications:       Prior to Admission medications    Medication Sig Start Date End Date Taking?  Authorizing Provider   Ascorbic Acid (VITAMIN C) 250 MG tablet Take 500 mg by mouth daily   Yes Historical Provider, MD   traZODone (DESYREL) 100 MG tablet Take 1 tablet by mouth nightly as needed for Sleep 21  Yes Marcus Ramos MD   tiZANidine (ZANAFLEX) 2 MG tablet Take 1 tablet by mouth 3 times daily as needed (muscle spasms) 21  Yes Marcus Ramos MD   levothyroxine (SYNTHROID) 50 MCG tablet Take 1 tablet by mouth Daily 1/8/21  Yes Jordon Robertson MD   gabapentin (NEURONTIN) 300 MG capsule TAKE 1 CAPSULE THREE TIMES DAILY 1/8/21 7/9/21 Yes Jordon Robertson MD   traMADol (ULTRAM) 50 MG tablet Take 1 tablet by mouth 3 times daily as needed for Pain for up to 7 days. Intended supply: 5 days. Take lowest dose possible to manage pain 1/8/21 1/15/21 Yes Jordon Robertson MD   potassium chloride (KLOR-CON) 10 MEQ extended release tablet Take 1 tablet by mouth daily 1/8/21  Yes Jordon Robertson MD   lisinopril (PRINIVIL;ZESTRIL) 20 MG tablet TAKE 1 TABLET EVERY DAY 12/30/20  Yes Radha Lang MD   metoprolol tartrate (LOPRESSOR) 25 MG tablet TAKE 1/2 TABLET TWICE DAILY 2/24/20  Yes Radha Lang MD   fluticasone (FLONASE) 50 MCG/ACT nasal spray USE 2 SPRAYS NASALLY EVERY DAY 12/28/19  Yes Jordon Robertson MD   dicyclomine (BENTYL) 10 MG capsule  11/26/19  Yes Historical Provider, MD   vitamin B-6 (PYRIDOXINE) 100 MG tablet Take 100 mg by mouth daily   Yes Historical Provider, MD   Coenzyme Q-10 100 MG CAPS Take 1 capsule by mouth daily   Yes Historical Provider, MD   Calcium Polycarbophil (FIBER-CAPS PO) Take by mouth   Yes Historical Provider, MD   vitamin D (CHOLECALCIFEROL) 1000 UNIT TABS tablet Take 1,000 Units by mouth daily   Yes Historical Provider, MD   montelukast (SINGULAIR) 10 MG tablet Take 1 tablet by mouth daily  Patient not taking: Reported on 1/8/2021 9/24/20   Jordon Robertson MD   Blood Pressure Monitoring (BLOOD PRESSURE CUFF) MISC Use to monitor Blood pressure 1-2 times daily 3/4/19   Jordon Robertson MD   Simethicone 80 MG TABS Take 80 mg by mouth 2 times daily as needed (bloating)  Patient not taking: Reported on 1/8/2021 11/12/18   Jordon Robertson MD        Allergies:       Patient has no known allergies. Social History:     Tobacco: reports that she has never smoked. She has never used smokeless tobacco.  Alcohol:      reports no history of alcohol use.   Drug Use:  reports no history of drug use.    Family History:     Family History   Problem Relation Age of Onset    Stroke Mother     High Cholesterol Mother     Heart Attack Mother     Heart Attack Father     High Cholesterol Father     High Cholesterol Sister     High Cholesterol Brother        Review of Systems:         Review of Systems   Constitutional: Negative for chills and fever. HENT: Negative for congestion and sore throat. Respiratory: Negative for cough and shortness of breath. Cardiovascular: Negative for chest pain and palpitations. Gastrointestinal: Negative for abdominal pain and nausea. Genitourinary: Negative for dysuria. Skin: Negative for rash. Neurological: Negative for headaches. Psychiatric/Behavioral: The patient is not nervous/anxious. Physical Exam:     Vitals:  /73 (Site: Left Upper Arm, Position: Sitting, Cuff Size: Medium Adult)   Pulse 75   Temp 97.3 °F (36.3 °C)   Wt 135 lb 6.4 oz (61.4 kg)   SpO2 97%   BMI 23.99 kg/m²       Physical Exam  Vitals signs reviewed. Constitutional:       General: She is not in acute distress. Appearance: She is well-developed. HENT:      Head: Normocephalic and atraumatic. Right Ear: Tympanic membrane, ear canal and external ear normal.      Left Ear: Tympanic membrane, ear canal and external ear normal.      Nose: Nose normal.      Mouth/Throat:      Pharynx: Oropharynx is clear. Eyes:      General: No scleral icterus. Right eye: No discharge. Left eye: No discharge. Conjunctiva/sclera: Conjunctivae normal.   Neck:      Thyroid: No thyromegaly. Cardiovascular:      Rate and Rhythm: Normal rate and regular rhythm. Heart sounds: Normal heart sounds. No murmur. Pulmonary:      Effort: Pulmonary effort is normal.      Breath sounds: Normal breath sounds. No wheezing or rales. Abdominal:      General: Bowel sounds are normal. There is no distension. Palpations: Abdomen is soft. There is no mass. Tenderness: There is no abdominal tenderness. Musculoskeletal: Normal range of motion. General: Tenderness (lumbosacral area ) present. Right lower leg: No edema. Left lower leg: No edema. Lymphadenopathy:      Cervical: No cervical adenopathy. Skin:     General: Skin is warm and dry. Findings: No rash. Neurological:      General: No focal deficit present. Mental Status: She is alert and oriented to person, place, and time. Mental status is at baseline. Psychiatric:         Mood and Affect: Mood normal.         Behavior: Behavior normal.         Thought Content: Thought content normal.         Judgment: Judgment normal.               Data:     Lab Results   Component Value Date     02/24/2020    K 3.5 02/24/2020     02/24/2020    CO2 25 02/24/2020    BUN 10 02/24/2020    CREATININE 0.64 02/24/2020    GLUCOSE 127 02/24/2020    GLUCOSE 99 01/19/2012    PROT 6.8 02/24/2020    PROT 6.5 01/24/2020    LABALBU 4.1 02/24/2020    LABALBU 4.4 01/19/2012    BILITOT 0.44 02/24/2020    ALKPHOS 118 02/24/2020    AST 37 02/24/2020    ALT 49 02/24/2020     Lab Results   Component Value Date    WBC 9.25 01/24/2020    RBC 5.00 01/24/2020    RBC 4.86 01/19/2012    HGB 14.6 01/24/2020    HCT 43.4 01/24/2020    MCV 86.8 01/24/2020    MCH 29.2 01/24/2020    MCHC 33.6 01/24/2020    RDW 14.3 01/24/2020     01/24/2020     01/19/2012    MPV 9.0 01/24/2020     Lab Results   Component Value Date    TSH 1.77 02/24/2020     Lab Results   Component Value Date    CHOL 250 01/16/2017    HDL 63 01/16/2017          Assessment & Plan        Diagnosis Orders   1. Essential hypertension   Blood pressure is stable, will continue on current medications, check BMP Basic Metabolic Panel   2. Chronic midline low back pain with right-sided sciatica   Continue on Zanaflex, OTC Tylenol, gabapentin. She is unable to take NSAIDs due to epigastric pain related to GERD/hiatal hernia.   In addition, has a history of CAD. Prescribed tramadol and advised to take sparingly. Also discussed need for further work-up for back pain. She is to come back in 4 weeks for reevaluation tiZANidine (ZANAFLEX) 2 MG tablet    traMADol (ULTRAM) 50 MG tablet   3. Lumbar and sacral osteoarthritis  tiZANidine (ZANAFLEX) 2 MG tablet   4. Acquired hypothyroidism   Continue on current dose of Synthroid, check TSH levothyroxine (SYNTHROID) 50 MCG tablet    TSH with Reflex   5. Insomnia, unspecified type   Prescribed trazodone. Follow-up if no improvement    6. Fibromyalgia syndrome   On gabapentin gabapentin (NEURONTIN) 300 MG capsule   7. Need for influenza vaccination   Received influenza vaccine INFLUENZA, QUADV, 3 YRS AND OLDER, IM PF, PREFILL SYR OR SDV, 0.5ML (AFLURIA QUADV, PF)                   Completed Refills   Requested Prescriptions     Signed Prescriptions Disp Refills    traZODone (DESYREL) 100 MG tablet 90 tablet 1     Sig: Take 1 tablet by mouth nightly as needed for Sleep    tiZANidine (ZANAFLEX) 2 MG tablet 90 tablet 1     Sig: Take 1 tablet by mouth 3 times daily as needed (muscle spasms)    levothyroxine (SYNTHROID) 50 MCG tablet 90 tablet 1     Sig: Take 1 tablet by mouth Daily    gabapentin (NEURONTIN) 300 MG capsule 270 capsule 1     Sig: TAKE 1 CAPSULE THREE TIMES DAILY    traMADol (ULTRAM) 50 MG tablet 20 tablet 0     Sig: Take 1 tablet by mouth 3 times daily as needed for Pain for up to 7 days. Intended supply: 5 days. Take lowest dose possible to manage pain    potassium chloride (KLOR-CON) 10 MEQ extended release tablet 60 tablet 1     Sig: Take 1 tablet by mouth daily     Return in about 4 weeks (around 2/5/2021) for back pain.      Orders Placed This Encounter   Medications    traZODone (DESYREL) 100 MG tablet     Sig: Take 1 tablet by mouth nightly as needed for Sleep     Dispense:  90 tablet     Refill:  1    tiZANidine (ZANAFLEX) 2 MG tablet     Sig: Take 1 tablet by mouth 3 times daily as needed (muscle spasms)     Dispense:  90 tablet     Refill:  1    levothyroxine (SYNTHROID) 50 MCG tablet     Sig: Take 1 tablet by mouth Daily     Dispense:  90 tablet     Refill:  1    gabapentin (NEURONTIN) 300 MG capsule     Sig: TAKE 1 CAPSULE THREE TIMES DAILY     Dispense:  270 capsule     Refill:  1    traMADol (ULTRAM) 50 MG tablet     Sig: Take 1 tablet by mouth 3 times daily as needed for Pain for up to 7 days. Intended supply: 5 days. Take lowest dose possible to manage pain     Dispense:  20 tablet     Refill:  0     Reduce doses taken as pain becomes manageable    potassium chloride (KLOR-CON) 10 MEQ extended release tablet     Sig: Take 1 tablet by mouth daily     Dispense:  60 tablet     Refill:  1     Orders Placed This Encounter   Procedures    INFLUENZA, QUADV, 3 YRS AND OLDER, IM PF, PREFILL SYR OR SDV, 0.5ML (AFLURIA QUADV, PF)    Basic Metabolic Panel     Standing Status:   Future     Standing Expiration Date:   1/8/2022    TSH with Reflex     Standing Status:   Future     Standing Expiration Date:   1/8/2022         Patient Instructions   SURVEY:    You may be receiving a survey from [employer] regarding your visit today. Please complete the survey to enable us to provide the highest quality of care to you and your family. If you cannot score us a very good on any question, please call the office to discuss how we could of made your experience a very good one. Thank you. You may be receiving a survey from [employer] regarding your visit today. You may get this in the mail, through your MyChart or in your email. Please complete the survey to enable us to provide the highest quality of care to you and your family. If you cannot score us as very good ( 5 Stars) on any question, please feel free to call the office to discuss how we could have made your experience exceptional.     Thank You!       MD Joana Fernandes, TETO Gasca, MARCUS        Electronically signed by Andrade Carey MD on 1/8/2021 at 12:54 PM           Completed Refills      Requested Prescriptions     Signed Prescriptions Disp Refills    traZODone (DESYREL) 100 MG tablet 90 tablet 1     Sig: Take 1 tablet by mouth nightly as needed for Sleep    tiZANidine (ZANAFLEX) 2 MG tablet 90 tablet 1     Sig: Take 1 tablet by mouth 3 times daily as needed (muscle spasms)    levothyroxine (SYNTHROID) 50 MCG tablet 90 tablet 1     Sig: Take 1 tablet by mouth Daily    gabapentin (NEURONTIN) 300 MG capsule 270 capsule 1     Sig: TAKE 1 CAPSULE THREE TIMES DAILY    traMADol (ULTRAM) 50 MG tablet 20 tablet 0     Sig: Take 1 tablet by mouth 3 times daily as needed for Pain for up to 7 days. Intended supply: 5 days.  Take lowest dose possible to manage pain    potassium chloride (KLOR-CON) 10 MEQ extended release tablet 60 tablet 1     Sig: Take 1 tablet by mouth daily

## 2021-01-18 LAB
BUN BLDV-MCNC: 9 MG/DL
CALCIUM SERPL-MCNC: 9.4 MG/DL
CHLORIDE BLD-SCNC: 102 MMOL/L
CO2: 26 MMOL/L
CREAT SERPL-MCNC: 0.67 MG/DL
GFR CALCULATED: 91
GLUCOSE BLD-MCNC: 83 MG/DL
POTASSIUM SERPL-SCNC: 4.4 MMOL/L
SODIUM BLD-SCNC: 142 MMOL/L
TSH SERPL DL<=0.05 MIU/L-ACNC: 1.59 UIU/ML

## 2021-01-19 ENCOUNTER — TELEPHONE (OUTPATIENT)
Dept: FAMILY MEDICINE CLINIC | Age: 71
End: 2021-01-19

## 2021-01-19 DIAGNOSIS — I10 ESSENTIAL HYPERTENSION: ICD-10-CM

## 2021-01-19 DIAGNOSIS — E03.9 ACQUIRED HYPOTHYROIDISM: ICD-10-CM

## 2021-02-01 DIAGNOSIS — T78.40XD ALLERGY, SUBSEQUENT ENCOUNTER: ICD-10-CM

## 2021-02-02 RX ORDER — MONTELUKAST SODIUM 10 MG/1
TABLET ORAL
Qty: 30 TABLET | Refills: 0 | Status: SHIPPED | OUTPATIENT
Start: 2021-02-02 | End: 2021-05-18 | Stop reason: SDUPTHER

## 2021-02-05 ENCOUNTER — OFFICE VISIT (OUTPATIENT)
Dept: FAMILY MEDICINE CLINIC | Age: 71
End: 2021-02-05
Payer: MEDICARE

## 2021-02-05 VITALS
HEART RATE: 67 BPM | DIASTOLIC BLOOD PRESSURE: 82 MMHG | OXYGEN SATURATION: 96 % | SYSTOLIC BLOOD PRESSURE: 112 MMHG | BODY MASS INDEX: 24.52 KG/M2 | TEMPERATURE: 97.2 F | WEIGHT: 138.4 LBS

## 2021-02-05 DIAGNOSIS — M25.511 CHRONIC RIGHT SHOULDER PAIN: ICD-10-CM

## 2021-02-05 DIAGNOSIS — M47.817 LUMBAR AND SACRAL OSTEOARTHRITIS: ICD-10-CM

## 2021-02-05 DIAGNOSIS — G89.29 CHRONIC RIGHT-SIDED LOW BACK PAIN WITH RIGHT-SIDED SCIATICA: Primary | ICD-10-CM

## 2021-02-05 DIAGNOSIS — F33.9 RECURRENT MAJOR DEPRESSIVE DISORDER, REMISSION STATUS UNSPECIFIED (HCC): ICD-10-CM

## 2021-02-05 DIAGNOSIS — G89.29 CHRONIC RIGHT SHOULDER PAIN: ICD-10-CM

## 2021-02-05 DIAGNOSIS — I20.9 ANGINAL SYNDROME (HCC): ICD-10-CM

## 2021-02-05 DIAGNOSIS — M54.41 CHRONIC RIGHT-SIDED LOW BACK PAIN WITH RIGHT-SIDED SCIATICA: Primary | ICD-10-CM

## 2021-02-05 PROCEDURE — G8420 CALC BMI NORM PARAMETERS: HCPCS | Performed by: INTERNAL MEDICINE

## 2021-02-05 PROCEDURE — G8482 FLU IMMUNIZE ORDER/ADMIN: HCPCS | Performed by: INTERNAL MEDICINE

## 2021-02-05 PROCEDURE — G8427 DOCREV CUR MEDS BY ELIG CLIN: HCPCS | Performed by: INTERNAL MEDICINE

## 2021-02-05 PROCEDURE — 99213 OFFICE O/P EST LOW 20 MIN: CPT | Performed by: INTERNAL MEDICINE

## 2021-02-05 PROCEDURE — 1123F ACP DISCUSS/DSCN MKR DOCD: CPT | Performed by: INTERNAL MEDICINE

## 2021-02-05 PROCEDURE — G8400 PT W/DXA NO RESULTS DOC: HCPCS | Performed by: INTERNAL MEDICINE

## 2021-02-05 PROCEDURE — 4040F PNEUMOC VAC/ADMIN/RCVD: CPT | Performed by: INTERNAL MEDICINE

## 2021-02-05 PROCEDURE — 1036F TOBACCO NON-USER: CPT | Performed by: INTERNAL MEDICINE

## 2021-02-05 PROCEDURE — 3017F COLORECTAL CA SCREEN DOC REV: CPT | Performed by: INTERNAL MEDICINE

## 2021-02-05 PROCEDURE — 1090F PRES/ABSN URINE INCON ASSESS: CPT | Performed by: INTERNAL MEDICINE

## 2021-02-05 RX ORDER — TRAMADOL HYDROCHLORIDE 50 MG/1
50 TABLET ORAL 2 TIMES DAILY PRN
Qty: 60 TABLET | Refills: 0 | Status: SHIPPED | OUTPATIENT
Start: 2021-02-05 | End: 2021-04-13 | Stop reason: SDUPTHER

## 2021-02-05 RX ORDER — MULTIVITAMIN WITH IRON
100 TABLET ORAL DAILY
Qty: 30 TABLET | Refills: 5 | Status: SHIPPED | OUTPATIENT
Start: 2021-02-05

## 2021-02-05 RX ORDER — CALCIUM POLYCARBOPHIL 625 MG 625 MG/1
625 TABLET ORAL DAILY
Qty: 30 TABLET | Refills: 4 | COMMUNITY
Start: 2021-02-05

## 2021-02-05 RX ORDER — CHOLECALCIFEROL (VITAMIN D3) 125 MCG
1 CAPSULE ORAL DAILY
Qty: 30 CAPSULE | Refills: 5 | Status: SHIPPED | OUTPATIENT
Start: 2021-02-05 | End: 2021-10-28

## 2021-02-05 RX ORDER — MULTIVIT WITH MINERALS/LUTEIN
500 TABLET ORAL DAILY
Qty: 30 TABLET | Refills: 5 | Status: SHIPPED | OUTPATIENT
Start: 2021-02-05

## 2021-02-05 RX ORDER — AMLODIPINE BESYLATE 2.5 MG/1
TABLET ORAL
COMMUNITY
Start: 2021-02-02 | End: 2022-06-13

## 2021-02-05 RX ORDER — MELATONIN
1000 DAILY
Qty: 90 TABLET | Refills: 1 | Status: SHIPPED | OUTPATIENT
Start: 2021-02-05 | End: 2021-07-26

## 2021-02-05 ASSESSMENT — ENCOUNTER SYMPTOMS
ABDOMINAL PAIN: 0
COUGH: 0
SHORTNESS OF BREATH: 0
SORE THROAT: 0
NAUSEA: 0
BACK PAIN: 1

## 2021-02-05 NOTE — PATIENT INSTRUCTIONS
SURVEY:    You may be receiving a survey from [email or web address] regarding your visit today. Please complete the survey to enable us to provide the highest quality of care to you and your family. If you cannot score us a very good on any question, please call the office to discuss how we could of made your experience a very good one. Thank you. You may be receiving a survey from [email or web address] regarding your visit today. You may get this in the mail, through your MyChart or in your email. Please complete the survey to enable us to provide the highest quality of care to you and your family. If you cannot score us as very good ( 5 Stars) on any question, please feel free to call the office to discuss how we could have made your experience exceptional.     Thank You!       MD Wade Garcia RMA Kirstie Chapman, PCA
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
## 2021-02-10 DIAGNOSIS — G89.29 CHRONIC RIGHT SHOULDER PAIN: ICD-10-CM

## 2021-02-10 DIAGNOSIS — M25.511 CHRONIC RIGHT SHOULDER PAIN: ICD-10-CM

## 2021-03-07 DIAGNOSIS — G89.29 CHRONIC MIDLINE LOW BACK PAIN WITH RIGHT-SIDED SCIATICA: ICD-10-CM

## 2021-03-07 DIAGNOSIS — M54.41 CHRONIC MIDLINE LOW BACK PAIN WITH RIGHT-SIDED SCIATICA: ICD-10-CM

## 2021-03-07 DIAGNOSIS — M47.817 LUMBAR AND SACRAL OSTEOARTHRITIS: ICD-10-CM

## 2021-03-08 RX ORDER — TIZANIDINE 2 MG/1
TABLET ORAL
Qty: 90 TABLET | Refills: 0 | Status: SHIPPED | OUTPATIENT
Start: 2021-03-08 | End: 2021-05-18 | Stop reason: SDUPTHER

## 2021-04-12 NOTE — PATIENT INSTRUCTIONS
SURVEY:    You may be receiving a survey from [employer] regarding your visit today. Please complete the survey to enable us to provide the highest quality of care to you and your family. If you cannot score us a very good on any question, please call the office to discuss how we could of made your experience a very good one. Thank you. You may be receiving a survey from [employer] regarding your visit today. You may get this in the mail, through your MyChart or in your email. Please complete the survey to enable us to provide the highest quality of care to you and your family. If you cannot score us as very good ( 5 Stars) on any question, please feel free to call the office to discuss how we could have made your experience exceptional.     Thank You!       MD Klaudia Yoder RMA Anibal Kuba, PCA

## 2021-04-13 ENCOUNTER — OFFICE VISIT (OUTPATIENT)
Dept: FAMILY MEDICINE CLINIC | Age: 71
End: 2021-04-13
Payer: MEDICARE

## 2021-04-13 VITALS
BODY MASS INDEX: 25.33 KG/M2 | SYSTOLIC BLOOD PRESSURE: 140 MMHG | HEART RATE: 60 BPM | WEIGHT: 143 LBS | OXYGEN SATURATION: 97 % | TEMPERATURE: 97.8 F | DIASTOLIC BLOOD PRESSURE: 96 MMHG

## 2021-04-13 DIAGNOSIS — G89.29 CHRONIC RIGHT-SIDED LOW BACK PAIN WITH RIGHT-SIDED SCIATICA: Primary | ICD-10-CM

## 2021-04-13 DIAGNOSIS — N28.89 RENAL MASS: ICD-10-CM

## 2021-04-13 DIAGNOSIS — K21.9 GASTROESOPHAGEAL REFLUX DISEASE WITHOUT ESOPHAGITIS: ICD-10-CM

## 2021-04-13 DIAGNOSIS — I10 ESSENTIAL HYPERTENSION: ICD-10-CM

## 2021-04-13 DIAGNOSIS — M47.817 LUMBAR AND SACRAL OSTEOARTHRITIS: ICD-10-CM

## 2021-04-13 DIAGNOSIS — M54.41 CHRONIC RIGHT-SIDED LOW BACK PAIN WITH RIGHT-SIDED SCIATICA: Primary | ICD-10-CM

## 2021-04-13 DIAGNOSIS — G89.29 CHRONIC RIGHT SHOULDER PAIN: ICD-10-CM

## 2021-04-13 DIAGNOSIS — M25.511 CHRONIC RIGHT SHOULDER PAIN: ICD-10-CM

## 2021-04-13 PROCEDURE — 99214 OFFICE O/P EST MOD 30 MIN: CPT | Performed by: INTERNAL MEDICINE

## 2021-04-13 PROCEDURE — 3017F COLORECTAL CA SCREEN DOC REV: CPT | Performed by: INTERNAL MEDICINE

## 2021-04-13 PROCEDURE — 4040F PNEUMOC VAC/ADMIN/RCVD: CPT | Performed by: INTERNAL MEDICINE

## 2021-04-13 PROCEDURE — G8427 DOCREV CUR MEDS BY ELIG CLIN: HCPCS | Performed by: INTERNAL MEDICINE

## 2021-04-13 PROCEDURE — 1090F PRES/ABSN URINE INCON ASSESS: CPT | Performed by: INTERNAL MEDICINE

## 2021-04-13 PROCEDURE — G8419 CALC BMI OUT NRM PARAM NOF/U: HCPCS | Performed by: INTERNAL MEDICINE

## 2021-04-13 PROCEDURE — G8400 PT W/DXA NO RESULTS DOC: HCPCS | Performed by: INTERNAL MEDICINE

## 2021-04-13 PROCEDURE — 1123F ACP DISCUSS/DSCN MKR DOCD: CPT | Performed by: INTERNAL MEDICINE

## 2021-04-13 PROCEDURE — 1036F TOBACCO NON-USER: CPT | Performed by: INTERNAL MEDICINE

## 2021-04-13 RX ORDER — OMEPRAZOLE 20 MG/1
20 CAPSULE, DELAYED RELEASE ORAL
Qty: 60 CAPSULE | Refills: 1 | Status: SHIPPED | OUTPATIENT
Start: 2021-04-13 | End: 2022-03-01 | Stop reason: SDUPTHER

## 2021-04-13 RX ORDER — TRAMADOL HYDROCHLORIDE 50 MG/1
50 TABLET ORAL 3 TIMES DAILY PRN
Qty: 90 TABLET | Refills: 0 | Status: SHIPPED | OUTPATIENT
Start: 2021-04-13 | End: 2021-05-18 | Stop reason: SDUPTHER

## 2021-04-13 RX ORDER — MELOXICAM 15 MG/1
TABLET ORAL
COMMUNITY
Start: 2021-02-26 | End: 2021-05-18 | Stop reason: ALTCHOICE

## 2021-04-13 ASSESSMENT — PATIENT HEALTH QUESTIONNAIRE - PHQ9
SUM OF ALL RESPONSES TO PHQ QUESTIONS 1-9: 2
1. LITTLE INTEREST OR PLEASURE IN DOING THINGS: 1
2. FEELING DOWN, DEPRESSED OR HOPELESS: 1
SUM OF ALL RESPONSES TO PHQ9 QUESTIONS 1 & 2: 2
SUM OF ALL RESPONSES TO PHQ QUESTIONS 1-9: 2

## 2021-04-13 ASSESSMENT — ENCOUNTER SYMPTOMS
BLURRED VISION: 0
ABDOMINAL PAIN: 1
BOWEL INCONTINENCE: 0
HEARTBURN: 1
COUGH: 0
SHORTNESS OF BREATH: 0
SORE THROAT: 0
BACK PAIN: 1
NAUSEA: 1
BELCHING: 0

## 2021-04-13 NOTE — PROGRESS NOTES
foraminal stenosis. L3-L4: Alec Leal is a diffuse disc bulge with mild thickening of the ligamentum flavum.  There is no significant spinal canal stenosis.  There is no significant right neural foraminal stenosis.  There is minimal left neural foraminal stenosis. L4-L5: Alec Leal is a diffuse disc bulge with thickening of the ligamentum flavum causing minimal spinal canal stenosis. Jarodette Elvis is very mild bilateral neural foraminal stenosis. L5-S1: Alec Leal is a diffuse disc bulge without significant spinal canal stenosis.  There is minimal neural foraminal stenosis. There is a cystic mass in the right kidney measuring at least 2.8 cm.  It is incompletely evaluated on this study.  A smaller exophytic mass arising from the lower pole of the left kidney is also incompletely evaluated. MRI of the right shoulder done on 3/16/2021 revealed:    1. Marked supraspinatus tendinosis with a 7 mm wide partial-thickness undersurface tear of the far anterior distal portion supraspinatus tendon, without retraction or muscular atrophy.  This tear is new compared to the prior exam from 04/06/2012. 2. Mild-moderate infraspinatus tendinosis and subscapularis tendinosis, without tear.  The teres minor tendon is normal.  3. Mild-moderate tendinosis of the long head biceps tendon has increased from the prior exam, without evidence of tear. 4. Moderate-size glenohumeral joint effusion, new compared to the prior MRI. 5. Moderate-large volume of fluid distending the long head biceps tendon sheath could be related to the joint effusion or could reflect superimposed tenosynovitis of the long head biceps tendon, increased from the prior exam.  6. Degeneration and fraying of the posterior and superior glenoid labrum.  There may be a small nondisplaced partial-thickness tear of the anteromedial aspect of the anterior glenoid labrum.   7. Moderate osteoarthritis of the acromioclavicular joint with moderate bony and soft tissue hypertrophy current treatment provides moderate improvement. There are no compliance problems. Past Medical History:     Past Medical History:   Diagnosis Date    Allergic rhinitis 2011    Anxiety 2011    Carpal tunnel syndrome 2011    Depression 2011    Dyslipidemia 2011    Fibromyalgia 2011    Hyperlipidemia     Hypertension 2011    SOB (shortness of breath)     Thyroid disease       Reviewed all health maintenance requirements and orderedappropriate tests  Health Maintenance Due   Topic Date Due    COVID-19 Vaccine (1) Never done    DEXA (modify frequency per FRAX score)  Never done       Past Surgical History:     Past Surgical History:   Procedure Laterality Date    CARDIAC CATHETERIZATION Left 2017    Dr. Sohail Santos @ [facility]--CAD, 50-60% ostial right CAD with an FFR of 0.94.  60% disease in the proximal LAD with an FFR of 0.92 with bridging of the mid LAD. Normal LV function at 60%. Mildly dilated ascending aorta -will do a CT scan to measure.  CARDIAC CATHETERIZATION Left 2020    Dr. Sohail Santos @ [facility]--Medical therapy     SECTION      x 4    ELBOW SURGERY Right 1996    GALLBLADDER SURGERY      HYSTERECTOMY      total hysterectomy    ROTATOR CUFF REPAIR      Right    SMALL INTESTINE SURGERY      twisted bowel         Medications:       Prior to Admission medications    Medication Sig Start Date End Date Taking? Authorizing Provider   PREDNISONE, MAY, PO Take by mouth   Yes Historical Provider, MD   omeprazole (PRILOSEC) 20 MG delayed release capsule Take 1 capsule by mouth 2 times daily (before meals) 21  Yes Mary Dye MD   traMADol (ULTRAM) 50 MG tablet Take 1 tablet by mouth 3 times daily as needed for Pain for up to 30 days.  21 Yes Mary Dye MD   tiZANidine (ZANAFLEX) 2 MG tablet TAKE 1 TABLET BY MOUTH THREE TIMES DAILY AS NEEDED FOR  MUSCLE  SPASMS 3/8/21  Yes Mary Dye MD   amLODIPine (NORVASC) 2.5 MG tablet  2/2/21  Yes Historical Provider, MD   Ascorbic Acid (VITAMIN C) 250 MG tablet Take 2 tablets by mouth daily 2/5/21  Yes Carmela Sanchez MD   coenzyme Q-10 100 MG capsule Take 1 capsule by mouth daily 2/5/21  Yes Carmela Sanchez MD   vitamin B-6 (PYRIDOXINE) 100 MG tablet Take 1 tablet by mouth daily 2/5/21  Yes Carmela Sanchez MD   vitamin D3 (CHOLECALCIFEROL) 25 MCG (1000 UT) TABS tablet Take 1 tablet by mouth daily 2/5/21  Yes Carmela Sanchez MD   polycarbophil (FIBER-CAPS) 625 MG tablet Take 1 tablet by mouth daily 2/5/21  Yes Carmela Sanchez MD   montelukast (SINGULAIR) 10 MG tablet Take 1 tablet by mouth once daily 2/2/21  Yes Carmela Sanchez MD   traZODone (DESYREL) 100 MG tablet Take 1 tablet by mouth nightly as needed for Sleep 1/8/21  Yes Carmela Sanchez MD   levothyroxine (SYNTHROID) 50 MCG tablet Take 1 tablet by mouth Daily 1/8/21  Yes Carmela Sanchez MD   gabapentin (NEURONTIN) 300 MG capsule TAKE 1 CAPSULE THREE TIMES DAILY 1/8/21 7/9/21 Yes Carmela Sanchez MD   potassium chloride (KLOR-CON) 10 MEQ extended release tablet Take 1 tablet by mouth daily 1/8/21  Yes Carmela Sanchez MD   lisinopril (PRINIVIL;ZESTRIL) 20 MG tablet TAKE 1 TABLET EVERY DAY 12/30/20  Yes Price Bauer MD   metoprolol tartrate (LOPRESSOR) 25 MG tablet TAKE 1/2 TABLET TWICE DAILY 2/24/20  Yes Price Bauer MD   fluticasone (FLONASE) 50 MCG/ACT nasal spray USE 2 SPRAYS NASALLY EVERY DAY 12/28/19  Yes Carmela Sanchez MD   dicyclomine (BENTYL) 10 MG capsule  11/26/19  Yes Historical Provider, MD   Blood Pressure Monitoring (BLOOD PRESSURE CUFF) MISC Use to monitor Blood pressure 1-2 times daily 3/4/19  Yes Carmela Sanchez MD   meloxicam (MOBIC) 15 MG tablet TAKE 1 TABLET BY MOUTH ONCE DAILY FOR 30 DAYS 2/26/21   Historical Provider, MD   Simethicone 80 MG TABS Take 80 mg by mouth 2 times daily as needed (bloating)  Patient not taking: Reported on 1/8/2021 11/12/18   Carmela Sanchez MD Conjunctiva/sclera: Conjunctivae normal.   Neck:      Thyroid: No thyromegaly. Cardiovascular:      Rate and Rhythm: Normal rate and regular rhythm. Heart sounds: Normal heart sounds. No murmur. Pulmonary:      Effort: Pulmonary effort is normal.      Breath sounds: Normal breath sounds. No wheezing or rales. Abdominal:      General: Bowel sounds are normal. There is no distension. Palpations: Abdomen is soft. There is no mass. Tenderness: There is no abdominal tenderness. Musculoskeletal: Normal range of motion. General: Tenderness (lumbosacral area tender) present. Right lower leg: No edema. Left lower leg: No edema. Lymphadenopathy:      Cervical: No cervical adenopathy. Skin:     General: Skin is warm and dry. Coloration: Skin is not pale. Findings: No rash. Neurological:      General: No focal deficit present. Mental Status: She is alert and oriented to person, place, and time. Cranial Nerves: No cranial nerve deficit. Sensory: No sensory deficit. Motor: No weakness.       Coordination: Coordination normal.      Gait: Gait normal.   Psychiatric:         Mood and Affect: Mood normal.         Behavior: Behavior normal.         Judgment: Judgment normal.               Data:     Lab Results   Component Value Date     01/18/2021    K 4.4 01/18/2021     01/18/2021    CO2 26 01/18/2021    BUN 9 01/18/2021    CREATININE 0.67 01/18/2021    GLUCOSE 83 01/18/2021    GLUCOSE 99 01/19/2012    PROT 6.8 02/24/2020    PROT 6.5 01/24/2020    LABALBU 4.1 02/24/2020    LABALBU 4.4 01/19/2012    BILITOT 0.44 02/24/2020    ALKPHOS 118 02/24/2020    AST 37 02/24/2020    ALT 49 02/24/2020     Lab Results   Component Value Date    WBC 9.25 01/24/2020    RBC 5.00 01/24/2020    RBC 4.86 01/19/2012    HGB 14.6 01/24/2020    HCT 43.4 01/24/2020    MCV 86.8 01/24/2020    MCH 29.2 01/24/2020    MCHC 33.6 01/24/2020    RDW 14.3 01/24/2020     01/24/2020     01/19/2012    MPV 9.0 01/24/2020     Lab Results   Component Value Date    TSH 1.590 01/18/2021     Lab Results   Component Value Date    CHOL 250 01/16/2017    HDL 63 01/16/2017          Assessment & Plan        Diagnosis Orders   1. Chronic right-sided low back pain with right-sided sciatica   MRI report reviewed. She is recommended PT , follow up with DR. Emerson. Prescribed Tramadol for pain control. PDMP reviewed. Follow up in 4 weeks  traMADol (ULTRAM) 50 MG tablet   2. Lumbar and sacral osteoarthritis  traMADol (ULTRAM) 50 MG tablet   3. Renal mass   Incidental finding on MRI lumbar spine 2.8 cm R kidney cystic mass, a smaller exophytic left kidney mass. Will check CT renal for further evaluation  CT KIDNEY WO CONTRAST   4. Gastroesophageal reflux disease without esophagitis   Prescribed Omeprazole 20 mg bid due to worsening GERD from taking Prednisone  omeprazole (PRILOSEC) 20 MG delayed release capsule   5. Essential hypertension   BP elevated. Most likely due to Prednisone. Advised to increase Lisinopril from daily to bid. 6. Chronic right shoulder pain   MRI shoulder reviewed. She is to follow up with . traMADol (ULTRAM) 50 MG tablet                   Completed Refills   Requested Prescriptions     Signed Prescriptions Disp Refills    omeprazole (PRILOSEC) 20 MG delayed release capsule 60 capsule 1     Sig: Take 1 capsule by mouth 2 times daily (before meals)    traMADol (ULTRAM) 50 MG tablet 90 tablet 0     Sig: Take 1 tablet by mouth 3 times daily as needed for Pain for up to 30 days. Return in about 4 weeks (around 5/11/2021) for back pain.      Orders Placed This Encounter   Medications    omeprazole (PRILOSEC) 20 MG delayed release capsule     Sig: Take 1 capsule by mouth 2 times daily (before meals)     Dispense:  60 capsule     Refill:  1    traMADol (ULTRAM) 50 MG tablet     Sig: Take 1 tablet by mouth 3 times daily as needed for Pain for up to 30 days.     Dispense:  90 tablet     Refill:  0     Reduce doses taken as pain becomes manageable     Orders Placed This Encounter   Procedures    CT KIDNEY WO CONTRAST     Standing Status:   Future     Standing Expiration Date:   4/13/2022         Patient Instructions   SURVEY:    You may be receiving a survey from [employer] regarding your visit today. Please complete the survey to enable us to provide the highest quality of care to you and your family. If you cannot score us a very good on any question, please call the office to discuss how we could of made your experience a very good one. Thank you. You may be receiving a survey from [employer] regarding your visit today. You may get this in the mail, through your MyChart or in your email. Please complete the survey to enable us to provide the highest quality of care to you and your family. If you cannot score us as very good ( 5 Stars) on any question, please feel free to call the office to discuss how we could have made your experience exceptional.     Thank You! Marietta Myers MD  [facility], [address] [place] [address]        Electronically signed by Marietta Myers MD on 4/13/2021 at 8:22 PM           Completed Refills      Requested Prescriptions     Signed Prescriptions Disp Refills    omeprazole (PRILOSEC) 20 MG delayed release capsule 60 capsule 1     Sig: Take 1 capsule by mouth 2 times daily (before meals)    traMADol (ULTRAM) 50 MG tablet 90 tablet 0     Sig: Take 1 tablet by mouth 3 times daily as needed for Pain for up to 30 days.

## 2021-05-10 DIAGNOSIS — N28.89 KIDNEY MASS: Primary | ICD-10-CM

## 2021-05-13 ENCOUNTER — HOSPITAL ENCOUNTER (OUTPATIENT)
Dept: CT IMAGING | Age: 71
Discharge: HOME | End: 2021-05-15
Payer: MEDICARE

## 2021-05-13 ENCOUNTER — HOSPITAL ENCOUNTER (OUTPATIENT)
Age: 71
Discharge: HOME | End: 2021-05-13
Payer: MEDICARE

## 2021-05-13 DIAGNOSIS — N28.89 KIDNEY MASS: ICD-10-CM

## 2021-05-13 LAB
ANION GAP SERPL CALCULATED.3IONS-SCNC: 7 MMOL/L (ref 9–17)
BUN BLDV-MCNC: 7 MG/DL (ref 8–23)
BUN/CREAT BLD: 13 (ref 9–20)
CALCIUM SERPL-MCNC: 9.4 MG/DL (ref 8.6–10.4)
CHLORIDE BLD-SCNC: 103 MMOL/L (ref 98–107)
CO2: 31 MMOL/L (ref 20–31)
CREAT SERPL-MCNC: 0.54 MG/DL (ref 0.5–0.9)
GFR AFRICAN AMERICAN: >60 ML/MIN
GFR NON-AFRICAN AMERICAN: >60 ML/MIN
GFR SERPL CREATININE-BSD FRML MDRD: ABNORMAL ML/MIN/{1.73_M2}
GFR SERPL CREATININE-BSD FRML MDRD: ABNORMAL ML/MIN/{1.73_M2}
GLUCOSE BLD-MCNC: 100 MG/DL (ref 70–99)
POTASSIUM SERPL-SCNC: 3.7 MMOL/L (ref 3.7–5.3)
SODIUM BLD-SCNC: 141 MMOL/L (ref 135–144)

## 2021-05-13 PROCEDURE — 74170 CT ABD WO CNTRST FLWD CNTRST: CPT

## 2021-05-13 PROCEDURE — 36415 COLL VENOUS BLD VENIPUNCTURE: CPT

## 2021-05-13 PROCEDURE — 6360000004 HC RX CONTRAST MEDICATION: Performed by: INTERNAL MEDICINE

## 2021-05-13 PROCEDURE — 80048 BASIC METABOLIC PNL TOTAL CA: CPT

## 2021-05-13 RX ADMIN — IOPAMIDOL 75 ML: 755 INJECTION, SOLUTION INTRAVENOUS at 12:14

## 2021-05-14 DIAGNOSIS — N28.89 BILATERAL KIDNEY MASSES: Primary | ICD-10-CM

## 2021-05-18 ENCOUNTER — OFFICE VISIT (OUTPATIENT)
Dept: FAMILY MEDICINE CLINIC | Age: 71
End: 2021-05-18
Payer: MEDICARE

## 2021-05-18 VITALS
WEIGHT: 142 LBS | HEART RATE: 59 BPM | OXYGEN SATURATION: 97 % | DIASTOLIC BLOOD PRESSURE: 82 MMHG | BODY MASS INDEX: 25.15 KG/M2 | SYSTOLIC BLOOD PRESSURE: 136 MMHG | TEMPERATURE: 98.2 F

## 2021-05-18 DIAGNOSIS — M47.817 LUMBAR AND SACRAL OSTEOARTHRITIS: ICD-10-CM

## 2021-05-18 DIAGNOSIS — M54.41 CHRONIC RIGHT-SIDED LOW BACK PAIN WITH RIGHT-SIDED SCIATICA: ICD-10-CM

## 2021-05-18 DIAGNOSIS — M25.511 CHRONIC RIGHT SHOULDER PAIN: ICD-10-CM

## 2021-05-18 DIAGNOSIS — M54.41 CHRONIC MIDLINE LOW BACK PAIN WITH RIGHT-SIDED SCIATICA: ICD-10-CM

## 2021-05-18 DIAGNOSIS — G89.29 CHRONIC MIDLINE LOW BACK PAIN WITH RIGHT-SIDED SCIATICA: ICD-10-CM

## 2021-05-18 DIAGNOSIS — N28.89 BILATERAL RENAL MASSES: ICD-10-CM

## 2021-05-18 DIAGNOSIS — T78.40XD ALLERGY, SUBSEQUENT ENCOUNTER: ICD-10-CM

## 2021-05-18 DIAGNOSIS — G89.29 CHRONIC RIGHT-SIDED LOW BACK PAIN WITH RIGHT-SIDED SCIATICA: ICD-10-CM

## 2021-05-18 DIAGNOSIS — G89.29 CHRONIC RIGHT SHOULDER PAIN: ICD-10-CM

## 2021-05-18 PROCEDURE — G8417 CALC BMI ABV UP PARAM F/U: HCPCS | Performed by: INTERNAL MEDICINE

## 2021-05-18 PROCEDURE — 99213 OFFICE O/P EST LOW 20 MIN: CPT | Performed by: INTERNAL MEDICINE

## 2021-05-18 PROCEDURE — 1123F ACP DISCUSS/DSCN MKR DOCD: CPT | Performed by: INTERNAL MEDICINE

## 2021-05-18 PROCEDURE — G8400 PT W/DXA NO RESULTS DOC: HCPCS | Performed by: INTERNAL MEDICINE

## 2021-05-18 PROCEDURE — 3017F COLORECTAL CA SCREEN DOC REV: CPT | Performed by: INTERNAL MEDICINE

## 2021-05-18 PROCEDURE — G8427 DOCREV CUR MEDS BY ELIG CLIN: HCPCS | Performed by: INTERNAL MEDICINE

## 2021-05-18 PROCEDURE — 4040F PNEUMOC VAC/ADMIN/RCVD: CPT | Performed by: INTERNAL MEDICINE

## 2021-05-18 PROCEDURE — 1090F PRES/ABSN URINE INCON ASSESS: CPT | Performed by: INTERNAL MEDICINE

## 2021-05-18 PROCEDURE — 1036F TOBACCO NON-USER: CPT | Performed by: INTERNAL MEDICINE

## 2021-05-18 RX ORDER — MONTELUKAST SODIUM 10 MG/1
TABLET ORAL
Qty: 90 TABLET | Refills: 1 | Status: SHIPPED | OUTPATIENT
Start: 2021-05-18 | End: 2021-10-28

## 2021-05-18 RX ORDER — TRAMADOL HYDROCHLORIDE 50 MG/1
50 TABLET ORAL 3 TIMES DAILY PRN
Qty: 90 TABLET | Refills: 1 | Status: SHIPPED | OUTPATIENT
Start: 2021-05-18 | End: 2021-06-17

## 2021-05-18 RX ORDER — TIZANIDINE 2 MG/1
TABLET ORAL
Qty: 90 TABLET | Refills: 0 | Status: SHIPPED | OUTPATIENT
Start: 2021-05-18 | End: 2021-08-09 | Stop reason: SDUPTHER

## 2021-05-18 ASSESSMENT — ENCOUNTER SYMPTOMS
NAUSEA: 0
BOWEL INCONTINENCE: 0
SHORTNESS OF BREATH: 0
COUGH: 0
SORE THROAT: 0
BACK PAIN: 1
ABDOMINAL PAIN: 0

## 2021-05-18 ASSESSMENT — SOCIAL DETERMINANTS OF HEALTH (SDOH): HOW HARD IS IT FOR YOU TO PAY FOR THE VERY BASICS LIKE FOOD, HOUSING, MEDICAL CARE, AND HEATING?: NOT VERY HARD

## 2021-05-18 NOTE — PATIENT INSTRUCTIONS
SURVEY:    You may be receiving a survey from [employer] regarding your visit today. Please complete the survey to enable us to provide the highest quality of care to you and your family. If you cannot score us a very good on any question, please call the office to discuss how we could of made your experience a very good one. Thank you. You may be receiving a survey from [employer] regarding your visit today. You may get this in the mail, through your MyChart or in your email. Please complete the survey to enable us to provide the highest quality of care to you and your family. If you cannot score us as very good ( 5 Stars) on any question, please feel free to call the office to discuss how we could have made your experience exceptional.     Thank You!       MD Cristal Estrada DARIO

## 2021-05-18 NOTE — PROGRESS NOTES
HPI Notes    Name: Ema Castañeda  : 1950         Chief Complaint:     Chief Complaint   Patient presents with    Back Pain     Patient presents today for a 4 week f/u for back pain     Results     Patient presents today for result of cat scan.  Medication Refill     Patient needs refill on allergy medication. History of Present Illness:        Roxanne presents to office to follow-up for bilateral renal lesions and chronic back pain. She had MRI lumbar spine on 3/15/2021 revealing indeterminate renal lesions  We followed up with CT renal protocol on  and it showed:      1. There is a cortical mass in the upper pole of the right kidney, measuring   3.2 cm in long axis, that demonstrates marked enhancement, compatible with a   renal neoplasm, likely a renal cell carcinoma.     2. Indeterminate cortical mass extending off the medial lower pole of the left   kidney, measuring 1.7 cm, that also shows significant enhancement, but to a   lesser degree than the mass in the right kidney. This is also concerning for a   neoplasm, such as a papillary renal cell carcinoma.     3. Small subcentimeter cysts in both kidneys as well as nonobstructing right   renal calculi. No hydronephrosis.     4. No lymphadenopathy or convincing evidence of metastatic disease in the   abdomen.     5. Moderate-sized sliding hiatal hernia.             Back Pain  This is a chronic problem. The current episode started more than 1 year ago. The problem occurs constantly. The pain is present in the lumbar spine. The quality of the pain is described as stabbing, burning and shooting. The pain radiates to the right thigh. The pain is at a severity of 6/10. The symptoms are aggravated by position and twisting. Pertinent negatives include no abdominal pain, bladder incontinence, bowel incontinence, chest pain, dysuria, fever, headaches or weakness. She has tried muscle relaxant (Tramadol, Gabapentin, Tylenol) for the symptoms. Provider, MD        Allergies:       Patient has no known allergies. Social History:     Tobacco: reports that she has never smoked. She has never used smokeless tobacco.  Alcohol:      reports no history of alcohol use. Drug Use:  reports no history of drug use. Family History:     Family History   Problem Relation Age of Onset    Stroke Mother     High Cholesterol Mother     Heart Attack Mother     Heart Attack Father     High Cholesterol Father     High Cholesterol Sister     High Cholesterol Brother        Review of Systems:         Review of Systems   Constitutional: Negative for chills and fever. HENT: Negative for congestion and sore throat. Respiratory: Negative for cough and shortness of breath. Cardiovascular: Negative for chest pain and palpitations. Gastrointestinal: Negative for abdominal pain, bowel incontinence and nausea. Genitourinary: Negative for bladder incontinence and dysuria. Musculoskeletal: Positive for arthralgias and back pain. Skin: Negative for rash. Neurological: Negative for dizziness, weakness and headaches. Psychiatric/Behavioral: The patient is not nervous/anxious. Physical Exam:     Vitals:  /82   Pulse 59   Temp 98.2 °F (36.8 °C)   Wt 142 lb (64.4 kg)   SpO2 97%   BMI 25.15 kg/m²       Physical Exam  Vitals reviewed. Constitutional:       General: She is not in acute distress. Appearance: Normal appearance. She is well-developed. HENT:      Head: Normocephalic and atraumatic. Right Ear: External ear normal.      Left Ear: External ear normal.      Nose: No congestion. Eyes:      General: No scleral icterus. Right eye: No discharge. Left eye: No discharge. Neck:      Thyroid: No thyromegaly. Cardiovascular:      Rate and Rhythm: Normal rate and regular rhythm. Heart sounds: Normal heart sounds. No murmur heard.      Pulmonary:      Effort: Pulmonary effort is normal.      Breath sounds: Normal breath sounds. No wheezing or rales. Abdominal:      General: Bowel sounds are normal. There is no distension. Palpations: Abdomen is soft. There is no mass. Tenderness: There is no abdominal tenderness. Musculoskeletal:         General: Tenderness (lumbosacral area on the right side) present. No deformity. Normal range of motion. Right lower leg: No edema. Left lower leg: No edema. Lymphadenopathy:      Cervical: No cervical adenopathy. Skin:     General: Skin is warm and dry. Coloration: Skin is not jaundiced or pale. Findings: No rash. Neurological:      General: No focal deficit present. Mental Status: She is alert and oriented to person, place, and time. Psychiatric:         Mood and Affect: Mood normal.         Behavior: Behavior normal.         Judgment: Judgment normal.               Data:     Lab Results   Component Value Date     05/13/2021    K 3.7 05/13/2021     05/13/2021    CO2 31 05/13/2021    BUN 7 05/13/2021    CREATININE 0.54 05/13/2021    GLUCOSE 100 05/13/2021    GLUCOSE 99 01/19/2012    PROT 6.8 02/24/2020    PROT 6.5 01/24/2020    LABALBU 4.1 02/24/2020    LABALBU 4.4 01/19/2012    BILITOT 0.44 02/24/2020    ALKPHOS 118 02/24/2020    AST 37 02/24/2020    ALT 49 02/24/2020     Lab Results   Component Value Date    WBC 9.25 01/24/2020    RBC 5.00 01/24/2020    RBC 4.86 01/19/2012    HGB 14.6 01/24/2020    HCT 43.4 01/24/2020    MCV 86.8 01/24/2020    MCH 29.2 01/24/2020    MCHC 33.6 01/24/2020    RDW 14.3 01/24/2020     01/24/2020     01/19/2012    MPV 9.0 01/24/2020     Lab Results   Component Value Date    TSH 1.590 01/18/2021     Lab Results   Component Value Date    CHOL 250 01/16/2017    HDL 63 01/16/2017          Assessment & Plan        Diagnosis Orders   1. Bilateral renal masses     2. Allergy, subsequent encounter  montelukast (SINGULAIR) 10 MG tablet   3.  Lumbar and sacral osteoarthritis  tiZANidine (ZANAFLEX) 2 MG tablet    traMADol (ULTRAM) 50 MG tablet   4. Chronic midline low back pain with right-sided sciatica  tiZANidine (ZANAFLEX) 2 MG tablet   5. Chronic right shoulder pain  traMADol (ULTRAM) 50 MG tablet   6. Chronic right-sided low back pain with right-sided sciatica  traMADol (ULTRAM) 50 MG tablet       Referred to urologist Dr. David Mock for evaluation of renal masses  Will refill Tramadol for chronic low back pain with sciatica. She is to continue on prn Tylenol, Zanaflex. Follow up in 2 months             Completed Refills   Requested Prescriptions     Signed Prescriptions Disp Refills    montelukast (SINGULAIR) 10 MG tablet 90 tablet 1     Sig: Take 1 tablet by mouth once daily    tiZANidine (ZANAFLEX) 2 MG tablet 90 tablet 0     Sig: TAKE 1 TABLET BY MOUTH THREE TIMES DAILY AS NEEDED FOR  MUSCLE  SPASMS    traMADol (ULTRAM) 50 MG tablet 90 tablet 1     Sig: Take 1 tablet by mouth 3 times daily as needed for Pain for up to 30 days. Return if symptoms worsen or fail to improve. Orders Placed This Encounter   Medications    montelukast (SINGULAIR) 10 MG tablet     Sig: Take 1 tablet by mouth once daily     Dispense:  90 tablet     Refill:  1    tiZANidine (ZANAFLEX) 2 MG tablet     Sig: TAKE 1 TABLET BY MOUTH THREE TIMES DAILY AS NEEDED FOR  MUSCLE  SPASMS     Dispense:  90 tablet     Refill:  0    traMADol (ULTRAM) 50 MG tablet     Sig: Take 1 tablet by mouth 3 times daily as needed for Pain for up to 30 days. Dispense:  90 tablet     Refill:  1     Reduce doses taken as pain becomes manageable     No orders of the defined types were placed in this encounter. Patient Instructions   SURVEY:    You may be receiving a survey from [employer] regarding your visit today. Please complete the survey to enable us to provide the highest quality of care to you and your family.     If you cannot score us a very good on any question, please call the office to discuss how we could of made your experience a very good one. Thank you. You may be receiving a survey from "[employer] regarding your visit today. You may get this in the mail, through your MyChart or in your email. Please complete the survey to enable us to provide the highest quality of care to you and your family. If you cannot score us as very good ( 5 Stars) on any question, please feel free to call the office to discuss how we could have made your experience exceptional.     Thank You! MD Imelda Jackson DARIO          Electronically signed by Neno Saucedo MD on 5/18/2021 at 10:05 AM           Completed Refills      Requested Prescriptions     Signed Prescriptions Disp Refills    montelukast (SINGULAIR) 10 MG tablet 90 tablet 1     Sig: Take 1 tablet by mouth once daily    tiZANidine (ZANAFLEX) 2 MG tablet 90 tablet 0     Sig: TAKE 1 TABLET BY MOUTH THREE TIMES DAILY AS NEEDED FOR  MUSCLE  SPASMS    traMADol (ULTRAM) 50 MG tablet 90 tablet 1     Sig: Take 1 tablet by mouth 3 times daily as needed for Pain for up to 30 days.

## 2021-05-20 ENCOUNTER — OFFICE VISIT (OUTPATIENT)
Dept: UROLOGY | Age: 71
End: 2021-05-20
Payer: MEDICARE

## 2021-05-20 ENCOUNTER — HOSPITAL ENCOUNTER (OUTPATIENT)
Age: 71
Setting detail: SPECIMEN
Discharge: HOME | End: 2021-05-20
Payer: MEDICARE

## 2021-05-20 VITALS
WEIGHT: 140 LBS | SYSTOLIC BLOOD PRESSURE: 110 MMHG | DIASTOLIC BLOOD PRESSURE: 62 MMHG | BODY MASS INDEX: 24.8 KG/M2 | HEIGHT: 63 IN

## 2021-05-20 DIAGNOSIS — N28.89 RENAL MASS: ICD-10-CM

## 2021-05-20 DIAGNOSIS — N28.89 RENAL MASS: Primary | ICD-10-CM

## 2021-05-20 LAB
-: ABNORMAL
AMORPHOUS: ABNORMAL
BACTERIA: ABNORMAL
BILIRUBIN URINE: NEGATIVE
CASTS UA: ABNORMAL /LPF
COLOR: YELLOW
COMMENT UA: ABNORMAL
CRYSTALS, UA: ABNORMAL /HPF
EPITHELIAL CELLS UA: ABNORMAL /HPF
GLUCOSE URINE: NEGATIVE
KETONES, URINE: NEGATIVE
LEUKOCYTE ESTERASE, URINE: ABNORMAL
MUCUS: ABNORMAL
NITRITE, URINE: POSITIVE
OTHER OBSERVATIONS UA: ABNORMAL
PH UA: 5 (ref 5–8)
PROTEIN UA: NEGATIVE
RBC UA: ABNORMAL /HPF (ref 0–2)
RENAL EPITHELIAL, UA: ABNORMAL /HPF
SPECIFIC GRAVITY UA: 1.01 (ref 1–1.03)
TRICHOMONAS: ABNORMAL
TURBIDITY: ABNORMAL
URINE HGB: NEGATIVE
UROBILINOGEN, URINE: NORMAL
WBC UA: ABNORMAL /HPF
YEAST: ABNORMAL

## 2021-05-20 PROCEDURE — 87077 CULTURE AEROBIC IDENTIFY: CPT

## 2021-05-20 PROCEDURE — 1036F TOBACCO NON-USER: CPT | Performed by: UROLOGY

## 2021-05-20 PROCEDURE — 1090F PRES/ABSN URINE INCON ASSESS: CPT | Performed by: UROLOGY

## 2021-05-20 PROCEDURE — G8420 CALC BMI NORM PARAMETERS: HCPCS | Performed by: UROLOGY

## 2021-05-20 PROCEDURE — 87086 URINE CULTURE/COLONY COUNT: CPT

## 2021-05-20 PROCEDURE — G8400 PT W/DXA NO RESULTS DOC: HCPCS | Performed by: UROLOGY

## 2021-05-20 PROCEDURE — 99204 OFFICE O/P NEW MOD 45 MIN: CPT | Performed by: UROLOGY

## 2021-05-20 PROCEDURE — G8427 DOCREV CUR MEDS BY ELIG CLIN: HCPCS | Performed by: UROLOGY

## 2021-05-20 PROCEDURE — 4040F PNEUMOC VAC/ADMIN/RCVD: CPT | Performed by: UROLOGY

## 2021-05-20 PROCEDURE — 3017F COLORECTAL CA SCREEN DOC REV: CPT | Performed by: UROLOGY

## 2021-05-20 PROCEDURE — 1123F ACP DISCUSS/DSCN MKR DOCD: CPT | Performed by: UROLOGY

## 2021-05-20 PROCEDURE — 81001 URINALYSIS AUTO W/SCOPE: CPT

## 2021-05-20 PROCEDURE — 87186 SC STD MICRODIL/AGAR DIL: CPT

## 2021-05-20 ASSESSMENT — ENCOUNTER SYMPTOMS
COLOR CHANGE: 0
WHEEZING: 0
SHORTNESS OF BREATH: 0
ABDOMINAL PAIN: 0
EYE REDNESS: 0
VOMITING: 0
BACK PAIN: 0
EYE PAIN: 0
COUGH: 0
NAUSEA: 0

## 2021-05-20 NOTE — PROGRESS NOTES
SURGERY      twisted bowel 1985     Outpatient Encounter Medications as of 5/20/2021   Medication Sig Dispense Refill    montelukast (SINGULAIR) 10 MG tablet Take 1 tablet by mouth once daily 90 tablet 1    tiZANidine (ZANAFLEX) 2 MG tablet TAKE 1 TABLET BY MOUTH THREE TIMES DAILY AS NEEDED FOR  MUSCLE  SPASMS 90 tablet 0    traMADol (ULTRAM) 50 MG tablet Take 1 tablet by mouth 3 times daily as needed for Pain for up to 30 days.  90 tablet 1    omeprazole (PRILOSEC) 20 MG delayed release capsule Take 1 capsule by mouth 2 times daily (before meals) 60 capsule 1    amLODIPine (NORVASC) 2.5 MG tablet       Ascorbic Acid (VITAMIN C) 250 MG tablet Take 2 tablets by mouth daily 30 tablet 5    coenzyme Q-10 100 MG capsule Take 1 capsule by mouth daily 30 capsule 5    vitamin B-6 (PYRIDOXINE) 100 MG tablet Take 1 tablet by mouth daily 30 tablet 5    vitamin D3 (CHOLECALCIFEROL) 25 MCG (1000 UT) TABS tablet Take 1 tablet by mouth daily 90 tablet 1    polycarbophil (FIBER-CAPS) 625 MG tablet Take 1 tablet by mouth daily 30 tablet 4    traZODone (DESYREL) 100 MG tablet Take 1 tablet by mouth nightly as needed for Sleep (Patient not taking: Reported on 5/18/2021) 90 tablet 1    levothyroxine (SYNTHROID) 50 MCG tablet Take 1 tablet by mouth Daily 90 tablet 1    gabapentin (NEURONTIN) 300 MG capsule TAKE 1 CAPSULE THREE TIMES DAILY 270 capsule 1    potassium chloride (KLOR-CON) 10 MEQ extended release tablet Take 1 tablet by mouth daily (Patient not taking: Reported on 5/18/2021) 60 tablet 1    lisinopril (PRINIVIL;ZESTRIL) 20 MG tablet TAKE 1 TABLET EVERY DAY 90 tablet 3    metoprolol tartrate (LOPRESSOR) 25 MG tablet TAKE 1/2 TABLET TWICE DAILY 90 tablet 3    fluticasone (FLONASE) 50 MCG/ACT nasal spray USE 2 SPRAYS NASALLY EVERY DAY 48 g 1    dicyclomine (BENTYL) 10 MG capsule       Blood Pressure Monitoring (BLOOD PRESSURE CUFF) MISC Use to monitor Blood pressure 1-2 times daily 1 each 0    Simethicone 80 MG TABS Take 80 mg by mouth 2 times daily as needed (bloating) (Patient not taking: Reported on 1/8/2021) 60 tablet 0    vitamin D (CHOLECALCIFEROL) 1000 UNIT TABS tablet Take 1,000 Units by mouth daily (Patient not taking: Reported on 5/18/2021)       No facility-administered encounter medications on file as of 5/20/2021. Current Outpatient Medications on File Prior to Visit   Medication Sig Dispense Refill    montelukast (SINGULAIR) 10 MG tablet Take 1 tablet by mouth once daily 90 tablet 1    tiZANidine (ZANAFLEX) 2 MG tablet TAKE 1 TABLET BY MOUTH THREE TIMES DAILY AS NEEDED FOR  MUSCLE  SPASMS 90 tablet 0    traMADol (ULTRAM) 50 MG tablet Take 1 tablet by mouth 3 times daily as needed for Pain for up to 30 days.  90 tablet 1    omeprazole (PRILOSEC) 20 MG delayed release capsule Take 1 capsule by mouth 2 times daily (before meals) 60 capsule 1    amLODIPine (NORVASC) 2.5 MG tablet       Ascorbic Acid (VITAMIN C) 250 MG tablet Take 2 tablets by mouth daily 30 tablet 5    coenzyme Q-10 100 MG capsule Take 1 capsule by mouth daily 30 capsule 5    vitamin B-6 (PYRIDOXINE) 100 MG tablet Take 1 tablet by mouth daily 30 tablet 5    vitamin D3 (CHOLECALCIFEROL) 25 MCG (1000 UT) TABS tablet Take 1 tablet by mouth daily 90 tablet 1    polycarbophil (FIBER-CAPS) 625 MG tablet Take 1 tablet by mouth daily 30 tablet 4    traZODone (DESYREL) 100 MG tablet Take 1 tablet by mouth nightly as needed for Sleep (Patient not taking: Reported on 5/18/2021) 90 tablet 1    levothyroxine (SYNTHROID) 50 MCG tablet Take 1 tablet by mouth Daily 90 tablet 1    gabapentin (NEURONTIN) 300 MG capsule TAKE 1 CAPSULE THREE TIMES DAILY 270 capsule 1    potassium chloride (KLOR-CON) 10 MEQ extended release tablet Take 1 tablet by mouth daily (Patient not taking: Reported on 5/18/2021) 60 tablet 1    lisinopril (PRINIVIL;ZESTRIL) 20 MG tablet TAKE 1 TABLET EVERY DAY 90 tablet 3    metoprolol tartrate (LOPRESSOR) 25 MG tablet TAKE 1/2 TABLET TWICE DAILY 90 tablet 3    fluticasone (FLONASE) 50 MCG/ACT nasal spray USE 2 SPRAYS NASALLY EVERY DAY 48 g 1    dicyclomine (BENTYL) 10 MG capsule       Blood Pressure Monitoring (BLOOD PRESSURE CUFF) MISC Use to monitor Blood pressure 1-2 times daily 1 each 0    Simethicone 80 MG TABS Take 80 mg by mouth 2 times daily as needed (bloating) (Patient not taking: Reported on 1/8/2021) 60 tablet 0    vitamin D (CHOLECALCIFEROL) 1000 UNIT TABS tablet Take 1,000 Units by mouth daily (Patient not taking: Reported on 5/18/2021)       No current facility-administered medications on file prior to visit. Patient has no known allergies. Family History   Problem Relation Age of Onset    Stroke Mother     High Cholesterol Mother     Heart Attack Mother     Heart Attack Father     High Cholesterol Father     High Cholesterol Sister     High Cholesterol Brother      Social History     Tobacco Use   Smoking Status Never Smoker   Smokeless Tobacco Never Used       Social History     Substance and Sexual Activity   Alcohol Use No    Comment: one glass once or twice a month       Review of Systems   Constitutional: Negative for appetite change, chills and fever. Eyes: Negative for pain, redness and visual disturbance. Respiratory: Negative for cough, shortness of breath and wheezing. Cardiovascular: Negative for chest pain and leg swelling. Gastrointestinal: Negative for abdominal pain, nausea and vomiting. Genitourinary: Negative for difficulty urinating, dysuria, flank pain, frequency, hematuria, pelvic pain, vaginal bleeding and vaginal discharge. Musculoskeletal: Negative for back pain, joint swelling and myalgias. Skin: Negative for color change, rash and wound. Neurological: Negative for dizziness, tremors and numbness. Hematological: Negative for adenopathy. Does not bruise/bleed easily. There were no vitals taken for this visit.       PHYSICAL EXAM:  Constitutional: Patient resting comfortably, in no acute distress. Neuro: Alert and oriented to person place and time. Cranial nerves grossly intact. Psych: Mood and affect normal.  Skin: Warm, dry  HEENT: normocephalic, atraumatic  Lymphatics: No palpable lymphadenopathy  Lungs: Respiratory effort normal, unlabored  Cardiovascular:  Normal peripheral pulses  Abdomen: Soft, non-tender, non-distended with no organomegaly or palpable masses. : No CVA tenderness. Bladder non-tender and not distended. Pelvic: Deferred    Lab Results   Component Value Date    BUN 7 (L) 05/13/2021     Lab Results   Component Value Date    CREATININE 0.54 05/13/2021       ASSESSMENT:  This is a 73 y.o. female with the following diagnoses:   Diagnosis Orders   1. Renal mass  External Referral To Urology         PLAN:  Patient will be referred to tertiary center. I do believe that she work on the right kidney. I did tell her we can consider monitoring the left kidney. There also could be consideration of a biopsy during the right sided procedure. I did not offer patient radical versus partial nephrectomy.   I did tell her to discuss this with her surgical referral.

## 2021-05-23 LAB
CULTURE: ABNORMAL
Lab: ABNORMAL
SPECIMEN DESCRIPTION: ABNORMAL

## 2021-05-24 ENCOUNTER — TELEPHONE (OUTPATIENT)
Dept: UROLOGY | Age: 71
End: 2021-05-24

## 2021-05-24 RX ORDER — SULFAMETHOXAZOLE AND TRIMETHOPRIM 800; 160 MG/1; MG/1
1 TABLET ORAL 2 TIMES DAILY
Qty: 14 TABLET | Refills: 0 | Status: SHIPPED | OUTPATIENT
Start: 2021-05-24 | End: 2021-05-31

## 2021-05-24 NOTE — TELEPHONE ENCOUNTER
Urine culture is positive for infection. We sent in culture specific Bactrim to treat. Take this antibiotic until gone. Take this with food and eat yogurt once per day to prevent GI upset. If you develop nausea, vomiting, or fevers call the office or go to the ER. If your urinary symptoms do not improve once completing the antibiotics call our office.

## 2021-05-24 NOTE — TELEPHONE ENCOUNTER
Ntfd Roxanne of UA results/response, she did voice understanding and also is aware to go to pharmacy to get ATB

## 2021-07-26 RX ORDER — MELATONIN
Qty: 90 TABLET | Refills: 1 | Status: SHIPPED | OUTPATIENT
Start: 2021-07-26 | End: 2021-12-14

## 2021-08-09 ENCOUNTER — TELEPHONE (OUTPATIENT)
Dept: SURGERY | Age: 71
End: 2021-08-09

## 2021-08-09 ENCOUNTER — HOSPITAL ENCOUNTER (OUTPATIENT)
Age: 71
Setting detail: SPECIMEN
Discharge: HOME | End: 2021-08-09
Payer: MEDICARE

## 2021-08-09 ENCOUNTER — OFFICE VISIT (OUTPATIENT)
Dept: FAMILY MEDICINE CLINIC | Age: 71
End: 2021-08-09
Payer: MEDICARE

## 2021-08-09 VITALS
WEIGHT: 138.6 LBS | HEART RATE: 82 BPM | SYSTOLIC BLOOD PRESSURE: 136 MMHG | DIASTOLIC BLOOD PRESSURE: 84 MMHG | OXYGEN SATURATION: 97 % | TEMPERATURE: 98 F | BODY MASS INDEX: 24.55 KG/M2

## 2021-08-09 DIAGNOSIS — R35.0 URINARY FREQUENCY: ICD-10-CM

## 2021-08-09 DIAGNOSIS — N30.00 ACUTE CYSTITIS WITHOUT HEMATURIA: ICD-10-CM

## 2021-08-09 DIAGNOSIS — N30.00 ACUTE CYSTITIS WITHOUT HEMATURIA: Primary | ICD-10-CM

## 2021-08-09 DIAGNOSIS — I10 ESSENTIAL HYPERTENSION: ICD-10-CM

## 2021-08-09 DIAGNOSIS — G89.29 CHRONIC MIDLINE LOW BACK PAIN WITH RIGHT-SIDED SCIATICA: ICD-10-CM

## 2021-08-09 DIAGNOSIS — M54.41 CHRONIC MIDLINE LOW BACK PAIN WITH RIGHT-SIDED SCIATICA: ICD-10-CM

## 2021-08-09 DIAGNOSIS — M47.817 LUMBAR AND SACRAL OSTEOARTHRITIS: ICD-10-CM

## 2021-08-09 PROBLEM — R11.2 POST-OPERATIVE NAUSEA AND VOMITING: Status: ACTIVE | Noted: 2021-08-09

## 2021-08-09 PROBLEM — Z95.5 STATUS POST CORONARY ARTERY BALLOON DILATION: Status: ACTIVE | Noted: 2021-08-09

## 2021-08-09 PROBLEM — M25.519 SHOULDER PAIN: Status: ACTIVE | Noted: 2021-08-09

## 2021-08-09 PROBLEM — G62.9 NEUROPATHY: Status: ACTIVE | Noted: 2021-08-09

## 2021-08-09 PROBLEM — H92.09 OTALGIA: Status: ACTIVE | Noted: 2021-08-09

## 2021-08-09 PROBLEM — M51.369 DEGENERATION OF INTERVERTEBRAL DISC OF LUMBAR REGION: Status: ACTIVE | Noted: 2019-12-04

## 2021-08-09 PROBLEM — H69.91 DYSFUNCTION OF RIGHT EUSTACHIAN TUBE: Status: ACTIVE | Noted: 2021-08-09

## 2021-08-09 PROBLEM — N28.89: Status: ACTIVE | Noted: 2021-08-09

## 2021-08-09 PROBLEM — R92.8 ABNORMAL MAMMOGRAM: Status: ACTIVE | Noted: 2021-08-09

## 2021-08-09 PROBLEM — Z98.890 POST-OPERATIVE NAUSEA AND VOMITING: Status: ACTIVE | Noted: 2021-08-09

## 2021-08-09 PROBLEM — R14.0 ABDOMINAL BLOATING: Status: ACTIVE | Noted: 2021-08-09

## 2021-08-09 PROBLEM — R52 PAIN: Status: ACTIVE | Noted: 2021-08-09

## 2021-08-09 PROBLEM — Z98.890 HISTORY OF FUNDOPLICATION: Status: ACTIVE | Noted: 2021-08-09

## 2021-08-09 PROBLEM — I25.10 ARTERIOSCLEROSIS OF CORONARY ARTERY: Status: ACTIVE | Noted: 2021-08-09

## 2021-08-09 PROBLEM — R14.2 GASEOUS REGURGITATION: Status: ACTIVE | Noted: 2021-08-09

## 2021-08-09 PROBLEM — R06.02 SHORTNESS OF BREATH: Status: ACTIVE | Noted: 2021-08-09

## 2021-08-09 PROBLEM — M51.36 DEGENERATION OF INTERVERTEBRAL DISC OF LUMBAR REGION: Status: ACTIVE | Noted: 2019-12-04

## 2021-08-09 PROBLEM — H69.81 DYSFUNCTION OF RIGHT EUSTACHIAN TUBE: Status: ACTIVE | Noted: 2021-08-09

## 2021-08-09 PROBLEM — J38.3 VOCAL CORD DYSFUNCTION: Status: ACTIVE | Noted: 2021-08-09

## 2021-08-09 PROBLEM — R12 HEARTBURN: Status: ACTIVE | Noted: 2021-08-09

## 2021-08-09 LAB
BILIRUBIN, POC: NEGATIVE
BLOOD URINE, POC: NEGATIVE
CLARITY, POC: CLEAR
COLOR, POC: YELLOW
GLUCOSE URINE, POC: NEGATIVE
KETONES, POC: NEGATIVE
LEUKOCYTE EST, POC: ABNORMAL
NITRITE, POC: POSITIVE
PH, POC: 6.5
PROTEIN, POC: NEGATIVE
SPECIFIC GRAVITY, POC: 1.02
UROBILINOGEN, POC: 1

## 2021-08-09 PROCEDURE — 3017F COLORECTAL CA SCREEN DOC REV: CPT | Performed by: INTERNAL MEDICINE

## 2021-08-09 PROCEDURE — G8427 DOCREV CUR MEDS BY ELIG CLIN: HCPCS | Performed by: INTERNAL MEDICINE

## 2021-08-09 PROCEDURE — 81002 URINALYSIS NONAUTO W/O SCOPE: CPT | Performed by: INTERNAL MEDICINE

## 2021-08-09 PROCEDURE — 99214 OFFICE O/P EST MOD 30 MIN: CPT | Performed by: INTERNAL MEDICINE

## 2021-08-09 PROCEDURE — G8400 PT W/DXA NO RESULTS DOC: HCPCS | Performed by: INTERNAL MEDICINE

## 2021-08-09 PROCEDURE — G8420 CALC BMI NORM PARAMETERS: HCPCS | Performed by: INTERNAL MEDICINE

## 2021-08-09 PROCEDURE — 87186 SC STD MICRODIL/AGAR DIL: CPT

## 2021-08-09 PROCEDURE — 1090F PRES/ABSN URINE INCON ASSESS: CPT | Performed by: INTERNAL MEDICINE

## 2021-08-09 PROCEDURE — 87086 URINE CULTURE/COLONY COUNT: CPT

## 2021-08-09 PROCEDURE — 1036F TOBACCO NON-USER: CPT | Performed by: INTERNAL MEDICINE

## 2021-08-09 PROCEDURE — 87077 CULTURE AEROBIC IDENTIFY: CPT

## 2021-08-09 PROCEDURE — 4040F PNEUMOC VAC/ADMIN/RCVD: CPT | Performed by: INTERNAL MEDICINE

## 2021-08-09 PROCEDURE — 1123F ACP DISCUSS/DSCN MKR DOCD: CPT | Performed by: INTERNAL MEDICINE

## 2021-08-09 RX ORDER — IBUPROFEN 800 MG/1
TABLET ORAL
COMMUNITY
Start: 2021-06-29 | End: 2022-06-13

## 2021-08-09 RX ORDER — TRAMADOL HYDROCHLORIDE 50 MG/1
TABLET ORAL
COMMUNITY
Start: 2021-06-30 | End: 2021-08-09 | Stop reason: SDUPTHER

## 2021-08-09 RX ORDER — TRAMADOL HYDROCHLORIDE 50 MG/1
50 TABLET ORAL 3 TIMES DAILY PRN
Qty: 90 TABLET | Refills: 1 | Status: SHIPPED | OUTPATIENT
Start: 2021-08-09 | End: 2022-03-01 | Stop reason: SDUPTHER

## 2021-08-09 RX ORDER — LISINOPRIL AND HYDROCHLOROTHIAZIDE 25; 20 MG/1; MG/1
TABLET ORAL
COMMUNITY
End: 2022-06-13

## 2021-08-09 RX ORDER — CIPROFLOXACIN 250 MG/1
250 TABLET, FILM COATED ORAL 2 TIMES DAILY
Qty: 10 TABLET | Refills: 0 | Status: SHIPPED | OUTPATIENT
Start: 2021-08-09 | End: 2021-08-14

## 2021-08-09 RX ORDER — TIZANIDINE 2 MG/1
TABLET ORAL
Qty: 90 TABLET | Refills: 0 | Status: SHIPPED | OUTPATIENT
Start: 2021-08-09 | End: 2021-10-15 | Stop reason: SINTOL

## 2021-08-09 ASSESSMENT — ENCOUNTER SYMPTOMS
SHORTNESS OF BREATH: 0
BOWEL INCONTINENCE: 0
SORE THROAT: 0
ABDOMINAL PAIN: 0
NAUSEA: 0
COUGH: 0
BACK PAIN: 1

## 2021-08-09 NOTE — TELEPHONE ENCOUNTER
Pharmacy [facility] called stating that Cipro and tizanidine have a reaction when taken together. Please advise thanks.

## 2021-08-09 NOTE — PROGRESS NOTES
HPI Notes    Name: Ponce Wallace  : 1950         Chief Complaint:     Chief Complaint   Patient presents with    3 Month Follow-Up     Patient presents today for a 3 month f/u for lower back pain and sciatica. Patient states the pain is worse since kidney surgery.  Urinary Frequency     Patient c/o urinary frequency, urgency and flank pain.  Chest Congestion     Patient c/o upper chest congestion and slight dry cough. She believes it to be allergies, states it is not covid becasue she's already had it. History of Present Illness:        Roxanne presents to office to follow up for HTN,,  Chronic back pain, dysuria. She c/o dysuria. States had 3-4 episodes of UTIs in the recent months. Was treated with various Anbx and it keeps coming back. She is s/p partial R  nephrectomy for renal cell cancer on  in [address] [place] [address], [address] by Dr. Alecia Hopkins. States going to follow-up with him for repeat CT. Urinary Frequency   This is a recurrent problem. The current episode started in the past 7 days. The problem occurs every urination. The quality of the pain is described as burning and stabbing. The pain is at a severity of 4/10. There has been no fever. She is not sexually active. Associated symptoms include flank pain and frequency. Pertinent negatives include no chills, hematuria, nausea or possible pregnancy (cipro). She has tried antibiotics for the symptoms. The treatment provided mild relief. Her past medical history is significant for recurrent UTIs and a urological procedure. Back Pain  This is a chronic problem. The current episode started more than 1 year ago. The problem occurs constantly. The problem is unchanged. The pain is present in the lumbar spine. The quality of the pain is described as burning and stabbing. The pain radiates to the right thigh. The pain is moderate. The symptoms are aggravated by position, twisting and standing.  Pertinent negatives include no abdominal pain, bladder incontinence, bowel incontinence, chest pain, dysuria, fever, headaches, numbness, paresthesias or perianal numbness. Treatments tried: Tramadol, Tylenol, gabapentin. The treatment provided moderate relief. Hypertension  This is a chronic problem. The current episode started more than 1 year ago. The problem is unchanged. The problem is controlled. Pertinent negatives include no chest pain, headaches, palpitations, peripheral edema or shortness of breath. There are no associated agents to hypertension. Past treatments include ACE inhibitors, diuretics, calcium channel blockers and beta blockers. The current treatment provides significant improvement. Past Medical History:     Past Medical History:   Diagnosis Date    Allergic rhinitis 2011    Anxiety 2011    Carpal tunnel syndrome 2011    Depression 2011    Dyslipidemia 2011    Fibromyalgia 2011    Hyperlipidemia     Hypertension 2011    SOB (shortness of breath)     Thyroid disease       Reviewed all health maintenance requirements and orderedappropriate tests  Health Maintenance Due   Topic Date Due    COVID-19 Vaccine (1) Never done    DEXA (modify frequency per FRAX score)  Never done   [employer] Visit (AWV)  2021       Past Surgical History:     Past Surgical History:   Procedure Laterality Date    CARDIAC CATHETERIZATION Left 2017    Dr. Adeline Tinoco @ [facility]--CAD, 50-60% ostial right CAD with an FFR of 0.94.  60% disease in the proximal LAD with an FFR of 0.92 with bridging of the mid LAD. Normal LV function at 60%. Mildly dilated ascending aorta -will do a CT scan to measure.     CARDIAC CATHETERIZATION Left 2020    Dr. Adeline Tinoco @ [facility]--Medical therapy     SECTION      x 4    ELBOW SURGERY Right     GALLBLADDER SURGERY      HYSTERECTOMY      total hysterectomy    ROTATOR CUFF REPAIR      Right    SMALL INTESTINE SURGERY      twisted bowel 1985 Medications:       Prior to Admission medications    Medication Sig Start Date End Date Taking? Authorizing Provider   ibuprofen (ADVIL;MOTRIN) 800 MG tablet TAKE 1 TABLET BY MOUTH EVERY 6 HOURS AS NEEDED 6/29/21  Yes Historical Provider, MD   lisinopril-hydroCHLOROthiazide (PRINZIDE;ZESTORETIC) 20-25 MG per tablet Take by mouth   Yes Historical Provider, MD   tiZANidine (ZANAFLEX) 2 MG tablet TAKE 1 TABLET BY MOUTH THREE TIMES DAILY AS NEEDED FOR  MUSCLE  SPASMS 8/9/21  Yes Dyan Mcduffie MD   ciprofloxacin (CIPRO) 250 MG tablet Take 1 tablet by mouth 2 times daily for 5 days 8/9/21 8/14/21 Yes Dyan Mcduffie MD   traMADol (ULTRAM) 50 MG tablet Take 1 tablet by mouth 3 times daily as needed for Pain for up to 30 days.  8/9/21 9/8/21 Yes Dyan Mcduffie MD   vitamin D3 (CHOLECALCIFEROL) 25 MCG (1000 UT) TABS tablet TAKE 1 TABLET EVERY DAY 7/26/21  Yes Dyan Mcduffie MD   montelukast (SINGULAIR) 10 MG tablet Take 1 tablet by mouth once daily 5/18/21  Yes Dyan Mcduffie MD   omeprazole (PRILOSEC) 20 MG delayed release capsule Take 1 capsule by mouth 2 times daily (before meals) 4/13/21  Yes Dyan Mcduffie MD   amLODIPine (NORVASC) 2.5 MG tablet  2/2/21  Yes Historical Provider, MD   Ascorbic Acid (VITAMIN C) 250 MG tablet Take 2 tablets by mouth daily 2/5/21  Yes Dyan Mcduffie MD   coenzyme Q-10 100 MG capsule Take 1 capsule by mouth daily 2/5/21  Yes Dyan Mcduffie MD   vitamin B-6 (PYRIDOXINE) 100 MG tablet Take 1 tablet by mouth daily 2/5/21  Yes Dyan Mcduffie MD   polycarbophil (FIBER-CAPS) 625 MG tablet Take 1 tablet by mouth daily 2/5/21  Yes Dyan Mcduffie MD   levothyroxine (SYNTHROID) 50 MCG tablet Take 1 tablet by mouth Daily 1/8/21  Yes Dyan Mcduffie MD   lisinopril (PRINIVIL;ZESTRIL) 20 MG tablet TAKE 1 TABLET EVERY DAY 12/30/20  Yes Bigg Calhoun MD   metoprolol tartrate (LOPRESSOR) 25 MG tablet TAKE 1/2 TABLET TWICE DAILY 2/24/20  Yes Bigg Calhoun MD   fluticasone (FLONASE) 50 MCG/ACT nasal spray USE 2 SPRAYS NASALLY EVERY DAY 12/28/19  Yes Lina Vega MD   dicyclomine (BENTYL) 10 MG capsule  11/26/19  Yes Historical Provider, MD   Blood Pressure Monitoring (BLOOD PRESSURE CUFF) MISC Use to monitor Blood pressure 1-2 times daily 3/4/19  Yes Lina Vega MD   traZODone (DESYREL) 100 MG tablet Take 1 tablet by mouth nightly as needed for Sleep  Patient not taking: Reported on 5/18/2021 1/8/21   Lina Vega MD   gabapentin (NEURONTIN) 300 MG capsule TAKE 1 CAPSULE THREE TIMES DAILY 1/8/21 7/9/21  Lina Vega MD   potassium chloride (KLOR-CON) 10 MEQ extended release tablet Take 1 tablet by mouth daily  Patient not taking: Reported on 5/18/2021 1/8/21   Lina Vega MD   Simethicone 80 MG TABS Take 80 mg by mouth 2 times daily as needed (bloating)  Patient not taking: Reported on 1/8/2021 11/12/18   Lina Vega MD   vitamin D (CHOLECALCIFEROL) 1000 UNIT TABS tablet Take 1,000 Units by mouth daily  Patient not taking: Reported on 5/18/2021    Historical Provider, MD        Allergies:       Patient has no known allergies. Social History:     Tobacco: reports that she has never smoked. She has never used smokeless tobacco.  Alcohol:      reports no history of alcohol use. Drug Use:  reports no history of drug use. Family History:     Family History   Problem Relation Age of Onset    Stroke Mother     High Cholesterol Mother     Heart Attack Mother     Heart Attack Father     High Cholesterol Father     High Cholesterol Sister     High Cholesterol Brother        Review of Systems:         Review of Systems   Constitutional: Negative for activity change, appetite change, chills and fever. HENT: Negative for congestion and sore throat. Respiratory: Negative for cough and shortness of breath. Cardiovascular: Negative for chest pain and palpitations. Gastrointestinal: Negative for abdominal pain, bowel incontinence and nausea. Genitourinary: Positive for flank pain and frequency. Negative for bladder incontinence, dysuria and hematuria. Musculoskeletal: Positive for back pain. Skin: Negative for rash. Neurological: Negative for dizziness, tremors, numbness, headaches and paresthesias. Psychiatric/Behavioral: The patient is not nervous/anxious. Physical Exam:     Vitals:  /84   Pulse 82   Temp 98 °F (36.7 °C)   Wt 138 lb 9.6 oz (62.9 kg)   SpO2 97%   BMI 24.55 kg/m²       Physical Exam  Vitals reviewed. Constitutional:       General: She is not in acute distress. Appearance: Normal appearance. She is well-developed. HENT:      Head: Normocephalic and atraumatic. Right Ear: External ear normal.      Left Ear: External ear normal.      Mouth/Throat:      Mouth: Mucous membranes are moist.   Eyes:      General: No scleral icterus. Right eye: No discharge. Left eye: No discharge. Conjunctiva/sclera: Conjunctivae normal.   Neck:      Thyroid: No thyromegaly. Cardiovascular:      Rate and Rhythm: Normal rate and regular rhythm. Heart sounds: Normal heart sounds. No murmur heard. Pulmonary:      Effort: Pulmonary effort is normal.      Breath sounds: Normal breath sounds. No wheezing or rales. Abdominal:      General: Bowel sounds are normal. There is no distension. Palpations: Abdomen is soft. There is no mass. Tenderness: There is no abdominal tenderness. Musculoskeletal:         General: Tenderness (Lumbosacral area tenderness mainly on the right side) present. Normal range of motion. Right lower leg: No edema. Left lower leg: No edema. Lymphadenopathy:      Cervical: No cervical adenopathy. Skin:     General: Skin is warm and dry. Coloration: Skin is not pale. Findings: No rash. Neurological:      General: No focal deficit present. Mental Status: She is alert and oriented to person, place, and time.  Mental status is at baseline. Psychiatric:         Mood and Affect: Mood normal.         Behavior: Behavior normal.         Thought Content: Thought content normal.         Judgment: Judgment normal.               Data:     Lab Results   Component Value Date     05/13/2021    K 3.7 05/13/2021     05/13/2021    CO2 31 05/13/2021    BUN 7 05/13/2021    CREATININE 0.54 05/13/2021    GLUCOSE 100 05/13/2021    GLUCOSE 99 01/19/2012    PROT 6.8 02/24/2020    PROT 6.5 01/24/2020    LABALBU 4.1 02/24/2020    LABALBU 4.4 01/19/2012    BILITOT 0.44 02/24/2020    ALKPHOS 118 02/24/2020    AST 37 02/24/2020    ALT 49 02/24/2020     Lab Results   Component Value Date    WBC 9.25 01/24/2020    RBC 5.00 01/24/2020    RBC 4.86 01/19/2012    HGB 14.6 01/24/2020    HCT 43.4 01/24/2020    MCV 86.8 01/24/2020    MCH 29.2 01/24/2020    MCHC 33.6 01/24/2020    RDW 14.3 01/24/2020     01/24/2020     01/19/2012    MPV 9.0 01/24/2020     Lab Results   Component Value Date    TSH 1.590 01/18/2021     Lab Results   Component Value Date    CHOL 250 01/16/2017    HDL 63 01/16/2017          Assessment & Plan        Diagnosis Orders   1. Acute cystitis without hematuria   Patient with recurrent UTI. Will send urine for culture sensitivity. Prescribed Cipro 250 mg twice daily. She also follows up with urology POCT Urinalysis no Micro    Culture, Urine    ciprofloxacin (CIPRO) 250 MG tablet   2. Chronic midline low back pain with right-sided sciatica   Pain is slightly worse than before due to recent partial nephrectomy on the right side. However she still managing with as needed tramadol, Zanaflex and Tylenol. Refill tramadol. PDMP reviewed tiZANidine (ZANAFLEX) 2 MG tablet    traMADol (ULTRAM) 50 MG tablet   3. Lumbar and sacral osteoarthritis  tiZANidine (ZANAFLEX) 2 MG tablet   4. Essential hypertension   Blood pressure is stable on current medications, continue without change    5.  Urinary frequency                     Completed Refills   Requested Prescriptions     Signed Prescriptions Disp Refills    tiZANidine (ZANAFLEX) 2 MG tablet 90 tablet 0     Sig: TAKE 1 TABLET BY MOUTH THREE TIMES DAILY AS NEEDED FOR  MUSCLE  SPASMS    ciprofloxacin (CIPRO) 250 MG tablet 10 tablet 0     Sig: Take 1 tablet by mouth 2 times daily for 5 days    traMADol (ULTRAM) 50 MG tablet 90 tablet 1     Sig: Take 1 tablet by mouth 3 times daily as needed for Pain for up to 30 days. Return in about 3 months (around 11/9/2021) for HTN, depression, back pain. Orders Placed This Encounter   Medications    tiZANidine (ZANAFLEX) 2 MG tablet     Sig: TAKE 1 TABLET BY MOUTH THREE TIMES DAILY AS NEEDED FOR  MUSCLE  SPASMS     Dispense:  90 tablet     Refill:  0    ciprofloxacin (CIPRO) 250 MG tablet     Sig: Take 1 tablet by mouth 2 times daily for 5 days     Dispense:  10 tablet     Refill:  0    traMADol (ULTRAM) 50 MG tablet     Sig: Take 1 tablet by mouth 3 times daily as needed for Pain for up to 30 days. Dispense:  90 tablet     Refill:  1     Reduce doses taken as pain becomes manageable     Orders Placed This Encounter   Procedures    Culture, Urine     Standing Status:   Future     Standing Expiration Date:   8/9/2022     Order Specific Question:   Specify (ex-cath, midstream, cysto, etc)? Answer:   clean catch    POCT Urinalysis no Micro         There are no Patient Instructions on file for this visit. Electronically signed by Mert Bear MD on 8/9/2021 at 4:37 PM           Completed Refills      Requested Prescriptions     Signed Prescriptions Disp Refills    tiZANidine (ZANAFLEX) 2 MG tablet 90 tablet 0     Sig: TAKE 1 TABLET BY MOUTH THREE TIMES DAILY AS NEEDED FOR  MUSCLE  SPASMS    ciprofloxacin (CIPRO) 250 MG tablet 10 tablet 0     Sig: Take 1 tablet by mouth 2 times daily for 5 days    traMADol (ULTRAM) 50 MG tablet 90 tablet 1     Sig: Take 1 tablet by mouth 3 times daily as needed for Pain for up to 30 days.

## 2021-08-10 NOTE — TELEPHONE ENCOUNTER
Please let the pharmacy know it will be okay.   Just asked the patient to hold her tizanidine while taking Cipro  Thank you

## 2021-08-11 LAB
CULTURE: ABNORMAL
Lab: ABNORMAL
SPECIMEN DESCRIPTION: ABNORMAL

## 2021-08-16 ENCOUNTER — HOSPITAL ENCOUNTER (OUTPATIENT)
Dept: MRI IMAGING | Age: 71
Discharge: HOME | End: 2021-08-16

## 2021-08-16 DIAGNOSIS — Z00.6 ENCOUNTER FOR EXAMINATION FOR NORMAL COMPARISON AND CONTROL IN CLINICAL RESEARCH PROGRAM: ICD-10-CM

## 2021-08-17 ENCOUNTER — TELEPHONE (OUTPATIENT)
Dept: FAMILY MEDICINE CLINIC | Age: 71
End: 2021-08-17

## 2021-08-17 NOTE — TELEPHONE ENCOUNTER
Patient called stating that Urine culture that [facility] ordered came back abnormal due to an kidney infection. Patient was hoping to be treated for the infection. Patient was last seen in office 08/09/2021.  Please advise thanks

## 2021-08-17 NOTE — TELEPHONE ENCOUNTER
Urine culture grew out Klebsiella sensitive to Cipro which is the antibiotic Dr. Krissy Martin treated her with. Should not need any further treatment unless she did not start the Cipro.

## 2021-09-02 ENCOUNTER — TELEPHONE (OUTPATIENT)
Dept: FAMILY MEDICINE CLINIC | Age: 71
End: 2021-09-02

## 2021-09-02 NOTE — TELEPHONE ENCOUNTER
----- Message from Murphy Hyatt sent at 9/2/2021  2:27 PM EDT -----  Subject: Appointment Request    Reason for Call: Routine (Patient Request) No Script    QUESTIONS  Type of Appointment? Established Patient  Reason for appointment request? No appointments available during search  Additional Information for Provider? Patient would like to come in to see   Dr. Veloz Hallmark she is still having issues since her surgery with infection   she states. Please advise. Thanks  ---------------------------------------------------------------------------  --------------  Shilpa PENA  What is the best way for the office to contact you? OK to leave message on   voicemail  Preferred Call Back Phone Number? [phone]  ---------------------------------------------------------------------------  --------------  SCRIPT ANSWERS  Relationship to Patient? Self  (Is the patient requesting to see the provider for a procedure?)? No  (Is the patient requesting to see the provider urgently  today or   tomorrow. )? No  Have you been diagnosed with, awaiting test results for, or told that you   are suspected of having COVID-19 (Coronavirus)? (If patient has tested   negative or was tested as a requirement for work, school, or travel and   not based on symptoms, answer no)? No  Do you currently have flu-like symptoms including fever or chills, cough,   shortness of breath, difficulty breathing, or new loss of taste or smell? No  Have you had close contact with someone with COVID-19 in the last 14 days? No  (Service Expert  click yes below to proceed with [employer] As Usual   Scheduling)?  Yes

## 2021-09-08 PROBLEM — R52 PAIN: Status: RESOLVED | Noted: 2021-08-09 | Resolved: 2021-09-08

## 2021-09-13 ENCOUNTER — HOSPITAL ENCOUNTER (OUTPATIENT)
Age: 71
Setting detail: SPECIMEN
Discharge: HOME | End: 2021-09-13
Payer: MEDICARE

## 2021-09-13 ENCOUNTER — OFFICE VISIT (OUTPATIENT)
Dept: FAMILY MEDICINE CLINIC | Age: 71
End: 2021-09-13
Payer: MEDICARE

## 2021-09-13 VITALS
TEMPERATURE: 97.6 F | BODY MASS INDEX: 23.95 KG/M2 | OXYGEN SATURATION: 96 % | DIASTOLIC BLOOD PRESSURE: 86 MMHG | WEIGHT: 135.2 LBS | HEART RATE: 84 BPM | SYSTOLIC BLOOD PRESSURE: 120 MMHG

## 2021-09-13 DIAGNOSIS — N30.00 ACUTE CYSTITIS WITHOUT HEMATURIA: Primary | ICD-10-CM

## 2021-09-13 DIAGNOSIS — R30.0 DYSURIA: ICD-10-CM

## 2021-09-13 LAB
BILIRUBIN, POC: NEGATIVE
BLOOD URINE, POC: ABNORMAL
CLARITY, POC: CLEAR
COLOR, POC: YELLOW
GLUCOSE URINE, POC: NEGATIVE
KETONES, POC: NEGATIVE
LEUKOCYTE EST, POC: ABNORMAL
NITRITE, POC: POSITIVE
PH, POC: 5.5
PROTEIN, POC: NEGATIVE
SPECIFIC GRAVITY, POC: 1.02
UROBILINOGEN, POC: 0.2

## 2021-09-13 PROCEDURE — 1123F ACP DISCUSS/DSCN MKR DOCD: CPT | Performed by: INTERNAL MEDICINE

## 2021-09-13 PROCEDURE — 4040F PNEUMOC VAC/ADMIN/RCVD: CPT | Performed by: INTERNAL MEDICINE

## 2021-09-13 PROCEDURE — 87086 URINE CULTURE/COLONY COUNT: CPT

## 2021-09-13 PROCEDURE — 1090F PRES/ABSN URINE INCON ASSESS: CPT | Performed by: INTERNAL MEDICINE

## 2021-09-13 PROCEDURE — 81002 URINALYSIS NONAUTO W/O SCOPE: CPT | Performed by: INTERNAL MEDICINE

## 2021-09-13 PROCEDURE — 1036F TOBACCO NON-USER: CPT | Performed by: INTERNAL MEDICINE

## 2021-09-13 PROCEDURE — G8400 PT W/DXA NO RESULTS DOC: HCPCS | Performed by: INTERNAL MEDICINE

## 2021-09-13 PROCEDURE — G8420 CALC BMI NORM PARAMETERS: HCPCS | Performed by: INTERNAL MEDICINE

## 2021-09-13 PROCEDURE — 3017F COLORECTAL CA SCREEN DOC REV: CPT | Performed by: INTERNAL MEDICINE

## 2021-09-13 PROCEDURE — 87186 SC STD MICRODIL/AGAR DIL: CPT

## 2021-09-13 PROCEDURE — 87077 CULTURE AEROBIC IDENTIFY: CPT

## 2021-09-13 PROCEDURE — G8427 DOCREV CUR MEDS BY ELIG CLIN: HCPCS | Performed by: INTERNAL MEDICINE

## 2021-09-13 PROCEDURE — 99213 OFFICE O/P EST LOW 20 MIN: CPT | Performed by: INTERNAL MEDICINE

## 2021-09-13 RX ORDER — ASPIRIN 81 MG/1
81 TABLET ORAL DAILY
Qty: 90 TABLET | Refills: 1 | Status: SHIPPED | OUTPATIENT
Start: 2021-09-13 | End: 2022-03-01 | Stop reason: SDUPTHER

## 2021-09-13 RX ORDER — CIPROFLOXACIN 500 MG/1
500 TABLET, FILM COATED ORAL 2 TIMES DAILY
Qty: 14 TABLET | Refills: 0 | Status: SHIPPED | OUTPATIENT
Start: 2021-09-13 | End: 2021-09-13 | Stop reason: SDUPTHER

## 2021-09-13 RX ORDER — CIPROFLOXACIN 500 MG/1
500 TABLET, FILM COATED ORAL 2 TIMES DAILY
Qty: 14 TABLET | Refills: 0 | Status: SHIPPED | OUTPATIENT
Start: 2021-09-13 | End: 2021-09-20

## 2021-09-13 ASSESSMENT — ENCOUNTER SYMPTOMS
SORE THROAT: 0
BACK PAIN: 1
COUGH: 0
NAUSEA: 1
SHORTNESS OF BREATH: 0
ABDOMINAL PAIN: 0

## 2021-09-13 NOTE — PROGRESS NOTES
HPI Notes    Name: Rosa Mcgee  : 1950         Chief Complaint:     Chief Complaint   Patient presents with    1 Month Follow-Up     Patient presents today for a 1 month follow up for kidney infection. Patient thinks she still has the infection lower abdominal pain on the sides, feeling the need to urinate all the time and fever off and on. Patient states she called last month asking Dr Thompson to look at urine culture but never got a reply. Patient has been drinking cranberry juice without improvement       History of Present Illness:        Roxanne presents to office for evaluation of dysuria and possible UTI    She is s/p partial R  nephrectomy for renal cell cancer on  in [address] [place] [address] Rd, [address] Dr. Ambrocio Rodarte. She had UTI due to Klebsiella on  and was treated with Cipro 250 mg bid . Antibiotic helped initially but after days after completing it her symptoms came back       Dysuria   This is a recurrent problem. The current episode started in the past 7 days. The problem has been gradually worsening. The quality of the pain is described as burning and aching. The pain is moderate. The maximum temperature recorded prior to her arrival was 100 - 100.9 F. She is not sexually active. Associated symptoms include flank pain, frequency, nausea and urgency. Pertinent negatives include no chills, hematuria or possible pregnancy. She has tried antibiotics for the symptoms. The treatment provided moderate relief. Her past medical history is significant for recurrent UTIs and a urological procedure.            Past Medical History:     Past Medical History:   Diagnosis Date    Allergic rhinitis 2011    Anxiety 2011    Carpal tunnel syndrome 2011    Depression 2011    Dyslipidemia 2011    Fibromyalgia 2011    Hyperlipidemia     Hypertension 2011    SOB (shortness of breath)     Thyroid disease       Reviewed all health maintenance requirements and orderedappropriate tests  Health Maintenance Due   Topic Date Due    COVID-19 Vaccine (1) Never done    DEXA (modify frequency per FRAX score)  Never done    Annual Wellness Visit (AWV)  Never done    Flu vaccine (1) 2021       Past Surgical History:     Past Surgical History:   Procedure Laterality Date    CARDIAC CATHETERIZATION Left 2017    Dr. Tereso Marks @ [facility]--CAD, 50-60% ostial right CAD with an FFR of 0.94.  60% disease in the proximal LAD with an FFR of 0.92 with bridging of the mid LAD. Normal LV function at 60%. Mildly dilated ascending aorta -will do a CT scan to measure.  CARDIAC CATHETERIZATION Left 2020    Dr. Tereso Marks @ [facility]--Medical therapy     SECTION      x 4    ELBOW SURGERY Right 1996    GALLBLADDER SURGERY      HYSTERECTOMY      total hysterectomy    ROTATOR CUFF REPAIR      Right    SMALL INTESTINE SURGERY      twisted bowel         Medications:       Prior to Admission medications    Medication Sig Start Date End Date Taking?  Authorizing Provider   ciprofloxacin (CIPRO) 500 MG tablet Take 1 tablet by mouth 2 times daily for 7 days 21 Yes Lorie Woo MD   aspirin EC 81 MG EC tablet Take 1 tablet by mouth daily 21  Yes Lorie Woo MD   ibuprofen (ADVIL;MOTRIN) 800 MG tablet TAKE 1 TABLET BY MOUTH EVERY 6 HOURS AS NEEDED 21  Yes Historical Provider, MD   lisinopril-hydroCHLOROthiazide (PRINZIDE;ZESTORETIC) 20-25 MG per tablet Take by mouth   Yes Historical Provider, MD   tiZANidine (ZANAFLEX) 2 MG tablet TAKE 1 TABLET BY MOUTH THREE TIMES DAILY AS NEEDED FOR  MUSCLE  SPASMS 21  Yes Lorie Woo MD   vitamin D3 (CHOLECALCIFEROL) 25 MCG (1000 UT) TABS tablet TAKE 1 TABLET EVERY DAY 21  Yes Lorie Woo MD   montelukast (SINGULAIR) 10 MG tablet Take 1 tablet by mouth once daily 21  Yes Lorie Woo MD   omeprazole (PRILOSEC) 20 MG delayed release capsule Take 1 capsule by mouth 2 times daily (before Breath sounds: Normal breath sounds. No wheezing or rales. Abdominal:      General: Bowel sounds are normal. There is no distension. Palpations: Abdomen is soft. There is no mass. Tenderness: There is no abdominal tenderness. Musculoskeletal:         General: Normal range of motion. Right lower leg: No edema. Left lower leg: No edema. Lymphadenopathy:      Cervical: No cervical adenopathy. Skin:     General: Skin is warm and dry. Coloration: Skin is not pale. Findings: No rash. Neurological:      General: No focal deficit present. Mental Status: She is alert and oriented to person, place, and time. Mental status is at baseline. Psychiatric:         Mood and Affect: Mood normal.         Behavior: Behavior normal.         Judgment: Judgment normal.               Data:     Lab Results   Component Value Date     05/13/2021    K 3.7 05/13/2021     05/13/2021    CO2 31 05/13/2021    BUN 7 05/13/2021    CREATININE 0.54 05/13/2021    GLUCOSE 100 05/13/2021    GLUCOSE 99 01/19/2012    PROT 6.8 02/24/2020    PROT 6.5 01/24/2020    LABALBU 4.1 02/24/2020    LABALBU 4.4 01/19/2012    BILITOT 0.44 02/24/2020    ALKPHOS 118 02/24/2020    AST 37 02/24/2020    ALT 49 02/24/2020     Lab Results   Component Value Date    WBC 9.25 01/24/2020    RBC 5.00 01/24/2020    RBC 4.86 01/19/2012    HGB 14.6 01/24/2020    HCT 43.4 01/24/2020    MCV 86.8 01/24/2020    MCH 29.2 01/24/2020    MCHC 33.6 01/24/2020    RDW 14.3 01/24/2020     01/24/2020     01/19/2012    MPV 9.0 01/24/2020     Lab Results   Component Value Date    TSH 1.590 01/18/2021     Lab Results   Component Value Date    CHOL 250 01/16/2017    HDL 63 01/16/2017          Assessment & Plan        Diagnosis Orders   1. Acute cystitis without hematuria  ciprofloxacin (CIPRO) 500 MG tablet   2.  Dysuria  POCT Urinalysis no Micro    Culture, Urine     Patient's urinalysis in office shows large leukocyte experience exceptional.     Thank You!       Dennis Henning MD  [address] [facility]        Electronically signed by Dennis Henning MD on 9/13/2021 at 2:25 PM           Completed Refills      Requested Prescriptions     Signed Prescriptions Disp Refills    ciprofloxacin (CIPRO) 500 MG tablet 14 tablet 0     Sig: Take 1 tablet by mouth 2 times daily for 7 days    aspirin EC 81 MG EC tablet 90 tablet 1     Sig: Take 1 tablet by mouth daily

## 2021-09-13 NOTE — PATIENT INSTRUCTIONS
SURVEY:    You may be receiving a survey from [facility] regarding your visit today. Please complete the survey to enable us to provide the highest quality of care to you and your family. If you cannot score us a very good on any question, please call the office to discuss how we could of made your experience a very good one. Thank you. You may be receiving a survey from [facility] regarding your visit today. You may get this in the mail, through your MyChart or in your email. Please complete the survey to enable us to provide the highest quality of care to you and your family. If you cannot score us as very good ( 5 Stars) on any question, please feel free to call the office to discuss how we could have made your experience exceptional.     Thank You!       MD Brodei Hickman

## 2021-09-18 LAB
CULTURE: ABNORMAL
CULTURE: ABNORMAL
Lab: ABNORMAL
SPECIMEN DESCRIPTION: ABNORMAL

## 2021-10-05 DIAGNOSIS — E78.2 MIXED HYPERLIPIDEMIA: ICD-10-CM

## 2021-10-05 DIAGNOSIS — Z95.5 STATUS POST CORONARY ARTERY BALLOON DILATION: ICD-10-CM

## 2021-10-05 DIAGNOSIS — R06.02 SHORTNESS OF BREATH: ICD-10-CM

## 2021-10-05 DIAGNOSIS — E55.9 VITAMIN D DEFICIENCY, UNSPECIFIED: ICD-10-CM

## 2021-10-05 DIAGNOSIS — I10 ESSENTIAL HYPERTENSION: Primary | ICD-10-CM

## 2021-10-05 DIAGNOSIS — I25.10 ARTERIOSCLEROSIS OF CORONARY ARTERY: ICD-10-CM

## 2021-10-05 DIAGNOSIS — E03.9 ACQUIRED HYPOTHYROIDISM: ICD-10-CM

## 2021-10-05 DIAGNOSIS — I20.9 ANGINAL SYNDROME (HCC): ICD-10-CM

## 2021-10-15 ENCOUNTER — OFFICE VISIT (OUTPATIENT)
Dept: FAMILY MEDICINE CLINIC | Age: 71
End: 2021-10-15
Payer: MEDICARE

## 2021-10-15 VITALS
DIASTOLIC BLOOD PRESSURE: 82 MMHG | HEART RATE: 70 BPM | BODY MASS INDEX: 23.85 KG/M2 | SYSTOLIC BLOOD PRESSURE: 132 MMHG | WEIGHT: 134.6 LBS | RESPIRATION RATE: 18 BRPM | TEMPERATURE: 98.6 F | HEIGHT: 63 IN | OXYGEN SATURATION: 97 %

## 2021-10-15 DIAGNOSIS — N30.00 ACUTE CYSTITIS WITHOUT HEMATURIA: Primary | ICD-10-CM

## 2021-10-15 DIAGNOSIS — N39.0 RECURRENT UTI: ICD-10-CM

## 2021-10-15 DIAGNOSIS — R30.0 DYSURIA: ICD-10-CM

## 2021-10-15 LAB
BILIRUBIN, POC: ABNORMAL
BLOOD URINE, POC: ABNORMAL
CLARITY, POC: ABNORMAL
COLOR, POC: YELLOW
GLUCOSE URINE, POC: ABNORMAL
KETONES, POC: ABNORMAL
LEUKOCYTE EST, POC: ABNORMAL
NITRITE, POC: POSITIVE
PH, POC: 5.5
PROTEIN, POC: ABNORMAL
SPECIFIC GRAVITY, POC: 1.02
UROBILINOGEN, POC: 0.2

## 2021-10-15 PROCEDURE — 1123F ACP DISCUSS/DSCN MKR DOCD: CPT | Performed by: INTERNAL MEDICINE

## 2021-10-15 PROCEDURE — 99213 OFFICE O/P EST LOW 20 MIN: CPT | Performed by: INTERNAL MEDICINE

## 2021-10-15 PROCEDURE — G8400 PT W/DXA NO RESULTS DOC: HCPCS | Performed by: INTERNAL MEDICINE

## 2021-10-15 PROCEDURE — 1036F TOBACCO NON-USER: CPT | Performed by: INTERNAL MEDICINE

## 2021-10-15 PROCEDURE — 81003 URINALYSIS AUTO W/O SCOPE: CPT | Performed by: INTERNAL MEDICINE

## 2021-10-15 PROCEDURE — 4040F PNEUMOC VAC/ADMIN/RCVD: CPT | Performed by: INTERNAL MEDICINE

## 2021-10-15 PROCEDURE — G8484 FLU IMMUNIZE NO ADMIN: HCPCS | Performed by: INTERNAL MEDICINE

## 2021-10-15 PROCEDURE — 1090F PRES/ABSN URINE INCON ASSESS: CPT | Performed by: INTERNAL MEDICINE

## 2021-10-15 PROCEDURE — G8427 DOCREV CUR MEDS BY ELIG CLIN: HCPCS | Performed by: INTERNAL MEDICINE

## 2021-10-15 PROCEDURE — 3017F COLORECTAL CA SCREEN DOC REV: CPT | Performed by: INTERNAL MEDICINE

## 2021-10-15 PROCEDURE — G8420 CALC BMI NORM PARAMETERS: HCPCS | Performed by: INTERNAL MEDICINE

## 2021-10-15 RX ORDER — CIPROFLOXACIN 250 MG/1
250 TABLET, FILM COATED ORAL 2 TIMES DAILY
Qty: 20 TABLET | Refills: 0 | Status: SHIPPED | OUTPATIENT
Start: 2021-10-15 | End: 2021-10-25

## 2021-10-15 RX ORDER — GREEN TEA/HOODIA GORDONII 315-12.5MG
1 CAPSULE ORAL 2 TIMES DAILY
Qty: 60 TABLET | Refills: 0 | Status: SHIPPED | OUTPATIENT
Start: 2021-10-15 | End: 2021-11-14

## 2021-10-15 ASSESSMENT — ENCOUNTER SYMPTOMS
ABDOMINAL PAIN: 0
SORE THROAT: 0
SHORTNESS OF BREATH: 0
NAUSEA: 1
COUGH: 0

## 2021-10-15 NOTE — PROGRESS NOTES
HPI Notes    Name: Gentry Jamison  : 1950         Chief Complaint:     Chief Complaint   Patient presents with    Dysuria     x 3 weeks.  Urinary Frequency     x 3 weeks.  Hematuria     x 3 weeks. History of Present Illness:        Roxanne presents to office for evaluation of possible recurrent UTI    She has a h/o recurrent UTI since  of this year. She is s/p partial R  nephrectomy for renal cell cancer on  in [address] [facility] Dr. Krystal Patricio. Urine culture from  came back positive for Klebsiella                       Dysuria   This is a recurrent problem. The current episode started in the past 7 days. The problem occurs every urination. The problem has been gradually worsening. The quality of the pain is described as aching. The maximum temperature recorded prior to her arrival was 100 - 100.9 F. Associated symptoms include flank pain, frequency, hematuria and nausea. Pertinent negatives include no chills. She has tried antibiotics for the symptoms. Her past medical history is significant for recurrent UTIs. Past Medical History:     Past Medical History:   Diagnosis Date    Allergic rhinitis 2011    Anxiety 2011    Carpal tunnel syndrome 2011    Depression 2011    Dyslipidemia 2011    Fibromyalgia 2011    Hyperlipidemia     Hypertension 2011    SOB (shortness of breath)     Thyroid disease       Reviewed all health maintenance requirements and orderedappropriate tests  Health Maintenance Due   Topic Date Due    DEXA (modify frequency per FRAX score)  Never done   [employer] Visit (AWV)  2021       Past Surgical History:     Past Surgical History:   Procedure Laterality Date    CARDIAC CATHETERIZATION Left 2017    Dr. Valentino Carrasco @ [facility]--CAD, 50-60% ostial right CAD with an FFR of 0.94.  60% disease in the proximal LAD with an FFR of 0.92 with bridging of the mid LAD. Normal LV function at 60%.  Mildly dilated ascending aorta -will do a CT scan to measure.  CARDIAC CATHETERIZATION Left 2020    Dr. Alivia Stewart @ [facility]--Medical therapy     SECTION      x 4    ELBOW SURGERY Right 1996    GALLBLADDER SURGERY      HYSTERECTOMY      total hysterectomy    ROTATOR CUFF REPAIR      Right    SMALL INTESTINE SURGERY      twisted bowel         Medications:       Prior to Admission medications    Medication Sig Start Date End Date Taking?  Authorizing Provider   ciprofloxacin (CIPRO) 250 MG tablet Take 1 tablet by mouth 2 times daily for 10 days 10/15/21 10/25/21 Yes Gagan Linder MD   Probiotic Acidophilus [facility]) TABS Take 1 tablet by mouth 2 times daily 10/15/21 11/14/21 Yes Gagan Linder MD   aspirin EC 81 MG EC tablet Take 1 tablet by mouth daily 21  Yes Gagan Linder MD   ibuprofen (ADVIL;MOTRIN) 800 MG tablet TAKE 1 TABLET BY MOUTH EVERY 6 HOURS AS NEEDED 21  Yes Historical Provider, MD   lisinopril-hydroCHLOROthiazide (PRINZIDE;ZESTORETIC) 20-25 MG per tablet Take by mouth   Yes Historical Provider, MD   vitamin D3 (CHOLECALCIFEROL) 25 MCG (1000 UT) TABS tablet TAKE 1 TABLET EVERY DAY 21  Yes Gagan Linder MD   montelukast (SINGULAIR) 10 MG tablet Take 1 tablet by mouth once daily 21  Yes Gagan Linder MD   omeprazole (PRILOSEC) 20 MG delayed release capsule Take 1 capsule by mouth 2 times daily (before meals) 21  Yes Gagan Linder MD   amLODIPine (NORVASC) 2.5 MG tablet  21  Yes Historical Provider, MD   Ascorbic Acid (VITAMIN C) 250 MG tablet Take 2 tablets by mouth daily 21  Yes Gagan Linder MD   coenzyme Q-10 100 MG capsule Take 1 capsule by mouth daily 21  Yes Gagan Linder MD   vitamin B-6 (PYRIDOXINE) 100 MG tablet Take 1 tablet by mouth daily 21  Yes Gagan Linder MD   polycarbophil (FIBER-CAPS) 625 MG tablet Take 1 tablet by mouth daily 21  Yes Gagan Linder MD   traZODone (DESYREL) 100 MG tablet Take 1 tablet by mouth nightly as needed for Sleep 1/8/21  Yes Belkys Rosario MD   levothyroxine (SYNTHROID) 50 MCG tablet Take 1 tablet by mouth Daily 1/8/21  Yes Belkys Rosario MD   potassium chloride (KLOR-CON) 10 MEQ extended release tablet Take 1 tablet by mouth daily 1/8/21  Yes Belkys Rosario MD   lisinopril (PRINIVIL;ZESTRIL) 20 MG tablet TAKE 1 TABLET EVERY DAY 12/30/20  Yes Gianfranco Tracey MD   metoprolol tartrate (LOPRESSOR) 25 MG tablet TAKE 1/2 TABLET TWICE DAILY 2/24/20  Yes Gianfranco Tracey MD   fluticasone (FLONASE) 50 MCG/ACT nasal spray USE 2 SPRAYS NASALLY EVERY DAY 12/28/19  Yes Belkys Rosario MD   dicyclomine (BENTYL) 10 MG capsule  11/26/19  Yes Historical Provider, MD   Blood Pressure Monitoring (BLOOD PRESSURE CUFF) MISC Use to monitor Blood pressure 1-2 times daily 3/4/19  Yes Belkys Rosario MD   Simethicone 80 MG TABS Take 80 mg by mouth 2 times daily as needed (bloating) 11/12/18  Yes Belkys Rosario MD   vitamin D (CHOLECALCIFEROL) 1000 UNIT TABS tablet Take 1,000 Units by mouth daily    Yes Historical Provider, MD   gabapentin (NEURONTIN) 300 MG capsule TAKE 1 CAPSULE THREE TIMES DAILY 1/8/21 7/9/21  Belkys Rosario MD        Allergies:       Patient has no known allergies. Social History:     Tobacco: reports that she has never smoked. She has never used smokeless tobacco.  Alcohol:      reports no history of alcohol use. Drug Use:  reports no history of drug use. Family History:     Family History   Problem Relation Age of Onset    Stroke Mother     High Cholesterol Mother     Heart Attack Mother     Heart Attack Father     High Cholesterol Father     High Cholesterol Sister     High Cholesterol Brother        Review of Systems:         Review of Systems   Constitutional: Negative for chills and fever. HENT: Negative for congestion and sore throat. Respiratory: Negative for cough and shortness of breath.     Cardiovascular: Negative for chest pain and palpitations. Gastrointestinal: Positive for nausea. Negative for abdominal pain. Genitourinary: Positive for dysuria, flank pain, frequency and hematuria. Negative for difficulty urinating and pelvic pain. Skin: Negative for rash. Neurological: Negative for headaches. Psychiatric/Behavioral: The patient is not nervous/anxious. Physical Exam:     Vitals:  /82 (Site: Left Upper Arm, Position: Sitting, Cuff Size: Large Adult)   Pulse 70   Temp 98.6 °F (37 °C) (Temporal)   Resp 18   Ht 5' 3\" (1.6 m)   Wt 134 lb 9.6 oz (61.1 kg)   SpO2 97%   BMI 23.84 kg/m²       Physical Exam  Vitals reviewed. Constitutional:       General: She is not in acute distress. Appearance: She is well-developed. HENT:      Head: Normocephalic and atraumatic. Neck:      Thyroid: No thyromegaly. Cardiovascular:      Rate and Rhythm: Normal rate and regular rhythm. Heart sounds: Normal heart sounds. No murmur heard. Pulmonary:      Effort: Pulmonary effort is normal.      Breath sounds: Normal breath sounds. No wheezing or rales. Abdominal:      General: Bowel sounds are normal. There is no distension. Palpations: Abdomen is soft. There is no mass. Tenderness: There is abdominal tenderness (lower abdomen). There is no guarding or rebound. Musculoskeletal:         General: No tenderness. Normal range of motion. Right lower leg: No edema. Left lower leg: No edema. Lymphadenopathy:      Cervical: No cervical adenopathy. Skin:     General: Skin is warm and dry. Coloration: Skin is not pale. Findings: No rash. Neurological:      General: No focal deficit present. Mental Status: She is alert and oriented to person, place, and time.    Psychiatric:         Behavior: Behavior normal.         Judgment: Judgment normal.               Data:     Lab Results   Component Value Date     05/13/2021    K 3.7 05/13/2021     05/13/2021    CO2 31 05/13/2021 BUN 7 05/13/2021    CREATININE 0.54 05/13/2021    GLUCOSE 100 05/13/2021    GLUCOSE 99 01/19/2012    PROT 6.8 02/24/2020    PROT 6.5 01/24/2020    LABALBU 4.1 02/24/2020    LABALBU 4.4 01/19/2012    BILITOT 0.44 02/24/2020    ALKPHOS 118 02/24/2020    AST 37 02/24/2020    ALT 49 02/24/2020     Lab Results   Component Value Date    WBC 9.25 01/24/2020    RBC 5.00 01/24/2020    RBC 4.86 01/19/2012    HGB 14.6 01/24/2020    HCT 43.4 01/24/2020    MCV 86.8 01/24/2020    MCH 29.2 01/24/2020    MCHC 33.6 01/24/2020    RDW 14.3 01/24/2020     01/24/2020     01/19/2012    MPV 9.0 01/24/2020     Lab Results   Component Value Date    TSH 1.590 01/18/2021     Lab Results   Component Value Date    CHOL 250 01/16/2017    HDL 63 01/16/2017          Assessment & Plan        Diagnosis Orders   1. Acute cystitis without hematuria  ciprofloxacin (CIPRO) 250 MG tablet   2. Dysuria  POCT Urinalysis No Micro (Auto)     Patient's urinalysis in office positive for nitrates and small leukocyte esterase. We will start on Cipro course again for 10 days. We will refer to urologist Amber Corona for evaluation and recommendations. She may need to be on long-term oral antibiotics for frequent UTI. Completed Refills   Requested Prescriptions     Signed Prescriptions Disp Refills    ciprofloxacin (CIPRO) 250 MG tablet 20 tablet 0     Sig: Take 1 tablet by mouth 2 times daily for 10 days    Probiotic Acidophilus (FLORANEX) TABS 60 tablet 0     Sig: Take 1 tablet by mouth 2 times daily     No follow-ups on file.      Orders Placed This Encounter   Medications    ciprofloxacin (CIPRO) 250 MG tablet     Sig: Take 1 tablet by mouth 2 times daily for 10 days     Dispense:  20 tablet     Refill:  0    Probiotic Acidophilus (FLORANEX) TABS     Sig: Take 1 tablet by mouth 2 times daily     Dispense:  60 tablet     Refill:  0     Orders Placed This Encounter   Procedures    POCT Urinalysis No Micro (Auto)         There are no Patient Instructions on file for this visit.     Electronically signed by Jeff Gomez MD on 10/15/2021 at 11:37 AM           Completed Refills      Requested Prescriptions     Signed Prescriptions Disp Refills    ciprofloxacin (CIPRO) 250 MG tablet 20 tablet 0     Sig: Take 1 tablet by mouth 2 times daily for 10 days    Probiotic Acidophilus (FLORANEX) TABS 60 tablet 0     Sig: Take 1 tablet by mouth 2 times daily

## 2021-10-28 DIAGNOSIS — T78.40XD ALLERGY, SUBSEQUENT ENCOUNTER: ICD-10-CM

## 2021-10-28 DIAGNOSIS — M79.7 FIBROMYALGIA SYNDROME: ICD-10-CM

## 2021-10-28 DIAGNOSIS — E03.9 ACQUIRED HYPOTHYROIDISM: ICD-10-CM

## 2021-10-28 RX ORDER — DIMENHYDRINATE 50 MG
TABLET ORAL
Qty: 90 CAPSULE | Refills: 1 | Status: SHIPPED | OUTPATIENT
Start: 2021-10-28

## 2021-10-28 RX ORDER — TRAZODONE HYDROCHLORIDE 100 MG/1
100 TABLET ORAL NIGHTLY PRN
Qty: 90 TABLET | Refills: 1 | Status: SHIPPED | OUTPATIENT
Start: 2021-10-28 | End: 2022-06-13

## 2021-10-28 RX ORDER — MONTELUKAST SODIUM 10 MG/1
TABLET ORAL
Qty: 90 TABLET | Refills: 1 | Status: SHIPPED | OUTPATIENT
Start: 2021-10-28 | End: 2022-08-23 | Stop reason: SDUPTHER

## 2021-10-28 RX ORDER — TIZANIDINE 2 MG/1
TABLET ORAL
Qty: 90 TABLET | Refills: 1 | Status: SHIPPED | OUTPATIENT
Start: 2021-10-28 | End: 2022-02-20

## 2021-10-28 RX ORDER — GABAPENTIN 300 MG/1
CAPSULE ORAL
Qty: 270 CAPSULE | Refills: 1 | Status: SHIPPED | OUTPATIENT
Start: 2021-10-28 | End: 2022-06-13

## 2021-10-28 RX ORDER — LEVOTHYROXINE SODIUM 0.05 MG/1
TABLET ORAL
Qty: 90 TABLET | Refills: 1 | Status: SHIPPED | OUTPATIENT
Start: 2021-10-28

## 2021-11-04 ENCOUNTER — HOSPITAL ENCOUNTER (OUTPATIENT)
Dept: GENERAL RADIOLOGY | Age: 71
Discharge: HOME | End: 2021-11-06
Payer: MEDICARE

## 2021-11-04 ENCOUNTER — HOSPITAL ENCOUNTER (OUTPATIENT)
Age: 71
Discharge: HOME | End: 2021-11-04
Payer: MEDICARE

## 2021-11-04 ENCOUNTER — OFFICE VISIT (OUTPATIENT)
Dept: UROLOGY | Age: 71
End: 2021-11-04
Payer: MEDICARE

## 2021-11-04 ENCOUNTER — HOSPITAL ENCOUNTER (OUTPATIENT)
Age: 71
Discharge: HOME | End: 2021-11-06
Payer: MEDICARE

## 2021-11-04 ENCOUNTER — HOSPITAL ENCOUNTER (OUTPATIENT)
Age: 71
Setting detail: SPECIMEN
Discharge: HOME | End: 2021-11-04
Payer: MEDICARE

## 2021-11-04 ENCOUNTER — TELEPHONE (OUTPATIENT)
Dept: UROLOGY | Age: 71
End: 2021-11-04

## 2021-11-04 VITALS
DIASTOLIC BLOOD PRESSURE: 82 MMHG | BODY MASS INDEX: 24.45 KG/M2 | HEIGHT: 63 IN | SYSTOLIC BLOOD PRESSURE: 138 MMHG | WEIGHT: 138 LBS

## 2021-11-04 DIAGNOSIS — I10 ESSENTIAL HYPERTENSION: ICD-10-CM

## 2021-11-04 DIAGNOSIS — N95.2 VAGINAL ATROPHY: ICD-10-CM

## 2021-11-04 DIAGNOSIS — N28.89 RENAL MASS: ICD-10-CM

## 2021-11-04 DIAGNOSIS — R35.0 FREQUENCY OF MICTURITION: ICD-10-CM

## 2021-11-04 DIAGNOSIS — I25.10 ARTERIOSCLEROSIS OF CORONARY ARTERY: ICD-10-CM

## 2021-11-04 DIAGNOSIS — E78.2 MIXED HYPERLIPIDEMIA: ICD-10-CM

## 2021-11-04 DIAGNOSIS — E03.9 ACQUIRED HYPOTHYROIDISM: ICD-10-CM

## 2021-11-04 DIAGNOSIS — I20.9 ANGINAL SYNDROME (HCC): ICD-10-CM

## 2021-11-04 DIAGNOSIS — E55.9 VITAMIN D DEFICIENCY, UNSPECIFIED: ICD-10-CM

## 2021-11-04 DIAGNOSIS — R06.02 SHORTNESS OF BREATH: ICD-10-CM

## 2021-11-04 DIAGNOSIS — R39.15 URGENCY OF MICTURITION: ICD-10-CM

## 2021-11-04 DIAGNOSIS — N28.89 RENAL MASS: Primary | ICD-10-CM

## 2021-11-04 DIAGNOSIS — C64.1 CLEAR CELL ADENOCARCINOMA OF KIDNEY, RIGHT (HCC): ICD-10-CM

## 2021-11-04 DIAGNOSIS — N39.0 RECURRENT UTI: ICD-10-CM

## 2021-11-04 DIAGNOSIS — N39.0 RECURRENT UTI: Primary | ICD-10-CM

## 2021-11-04 LAB
-: ABNORMAL
ABSOLUTE EOS #: 0.3 K/UL (ref 0–0.4)
ABSOLUTE IMMATURE GRANULOCYTE: ABNORMAL K/UL (ref 0–0.3)
ABSOLUTE LYMPH #: 1.7 K/UL (ref 1–4.8)
ABSOLUTE MONO #: 0.5 K/UL (ref 0–1)
ALBUMIN SERPL-MCNC: 4.1 G/DL (ref 3.5–5.2)
ALBUMIN/GLOBULIN RATIO: ABNORMAL (ref 1–2.5)
ALP BLD-CCNC: 105 U/L (ref 35–104)
ALT SERPL-CCNC: 19 U/L (ref 5–33)
AMORPHOUS: ABNORMAL
ANION GAP SERPL CALCULATED.3IONS-SCNC: 8 MMOL/L (ref 9–17)
AST SERPL-CCNC: 21 U/L
BACTERIA: ABNORMAL
BASOPHILS # BLD: 0 % (ref 0–2)
BASOPHILS ABSOLUTE: 0 K/UL (ref 0–0.2)
BILIRUB SERPL-MCNC: 0.71 MG/DL (ref 0.3–1.2)
BILIRUBIN URINE: NEGATIVE
BUN BLDV-MCNC: 9 MG/DL (ref 8–23)
BUN/CREAT BLD: 16 (ref 9–20)
CALCIUM SERPL-MCNC: 9.2 MG/DL (ref 8.6–10.4)
CASTS UA: ABNORMAL /LPF
CHLORIDE BLD-SCNC: 103 MMOL/L (ref 98–107)
CHOLESTEROL/HDL RATIO: 2.8
CHOLESTEROL: 198 MG/DL
CO2: 29 MMOL/L (ref 20–31)
COLOR: YELLOW
COMMENT UA: ABNORMAL
CREAT SERPL-MCNC: 0.57 MG/DL (ref 0.5–0.9)
CRYSTALS, UA: ABNORMAL /HPF
DIFFERENTIAL TYPE: YES
EOSINOPHILS RELATIVE PERCENT: 4 % (ref 0–5)
EPITHELIAL CELLS UA: ABNORMAL /HPF
GFR AFRICAN AMERICAN: >60 ML/MIN
GFR NON-AFRICAN AMERICAN: >60 ML/MIN
GFR SERPL CREATININE-BSD FRML MDRD: ABNORMAL ML/MIN/{1.73_M2}
GFR SERPL CREATININE-BSD FRML MDRD: ABNORMAL ML/MIN/{1.73_M2}
GLUCOSE BLD-MCNC: 95 MG/DL (ref 70–99)
GLUCOSE URINE: NEGATIVE
HCT VFR BLD CALC: 45.7 % (ref 36–46)
HDLC SERPL-MCNC: 70 MG/DL
HEMOGLOBIN: 15 G/DL (ref 12–16)
IMMATURE GRANULOCYTES: ABNORMAL %
KETONES, URINE: NEGATIVE
LDL CHOLESTEROL: 115 MG/DL (ref 0–130)
LEUKOCYTE ESTERASE, URINE: NEGATIVE
LYMPHOCYTES # BLD: 22 % (ref 15–40)
MAGNESIUM: 1.8 MG/DL (ref 1.6–2.6)
MCH RBC QN AUTO: 26.3 PG (ref 26–34)
MCHC RBC AUTO-ENTMCNC: 32.8 G/DL (ref 31–37)
MCV RBC AUTO: 80.2 FL (ref 80–100)
MONOCYTES # BLD: 7 % (ref 4–8)
MUCUS: ABNORMAL
NITRITE, URINE: NEGATIVE
NRBC AUTOMATED: ABNORMAL PER 100 WBC
OTHER OBSERVATIONS UA: ABNORMAL
PATIENT FASTING?: YES
PDW BLD-RTO: 15.9 % (ref 12.1–15.2)
PH UA: 7 (ref 5–8)
PLATELET # BLD: 696 K/UL (ref 140–450)
PLATELET ESTIMATE: ABNORMAL
PMV BLD AUTO: ABNORMAL FL (ref 6–12)
POTASSIUM SERPL-SCNC: 4.2 MMOL/L (ref 3.7–5.3)
PROTEIN UA: NEGATIVE
RBC # BLD: 5.7 M/UL (ref 4–5.2)
RBC # BLD: ABNORMAL 10*6/UL
RBC UA: ABNORMAL /HPF (ref 0–2)
RENAL EPITHELIAL, UA: ABNORMAL /HPF
SEG NEUTROPHILS: 67 % (ref 47–75)
SEGMENTED NEUTROPHILS ABSOLUTE COUNT: 5.1 K/UL (ref 2.5–7)
SODIUM BLD-SCNC: 140 MMOL/L (ref 135–144)
SPECIFIC GRAVITY UA: 1.01 (ref 1–1.03)
TOTAL PROTEIN: 6.4 G/DL (ref 6.4–8.3)
TRICHOMONAS: ABNORMAL
TRIGL SERPL-MCNC: 66 MG/DL
TSH SERPL DL<=0.05 MIU/L-ACNC: 1.7 MIU/L (ref 0.3–5)
TURBIDITY: CLEAR
URINE HGB: NEGATIVE
UROBILINOGEN, URINE: NORMAL
VITAMIN D 25-HYDROXY: 48.2 NG/ML (ref 30–100)
VLDLC SERPL CALC-MCNC: NORMAL MG/DL (ref 1–30)
WBC # BLD: 7.6 K/UL (ref 3.5–11)
WBC # BLD: ABNORMAL 10*3/UL
WBC UA: ABNORMAL /HPF
YEAST: ABNORMAL

## 2021-11-04 PROCEDURE — 1090F PRES/ABSN URINE INCON ASSESS: CPT | Performed by: PHYSICIAN ASSISTANT

## 2021-11-04 PROCEDURE — 51798 US URINE CAPACITY MEASURE: CPT | Performed by: PHYSICIAN ASSISTANT

## 2021-11-04 PROCEDURE — G8420 CALC BMI NORM PARAMETERS: HCPCS | Performed by: PHYSICIAN ASSISTANT

## 2021-11-04 PROCEDURE — 85025 COMPLETE CBC W/AUTO DIFF WBC: CPT

## 2021-11-04 PROCEDURE — 71046 X-RAY EXAM CHEST 2 VIEWS: CPT

## 2021-11-04 PROCEDURE — G8484 FLU IMMUNIZE NO ADMIN: HCPCS | Performed by: PHYSICIAN ASSISTANT

## 2021-11-04 PROCEDURE — 83735 ASSAY OF MAGNESIUM: CPT

## 2021-11-04 PROCEDURE — 93005 ELECTROCARDIOGRAM TRACING: CPT

## 2021-11-04 PROCEDURE — 81001 URINALYSIS AUTO W/SCOPE: CPT

## 2021-11-04 PROCEDURE — 87086 URINE CULTURE/COLONY COUNT: CPT

## 2021-11-04 PROCEDURE — 80061 LIPID PANEL: CPT

## 2021-11-04 PROCEDURE — G8427 DOCREV CUR MEDS BY ELIG CLIN: HCPCS | Performed by: PHYSICIAN ASSISTANT

## 2021-11-04 PROCEDURE — 1123F ACP DISCUSS/DSCN MKR DOCD: CPT | Performed by: PHYSICIAN ASSISTANT

## 2021-11-04 PROCEDURE — 82306 VITAMIN D 25 HYDROXY: CPT

## 2021-11-04 PROCEDURE — 1036F TOBACCO NON-USER: CPT | Performed by: PHYSICIAN ASSISTANT

## 2021-11-04 PROCEDURE — 4040F PNEUMOC VAC/ADMIN/RCVD: CPT | Performed by: PHYSICIAN ASSISTANT

## 2021-11-04 PROCEDURE — 99214 OFFICE O/P EST MOD 30 MIN: CPT | Performed by: PHYSICIAN ASSISTANT

## 2021-11-04 PROCEDURE — 84443 ASSAY THYROID STIM HORMONE: CPT

## 2021-11-04 PROCEDURE — 3017F COLORECTAL CA SCREEN DOC REV: CPT | Performed by: PHYSICIAN ASSISTANT

## 2021-11-04 PROCEDURE — G8400 PT W/DXA NO RESULTS DOC: HCPCS | Performed by: PHYSICIAN ASSISTANT

## 2021-11-04 PROCEDURE — 80053 COMPREHEN METABOLIC PANEL: CPT

## 2021-11-04 PROCEDURE — 36415 COLL VENOUS BLD VENIPUNCTURE: CPT

## 2021-11-04 RX ORDER — B-COMPLEX WITH VITAMIN C
TABLET ORAL
COMMUNITY

## 2021-11-04 RX ORDER — ESTRADIOL 0.1 MG/G
CREAM VAGINAL
Qty: 42.5 G | Refills: 3 | Status: SHIPPED | OUTPATIENT
Start: 2021-11-04 | End: 2022-07-14 | Stop reason: SDUPTHER

## 2021-11-04 NOTE — TELEPHONE ENCOUNTER
Please fax new order for CT abdomen pelvis with and without contrast to [facility].  Please confirm that they received it.

## 2021-11-04 NOTE — PROGRESS NOTES
HPI:      Patient is a 73 y.o. male in no acute distress. She is alert and oriented to person, place, and time. 5/2021 Patient being seen here today as a new patient. Patient was referred by primary care for renal masses. Patient did have a recent CT scan. This film was independently reviewed. This does show a 3 cm mass in the right kidney. There is also a 1.7 cm mass in the left kidney. Patient does also have a small stone in the right kidney. Patient's mass on the left does appear to be cystic. I am more concerned about the mass on the right. Patient has never seen urology in the past.  Patient has had no recent gross hematuria dysuria. Patient is a lifetime non-smoker. 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 Patient has had no recent unintentional weight loss or decreased appetite. She reports no flank pain nausea vomiting today. Patient was having some back pain that prompted an MRI the MRI discovered the renal masses which prompted the CT scan. No pain today. 6/2021 - Partial right robotic assisted nephrectomy - Dr. Flores Sen RENAL CELL CARCINOMA    Urine cultures  10/2021 - UA treated with cipro  9/2021 - Klebsiella  8/2021 - Klebsiella  7/2021 - Klebsiella (outside hospital)   5/2021 - Klebsiella    Today:  Patient is here today for recurrent urinary tract infection. She did have 4 Klebsiella urinary tract infections over the past year. Patient did have a partial right robotic assisted nephrectomy in 6/2021 which did show clear cell renal cell carcinoma. She is continuing to be followed by Dr. Sandi Tellez. patient states that she just completed a course of Cipro based on a urinalysis. She continues to have symptoms. Currently complains of nocturia x2, urgency frequency and small voids. She feels that she has to strain/push to empty her bladder. Patient voided - 120mL, PVR - 36 mL.     Past Medical History:   Diagnosis Date    Allergic rhinitis 5/4/2011    Anxiety 2011    Carpal tunnel syndrome 2011    Depression 2011    Dyslipidemia 2011    Fibromyalgia 2011    Hyperlipidemia     Hypertension 2011    SOB (shortness of breath)     Thyroid disease      Past Surgical History:   Procedure Laterality Date    CARDIAC CATHETERIZATION Left 2017    Dr. Lianet Carranza @ [facility]--CAD, 50-60% ostial right CAD with an FFR of 0.94.  60% disease in the proximal LAD with an FFR of 0.92 with bridging of the mid LAD. Normal LV function at 60%. Mildly dilated ascending aorta -will do a CT scan to measure.  CARDIAC CATHETERIZATION Left 2020    Dr. Lianet Carranza @ [facility]--Medical therapy     SECTION      x 4    ELBOW SURGERY Right     GALLBLADDER SURGERY  1975    HYSTERECTOMY      total hysterectomy    KIDNEY SURGERY Right 2021    Dr. Dexter Andrew      twisted bowel 1985     Outpatient Encounter Medications as of 2021   Medication Sig Dispense Refill    Zinc 100 MG TABS Take by mouth      estradiol (ESTRACE VAGINAL) 0.1 MG/GM vaginal cream Place a pea-sized amount vaginally daily x 2 weeks, then 3 times per week thereafter. Use finger to apply, wash hands after application. DO NOT use plastic applicator. Call office if you stop this medication.  42.5 g 3    Coenzyme Q10 (CO Q-10) 100 MG CAPS TAKE 1 CAPSULE EVERY DAY 90 capsule 1    levothyroxine (SYNTHROID) 50 MCG tablet TAKE 1 TABLET EVERY DAY 90 tablet 1    gabapentin (NEURONTIN) 300 MG capsule TAKE 1 CAPSULE THREE TIMES DAILY 270 capsule 1    tiZANidine (ZANAFLEX) 2 MG tablet TAKE 1 TABLET BY MOUTH 3 TIMES DAILY AS NEEDED (MUSCLE SPASMS) 90 tablet 1    montelukast (SINGULAIR) 10 MG tablet TAKE 1 TABLET EVERY DAY 90 tablet 1    aspirin EC 81 MG EC tablet Take 1 tablet by mouth daily 90 tablet 1    ibuprofen (ADVIL;MOTRIN) 800 MG tablet TAKE 1 TABLET BY MOUTH EVERY 6 HOURS AS NEEDED      lisinopril-hydroCHLOROthiazide (PRINZIDE;ZESTORETIC) 20-25 MG per tablet Take by mouth      vitamin D3 (CHOLECALCIFEROL) 25 MCG (1000 UT) TABS tablet TAKE 1 TABLET EVERY DAY 90 tablet 1    omeprazole (PRILOSEC) 20 MG delayed release capsule Take 1 capsule by mouth 2 times daily (before meals) 60 capsule 1    amLODIPine (NORVASC) 2.5 MG tablet       Ascorbic Acid (VITAMIN C) 250 MG tablet Take 2 tablets by mouth daily 30 tablet 5    vitamin B-6 (PYRIDOXINE) 100 MG tablet Take 1 tablet by mouth daily 30 tablet 5    polycarbophil (FIBER-CAPS) 625 MG tablet Take 1 tablet by mouth daily 30 tablet 4    lisinopril (PRINIVIL;ZESTRIL) 20 MG tablet TAKE 1 TABLET EVERY DAY 90 tablet 3    metoprolol tartrate (LOPRESSOR) 25 MG tablet TAKE 1/2 TABLET TWICE DAILY 90 tablet 3    fluticasone (FLONASE) 50 MCG/ACT nasal spray USE 2 SPRAYS NASALLY EVERY DAY 48 g 1    dicyclomine (BENTYL) 10 MG capsule       Blood Pressure Monitoring (BLOOD PRESSURE CUFF) MISC Use to monitor Blood pressure 1-2 times daily 1 each 0    Simethicone 80 MG TABS Take 80 mg by mouth 2 times daily as needed (bloating) 60 tablet 0    vitamin D (CHOLECALCIFEROL) 1000 UNIT TABS tablet Take 1,000 Units by mouth daily       traZODone (DESYREL) 100 MG tablet TAKE 1 TABLET BY MOUTH NIGHTLY AS NEEDED FOR SLEEP (Patient not taking: Reported on 11/4/2021) 90 tablet 1    Probiotic Acidophilus (FLORANEX) TABS Take 1 tablet by mouth 2 times daily (Patient not taking: Reported on 11/4/2021) 60 tablet 0    potassium chloride (KLOR-CON) 10 MEQ extended release tablet Take 1 tablet by mouth daily (Patient not taking: Reported on 11/4/2021) 60 tablet 1     No facility-administered encounter medications on file as of 11/4/2021.       Current Outpatient Medications on File Prior to Visit   Medication Sig Dispense Refill    Zinc 100 MG TABS Take by mouth      Coenzyme Q10 (CO Q-10) 100 MG CAPS TAKE 1 CAPSULE EVERY DAY 90 capsule 1    levothyroxine (SYNTHROID) 50 MCG tablet TAKE 1 TABLET EVERY DAY 90 tablet 1    gabapentin (NEURONTIN) 300 MG capsule TAKE 1 CAPSULE THREE TIMES DAILY 270 capsule 1    tiZANidine (ZANAFLEX) 2 MG tablet TAKE 1 TABLET BY MOUTH 3 TIMES DAILY AS NEEDED (MUSCLE SPASMS) 90 tablet 1    montelukast (SINGULAIR) 10 MG tablet TAKE 1 TABLET EVERY DAY 90 tablet 1    aspirin EC 81 MG EC tablet Take 1 tablet by mouth daily 90 tablet 1    ibuprofen (ADVIL;MOTRIN) 800 MG tablet TAKE 1 TABLET BY MOUTH EVERY 6 HOURS AS NEEDED      lisinopril-hydroCHLOROthiazide (PRINZIDE;ZESTORETIC) 20-25 MG per tablet Take by mouth      vitamin D3 (CHOLECALCIFEROL) 25 MCG (1000 UT) TABS tablet TAKE 1 TABLET EVERY DAY 90 tablet 1    omeprazole (PRILOSEC) 20 MG delayed release capsule Take 1 capsule by mouth 2 times daily (before meals) 60 capsule 1    amLODIPine (NORVASC) 2.5 MG tablet       Ascorbic Acid (VITAMIN C) 250 MG tablet Take 2 tablets by mouth daily 30 tablet 5    vitamin B-6 (PYRIDOXINE) 100 MG tablet Take 1 tablet by mouth daily 30 tablet 5    polycarbophil (FIBER-CAPS) 625 MG tablet Take 1 tablet by mouth daily 30 tablet 4    lisinopril (PRINIVIL;ZESTRIL) 20 MG tablet TAKE 1 TABLET EVERY DAY 90 tablet 3    metoprolol tartrate (LOPRESSOR) 25 MG tablet TAKE 1/2 TABLET TWICE DAILY 90 tablet 3    fluticasone (FLONASE) 50 MCG/ACT nasal spray USE 2 SPRAYS NASALLY EVERY DAY 48 g 1    dicyclomine (BENTYL) 10 MG capsule       Blood Pressure Monitoring (BLOOD PRESSURE CUFF) MISC Use to monitor Blood pressure 1-2 times daily 1 each 0    Simethicone 80 MG TABS Take 80 mg by mouth 2 times daily as needed (bloating) 60 tablet 0    vitamin D (CHOLECALCIFEROL) 1000 UNIT TABS tablet Take 1,000 Units by mouth daily       traZODone (DESYREL) 100 MG tablet TAKE 1 TABLET BY MOUTH NIGHTLY AS NEEDED FOR SLEEP (Patient not taking: Reported on 11/4/2021) 90 tablet 1    Probiotic Acidophilus (FLORANEX) TABS Take 1 tablet by mouth 2 times daily (Patient not taking: urine culture results. If she does need antibiotic we will treat her with an extended course. Encourage water intake    Avoid bladder irritants    Start over-the-counter oral cranberry daily    Start vaginal estrogen, did give her instructions, let her know that it may take upwards of 6 months to be fully effective.     We may need to perform a cystoscopy on her to more fully evaluate her or order additional imaging if she continues to develop infections    Follow-up in 6 weeks

## 2021-11-04 NOTE — PATIENT INSTRUCTIONS
BLADDER IRRITANTS     There are several changes you can make to your diet to help improve your urinary symptoms. The following have been shown to cause irritation to the bladder and should be AVOIDED if possible:  ~ Coffee (including decaffeinated)   ~ ALL Tea (including green teas and decaffeinated)  ~ Soda/Pop/carbonated beverages/Energy drinks (especially dark dyed zachary, root beers, mountain dew, etc)  ~These are MUCH worse if they have caffeine, but can also be irritative to the bladder even without caffeine  ~ Alcoholic beverages  ~ Spicy foods (peppers contain capsaicin, which is very irritating to the bladder)  ~ Acidic foods (for example: tomato based foods, orange juice, etc)    We encourage increased water intake, unless you have been placed on a fluid restriction by another provider. SURVEY:    You may be receiving a survey from [employer] regarding your visit today. Please complete the survey to enable us to provide the highest quality of care to you and your family. If you cannot score us a very good on any question, please call the office to discuss how we could have made your experience a very good one. Thank you.

## 2021-11-05 LAB
CULTURE: NO GROWTH
Lab: NORMAL
SPECIMEN DESCRIPTION: NORMAL

## 2021-11-07 LAB
EKG ATRIAL RATE: 62 BPM
EKG P AXIS: 74 DEGREES
EKG P-R INTERVAL: 166 MS
EKG Q-T INTERVAL: 432 MS
EKG QRS DURATION: 90 MS
EKG QTC CALCULATION (BAZETT): 438 MS
EKG R AXIS: 76 DEGREES
EKG T AXIS: 81 DEGREES
EKG VENTRICULAR RATE: 62 BPM

## 2021-11-07 PROCEDURE — 93010 ELECTROCARDIOGRAM REPORT: CPT | Performed by: INTERNAL MEDICINE

## 2021-11-08 ENCOUNTER — TELEPHONE (OUTPATIENT)
Dept: UROLOGY | Age: 71
End: 2021-11-08

## 2021-11-08 NOTE — TELEPHONE ENCOUNTER
----- Message from CARMELITA Garcia - CNP sent at 11/8/2021 10:00 AM EST -----  Call pt - urine cx reviewed and negative for UTI & for significant microhematuria

## 2021-11-17 ENCOUNTER — OFFICE VISIT (OUTPATIENT)
Dept: CARDIOLOGY CLINIC | Age: 71
End: 2021-11-17
Payer: MEDICARE

## 2021-11-17 VITALS
DIASTOLIC BLOOD PRESSURE: 80 MMHG | WEIGHT: 136 LBS | SYSTOLIC BLOOD PRESSURE: 140 MMHG | BODY MASS INDEX: 24.09 KG/M2 | OXYGEN SATURATION: 96 % | HEART RATE: 73 BPM

## 2021-11-17 DIAGNOSIS — I10 ESSENTIAL HYPERTENSION: Primary | ICD-10-CM

## 2021-11-17 DIAGNOSIS — R00.2 PALPITATIONS: ICD-10-CM

## 2021-11-17 DIAGNOSIS — E78.2 MIXED HYPERLIPIDEMIA: ICD-10-CM

## 2021-11-17 DIAGNOSIS — E55.9 VITAMIN D DEFICIENCY, UNSPECIFIED: ICD-10-CM

## 2021-11-17 PROCEDURE — 1123F ACP DISCUSS/DSCN MKR DOCD: CPT | Performed by: INTERNAL MEDICINE

## 2021-11-17 PROCEDURE — G8400 PT W/DXA NO RESULTS DOC: HCPCS | Performed by: INTERNAL MEDICINE

## 2021-11-17 PROCEDURE — 3017F COLORECTAL CA SCREEN DOC REV: CPT | Performed by: INTERNAL MEDICINE

## 2021-11-17 PROCEDURE — 99214 OFFICE O/P EST MOD 30 MIN: CPT | Performed by: INTERNAL MEDICINE

## 2021-11-17 PROCEDURE — 4040F PNEUMOC VAC/ADMIN/RCVD: CPT | Performed by: INTERNAL MEDICINE

## 2021-11-17 PROCEDURE — 1036F TOBACCO NON-USER: CPT | Performed by: INTERNAL MEDICINE

## 2021-11-17 PROCEDURE — 1090F PRES/ABSN URINE INCON ASSESS: CPT | Performed by: INTERNAL MEDICINE

## 2021-11-17 PROCEDURE — G8484 FLU IMMUNIZE NO ADMIN: HCPCS | Performed by: INTERNAL MEDICINE

## 2021-11-17 PROCEDURE — G8428 CUR MEDS NOT DOCUMENT: HCPCS | Performed by: INTERNAL MEDICINE

## 2021-11-17 PROCEDURE — G8420 CALC BMI NORM PARAMETERS: HCPCS | Performed by: INTERNAL MEDICINE

## 2021-11-17 RX ORDER — LISINOPRIL 20 MG/1
TABLET ORAL
Qty: 90 TABLET | Refills: 3 | Status: SHIPPED | OUTPATIENT
Start: 2021-11-17 | End: 2022-06-13 | Stop reason: SDUPTHER

## 2021-11-17 NOTE — PROGRESS NOTES
Ov Dr. Alivia Stewart for one year follow up   Pt states all medications should be in computer   June had surgery on right kidney   Removed small ca tumor   Another tumor on the left scans  To be done tomorrow   D/t that plus ongoing UTI's   No chemo/radiation needed   C/o fatigue - x 6 months  C/o palpitations \"gurgling\"-daily   One episode of chest pain  C/o sob when walking up stairs   \"pretty new\" per pt   C/o lightheadedness when getting up  To fast or bending over   No edema  Has Mai 6 yr old dtg  Pamela Goins doing well-not working    passed in Oct 2020  aspirated   Buried in [place] at [facility]   Will be going to [place] for thanksgiving to   [address]  Bedside echo done     Will set up for 48 hr holter  And call with results    Will follow up in one year

## 2021-11-17 NOTE — LETTER
some palpitations, one episode of chest pain. She has some shortness of breath when walking up steps. She has had no syncope or near syncope. She has been under much stress with her  passing away in 10/2020. He had evidently aspirated after he had been drinking. She has six children with one son being Veronica Barba who is a patient of ours. She did have foster children. She had a girl named Alex who she adopted at birth who is currently 6years old and doing well. She plans on going to [place] for Thanksgiving to her sister's house where her  is also buried. She does not smoke or drink alcohol. She has complained of some palpitations where she feels her heart \"beating fast.\"  She has had no syncope, near syncope, lightheaded, or dizziness. CARDIAC RISK FACTORS:  Known CAD:  Positive. Hypertension:  Positive. Hyperlipidemia:  Positive. Other Family Members:  Positive. Peripheral Vascular Disease:  Negative. Smoking:  Negative. Diabetes:  Negative. MEDICATIONS:  At this time, she is on Norvasc 2.5 mg daily, aspirin 81 mg daily, CoQ10 daily, Flonase p.r.n., Neurontin 300 mg t.i.d., Synthroid 50 mcg daily, lisinopril 20 mg daily with Prinzide 20/25 daily, Lopressor 25 mg half a tablet b.i.d., Singulair 10 mg daily, Prilosec 20 mg b.i.d., Zanaflex 2 mg daily, vitamins daily. PAST MEDICAL AND SURGICAL HISTORY:  1. Hypertension, many years, very labile. 2.  Hyperlipidemia. 3.  Four C-sections in , , , and .  4.  Right shoulder surgery in  and .  5.  Hysterectomy in .  6.  Several surgeries for adhesions. 7.  Depression. 8.  Right elbow surgery in .  9.  Cholecystectomy in . 10.  Rotator cuff repair. 11.  Catheterization on 2017. 12.  Last catheterization on 2020. 13.  Renal cell carcinoma with partial right nephrectomy with no metastasis. FAMILY HISTORY:  Mother  of CVA at [de-identified]. Father  of MI at 71.   One son had MI by the name of Cristina Ann. SOCIAL HISTORY:  She is 73years old, retired from [employer] Six children with one son, Roma Monique, who is a patient of ours. She has adopted a girl by the name of Alex who is 6years old, doing well. Her , Roma Monique, passed away in 10/2020, after aspirating when he was drinking alcohol. She does not smoke or drink alcohol. REVIEW OF SYSTEMS:  Cardiac as above. Other systems reviewed including constitutional, eyes, ears, nose and throat, cardiovascular, respiratory, GI, , musculoskeletal, integumentary, neurologic, psychiatric, endocrine, hematologic and allergic/immunologic are negative except for what is described above. No weight loss or weight gain. No change in bowel habits. No blood in stools. No fevers, sweats or chills. PHYSICAL EXAMINATION:  VITAL SIGNS:  Her blood pressure was 140/80 with a heart rate of 73 and regular. Respiratory rate 18. O2 saturation 96%. Weight 136 pounds. GENERAL:  She is a pleasant, frail 79-year-old female denying pain. She was oriented to person, place and time. Answered questions appropriately. SKIN:  No unusual skin changes. HEENT:  The pupils are equally round and intact. Mucous membranes were dry. NECK:  No JVD. Good carotid pulses. No carotid bruits. No lymphadenopathy or thyromegaly. CARDIOVASCULAR EXAM:  S1 and S2 were normal.  No S3 or S4. Soft systolic blowing type murmur. No diastolic murmur. PMI was normal.  No lift, thrust, or pericardial friction rub. LUNGS:  Clear to auscultation and percussion. ABDOMEN:  Soft and nontender. Good bowel sounds. EXTREMITIES:  Good femoral pulses. Good pedal pulses. No pedal edema. Skin was warm and dry. No calf tenderness. Nail beds pink. Good cap refill. PULSES:  Bilateral symmetrical radial, brachial and carotid pulses. No carotid bruits. Good femoral and pedal pulses. NEUROLOGIC EXAM:  Within normal limits.   PSYCHIATRIC EXAM:  Within

## 2021-11-22 NOTE — PROGRESS NOTES
Jeanine Munoz M.D. [address] [place] [address], [place] [address]  [phone]        2021        Jeff Gomez MD  [address] [facility], [address] [facility]      RE:   Isiah Ley  :  1950      Dear Dr. Silvia Peguero:    CHIEF COMPLAINT:  1. Chest pain. 2.  Mild coronary artery disease status post cardiac catheterization by myself on 2020, that showed 30% to 40% LAD disease with 50% disease in the ostium of the right coronary artery with an iFR of 1.0, unremarkable circumflex, normal LV function, EF 60%, medical therapy recommended. HISTORY OF PRESENT ILLNESS:  I had the pleasure of seeing Mrs. Roxanne Stubbs in our office on 2021. She is a very pleasant 68-year-old female with a stress test in , which was abnormal.  This resulted in a catheterization on 2017, that showed 60% disease in the ostium of the right coronary artery and 60% LAD, mild circumflex, FFR of 0.94 in her right coronary artery, 0.92 in the LAD, 0.91 in diagonal, medical therapy recommended. She developed increasing shortness of breath and had a stress test that was done on 2020. It was actually fairly good with normal perfusion. An echocardiogram also looked good. Because of her continued chest discomfort, we repeated the catheterization on 2020. It actually looked if anything better than the first one was 50% RCA, ostial disease with an FFR of 1.0 which means no limitation in the flow. Her LAD had 30% to 40% disease and circumflex looked quite good with normal LV function, EF 60%. She has done well since that time. She has some fatigue over the last 6 months. She did have a right nephrectomy due to a 3 cm mass in her right kidney which was a small cell which was a renal cell carcinoma with no metastasis. She has had no chemotherapy or radiation therapy. She has had fatigue for last 6 months.   She does complain of some palpitations, one episode of chest pain. She has some shortness of breath when walking up steps. She has had no syncope or near syncope. She has been under much stress with her  passing away in 10/2020. He had evidently aspirated after he had been drinking. She has six children with one son being Diana Whittaker who is a patient of ours. She did have foster children. She had a girl named Alex who she adopted at birth who is currently 6years old and doing well. She plans on going to [place] for Thanksgiving to her sister's house where her  is also buried. She does not smoke or drink alcohol. She has complained of some palpitations where she feels her heart \"beating fast.\"  She has had no syncope, near syncope, lightheaded, or dizziness. CARDIAC RISK FACTORS:  Known CAD:  Positive. Hypertension:  Positive. Hyperlipidemia:  Positive. Other Family Members:  Positive. Peripheral Vascular Disease:  Negative. Smoking:  Negative. Diabetes:  Negative. MEDICATIONS:  At this time, she is on Norvasc 2.5 mg daily, aspirin 81 mg daily, CoQ10 daily, Flonase p.r.n., Neurontin 300 mg t.i.d., Synthroid 50 mcg daily, lisinopril 20 mg daily with Prinzide 20/25 daily, Lopressor 25 mg half a tablet b.i.d., Singulair 10 mg daily, Prilosec 20 mg b.i.d., Zanaflex 2 mg daily, vitamins daily. PAST MEDICAL AND SURGICAL HISTORY:  1. Hypertension, many years, very labile. 2.  Hyperlipidemia. 3.  Four C-sections in , , , and .  4.  Right shoulder surgery in  and .  5.  Hysterectomy in .  6.  Several surgeries for adhesions. 7.  Depression. 8.  Right elbow surgery in .  9.  Cholecystectomy in . 10.  Rotator cuff repair. 11.  Catheterization on 2017. 12.  Last catheterization on 2020. 13.  Renal cell carcinoma with partial right nephrectomy with no metastasis. FAMILY HISTORY:  Mother  of CVA at [de-identified]. Father  of MI at 71.   One son had MI by the name of Cami Her. SOCIAL HISTORY:  She is 73years old, retired from [employer] Six children with one son, Pia Woods, who is a patient of ours. She has adopted a girl by the name of Alex who is 6years old, doing well. Her , Pia Woods, passed away in 10/2020, after aspirating when he was drinking alcohol. She does not smoke or drink alcohol. REVIEW OF SYSTEMS:  Cardiac as above. Other systems reviewed including constitutional, eyes, ears, nose and throat, cardiovascular, respiratory, GI, , musculoskeletal, integumentary, neurologic, psychiatric, endocrine, hematologic and allergic/immunologic are negative except for what is described above. No weight loss or weight gain. No change in bowel habits. No blood in stools. No fevers, sweats or chills. PHYSICAL EXAMINATION:  VITAL SIGNS:  Her blood pressure was 140/80 with a heart rate of 73 and regular. Respiratory rate 18. O2 saturation 96%. Weight 136 pounds. GENERAL:  She is a pleasant, frail 73-year-old female denying pain. She was oriented to person, place and time. Answered questions appropriately. SKIN:  No unusual skin changes. HEENT:  The pupils are equally round and intact. Mucous membranes were dry. NECK:  No JVD. Good carotid pulses. No carotid bruits. No lymphadenopathy or thyromegaly. CARDIOVASCULAR EXAM:  S1 and S2 were normal.  No S3 or S4. Soft systolic blowing type murmur. No diastolic murmur. PMI was normal.  No lift, thrust, or pericardial friction rub. LUNGS:  Clear to auscultation and percussion. ABDOMEN:  Soft and nontender. Good bowel sounds. EXTREMITIES:  Good femoral pulses. Good pedal pulses. No pedal edema. Skin was warm and dry. No calf tenderness. Nail beds pink. Good cap refill. PULSES:  Bilateral symmetrical radial, brachial and carotid pulses. No carotid bruits. Good femoral and pedal pulses. NEUROLOGIC EXAM:  Within normal limits.   PSYCHIATRIC EXAM:  Within normal limits. LABORATORY DATA:  Sodium was 140, potassium 4.2, BUN 9, creatinine 0.57, GFR greater than 60. Magnesium 1.8. Glucose 95. Calcium was 9.2. Cholesterol 198, HDL 70, , triglycerides 66. ALT was 19, AST was 21. TSH 1.70. Vitamin D was 48.2. White count 7.6, hemoglobin 15.2 with a platelet count 802,762. EKG showed sinus rhythm and was normal.    IMPRESSION:  1. Moderate coronary artery disease status post catheterization on 07/20/2020, showing 50% RCA with an iFR of 1.0 with 30% to 40% LAD, unremarkable circumflex, normal LV function. 2.  Catheterization on 12/20/2017, showing 60% disease in all three major vessels with normal LV function. 3.  Strong family history of coronary artery disease. 4.  Very labile hypertension. 5.  History of depression, untreated. 6.  Palpitations. PLAN:  1. We will do a 48-hour Holter monitor and call with results. 2.  We will follow up in 1 year. DISCUSSION:  Overall, she is doing well and adjusting to life without her . Her adopted daughter, Daniel Shirley, is a very much of stacie in her life. Thank you very much for allowing me the privilege of seeing Mrs. Flavio Woo. If you have any questions on my thoughts, please do not hesitate to contact me.     Sincerely,        Renee Ocasio    D: 11/17/2021 22:30:27     T: 11/18/2021 13:18:22     [email or web address]  Job#: [id number]   Doc#: [id number]

## 2021-11-23 NOTE — TELEPHONE ENCOUNTER
Patient called to follow up if she had her CT scan completed.  Patient reports that she is scheduled to have it completed 11/30/21 at [facility].

## 2021-11-29 ENCOUNTER — HOSPITAL ENCOUNTER (OUTPATIENT)
Dept: NON INVASIVE DIAGNOSTICS | Age: 71
Discharge: HOME | End: 2021-11-29
Payer: MEDICARE

## 2021-11-29 DIAGNOSIS — E55.9 VITAMIN D DEFICIENCY, UNSPECIFIED: ICD-10-CM

## 2021-11-29 DIAGNOSIS — I10 ESSENTIAL HYPERTENSION: ICD-10-CM

## 2021-11-29 DIAGNOSIS — E78.2 MIXED HYPERLIPIDEMIA: ICD-10-CM

## 2021-11-29 DIAGNOSIS — R00.2 PALPITATIONS: ICD-10-CM

## 2021-11-29 PROCEDURE — 93225 XTRNL ECG REC<48 HRS REC: CPT

## 2021-11-29 PROCEDURE — 93226 XTRNL ECG REC<48 HR SCAN A/R: CPT

## 2021-11-29 NOTE — PROGRESS NOTES
Patient has CT tomorrow in [place]. Jose Barthel consulted , he is OK with patient wearing until time of CT approximately 16 hours.

## 2021-12-05 LAB
ACQUISITION DURATION: NORMAL S
AVERAGE HEART RATE: 69 BPM
EKG DIAGNOSIS: NORMAL
HOLTER MAX HEART RATE: 94 BPM
HOOKUP DATE: NORMAL
HOOKUP TIME: NORMAL
MAX HEART RATE TIME/DATE: NORMAL
MIN HEART RATE TIME/DATE: NORMAL
MIN HEART RATE: 52 BPM
NUMBER OF QRS COMPLEXES: NORMAL
NUMBER OF SUPRAVENTRICULAR COUPLETS: 2
NUMBER OF SUPRAVENTRICULAR ECTOPICS: 64
NUMBER OF SUPRAVENTRICULAR ISOLATED BEATS: 43
NUMBER OF VENTRICULAR BIGEMINAL CYCLES: 0
NUMBER OF VENTRICULAR COUPLETS: 0
NUMBER OF VENTRICULAR ECTOPICS: 329

## 2021-12-06 ENCOUNTER — TELEPHONE (OUTPATIENT)
Dept: CARDIOLOGY CLINIC | Age: 71
End: 2021-12-06

## 2021-12-06 DIAGNOSIS — N39.0 RECURRENT UTI: ICD-10-CM

## 2021-12-06 DIAGNOSIS — N28.89 RENAL MASS: ICD-10-CM

## 2021-12-06 NOTE — TELEPHONE ENCOUNTER
Pt called with monitor results-has occasional mild faster HR-will increase Lopressor 25 mg to full tablet in am  And 1/2 tablet in pm   PER Dr. Mike Ravi

## 2021-12-08 ENCOUNTER — TELEPHONE (OUTPATIENT)
Dept: UROLOGY | Age: 71
End: 2021-12-08

## 2021-12-08 NOTE — TELEPHONE ENCOUNTER
Writer lm for patient to call the office, appt given for 12/9/2021 @ 11am, need to know if she is going to keep appt or r/s

## 2021-12-14 RX ORDER — MELATONIN
Qty: 90 TABLET | Refills: 1 | Status: SHIPPED | OUTPATIENT
Start: 2021-12-14

## 2021-12-16 ENCOUNTER — OFFICE VISIT (OUTPATIENT)
Dept: UROLOGY | Age: 71
End: 2021-12-16
Payer: MEDICARE

## 2021-12-16 VITALS
DIASTOLIC BLOOD PRESSURE: 82 MMHG | BODY MASS INDEX: 24.8 KG/M2 | WEIGHT: 140 LBS | SYSTOLIC BLOOD PRESSURE: 140 MMHG | HEIGHT: 63 IN

## 2021-12-16 DIAGNOSIS — C64.1 CLEAR CELL ADENOCARCINOMA OF KIDNEY, RIGHT (HCC): ICD-10-CM

## 2021-12-16 DIAGNOSIS — N28.89 RENAL MASS: Primary | ICD-10-CM

## 2021-12-16 PROCEDURE — G8427 DOCREV CUR MEDS BY ELIG CLIN: HCPCS | Performed by: UROLOGY

## 2021-12-16 PROCEDURE — 1123F ACP DISCUSS/DSCN MKR DOCD: CPT | Performed by: UROLOGY

## 2021-12-16 PROCEDURE — 99213 OFFICE O/P EST LOW 20 MIN: CPT | Performed by: UROLOGY

## 2021-12-16 PROCEDURE — 3017F COLORECTAL CA SCREEN DOC REV: CPT | Performed by: UROLOGY

## 2021-12-16 PROCEDURE — G8420 CALC BMI NORM PARAMETERS: HCPCS | Performed by: UROLOGY

## 2021-12-16 PROCEDURE — 1036F TOBACCO NON-USER: CPT | Performed by: UROLOGY

## 2021-12-16 PROCEDURE — 1090F PRES/ABSN URINE INCON ASSESS: CPT | Performed by: UROLOGY

## 2021-12-16 PROCEDURE — G8484 FLU IMMUNIZE NO ADMIN: HCPCS | Performed by: UROLOGY

## 2021-12-16 PROCEDURE — G8400 PT W/DXA NO RESULTS DOC: HCPCS | Performed by: UROLOGY

## 2021-12-16 PROCEDURE — 4040F PNEUMOC VAC/ADMIN/RCVD: CPT | Performed by: UROLOGY

## 2021-12-16 ASSESSMENT — ENCOUNTER SYMPTOMS
COLOR CHANGE: 0
EYE REDNESS: 0
BACK PAIN: 0
WHEEZING: 0
ABDOMINAL PAIN: 0
SHORTNESS OF BREATH: 0
COUGH: 0
VOMITING: 0
NAUSEA: 0
EYE PAIN: 0

## 2021-12-16 NOTE — PROGRESS NOTES
HPI:        Patient is a 73 y.o. female in no acute distress. She is alert and oriented to person, place, and time. History  5/2021 Patient being seen here today as a new patient. Patient was referred by primary care for renal masses. Patient did have a recent CT scan. This film was independently reviewed. This does show a 3 cm mass in the right kidney. There is also a 1.7 cm mass in the left kidney. Patient does also have a small stone in the right kidney. Patient's mass on the left does appear to be cystic. I am more concerned about the mass on the right. Patient has never seen urology in the past.  Patient has had no recent gross hematuria dysuria. Patient is a lifetime non-smoker. Patient did work for 25 years in an [employer]. Patient has had no recent unintentional weight loss or decreased appetite. She reports no flank pain nausea vomiting today. Patient was having some back pain that prompted an MRI the MRI discovered the renal masses which prompted the CT scan. No pain today.     6/2021 - Partial right robotic assisted nephrectomy - Dr. Donavan Pina RENAL CELL CARCINOMA     Urine cultures  10/2021 - UA treated with cipro  9/2021 - Klebsiella  8/2021 - Klebsiella  7/2021 - Klebsiella (outside hospital)   5/2021 - Klebsiella     Currently  Patient is here today for 6-week follow-up. At the last visit we did reculture water. We told her to avoid bladder irritants. We also gave her vaginal estrogen. Patient did have a recent CT scan. This film was independently reviewed. This did show a small lesion in the left kidney. There is also some suggestions in the paracolic gutter of peritoneal carcinomatosis. This is new finding. Patient is having no symptoms. No gross hematuria dysuria. No pain.     Past Medical History:   Diagnosis Date    Allergic rhinitis 5/4/2011    Anxiety 5/4/2011    Carpal tunnel syndrome 5/4/2011    Depression 5/4/2011    Dyslipidemia 2011    Fibromyalgia 2011    Hyperlipidemia     Hypertension 2011    SOB (shortness of breath)     Thyroid disease      Past Surgical History:   Procedure Laterality Date    CARDIAC CATHETERIZATION Left 2017    Dr. Gurperet Victoria @ [facility]--CAD, 50-60% ostial right CAD with an FFR of 0.94.  60% disease in the proximal LAD with an FFR of 0.92 with bridging of the mid LAD. Normal LV function at 60%. Mildly dilated ascending aorta -will do a CT scan to measure.  CARDIAC CATHETERIZATION Left 2020    Dr. Gurpreet Victoria @ [facility]--Medical therapy     SECTION      x 4   [facility]    HYSTERECTOMY      total hysterectomy    KIDNEY SURGERY Right 2021    Dr. Lissette Colon      Right   [address]      twisted bowel 1985     Outpatient Encounter Medications as of 2021   Medication Sig Dispense Refill    vitamin D3 (CHOLECALCIFEROL) 25 MCG (1000 UT) TABS tablet TAKE 1 TABLET EVERY DAY 90 tablet 1    lisinopril (PRINIVIL;ZESTRIL) 20 MG tablet TAKE 1 TABLET EVERY DAY 90 tablet 3    metoprolol tartrate (LOPRESSOR) 25 MG tablet TAKE 1/2 TABLET TWICE DAILY (Patient taking differently: Take 25 mg by mouth Take full tablet in am and 1/2 tablet in pm) 90 tablet 3    Zinc 100 MG TABS Take by mouth      estradiol (ESTRACE VAGINAL) 0.1 MG/GM vaginal cream Place a pea-sized amount vaginally daily x 2 weeks, then 3 times per week thereafter. Use finger to apply, wash hands after application. DO NOT use plastic applicator. Call office if you stop this medication.  42.5 g 3    Coenzyme Q10 (CO Q-10) 100 MG CAPS TAKE 1 CAPSULE EVERY DAY 90 capsule 1    levothyroxine (SYNTHROID) 50 MCG tablet TAKE 1 TABLET EVERY DAY 90 tablet 1    tiZANidine (ZANAFLEX) 2 MG tablet TAKE 1 TABLET BY MOUTH 3 TIMES DAILY AS NEEDED (MUSCLE SPASMS) 90 tablet 1    traZODone (DESYREL) 100 MG tablet TAKE 1 TABLET BY MOUTH NIGHTLY AS NEEDED FOR SLEEP 90 tablet 1    montelukast (SINGULAIR) 10 MG tablet TAKE 1 TABLET EVERY DAY 90 tablet 1    aspirin EC 81 MG EC tablet Take 1 tablet by mouth daily 90 tablet 1    ibuprofen (ADVIL;MOTRIN) 800 MG tablet TAKE 1 TABLET BY MOUTH EVERY 6 HOURS AS NEEDED      lisinopril-hydroCHLOROthiazide (PRINZIDE;ZESTORETIC) 20-25 MG per tablet Take by mouth      omeprazole (PRILOSEC) 20 MG delayed release capsule Take 1 capsule by mouth 2 times daily (before meals) 60 capsule 1    amLODIPine (NORVASC) 2.5 MG tablet       Ascorbic Acid (VITAMIN C) 250 MG tablet Take 2 tablets by mouth daily 30 tablet 5    vitamin B-6 (PYRIDOXINE) 100 MG tablet Take 1 tablet by mouth daily 30 tablet 5    polycarbophil (FIBER-CAPS) 625 MG tablet Take 1 tablet by mouth daily 30 tablet 4    potassium chloride (KLOR-CON) 10 MEQ extended release tablet Take 1 tablet by mouth daily 60 tablet 1    fluticasone (FLONASE) 50 MCG/ACT nasal spray USE 2 SPRAYS NASALLY EVERY DAY 48 g 1    dicyclomine (BENTYL) 10 MG capsule       Blood Pressure Monitoring (BLOOD PRESSURE CUFF) MISC Use to monitor Blood pressure 1-2 times daily 1 each 0    Simethicone 80 MG TABS Take 80 mg by mouth 2 times daily as needed (bloating) 60 tablet 0    vitamin D (CHOLECALCIFEROL) 1000 UNIT TABS tablet Take 1,000 Units by mouth daily       gabapentin (NEURONTIN) 300 MG capsule TAKE 1 CAPSULE THREE TIMES DAILY 270 capsule 1     No facility-administered encounter medications on file as of 12/16/2021.       Current Outpatient Medications on File Prior to Visit   Medication Sig Dispense Refill    vitamin D3 (CHOLECALCIFEROL) 25 MCG (1000 UT) TABS tablet TAKE 1 TABLET EVERY DAY 90 tablet 1    lisinopril (PRINIVIL;ZESTRIL) 20 MG tablet TAKE 1 TABLET EVERY DAY 90 tablet 3    metoprolol tartrate (LOPRESSOR) 25 MG tablet TAKE 1/2 TABLET TWICE DAILY (Patient taking differently: Take 25 mg by mouth Take full tablet in am and 1/2 tablet in pm) 90 tablet 3    mouth daily       gabapentin (NEURONTIN) 300 MG capsule TAKE 1 CAPSULE THREE TIMES DAILY 270 capsule 1     No current facility-administered medications on file prior to visit. Patient has no known allergies. Family History   Problem Relation Age of Onset    Stroke Mother     High Cholesterol Mother     Heart Attack Mother     Heart Attack Father     High Cholesterol Father     High Cholesterol Sister     High Cholesterol Brother      Social History     Tobacco Use   Smoking Status Never Smoker   Smokeless Tobacco Never Used       Social History     Substance and Sexual Activity   Alcohol Use No    Comment: one glass once or twice a month       Review of Systems   Constitutional: Negative for appetite change, chills and fever. Eyes: Negative for pain, redness and visual disturbance. Respiratory: Negative for cough, shortness of breath and wheezing. Cardiovascular: Negative for chest pain and leg swelling. Gastrointestinal: Negative for abdominal pain, nausea and vomiting. Genitourinary: Negative for difficulty urinating, dysuria, flank pain, frequency, hematuria, pelvic pain, vaginal bleeding and vaginal discharge. Musculoskeletal: Negative for back pain, joint swelling and myalgias. Skin: Negative for color change, rash and wound. Neurological: Negative for dizziness, tremors and numbness. Hematological: Negative for adenopathy. Does not bruise/bleed easily. BP (!) 140/82   Ht 5' 3\" (1.6 m)   Wt 140 lb (63.5 kg)   LMP  (LMP Unknown)   Breastfeeding No   BMI 24.80 kg/m²       PHYSICAL EXAM:  Constitutional: Patient resting comfortably, in no acute distress. Neuro: Alert and oriented to person place and time. Cranial nerves grossly intact.     Psych: Mood and affect normal.  Skin: Warm, dry  HEENT: normocephalic, atraumatic  Lymphatics: No palpable lymphadenopathy  Lungs: Respiratory effort normal, unlabored  Cardiovascular:  Normal peripheral pulses  Abdomen: Soft, non-tender, non-distended with no organomegaly or palpable masses. : No CVA tenderness. Bladder non-tender and not distended. Pelvic: deferred    Lab Results   Component Value Date    BUN 9 11/04/2021     Lab Results   Component Value Date    CREATININE 0.57 11/04/2021       ASSESSMENT:  This is a 73 y.o. female with the following diagnoses:   Diagnosis Orders   1. Renal mass     2. Clear cell adenocarcinoma of kidney, right (HCC)           PLAN:  We will plan for referral to oncology to further identify the potential for pelvic carcinomatosis. We did send the CT scan to primary care today. We will them to take over the referral.  We will see her back in 6 months. At that point time she has a repeat CT scan ordered.

## 2021-12-17 NOTE — PROGRESS NOTES
Can you please ask the patient to make an appointment to follow-up on abnormal CT scan findings.   We will need to refer her to an oncologist  Thank you

## 2021-12-28 ENCOUNTER — OFFICE VISIT (OUTPATIENT)
Dept: FAMILY MEDICINE CLINIC | Age: 71
End: 2021-12-28
Payer: MEDICARE

## 2021-12-28 VITALS
HEIGHT: 63 IN | WEIGHT: 135.5 LBS | HEART RATE: 67 BPM | SYSTOLIC BLOOD PRESSURE: 138 MMHG | DIASTOLIC BLOOD PRESSURE: 87 MMHG | RESPIRATION RATE: 18 BRPM | BODY MASS INDEX: 24.01 KG/M2 | TEMPERATURE: 96.6 F | OXYGEN SATURATION: 99 %

## 2021-12-28 DIAGNOSIS — Z00.00 ROUTINE GENERAL MEDICAL EXAMINATION AT A HEALTH CARE FACILITY: ICD-10-CM

## 2021-12-28 DIAGNOSIS — Z00.00 ENCOUNTER FOR ANNUAL WELLNESS VISIT (AWV) IN MEDICARE PATIENT: ICD-10-CM

## 2021-12-28 DIAGNOSIS — C64.1 CLEAR CELL ADENOCARCINOMA OF KIDNEY, RIGHT (HCC): ICD-10-CM

## 2021-12-28 DIAGNOSIS — R93.5 ABNORMAL CT OF THE ABDOMEN: Primary | ICD-10-CM

## 2021-12-28 PROCEDURE — G8427 DOCREV CUR MEDS BY ELIG CLIN: HCPCS | Performed by: INTERNAL MEDICINE

## 2021-12-28 PROCEDURE — 4040F PNEUMOC VAC/ADMIN/RCVD: CPT | Performed by: INTERNAL MEDICINE

## 2021-12-28 PROCEDURE — 99213 OFFICE O/P EST LOW 20 MIN: CPT | Performed by: INTERNAL MEDICINE

## 2021-12-28 PROCEDURE — G8420 CALC BMI NORM PARAMETERS: HCPCS | Performed by: INTERNAL MEDICINE

## 2021-12-28 PROCEDURE — 1036F TOBACCO NON-USER: CPT | Performed by: INTERNAL MEDICINE

## 2021-12-28 PROCEDURE — 1123F ACP DISCUSS/DSCN MKR DOCD: CPT | Performed by: INTERNAL MEDICINE

## 2021-12-28 PROCEDURE — G0439 PPPS, SUBSEQ VISIT: HCPCS | Performed by: INTERNAL MEDICINE

## 2021-12-28 PROCEDURE — G8400 PT W/DXA NO RESULTS DOC: HCPCS | Performed by: INTERNAL MEDICINE

## 2021-12-28 PROCEDURE — 1090F PRES/ABSN URINE INCON ASSESS: CPT | Performed by: INTERNAL MEDICINE

## 2021-12-28 PROCEDURE — G8484 FLU IMMUNIZE NO ADMIN: HCPCS | Performed by: INTERNAL MEDICINE

## 2021-12-28 PROCEDURE — 3017F COLORECTAL CA SCREEN DOC REV: CPT | Performed by: INTERNAL MEDICINE

## 2021-12-28 RX ORDER — CEPHALEXIN 500 MG/1
500 CAPSULE ORAL 2 TIMES DAILY
Qty: 14 CAPSULE | Refills: 0 | Status: SHIPPED | OUTPATIENT
Start: 2021-12-28 | End: 2022-01-04

## 2021-12-28 ASSESSMENT — PATIENT HEALTH QUESTIONNAIRE - PHQ9
2. FEELING DOWN, DEPRESSED OR HOPELESS: 0
SUM OF ALL RESPONSES TO PHQ9 QUESTIONS 1 & 2: 2
SUM OF ALL RESPONSES TO PHQ QUESTIONS 1-9: 2
SUM OF ALL RESPONSES TO PHQ QUESTIONS 1-9: 2
1. LITTLE INTEREST OR PLEASURE IN DOING THINGS: 2
SUM OF ALL RESPONSES TO PHQ QUESTIONS 1-9: 2

## 2021-12-28 ASSESSMENT — ENCOUNTER SYMPTOMS
ABDOMINAL PAIN: 0
SHORTNESS OF BREATH: 0
COUGH: 1
NAUSEA: 0
SORE THROAT: 0

## 2021-12-28 ASSESSMENT — LIFESTYLE VARIABLES: HOW OFTEN DO YOU HAVE A DRINK CONTAINING ALCOHOL: 0

## 2021-12-28 NOTE — PROGRESS NOTES
Medicare Annual Wellness Visit  Name: Anthony Patel Date: 2021   MRN: [id number] Sex: Female   Age: 73 y.o. Ethnicity: Non- / Non    : 1950 Race: White (non-)      Elver Stovall is here for Medicare AWV and Results (review CT scan)    Screenings for behavioral, psychosocial and functional/safety risks, and cognitive dysfunction are all negative except as indicated below. These results, as well as other patient data from the [address] form, are documented in Flowsheets linked to this Encounter. No Known Allergies    Prior to Visit Medications    Medication Sig Taking? Authorizing Provider   cephALEXin (KEFLEX) 500 MG capsule Take 1 capsule by mouth 2 times daily for 7 days Yes Armando Chavez MD   vitamin D3 (CHOLECALCIFEROL) 25 MCG (1000 UT) TABS tablet TAKE 1 TABLET EVERY DAY Yes Armando Chavez MD   lisinopril (PRINIVIL;ZESTRIL) 20 MG tablet TAKE 1 TABLET EVERY DAY Yes Daja aH MD   metoprolol tartrate (LOPRESSOR) 25 MG tablet TAKE 1/2 TABLET TWICE DAILY  Patient taking differently: Take 25 mg by mouth Take full tablet in am and 1/2 tablet in pm Yes Daja Ha MD   Zinc 100 MG TABS Take by mouth Yes Historical Provider, MD   estradiol (ESTRACE VAGINAL) 0.1 MG/GM vaginal cream Place a pea-sized amount vaginally daily x 2 weeks, then 3 times per week thereafter. Use finger to apply, wash hands after application. DO NOT use plastic applicator. Call office if you stop this medication.  Yes Savanna Oglesby PA-C   Coenzyme Q10 (CO Q-10) 100 MG CAPS TAKE 1 CAPSULE EVERY DAY Yes Armando Chavez MD   levothyroxine (SYNTHROID) 50 MCG tablet TAKE 1 TABLET EVERY DAY Yes Armando Chavez MD   tiZANidine (ZANAFLEX) 2 MG tablet TAKE 1 TABLET BY MOUTH 3 TIMES DAILY AS NEEDED (MUSCLE SPASMS) Yes Armando Chavez MD   traZODone (DESYREL) 100 MG tablet TAKE 1 TABLET BY MOUTH NIGHTLY AS NEEDED FOR SLEEP Yes Armando Chavez MD   montelukast (SINGULAIR) 10 MG tablet TAKE 1 TABLET EVERY DAY Yes Viet Addison MD   aspirin EC 81 MG EC tablet Take 1 tablet by mouth daily Yes Viet Addison MD   ibuprofen (ADVIL;MOTRIN) 800 MG tablet TAKE 1 TABLET BY MOUTH EVERY 6 HOURS AS NEEDED Yes Historical Provider, MD   lisinopril-hydroCHLOROthiazide (PRINZIDE;ZESTORETIC) 20-25 MG per tablet Take by mouth Yes Historical Provider, MD   omeprazole (PRILOSEC) 20 MG delayed release capsule Take 1 capsule by mouth 2 times daily (before meals) Yes Viet Addison MD   amLODIPine (NORVASC) 2.5 MG tablet  Yes Historical Provider, MD   Ascorbic Acid (VITAMIN C) 250 MG tablet Take 2 tablets by mouth daily Yes Viet Addison MD   vitamin B-6 (PYRIDOXINE) 100 MG tablet Take 1 tablet by mouth daily Yes Viet Addison MD   polycarbophil (FIBER-CAPS) 625 MG tablet Take 1 tablet by mouth daily Yes Viet Addison MD   potassium chloride (KLOR-CON) 10 MEQ extended release tablet Take 1 tablet by mouth daily Yes Viet Addison MD   fluticasone (FLONASE) 50 MCG/ACT nasal spray USE 2 SPRAYS NASALLY EVERY DAY Yes Viet Addison MD   dicyclomine (BENTYL) 10 MG capsule  Yes Historical Provider, MD   Blood Pressure Monitoring (BLOOD PRESSURE CUFF) MISC Use to monitor Blood pressure 1-2 times daily Yes Viet Addison MD   Simethicone 80 MG TABS Take 80 mg by mouth 2 times daily as needed (bloating) Yes Viet Addison MD   vitamin D (CHOLECALCIFEROL) 1000 UNIT TABS tablet Take 1,000 Units by mouth daily  Yes Historical Provider, MD   gabapentin (NEURONTIN) 300 MG capsule TAKE 1 CAPSULE THREE TIMES DAILY  Viet Addison MD       Past Medical History:   Diagnosis Date    Allergic rhinitis 5/4/2011    Anxiety 5/4/2011    Carpal tunnel syndrome 5/4/2011    Depression 5/4/2011    Dyslipidemia 5/4/2011    Fibromyalgia 5/4/2011    Hyperlipidemia     Hypertension 5/4/2011    SOB (shortness of breath)     Thyroid disease        Past Surgical History:   Procedure Laterality Date    CARDIAC bilaterally, nose without deformity, nasal mucosa and turbinates normal without polyps  Neck: supple and non-tender without mass, no thyromegaly or thyroid nodules, no cervical lymphadenopathy  Pulmonary/Chest: clear to auscultation bilaterally- no wheezes, rales or rhonchi, normal air movement, no respiratory distress  Cardiovascular: normal rate, regular rhythm, normal S1 and S2, no murmurs, rubs, clicks, or gallops  Abdomen: soft, non-tender, non-distended, normal bowel sounds  Extremities: no cyanosis, clubbing or edema  Musculoskeletal: normal range of motion, no joint swelling, deformity or tenderness  Neurologic: reflexes normal and symmetric, no cranial nerve deficit, gait, coordination and speech normal    Patient's complete Health Risk Assessment and screening values have been reviewed and are found in Flowsheets. The following problems were reviewed today and where indicated follow up appointments were made and/or referrals ordered. Positive Risk Factor Screenings with Interventions:            General Health and ACP:  General  In general, how would you say your health is?: Good  In the past 7 days, have you experienced any of the following?  New or Increased Pain, New or Increased Fatigue, Loneliness, Social Isolation, Stress or Anger?: (!) New or Increased Pain  Do you get the social and emotional support that you need?: Yes  Do you have a Living Will?: (!) No  Advance Directives     Power of [address] [place] [address] Will ACP-Advance Directive ACP-Power of     Not on File Not on File Not on File Not on File      General Health Risk Interventions:  · Pain issues: patient declines any further evaluation/treatment for this issue   · Declined living will completion/referral    Health Habits/Nutrition:  Health Habits/Nutrition  Do you exercise for at least 20 minutes 2-3 times per week?: (!) No  Have you lost any weight without trying in the past 3 months?: No  Do you eat only one meal per day?: No  Have you seen the dentist within the past year?: Yes  Body mass index: 24  Health Habits/Nutrition Interventions:  · Inadequate physical activity:  patient is not ready to increase his/her physical activity level at this time         Personalized Preventive Plan   Current Health Maintenance Status  Immunization History   Administered Date(s) Administered    Influenza, Quadv, IM, PF (6 mo and older Fluzone, Flulaval, Fluarix, and 3 yrs and older Afluria) 12/08/2017, 02/12/2019, 01/23/2020, 01/08/2021    Pneumococcal Conjugate 13-valent ([id number]) 12/08/2017    Pneumococcal Polysaccharide ([id number]) 02/12/2019    Tdap (Boostrix, Adacel) 02/10/2014        Health Maintenance   Topic Date Due    DEXA (modify frequency per FRAX score)  Never done    Annual Wellness Visit (AWV)  08/11/2021    Flu vaccine (1) 10/15/2022 (Originally 9/1/2021)    Shingles Vaccine (1 of 2) 10/15/2022 (Originally 1/25/2002)    COVID-19 Vaccine (1) 10/15/2022 (Originally 1/25/1955)    Breast cancer screen  02/28/2022    TSH testing  11/04/2022    Potassium monitoring  11/04/2022    Creatinine monitoring  11/04/2022    DTaP/Tdap/Td vaccine (2 - Td or Tdap) 02/10/2024    Lipid screen  11/04/2026    Colon cancer screen colonoscopy  06/24/2029    Pneumococcal 65+ years Vaccine  Completed    Hepatitis C screen  Addressed    Hepatitis A vaccine  Aged Out    Hepatitis B vaccine  Aged Out    Hib vaccine  Aged Out    Meningococcal (ACWY) vaccine  Aged Out     Recommendations for [facility] Due: see orders and patient instructions/AVS.  . Recommended screening schedule for the next 5-10 years is provided to the patient in written form: see Patient Instructions/AVS.    Roxanne was seen today for medicare awv and results.     Diagnoses and all orders for this visit:    Abnormal CT of the abdomen  -     External Referral To Oncology    Clear cell adenocarcinoma of kidney, right [facility])  -     External Referral To Oncology    Other orders  -     cephALEXin (KEFLEX) 500 MG capsule;  Take 1 capsule by mouth 2 times daily for 7 days

## 2021-12-28 NOTE — PATIENT INSTRUCTIONS
SURVEY:    You may be receiving a survey from [employer] regarding your visit today. Please complete the survey to enable us to provide the highest quality of care to you and your family. If you cannot score us a very good on any question, please call the office to discuss how we could have made your experience a very good one. Thank you. MD Taylor Grimes, YOLANDA [place], [place]  [place], [place]    Personalized Preventive Plan for Roxanne Koch - 12/28/2021  Medicare offers a range of preventive health benefits. Some of the tests and screenings are paid in full while other may be subject to a deductible, co-insurance, and/or copay. Some of these benefits include a comprehensive review of your medical history including lifestyle, illnesses that may run in your family, and various assessments and screenings as appropriate. After reviewing your medical record and screening and assessments performed today your provider may have ordered immunizations, labs, imaging, and/or referrals for you. A list of these orders (if applicable) as well as your Preventive Care list are included within your After Visit Summary for your review. Other Preventive Recommendations:    · A preventive eye exam performed by an eye specialist is recommended every 1-2 years to screen for glaucoma; cataracts, macular degeneration, and other eye disorders. · A preventive dental visit is recommended every 6 months. · Try to get at least 150 minutes of exercise per week or 10,000 steps per day on a pedometer . · Order or download the FREE \"Exercise & Physical Activity: Your Everyday Guide\" from [employer]. Call 9-304.938.7731 or search [employer] online. · You need 0815-7123 mg of calcium and 7289-9505 IU of vitamin D per day.  It is possible to meet your calcium requirement with diet alone, but a vitamin D supplement is usually necessary to meet this

## 2021-12-28 NOTE — PROGRESS NOTES
HPI Notes    Name: Nelson Garibay  : 1950         Chief Complaint:     Chief Complaint   Patient presents with    Medicare AWV    Results     review CT scan       History of Present Illness:        Roxanne presents to office to follow-up for an abnormal CT scan abdomen and pelvis ordered by her urologist for follow-up for history of renal cell cancer. Patient also complains of cough and congestion and would like to get an antibiotic. Roxanne has history of renal mass found on MRI of lumbar spine 3/15/21 that was ordered to evaluate for chronic back pain. She had a 3 cm mass in the right kidney and there was also 1.7 cm mass in the left kidney which appeared to be cystic. She was  referred to urologist Jose Eduardo Alonzo for evaluation and was then was  referred to [facility]. 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 After her surgery she continued to have recurrent urinary tract infections with most of cultures showing Klebsiella. She follows up with our urologist Jose Eduardo Alonzo . She had CT scan of abdomen/ pelvis with and without contrast ordered as a follow-up and patient had it done in [place] on 12/3/2021. Radiologist report:  1. Posttreatment changes are seen at the upper pole of the right kidney without suggestion of residual or recurrent mass in the therapeutic defect  2. Findings suspicious for a solid enhancing nodule at the lower pole of the left kidney. This could be a small renal neoplasm. Not substantially changed in size from 2021  3. Cyst with septations in the lower lobe of the right kidney is also unchanged. 4.  Peritoneal nodularity in the right paracolic gutter measuring up to 2.2 cm. New since 2021 study. Findings is suspicious for peritoneal carcinomatosis.     Patient's urologist recommended referral to an oncologist for further work-up of this new finding  Patient reports that she has chronic abdominal pain but is not any worse than before. Her appetite has been good. No significant weight loss. She reports having history of total hysterectomy with bilateral oophorectomy            Past Medical History:     Past Medical History:   Diagnosis Date    Allergic rhinitis 2011    Anxiety 2011    Carpal tunnel syndrome 2011    Depression 2011    Dyslipidemia 2011    Fibromyalgia 2011    Hyperlipidemia     Hypertension 2011    SOB (shortness of breath)     Thyroid disease       Reviewed all health maintenance requirements and orderedappropriate tests  Health Maintenance Due   Topic Date Due    DEXA (modify frequency per FRAX score)  Never done   [employer] Visit (AWV)  2021       Past Surgical History:     Past Surgical History:   Procedure Laterality Date    CARDIAC CATHETERIZATION Left 2017    Dr. Stephan Navarro @ [facility]--CAD, 50-60% ostial right CAD with an FFR of 0.94.  60% disease in the proximal LAD with an FFR of 0.92 with bridging of the mid LAD. Normal LV function at 60%. Mildly dilated ascending aorta -will do a CT scan to measure.  CARDIAC CATHETERIZATION Left 2020    Dr. Stephan Navarro @ [facility]--Medical therapy     SECTION      x 4    ELBOW SURGERY Right     GALLBLADDER SURGERY      HYSTERECTOMY      total hysterectomy    KIDNEY SURGERY Right 2021    Dr. Brenton Nguyen      Right    SMALL INTESTINE SURGERY      twisted bowel 1985        Medications:       Prior to Admission medications    Medication Sig Start Date End Date Taking?  Authorizing Provider   cephALEXin (KEFLEX) 500 MG capsule Take 1 capsule by mouth 2 times daily for 7 days 21 Yes Carmenza Ha MD   vitamin D3 (CHOLECALCIFEROL) 25 MCG (1000 UT) TABS tablet TAKE 1 TABLET EVERY DAY 21  Yes Carmenza Ha MD   lisinopril (PRINIVIL;ZESTRIL) 20 MG tablet TAKE 1 TABLET EVERY DAY 11/17/21  Yes Woo Matthews MD   metoprolol tartrate (LOPRESSOR) 25 MG tablet TAKE 1/2 TABLET TWICE DAILY  Patient taking differently: Take 25 mg by mouth Take full tablet in am and 1/2 tablet in pm 11/17/21  Yes Woo Matthews MD   Zinc 100 MG TABS Take by mouth   Yes Historical Provider, MD   estradiol (ESTRACE VAGINAL) 0.1 MG/GM vaginal cream Place a pea-sized amount vaginally daily x 2 weeks, then 3 times per week thereafter. Use finger to apply, wash hands after application. DO NOT use plastic applicator. Call office if you stop this medication.  11/4/21  Yes Sb Oglesby PA-C   Coenzyme Q10 (CO Q-10) 100 MG CAPS TAKE 1 CAPSULE EVERY DAY 10/28/21  Yes Oscar Anguiano MD   levothyroxine (SYNTHROID) 50 MCG tablet TAKE 1 TABLET EVERY DAY 10/28/21  Yes Oscar Anguiano MD   tiZANidine (ZANAFLEX) 2 MG tablet TAKE 1 TABLET BY MOUTH 3 TIMES DAILY AS NEEDED (MUSCLE SPASMS) 10/28/21  Yes Oscar Anguiano MD   traZODone (DESYREL) 100 MG tablet TAKE 1 TABLET BY MOUTH NIGHTLY AS NEEDED FOR SLEEP 10/28/21  Yes Oscar Anguiano MD   montelukast (SINGULAIR) 10 MG tablet TAKE 1 TABLET EVERY DAY 10/28/21  Yes Oscar Anguiano MD   aspirin EC 81 MG EC tablet Take 1 tablet by mouth daily 9/13/21  Yes Oscar Anguiano MD   ibuprofen (ADVIL;MOTRIN) 800 MG tablet TAKE 1 TABLET BY MOUTH EVERY 6 HOURS AS NEEDED 6/29/21  Yes Historical Provider, MD   lisinopril-hydroCHLOROthiazide (PRINZIDE;ZESTORETIC) 20-25 MG per tablet Take by mouth   Yes Historical Provider, MD   omeprazole (PRILOSEC) 20 MG delayed release capsule Take 1 capsule by mouth 2 times daily (before meals) 4/13/21  Yes Oscar Anguiano MD   amLODIPine (NORVASC) 2.5 MG tablet  2/2/21  Yes Historical Provider, MD   Ascorbic Acid (VITAMIN C) 250 MG tablet Take 2 tablets by mouth daily 2/5/21  Yes Oscar Anguiano MD   vitamin B-6 (PYRIDOXINE) 100 MG tablet Take 1 tablet by mouth daily 2/5/21  Yes Oscar Anguiano MD   polycarbophil (FIBER-CAPS) 625 MG tablet Take 1 tablet by mouth daily 2/5/21  Yes Odin Angelo MD   potassium chloride (KLOR-CON) 10 MEQ extended release tablet Take 1 tablet by mouth daily 1/8/21  Yes Odin Angelo MD   fluticasone (FLONASE) 50 MCG/ACT nasal spray USE 2 SPRAYS NASALLY EVERY DAY 12/28/19  Yes Odin Angelo MD   dicyclomine (BENTYL) 10 MG capsule  11/26/19  Yes Historical Provider, MD   Blood Pressure Monitoring (BLOOD PRESSURE CUFF) MISC Use to monitor Blood pressure 1-2 times daily 3/4/19  Yes Odin Angelo MD   Simethicone 80 MG TABS Take 80 mg by mouth 2 times daily as needed (bloating) 11/12/18  Yes Odin Angelo MD   vitamin D (CHOLECALCIFEROL) 1000 UNIT TABS tablet Take 1,000 Units by mouth daily    Yes Historical Provider, MD   gabapentin (NEURONTIN) 300 MG capsule TAKE 1 CAPSULE THREE TIMES DAILY 10/28/21 11/28/21  Odin Angelo MD        Allergies:       Patient has no known allergies. Social History:     Tobacco: reports that she has never smoked. She has never used smokeless tobacco.  Alcohol:      reports no history of alcohol use. Drug Use:  reports no history of drug use. Family History:     Family History   Problem Relation Age of Onset    Stroke Mother     High Cholesterol Mother     Heart Attack Mother     Heart Attack Father     High Cholesterol Father     High Cholesterol Sister     High Cholesterol Brother        Review of Systems:         Review of Systems   Constitutional: Negative for chills and fever. HENT: Positive for congestion. Negative for sore throat. Respiratory: Positive for cough. Negative for shortness of breath. Cardiovascular: Negative for chest pain and palpitations. Gastrointestinal: Negative for abdominal pain and nausea. Genitourinary: Negative for dysuria. Skin: Negative for rash. Neurological: Positive for headaches. Psychiatric/Behavioral: The patient is not nervous/anxious.           Physical Exam:     Vitals:  /87 (Site: Left Upper Arm, Position: Sitting)   Pulse 67   Temp 96.6 °F (35.9 °C) (Temporal)   Resp 18   Ht 5' 3\" (1.6 m)   Wt 135 lb 8 oz (61.5 kg)   LMP  (LMP Unknown)   SpO2 99%   BMI 24.00 kg/m²       Physical Exam  Vitals reviewed. Constitutional:       General: She is not in acute distress. Appearance: She is well-developed. HENT:      Head: Normocephalic and atraumatic. Right Ear: External ear normal.      Left Ear: External ear normal.      Nose: Congestion and rhinorrhea present. Mouth/Throat:      Pharynx: Oropharynx is clear. Eyes:      General:         Right eye: No discharge. Left eye: No discharge. Conjunctiva/sclera: Conjunctivae normal.   Neck:      Thyroid: No thyromegaly. Cardiovascular:      Rate and Rhythm: Normal rate and regular rhythm. Heart sounds: Normal heart sounds. No murmur heard. Pulmonary:      Effort: Pulmonary effort is normal.      Breath sounds: Normal breath sounds. No wheezing or rales. Abdominal:      General: Bowel sounds are normal. There is no distension. Palpations: Abdomen is soft. There is no mass. Tenderness: There is no abdominal tenderness. Musculoskeletal:         General: Normal range of motion. Lymphadenopathy:      Cervical: No cervical adenopathy. Skin:     General: Skin is warm and dry. Findings: No rash. Neurological:      General: No focal deficit present. Mental Status: She is alert and oriented to person, place, and time. Mental status is at baseline.    Psychiatric:         Mood and Affect: Mood normal.         Behavior: Behavior normal.               Data:     Lab Results   Component Value Date     11/04/2021    K 4.2 11/04/2021     11/04/2021    CO2 29 11/04/2021    BUN 9 11/04/2021    CREATININE 0.57 11/04/2021    GLUCOSE 95 11/04/2021    GLUCOSE 99 01/19/2012    PROT 6.4 11/04/2021    PROT 6.5 01/24/2020    LABALBU 4.1 11/04/2021    LABALBU 4.4 01/19/2012    BILITOT 0.71 11/04/2021    ALKPH[facility] 105 receiving a survey from [employer] regarding your visit today. Please complete the survey to enable us to provide the highest quality of care to you and your family. If you cannot score us a very good on any question, please call the office to discuss how we could have made your experience a very good one. Thank you.     MD Macario Atkinson, [address] [place] [address]  [facility]        Electronically signed by Danielito Chinchilla MD on 12/28/2021 at 2:44 PM           Completed Refills      Requested Prescriptions     Signed Prescriptions Disp Refills    cephALEXin (KEFLEX) 500 MG capsule 14 capsule 0     Sig: Take 1 capsule by mouth 2 times daily for 7 days

## 2022-02-01 ENCOUNTER — HOSPITAL ENCOUNTER (OUTPATIENT)
Dept: CT IMAGING | Age: 72
Discharge: HOME | End: 2022-02-01

## 2022-02-01 DIAGNOSIS — Z00.6 ENCOUNTER FOR EXAMINATION FOR NORMAL COMPARISON AND CONTROL IN CLINICAL RESEARCH PROGRAM: ICD-10-CM

## 2022-02-20 RX ORDER — TIZANIDINE 2 MG/1
TABLET ORAL
Qty: 90 TABLET | Refills: 1 | Status: SHIPPED | OUTPATIENT
Start: 2022-02-20 | End: 2022-06-13 | Stop reason: SDUPTHER

## 2022-02-28 PROBLEM — R12 HEARTBURN: Status: RESOLVED | Noted: 2021-08-09 | Resolved: 2022-02-28

## 2022-02-28 PROBLEM — J38.3 VOCAL CORD DYSFUNCTION: Status: RESOLVED | Noted: 2021-08-09 | Resolved: 2022-02-28

## 2022-03-01 ENCOUNTER — OFFICE VISIT (OUTPATIENT)
Dept: FAMILY MEDICINE CLINIC | Age: 72
End: 2022-03-01
Payer: MEDICARE

## 2022-03-01 VITALS
DIASTOLIC BLOOD PRESSURE: 76 MMHG | RESPIRATION RATE: 18 BRPM | OXYGEN SATURATION: 98 % | SYSTOLIC BLOOD PRESSURE: 130 MMHG | HEART RATE: 70 BPM | BODY MASS INDEX: 24.09 KG/M2 | TEMPERATURE: 97.2 F | WEIGHT: 136 LBS

## 2022-03-01 DIAGNOSIS — N30.00 ACUTE CYSTITIS WITHOUT HEMATURIA: ICD-10-CM

## 2022-03-01 DIAGNOSIS — G89.29 CHRONIC MIDLINE LOW BACK PAIN WITH RIGHT-SIDED SCIATICA: ICD-10-CM

## 2022-03-01 DIAGNOSIS — I10 ESSENTIAL HYPERTENSION: Primary | ICD-10-CM

## 2022-03-01 DIAGNOSIS — M54.41 CHRONIC MIDLINE LOW BACK PAIN WITH RIGHT-SIDED SCIATICA: ICD-10-CM

## 2022-03-01 DIAGNOSIS — E03.9 ACQUIRED HYPOTHYROIDISM: ICD-10-CM

## 2022-03-01 DIAGNOSIS — K21.9 GASTROESOPHAGEAL REFLUX DISEASE WITHOUT ESOPHAGITIS: ICD-10-CM

## 2022-03-01 LAB
BILIRUBIN, POC: NEGATIVE
BLOOD URINE, POC: NEGATIVE
CLARITY, POC: CLEAR
COLOR, POC: NORMAL
GLUCOSE URINE, POC: NEGATIVE
KETONES, POC: NEGATIVE
LEUKOCYTE EST, POC: NEGATIVE
NITRITE, POC: NEGATIVE
PH, POC: 6
PROTEIN, POC: NEGATIVE
SPECIFIC GRAVITY, POC: 1.02
UROBILINOGEN, POC: 0.2

## 2022-03-01 PROCEDURE — 1036F TOBACCO NON-USER: CPT | Performed by: INTERNAL MEDICINE

## 2022-03-01 PROCEDURE — 99214 OFFICE O/P EST MOD 30 MIN: CPT | Performed by: INTERNAL MEDICINE

## 2022-03-01 PROCEDURE — G8400 PT W/DXA NO RESULTS DOC: HCPCS | Performed by: INTERNAL MEDICINE

## 2022-03-01 PROCEDURE — 1123F ACP DISCUSS/DSCN MKR DOCD: CPT | Performed by: INTERNAL MEDICINE

## 2022-03-01 PROCEDURE — 4040F PNEUMOC VAC/ADMIN/RCVD: CPT | Performed by: INTERNAL MEDICINE

## 2022-03-01 PROCEDURE — 3017F COLORECTAL CA SCREEN DOC REV: CPT | Performed by: INTERNAL MEDICINE

## 2022-03-01 PROCEDURE — 1090F PRES/ABSN URINE INCON ASSESS: CPT | Performed by: INTERNAL MEDICINE

## 2022-03-01 PROCEDURE — G8427 DOCREV CUR MEDS BY ELIG CLIN: HCPCS | Performed by: INTERNAL MEDICINE

## 2022-03-01 PROCEDURE — G8484 FLU IMMUNIZE NO ADMIN: HCPCS | Performed by: INTERNAL MEDICINE

## 2022-03-01 PROCEDURE — 81002 URINALYSIS NONAUTO W/O SCOPE: CPT | Performed by: INTERNAL MEDICINE

## 2022-03-01 PROCEDURE — G8420 CALC BMI NORM PARAMETERS: HCPCS | Performed by: INTERNAL MEDICINE

## 2022-03-01 RX ORDER — TRAMADOL HYDROCHLORIDE 50 MG/1
50 TABLET ORAL 3 TIMES DAILY PRN
Qty: 90 TABLET | Refills: 1 | Status: SHIPPED | OUTPATIENT
Start: 2022-03-01 | End: 2022-06-13 | Stop reason: SDUPTHER

## 2022-03-01 RX ORDER — MULTIVIT-MIN/FA/LYCOPEN/LUTEIN .4-300-25
TABLET ORAL
COMMUNITY

## 2022-03-01 RX ORDER — OMEPRAZOLE 20 MG/1
20 CAPSULE, DELAYED RELEASE ORAL
Qty: 60 CAPSULE | Refills: 1 | Status: SHIPPED | OUTPATIENT
Start: 2022-03-01 | End: 2022-09-12

## 2022-03-01 RX ORDER — CEPHALEXIN 500 MG/1
500 CAPSULE ORAL 3 TIMES DAILY
Qty: 21 CAPSULE | Refills: 0 | Status: SHIPPED | OUTPATIENT
Start: 2022-03-01 | End: 2022-06-13 | Stop reason: SDUPTHER

## 2022-03-01 RX ORDER — ASPIRIN 81 MG/1
81 TABLET ORAL DAILY
Qty: 90 TABLET | Refills: 1 | Status: SHIPPED | OUTPATIENT
Start: 2022-03-01

## 2022-03-01 ASSESSMENT — ENCOUNTER SYMPTOMS
CONSTIPATION: 0
HEARTBURN: 1
COUGH: 0
SORE THROAT: 0
ABDOMINAL PAIN: 0
BACK PAIN: 1
NAUSEA: 0
CHEST TIGHTNESS: 0
SHORTNESS OF BREATH: 0
BOWEL INCONTINENCE: 0
BELCHING: 1
TROUBLE SWALLOWING: 0

## 2022-03-01 NOTE — PROGRESS NOTES
HPI Notes    Name: Army Azevedo  : 1950         Chief Complaint:     Chief Complaint   Patient presents with    Check-Up     Patient did follow up with Oncology due to abnormal CT. Bx came back normal.     Urinary Tract Infection     patient feels that she has another UTI, she gets them frequently. Pt has followed with Dr Maryelizabeth Simmonds in the past for this issue        History of Present Illness:        Roxanne presents to office to follow up for HTN, hypothyroidism, chronic low back pain, GERD  Today she also c/o possible UTI. She is prone to recurrent UTIs. Roxanne confirms that she is compliant with her thyroid medication. She  denies an increase in fatigue, constipation, increased skin dryness, or increased lower extremity edema. The last TSH was 1.07 on 21      She had CT scan of abdomen/ pelvis with and without contrast ordered as a follow-up for h/o renal cancer on 12/3/2021. Radiologist report:  1. Posttreatment changes are seen at the upper pole of the right kidney without suggestion of residual or recurrent mass in the therapeutic defect  2. Findings suspicious for a solid enhancing nodule at the lower pole of the left kidney. This could be a small renal neoplasm. Not substantially changed in size from 2021  3. Cyst with septations in the lower lobe of the right kidney is also unchanged. 4.  Peritoneal nodularity in the right paracolic gutter measuring up to 2.2 cm. New since 2021 study. Findings is suspicious for peritoneal carcinomatosis. She was referred to hem-onc Dr. Brigette Ledesma in [place]. States she had a bx for the mass and the result came back benign. Roxanne is going to follow up with her oncologist in about 6 months          Hypertension  This is a chronic problem. The current episode started more than 1 year ago. The problem is unchanged. The problem is controlled.  Pertinent negatives include no chest pain, headaches, palpitations, peripheral edema or shortness of breath. There are no associated agents to hypertension. Risk factors for coronary artery disease include family history and post-menopausal state. Past treatments include ACE inhibitors, beta blockers and diuretics. The current treatment provides significant improvement. There are no compliance problems. Hypertensive end-organ damage includes CAD/MI. Back Pain  This is a chronic problem. The current episode started more than 1 year ago. The problem occurs 2 to 4 times per day. The problem is unchanged. The pain is present in the lumbar spine. The quality of the pain is described as shooting, stabbing and aching. The pain radiates to the right thigh and right knee. The pain is at a severity of 6/10. The symptoms are aggravated by standing, twisting and bending. Pertinent negatives include no abdominal pain, bladder incontinence, bowel incontinence, chest pain, dysuria, fever, headaches, paresthesias, pelvic pain or weight loss. Treatments tried: gabapentin, Tramadol. The treatment provided moderate relief. Gastroesophageal Reflux  She complains of belching and heartburn. She reports no abdominal pain, no chest pain, no coughing, no nausea or no sore throat. This is a chronic problem. The current episode started more than 1 year ago. The problem occurs frequently. The problem has been gradually worsening. The symptoms are aggravated by certain foods. Pertinent negatives include no fatigue, muscle weakness or weight loss. Risk factors include hiatal hernia. She has tried nothing for the symptoms. The treatment provided no relief. Past procedures include an EGD. Past invasive treatments include gastroplication. Dysuria   This is a recurrent problem. The current episode started in the past 7 days. The problem occurs every urination. The problem has been gradually worsening. The quality of the pain is described as aching. The pain is moderate. There has been no fever. She is not sexually active. Associated symptoms include chills, flank pain, frequency and urgency. Pertinent negatives include no hematuria, hesitancy or nausea. She has tried increased fluids (AZO, cranberry pills) for the symptoms. The treatment provided no relief. Her past medical history is significant for recurrent UTIs. Past Medical History:     Past Medical History:   Diagnosis Date    Allergic rhinitis 2011    Anxiety 2011    Carpal tunnel syndrome 2011    Depression 2011    Dyslipidemia 2011    Fibromyalgia 2011    Hyperlipidemia     Hypertension 2011    SOB (shortness of breath)     Thyroid disease       Reviewed all health maintenance requirements and orderedappropriate tests  Health Maintenance Due   Topic Date Due    DEXA (modify frequency per FRAX score)  Never done       Past Surgical History:     Past Surgical History:   Procedure Laterality Date    CARDIAC CATHETERIZATION Left 2017    Dr. Kristin Jimenez @ [facility]--CAD, 50-60% ostial right CAD with an FFR of 0.94.  60% disease in the proximal LAD with an FFR of 0.92 with bridging of the mid LAD. Normal LV function at 60%. Mildly dilated ascending aorta -will do a CT scan to measure.  CARDIAC CATHETERIZATION Left 2020    Dr. Kristin Jimenez @ [facility]--Medical therapy     SECTION      x 4    ELBOW SURGERY Right     GALLBLADDER SURGERY      HYSTERECTOMY      total hysterectomy    KIDNEY SURGERY Right 2021    Dr. Jeanne Banks      Right    SMALL INTESTINE SURGERY      twisted bowel 1985        Medications:       Prior to Admission medications    Medication Sig Start Date End Date Taking?  Authorizing Provider   B Complex Vitamins (VITAMIN B COMPLEX 100 IJ) Take by mouth   Yes Historical Provider, MD   Multiple Vitamins-Minerals (CENTRUM SILVER ADULT 50+) TABS Take by mouth   Yes Historical Provider, MD   aspirin EC 81 MG EC tablet Take 1 tablet by mouth daily 3/1/22  Yes Tana Myers MD   omeprazole (PRILOSEC) 20 MG delayed release capsule Take 1 capsule by mouth 2 times daily (before meals) 3/1/22  Yes Tana Myers MD   cephALEXin (KEFLEX) 500 MG capsule Take 1 capsule by mouth 3 times daily for 7 days 3/1/22 3/8/22 Yes Tana Myers MD   traMADol (ULTRAM) 50 MG tablet Take 1 tablet by mouth 3 times daily as needed for Pain for up to 30 days. 3/1/22 3/31/22 Yes Tana Myers MD   tiZANidine (ZANAFLEX) 2 MG tablet TAKE 1 TABLET BY MOUTH 3 TIMES DAILY AS NEEDED (MUSCLE SPASMS) 2/20/22  Yes Tana Myers MD   vitamin D3 (CHOLECALCIFEROL) 25 MCG (1000 UT) TABS tablet TAKE 1 TABLET EVERY DAY 12/14/21  Yes Tana Myers MD   lisinopril (PRINIVIL;ZESTRIL) 20 MG tablet TAKE 1 TABLET EVERY DAY 11/17/21  Yes Abraham Boateng MD   metoprolol tartrate (LOPRESSOR) 25 MG tablet TAKE 1/2 TABLET TWICE DAILY  Patient taking differently: Take 25 mg by mouth Take full tablet in am and 1/2 tablet in pm 11/17/21  Yes Abraham Boateng MD   Zinc 100 MG TABS Take by mouth   Yes Historical Provider, MD   estradiol (ESTRACE VAGINAL) 0.1 MG/GM vaginal cream Place a pea-sized amount vaginally daily x 2 weeks, then 3 times per week thereafter. Use finger to apply, wash hands after application. DO NOT use plastic applicator. Call office if you stop this medication.  11/4/21  Yes Sb Oglesby PA-C   Coenzyme Q10 (CO Q-10) 100 MG CAPS TAKE 1 CAPSULE EVERY DAY 10/28/21  Yes Tana Myers MD   levothyroxine (SYNTHROID) 50 MCG tablet TAKE 1 TABLET EVERY DAY 10/28/21  Yes Tana Myers MD   traZODone (DESYREL) 100 MG tablet TAKE 1 TABLET BY MOUTH NIGHTLY AS NEEDED FOR SLEEP 10/28/21  Yes Tana Myers MD   montelukast (SINGULAIR) 10 MG tablet TAKE 1 TABLET EVERY DAY 10/28/21  Yes Tana Myers MD   ibuprofen (ADVIL;MOTRIN) 800 MG tablet TAKE 1 TABLET BY MOUTH EVERY 6 HOURS AS NEEDED 6/29/21  Yes Historical Provider, MD   lisinopril-hydroCHLOROthiazide (PRINZIDE;ZESTORETIC) 20-25 MG per tablet Take by mouth   Yes Historical Provider, MD   amLODIPine (NORVASC) 2.5 MG tablet  2/2/21  Yes Historical Provider, MD   Ascorbic Acid (VITAMIN C) 250 MG tablet Take 2 tablets by mouth daily 2/5/21  Yes Neno Saucedo MD   vitamin B-6 (PYRIDOXINE) 100 MG tablet Take 1 tablet by mouth daily 2/5/21  Yes Neno Saucedo MD   polycarbophil (FIBER-CAPS) 625 MG tablet Take 1 tablet by mouth daily 2/5/21  Yes Neno Saucedo MD   potassium chloride (KLOR-CON) 10 MEQ extended release tablet Take 1 tablet by mouth daily 1/8/21  Yes Neno Saucedo MD   fluticasone (FLONASE) 50 MCG/ACT nasal spray USE 2 SPRAYS NASALLY EVERY DAY 12/28/19  Yes Neno Saucedo MD   dicyclomine (BENTYL) 10 MG capsule  11/26/19  Yes Historical Provider, MD   Blood Pressure Monitoring (BLOOD PRESSURE CUFF) MISC Use to monitor Blood pressure 1-2 times daily 3/4/19  Yes Neno Saucedo MD   Simethicone 80 MG TABS Take 80 mg by mouth 2 times daily as needed (bloating) 11/12/18  Yes Neno Saucedo MD   vitamin D (CHOLECALCIFEROL) 1000 UNIT TABS tablet Take 1,000 Units by mouth daily    Yes Historical Provider, MD   gabapentin (NEURONTIN) 300 MG capsule TAKE 1 CAPSULE THREE TIMES DAILY 10/28/21 11/28/21  Neno Saucedo MD        Allergies:       Patient has no known allergies. Social History:     Tobacco: reports that she has never smoked. She has never used smokeless tobacco.  Alcohol:      reports no history of alcohol use. Drug Use:  reports no history of drug use. Family History:     Family History   Problem Relation Age of Onset    Stroke Mother     High Cholesterol Mother     Heart Attack Mother     Heart Attack Father     High Cholesterol Father     High Cholesterol Sister     High Cholesterol Brother        Review of Systems:         Review of Systems   Constitutional: Positive for chills. Negative for activity change, appetite change, fatigue, fever and weight loss.    HENT: Negative for congestion, sore throat and trouble swallowing. Respiratory: Negative for cough, chest tightness and shortness of breath. Cardiovascular: Negative for chest pain and palpitations. Gastrointestinal: Positive for heartburn. Negative for abdominal pain, bowel incontinence, constipation and nausea. Genitourinary: Positive for flank pain, frequency and urgency. Negative for bladder incontinence, dysuria, hematuria, hesitancy and pelvic pain. Musculoskeletal: Positive for back pain. Negative for muscle weakness. Skin: Negative for rash. Neurological: Negative for dizziness, headaches and paresthesias. Psychiatric/Behavioral: The patient is not nervous/anxious. Physical Exam:     Vitals:  /76   Pulse 70   Temp 97.2 °F (36.2 °C) (Temporal)   Resp 18   Wt 136 lb (61.7 kg)   LMP  (LMP Unknown)   SpO2 98%   BMI 24.09 kg/m²       Physical Exam  Vitals reviewed. Constitutional:       General: She is not in acute distress. Appearance: Normal appearance. She is well-developed. She is not ill-appearing. HENT:      Head: Normocephalic and atraumatic. Right Ear: External ear normal.      Left Ear: External ear normal.      Nose: Nose normal. No congestion. Mouth/Throat:      Pharynx: Oropharynx is clear. Eyes:      General: No scleral icterus. Right eye: No discharge. Left eye: No discharge. Conjunctiva/sclera: Conjunctivae normal.   Neck:      Thyroid: No thyromegaly. Cardiovascular:      Rate and Rhythm: Normal rate and regular rhythm. Heart sounds: Normal heart sounds. No murmur heard. Pulmonary:      Effort: Pulmonary effort is normal.      Breath sounds: Normal breath sounds. No wheezing or rales. Abdominal:      General: Bowel sounds are normal. There is no distension. Palpations: Abdomen is soft. There is no mass. Tenderness: There is abdominal tenderness (mild tenderness in the lower abdomen).  There is no right CVA tenderness or left CVA tenderness. Musculoskeletal:         General: Tenderness (lumbosacral area tenderness) present. Normal range of motion. Right lower leg: No edema. Left lower leg: No edema. Lymphadenopathy:      Cervical: No cervical adenopathy. Skin:     General: Skin is warm and dry. Coloration: Skin is not jaundiced or pale. Findings: No rash. Neurological:      General: No focal deficit present. Mental Status: She is alert and oriented to person, place, and time. Mental status is at baseline. Psychiatric:         Mood and Affect: Mood normal.         Behavior: Behavior normal.         Thought Content: Thought content normal.               Data:     Lab Results   Component Value Date     11/04/2021    K 4.2 11/04/2021     11/04/2021    CO2 29 11/04/2021    BUN 9 11/04/2021    CREATININE 0.57 11/04/2021    GLUCOSE 95 11/04/2021    GLUCOSE 99 01/19/2012    PROT 6.4 11/04/2021    PROT 6.5 01/24/2020    LABALBU 4.1 11/04/2021    LABALBU 4.4 01/19/2012    BILITOT 0.71 11/04/2021    ALKPHOS 105 11/04/2021    AST 21 11/04/2021    ALT 19 11/04/2021     Lab Results   Component Value Date    WBC 7.6 11/04/2021    RBC 5.70 11/04/2021    RBC 4.86 01/19/2012    HGB 15.0 11/04/2021    HCT 45.7 11/04/2021    MCV 80.2 11/04/2021    MCH 26.3 11/04/2021    MCHC 32.8 11/04/2021    RDW 15.9 11/04/2021     11/04/2021     01/19/2012    MPV NOT REPORTED 11/04/2021     Lab Results   Component Value Date    TSH 1.70 11/04/2021     Lab Results   Component Value Date    CHOL 198 11/04/2021    CHOL 250 01/16/2017    HDL 70 11/04/2021          Assessment & Plan        Diagnosis Orders   1. Essential hypertension   Blood pressure stable, continue on current meds    2. Chronic midline low back pain with right-sided sciatica   Patient's low back pain is reasonably controlled. She is unable to take NSAIDs due to severe stomach pains, continue on Tylenol, gabapentin and as needed tramadol.   PDMP reviewed and we will refill patient's tramadol. She is advised to use it sparingly traMADol (ULTRAM) 50 MG tablet   3. Acquired hypothyroidism   TSH normal, continue on current dose of Synthroid    4. Gastroesophageal reflux disease without esophagitis   We will resume omeprazole since the patient is experiencing GERD symptoms. omeprazole (PRILOSEC) 20 MG delayed release capsule   5. Acute cystitis without hematuria   Urine micro test is normal in office. However she is symptomatic and for this reason will prescribe cephalexin course. Follow-up as needed   cephALEXin (KEFLEX) 500 MG capsule                   Completed Refills   Requested Prescriptions     Signed Prescriptions Disp Refills    aspirin EC 81 MG EC tablet 90 tablet 1     Sig: Take 1 tablet by mouth daily    omeprazole (PRILOSEC) 20 MG delayed release capsule 60 capsule 1     Sig: Take 1 capsule by mouth 2 times daily (before meals)    cephALEXin (KEFLEX) 500 MG capsule 21 capsule 0     Sig: Take 1 capsule by mouth 3 times daily for 7 days    traMADol (ULTRAM) 50 MG tablet 90 tablet 1     Sig: Take 1 tablet by mouth 3 times daily as needed for Pain for up to 30 days. Return in about 3 months (around 6/1/2022) for HTN, gerd, back pain. Orders Placed This Encounter   Medications    aspirin EC 81 MG EC tablet     Sig: Take 1 tablet by mouth daily     Dispense:  90 tablet     Refill:  1    omeprazole (PRILOSEC) 20 MG delayed release capsule     Sig: Take 1 capsule by mouth 2 times daily (before meals)     Dispense:  60 capsule     Refill:  1    cephALEXin (KEFLEX) 500 MG capsule     Sig: Take 1 capsule by mouth 3 times daily for 7 days     Dispense:  21 capsule     Refill:  0    traMADol (ULTRAM) 50 MG tablet     Sig: Take 1 tablet by mouth 3 times daily as needed for Pain for up to 30 days.      Dispense:  90 tablet     Refill:  1     Reduce doses taken as pain becomes manageable     No orders of the defined types were placed in this encounter. There are no Patient Instructions on file for this visit. Electronically signed by Israel Louise MD on 3/1/2022 at 9:36 AM           Completed Refills      Requested Prescriptions     Signed Prescriptions Disp Refills    aspirin EC 81 MG EC tablet 90 tablet 1     Sig: Take 1 tablet by mouth daily    omeprazole (PRILOSEC) 20 MG delayed release capsule 60 capsule 1     Sig: Take 1 capsule by mouth 2 times daily (before meals)    cephALEXin (KEFLEX) 500 MG capsule 21 capsule 0     Sig: Take 1 capsule by mouth 3 times daily for 7 days    traMADol (ULTRAM) 50 MG tablet 90 tablet 1     Sig: Take 1 tablet by mouth 3 times daily as needed for Pain for up to 30 days.

## 2022-03-11 DIAGNOSIS — Z12.31 SCREENING MAMMOGRAM FOR HIGH-RISK PATIENT: Primary | ICD-10-CM

## 2022-03-11 DIAGNOSIS — Z12.31 SCREENING MAMMOGRAM FOR HIGH-RISK PATIENT: ICD-10-CM

## 2022-06-03 ENCOUNTER — TELEPHONE (OUTPATIENT)
Dept: UROLOGY | Age: 72
End: 2022-06-03

## 2022-06-03 DIAGNOSIS — N28.89 RENAL MASS: Primary | ICD-10-CM

## 2022-06-03 DIAGNOSIS — C64.1 CLEAR CELL ADENOCARCINOMA OF KIDNEY, RIGHT (HCC): ICD-10-CM

## 2022-06-03 NOTE — TELEPHONE ENCOUNTER
Per last office note it appears if she does need any CT scan, if she has not had 1 done at outside facility we will need 1 prior to her visit. Orders were placed.

## 2022-06-03 NOTE — TELEPHONE ENCOUNTER
Patient called and was asked if she had a CT scan completed. Patient advised that she did have a CT completed, ordered by her oncologist at [place] [facility] [id number] in [place]. Said she wants to cancel her appointment because her oncologist will manage her renal mass.

## 2022-06-10 ENCOUNTER — TELEPHONE (OUTPATIENT)
Dept: UROLOGY | Age: 72
End: 2022-06-10

## 2022-06-10 NOTE — TELEPHONE ENCOUNTER
Called the patient to remind of upcoming apt and to get ct scan done prior to visit. She notes that she just had a full body scan that was ordered by her oncologist through [facility]. She was advised to bring a disc in with her to her apt. She's questioning if she still needs to get CT scan done.

## 2022-06-10 NOTE — TELEPHONE ENCOUNTER
Can you get this report so I can see what test was done and we can determine if she still needs a CT

## 2022-06-12 PROBLEM — M75.51 BURSITIS OF RIGHT SHOULDER: Status: ACTIVE | Noted: 2022-06-12

## 2022-06-12 PROBLEM — H69.81 DYSFUNCTION OF RIGHT EUSTACHIAN TUBE: Status: RESOLVED | Noted: 2021-08-09 | Resolved: 2022-06-12

## 2022-06-12 PROBLEM — R14.2 GASEOUS REGURGITATION: Status: RESOLVED | Noted: 2021-08-09 | Resolved: 2022-06-12

## 2022-06-12 PROBLEM — I20.9 ANGINAL SYNDROME (HCC): Status: RESOLVED | Noted: 2021-02-05 | Resolved: 2022-06-12

## 2022-06-12 PROBLEM — H69.91 DYSFUNCTION OF RIGHT EUSTACHIAN TUBE: Status: RESOLVED | Noted: 2021-08-09 | Resolved: 2022-06-12

## 2022-06-12 PROBLEM — M54.16 LUMBAR RADICULOPATHY: Status: ACTIVE | Noted: 2022-06-12

## 2022-06-12 PROBLEM — L30.4 ECZEMA INTERTRIGO: Status: RESOLVED | Noted: 2017-01-09 | Resolved: 2022-06-12

## 2022-06-12 PROBLEM — R14.0 ABDOMINAL BLOATING: Status: RESOLVED | Noted: 2021-08-09 | Resolved: 2022-06-12

## 2022-06-13 ENCOUNTER — OFFICE VISIT (OUTPATIENT)
Dept: FAMILY MEDICINE CLINIC | Age: 72
End: 2022-06-13
Payer: COMMERCIAL

## 2022-06-13 VITALS
DIASTOLIC BLOOD PRESSURE: 98 MMHG | HEART RATE: 63 BPM | OXYGEN SATURATION: 96 % | HEIGHT: 63 IN | TEMPERATURE: 97.1 F | WEIGHT: 134.5 LBS | BODY MASS INDEX: 23.83 KG/M2 | RESPIRATION RATE: 18 BRPM | SYSTOLIC BLOOD PRESSURE: 140 MMHG

## 2022-06-13 DIAGNOSIS — F33.9 RECURRENT MAJOR DEPRESSIVE DISORDER, REMISSION STATUS UNSPECIFIED (HCC): ICD-10-CM

## 2022-06-13 DIAGNOSIS — I10 ESSENTIAL HYPERTENSION: Primary | ICD-10-CM

## 2022-06-13 DIAGNOSIS — K21.9 GASTROESOPHAGEAL REFLUX DISEASE WITHOUT ESOPHAGITIS: ICD-10-CM

## 2022-06-13 DIAGNOSIS — G47.00 INSOMNIA, UNSPECIFIED TYPE: ICD-10-CM

## 2022-06-13 DIAGNOSIS — E03.9 ACQUIRED HYPOTHYROIDISM: ICD-10-CM

## 2022-06-13 DIAGNOSIS — G89.29 CHRONIC MIDLINE LOW BACK PAIN WITH RIGHT-SIDED SCIATICA: ICD-10-CM

## 2022-06-13 DIAGNOSIS — R10.9 ABDOMINAL CRAMPING: ICD-10-CM

## 2022-06-13 DIAGNOSIS — N30.00 ACUTE CYSTITIS WITHOUT HEMATURIA: ICD-10-CM

## 2022-06-13 DIAGNOSIS — C64.1 CLEAR CELL ADENOCARCINOMA OF KIDNEY, RIGHT (HCC): ICD-10-CM

## 2022-06-13 DIAGNOSIS — R10.9 FLANK PAIN: ICD-10-CM

## 2022-06-13 DIAGNOSIS — M54.41 CHRONIC MIDLINE LOW BACK PAIN WITH RIGHT-SIDED SCIATICA: ICD-10-CM

## 2022-06-13 LAB
BILIRUBIN, POC: NORMAL
BLOOD URINE, POC: NORMAL
CLARITY, POC: CLEAR
COLOR, POC: YELLOW
GLUCOSE URINE, POC: NORMAL
KETONES, POC: NORMAL
LEUKOCYTE EST, POC: NORMAL
NITRITE, POC: NORMAL
PH, POC: 6
PROTEIN, POC: NORMAL
SPECIFIC GRAVITY, POC: 1.01
UROBILINOGEN, POC: 0.2

## 2022-06-13 PROCEDURE — G8400 PT W/DXA NO RESULTS DOC: HCPCS | Performed by: INTERNAL MEDICINE

## 2022-06-13 PROCEDURE — 99214 OFFICE O/P EST MOD 30 MIN: CPT | Performed by: INTERNAL MEDICINE

## 2022-06-13 PROCEDURE — G8420 CALC BMI NORM PARAMETERS: HCPCS | Performed by: INTERNAL MEDICINE

## 2022-06-13 PROCEDURE — 1036F TOBACCO NON-USER: CPT | Performed by: INTERNAL MEDICINE

## 2022-06-13 PROCEDURE — 1090F PRES/ABSN URINE INCON ASSESS: CPT | Performed by: INTERNAL MEDICINE

## 2022-06-13 PROCEDURE — 1123F ACP DISCUSS/DSCN MKR DOCD: CPT | Performed by: INTERNAL MEDICINE

## 2022-06-13 PROCEDURE — 81003 URINALYSIS AUTO W/O SCOPE: CPT | Performed by: INTERNAL MEDICINE

## 2022-06-13 PROCEDURE — 3017F COLORECTAL CA SCREEN DOC REV: CPT | Performed by: INTERNAL MEDICINE

## 2022-06-13 PROCEDURE — G8427 DOCREV CUR MEDS BY ELIG CLIN: HCPCS | Performed by: INTERNAL MEDICINE

## 2022-06-13 RX ORDER — TIZANIDINE 2 MG/1
TABLET ORAL
Qty: 90 TABLET | Refills: 1 | Status: SHIPPED | OUTPATIENT
Start: 2022-06-13 | End: 2022-09-12

## 2022-06-13 RX ORDER — TRAMADOL HYDROCHLORIDE 50 MG/1
50 TABLET ORAL 3 TIMES DAILY PRN
Qty: 90 TABLET | Refills: 1 | Status: SHIPPED | OUTPATIENT
Start: 2022-06-13 | End: 2022-07-13

## 2022-06-13 RX ORDER — LISINOPRIL 20 MG/1
TABLET ORAL
Qty: 90 TABLET | Refills: 3 | Status: SHIPPED | OUTPATIENT
Start: 2022-06-13 | End: 2022-09-13 | Stop reason: ALTCHOICE

## 2022-06-13 RX ORDER — FERROUS SULFATE 325(65) MG
TABLET ORAL
COMMUNITY
Start: 2022-05-16 | End: 2022-09-13

## 2022-06-13 RX ORDER — CEPHALEXIN 500 MG/1
500 CAPSULE ORAL 3 TIMES DAILY
Qty: 21 CAPSULE | Refills: 0 | Status: SHIPPED | OUTPATIENT
Start: 2022-06-13 | End: 2022-06-20

## 2022-06-13 SDOH — ECONOMIC STABILITY: FOOD INSECURITY: WITHIN THE PAST 12 MONTHS, THE FOOD YOU BOUGHT JUST DIDN'T LAST AND YOU DIDN'T HAVE MONEY TO GET MORE.: NEVER TRUE

## 2022-06-13 SDOH — ECONOMIC STABILITY: FOOD INSECURITY: WITHIN THE PAST 12 MONTHS, YOU WORRIED THAT YOUR FOOD WOULD RUN OUT BEFORE YOU GOT MONEY TO BUY MORE.: NEVER TRUE

## 2022-06-13 ASSESSMENT — ENCOUNTER SYMPTOMS
NAUSEA: 0
ABDOMINAL PAIN: 0
BACK PAIN: 1
ORTHOPNEA: 0
BOWEL INCONTINENCE: 0
CONSTIPATION: 0
CHEST TIGHTNESS: 0
SORE THROAT: 0
SHORTNESS OF BREATH: 0
VOMITING: 0
COUGH: 0

## 2022-06-13 ASSESSMENT — PATIENT HEALTH QUESTIONNAIRE - PHQ9
1. LITTLE INTEREST OR PLEASURE IN DOING THINGS: 0
7. TROUBLE CONCENTRATING ON THINGS, SUCH AS READING THE NEWSPAPER OR WATCHING TELEVISION: 0
5. POOR APPETITE OR OVEREATING: 0
3. TROUBLE FALLING OR STAYING ASLEEP: 0
SUM OF ALL RESPONSES TO PHQ QUESTIONS 1-9: 0
6. FEELING BAD ABOUT YOURSELF - OR THAT YOU ARE A FAILURE OR HAVE LET YOURSELF OR YOUR FAMILY DOWN: 0
10. IF YOU CHECKED OFF ANY PROBLEMS, HOW DIFFICULT HAVE THESE PROBLEMS MADE IT FOR YOU TO DO YOUR WORK, TAKE CARE OF THINGS AT HOME, OR GET ALONG WITH OTHER PEOPLE: 0
SUM OF ALL RESPONSES TO PHQ QUESTIONS 1-9: 0
8. MOVING OR SPEAKING SO SLOWLY THAT OTHER PEOPLE COULD HAVE NOTICED. OR THE OPPOSITE, BEING SO FIGETY OR RESTLESS THAT YOU HAVE BEEN MOVING AROUND A LOT MORE THAN USUAL: 0
2. FEELING DOWN, DEPRESSED OR HOPELESS: 0
SUM OF ALL RESPONSES TO PHQ QUESTIONS 1-9: 0
9. THOUGHTS THAT YOU WOULD BE BETTER OFF DEAD, OR OF HURTING YOURSELF: 0
SUM OF ALL RESPONSES TO PHQ9 QUESTIONS 1 & 2: 0
4. FEELING TIRED OR HAVING LITTLE ENERGY: 0
SUM OF ALL RESPONSES TO PHQ QUESTIONS 1-9: 0

## 2022-06-13 ASSESSMENT — SOCIAL DETERMINANTS OF HEALTH (SDOH): HOW HARD IS IT FOR YOU TO PAY FOR THE VERY BASICS LIKE FOOD, HOUSING, MEDICAL CARE, AND HEATING?: NOT HARD AT ALL

## 2022-06-13 NOTE — TELEPHONE ENCOUNTER
Spoke with patient to let her know the office needs her scan on a disc and also the report to determine if she really does need the CT

## 2022-06-13 NOTE — TELEPHONE ENCOUNTER
Left message on machine for patient to return a call to the office to get more information on where we can obtain her recent imaging results/report.

## 2022-06-13 NOTE — PROGRESS NOTES
HPI Notes    Name: Tahira Cam  : 1950         Chief Complaint:     Chief Complaint   Patient presents with    Hypertension     Check up.  Gastroesophageal Reflux     Check up.  Back Pain     Check up.  Abdominal Cramping     Patient thinks she may have a possible UTI. Gets them frequently.  Flank Pain    Arthritis     c/o pain in left foot and big toe. Patient thinks it may be an arthiritic flare up. History of Present Illness:        Roxanne presents to office to follow up for HTN, Hypothyroidism , Insomnia, GERD, chronic back pain    She c/o flank pain and dysuria. Says prone to frequent UTIs. States has been stressed out due to her brother having open heart surgery and her son giving her a lot of troubles at home. Roxanne  he has a history of hypothyroidism for many years. She states that she is compliant with Synthroid. Reports no recent weight gain, hair loss, constipation, swelling. Last TSH was 1.7 on 2021. She  has a history of insomnia for many years. The problem is stable. She takes Tizanidine before going to bed and it helps, stopped Trazodone since it was not helping. She is able to sleep about 5 hrs. She does not take naps. Hypertension  This is a chronic problem. The current episode started more than 1 year ago. The problem is unchanged. Pertinent negatives include no anxiety, chest pain, headaches, orthopnea, palpitations, peripheral edema or shortness of breath. There are no associated agents to hypertension. Risk factors for coronary artery disease include post-menopausal state and family history. Past treatments include diuretics, beta blockers and ACE inhibitors. The current treatment provides significant improvement. There are no compliance problems. Hypertensive end-organ damage includes CAD/MI. There is no history of CVA or heart failure. Back Pain  This is a chronic problem. The current episode started more than 1 year ago.  The problem occurs constantly. The problem is unchanged. The pain is present in the lumbar spine and sacro-iliac. The pain does not radiate. The pain is moderate. The symptoms are aggravated by position. Associated symptoms include dysuria. Pertinent negatives include no abdominal pain, bladder incontinence, bowel incontinence, chest pain, fever, headaches, numbness, paresthesias or weight loss. She has tried NSAIDs and muscle relaxant (prn Tramadol, gabapentin) for the symptoms. The treatment provided moderate relief. Gastroesophageal Reflux  She reports no abdominal pain, no chest pain, no coughing, no nausea or no sore throat. This is a chronic problem. The current episode started more than 1 year ago. The problem occurs occasionally. The problem has been unchanged. The symptoms are aggravated by certain foods. Pertinent negatives include no fatigue or weight loss. Risk factors include hiatal hernia and NSAIDs. She has tried a PPI for the symptoms. The treatment provided significant relief. Past invasive treatments include gastroplication. Dysuria   This is a recurrent problem. The current episode started in the past 7 days. The problem has been gradually worsening. The quality of the pain is described as aching. The pain is at a severity of 4/10. There has been no fever. She is not sexually active. Associated symptoms include flank pain and frequency. Pertinent negatives include no chills, nausea, urgency or vomiting. She has tried increased fluids for the symptoms. The treatment provided no relief. Her past medical history is significant for recurrent UTIs.            Past Medical History:     Past Medical History:   Diagnosis Date    Allergic rhinitis 5/4/2011    Anxiety 5/4/2011    Carpal tunnel syndrome 5/4/2011    Depression 5/4/2011    Dyslipidemia 5/4/2011    Fibromyalgia 5/4/2011    Hyperlipidemia     Hypertension 5/4/2011    SOB (shortness of breath)     Thyroid disease       Reviewed all health maintenance requirements and orderedappropriate tests  Health Maintenance Due   Topic Date Due    DEXA (modify frequency per FRAX score)  Never done       Past Surgical History:     Past Surgical History:   Procedure Laterality Date    CARDIAC CATHETERIZATION Left 2017    Dr. Kinga Harris @ [facility]--CAD, 50-60% ostial right CAD with an FFR of 0.94.  60% disease in the proximal LAD with an FFR of 0.92 with bridging of the mid LAD. Normal LV function at 60%. Mildly dilated ascending aorta -will do a CT scan to measure.  CARDIAC CATHETERIZATION Left 2020    Dr. Kinga Harris @ [facility]--Medical therapy     SECTION      x 4    ELBOW SURGERY Right     GALLBLADDER SURGERY      HYSTERECTOMY (CERVIX STATUS UNKNOWN)      total hysterectomy    KIDNEY SURGERY Right 2021    Dr. Marco Antonio Crowder      Right    SMALL INTESTINE SURGERY      twisted bowel         Medications:       Prior to Admission medications    Medication Sig Start Date End Date Taking? Authorizing Provider   tiZANidine (ZANAFLEX) 2 MG tablet TAKE 1 TABLET BY MOUTH 3 TIMES DAILY AS NEEDED (MUSCLE SPASMS) 22  Yes Dustin Gill MD   lisinopril (PRINIVIL;ZESTRIL) 20 MG tablet TAKE 1 TABLET EVERY DAY 22  Yes Dustin Gill MD   cephALEXin (KEFLEX) 500 MG capsule Take 1 capsule by mouth 3 times daily for 7 days 22 Yes Dustin Gill MD   traMADol (ULTRAM) 50 MG tablet Take 1 tablet by mouth 3 times daily as needed for Pain for up to 30 days.  22 Yes Dustin Gill MD   B Complex Vitamins (VITAMIN B COMPLEX 100 IJ) Take by mouth   Yes Historical Provider, MD   aspirin EC 81 MG EC tablet Take 1 tablet by mouth daily 3/1/22  Yes Dustin Gill MD   omeprazole (PRILOSEC) 20 MG delayed release capsule Take 1 capsule by mouth 2 times daily (before meals) 3/1/22  Yes Dustin Gill MD   vitamin D3 (CHOLECALCIFEROL) 25 MCG (1000 UT) TABS tablet TAKE 1 TABLET EVERY DAY 12/14/21  Yes Kevin Gray MD   metoprolol tartrate (LOPRESSOR) 25 MG tablet TAKE 1/2 TABLET TWICE DAILY  Patient taking differently: Take 25 mg by mouth Take full tablet in am and 1/2 tablet in pm 11/17/21  Yes Chantale Vizcarra MD   Zinc 100 MG TABS Take by mouth   Yes Historical Provider, MD   estradiol (ESTRACE VAGINAL) 0.1 MG/GM vaginal cream Place a pea-sized amount vaginally daily x 2 weeks, then 3 times per week thereafter. Use finger to apply, wash hands after application. DO NOT use plastic applicator. Call office if you stop this medication.  11/4/21  Yes Sb Oglesby PA-C   Coenzyme Q10 (CO Q-10) 100 MG CAPS TAKE 1 CAPSULE EVERY DAY 10/28/21  Yes Kevin Gray MD   levothyroxine (SYNTHROID) 50 MCG tablet TAKE 1 TABLET EVERY DAY 10/28/21  Yes Kevin Gray MD   gabapentin (NEURONTIN) 300 MG capsule TAKE 1 CAPSULE THREE TIMES DAILY 10/28/21 6/13/22 Yes Kevin Gray MD   montelukast (SINGULAIR) 10 MG tablet TAKE 1 TABLET EVERY DAY 10/28/21  Yes Kevin Gray MD   Ascorbic Acid (VITAMIN C) 250 MG tablet Take 2 tablets by mouth daily 2/5/21  Yes Kevin Gray MD   vitamin B-6 (PYRIDOXINE) 100 MG tablet Take 1 tablet by mouth daily 2/5/21  Yes Kevin Gray MD   polycarbophil (FIBER-CAPS) 625 MG tablet Take 1 tablet by mouth daily 2/5/21  Yes Kevin Gray MD   fluticasone (FLONASE) 50 MCG/ACT nasal spray USE 2 SPRAYS NASALLY EVERY DAY 12/28/19  Yes Kevin Gray MD   dicyclomine (BENTYL) 10 MG capsule  11/26/19  Yes Historical Provider, MD   Blood Pressure Monitoring (BLOOD PRESSURE CUFF) MISC Use to monitor Blood pressure 1-2 times daily 3/4/19  Yes Kevin Gray MD   FEROSUL 325 (65 Fe) MG tablet TAKE 1 TABLET BY MOUTH ONCE DAILY WITH BREAKFAST 5/16/22   Historical Provider, MD   Multiple Vitamins-Minerals (CENTRUM SILVER ADULT 50+) TABS Take by mouth  Patient not taking: Reported on 6/13/2022    Historical Provider, MD        Allergies:       Patient has no known allergies. Social History:     Tobacco: reports that she has never smoked. She has never used smokeless tobacco.  Alcohol:      reports no history of alcohol use. Drug Use:  reports no history of drug use. Family History:     Family History   Problem Relation Age of Onset    Stroke Mother     High Cholesterol Mother     Heart Attack Mother     Heart Attack Father     High Cholesterol Father     High Cholesterol Sister     High Cholesterol Brother        Review of Systems:         Review of Systems   Constitutional: Negative for activity change, appetite change, chills, fatigue, fever and weight loss. HENT: Negative for congestion and sore throat. Respiratory: Negative for cough, chest tightness and shortness of breath. Cardiovascular: Negative for chest pain, palpitations, orthopnea and leg swelling. Gastrointestinal: Negative for abdominal pain, bowel incontinence, constipation, nausea and vomiting. Genitourinary: Positive for dysuria, flank pain and frequency. Negative for bladder incontinence and urgency. Musculoskeletal: Positive for arthralgias, back pain and myalgias. Skin: Negative for rash. Neurological: Negative for dizziness, tremors, numbness, headaches and paresthesias. Psychiatric/Behavioral: Negative for decreased concentration, dysphoric mood, self-injury and sleep disturbance. The patient is not nervous/anxious. Physical Exam:     Vitals:  BP (!) 140/98   Pulse 63   Temp 97.1 °F (36.2 °C) (Temporal)   Resp 18   Ht 5' 3\" (1.6 m)   Wt 134 lb 8 oz (61 kg)   LMP  (LMP Unknown)   SpO2 96%   BMI 23.83 kg/m²       Physical Exam  Vitals reviewed. Constitutional:       General: She is not in acute distress. Appearance: Normal appearance. She is well-developed. She is not ill-appearing. HENT:      Head: Normocephalic and atraumatic.       Right Ear: External ear normal.      Mouth/Throat:      Mouth: Mucous membranes are moist.      Pharynx: Oropharynx is clear. Eyes:      General: No scleral icterus. Right eye: No discharge. Left eye: No discharge. Conjunctiva/sclera: Conjunctivae normal.   Neck:      Thyroid: No thyromegaly. Cardiovascular:      Rate and Rhythm: Normal rate and regular rhythm. Heart sounds: Normal heart sounds. No murmur heard. Pulmonary:      Effort: Pulmonary effort is normal.      Breath sounds: Normal breath sounds. No wheezing or rales. Abdominal:      General: Bowel sounds are normal. There is no distension. Palpations: Abdomen is soft. There is no mass. Tenderness: There is no abdominal tenderness. Musculoskeletal:         General: Tenderness present. Normal range of motion. Right lower leg: No edema. Left lower leg: No edema. Lymphadenopathy:      Cervical: No cervical adenopathy. Skin:     General: Skin is warm and dry. Coloration: Skin is not jaundiced or pale. Findings: No rash. Neurological:      General: No focal deficit present. Mental Status: She is alert and oriented to person, place, and time. Mental status is at baseline. Psychiatric:         Mood and Affect: Mood normal.         Behavior: Behavior normal.         Thought Content:  Thought content normal.               Data:     Lab Results   Component Value Date     11/04/2021    K 4.2 11/04/2021     11/04/2021    CO2 29 11/04/2021    BUN 9 11/04/2021    CREATININE 0.57 11/04/2021    GLUCOSE 95 11/04/2021    GLUCOSE 99 01/19/2012    PROT 6.4 11/04/2021    PROT 6.5 01/24/2020    LABALBU 4.1 11/04/2021    LABALBU 4.4 01/19/2012    BILITOT 0.71 11/04/2021    ALKPHOS 105 11/04/2021    AST 21 11/04/2021    ALT 19 11/04/2021     Lab Results   Component Value Date    WBC 7.6 11/04/2021    RBC 5.70 11/04/2021    RBC 4.86 01/19/2012    HGB 15.0 11/04/2021    HCT 45.7 11/04/2021    MCV 80.2 11/04/2021    MCH 26.3 11/04/2021    MCHC 32.8 11/04/2021    RDW 15.9 11/04/2021     11/04/2021     01/19/2012    MPV NOT REPORTED 11/04/2021     Lab Results   Component Value Date    TSH 1.70 11/04/2021     Lab Results   Component Value Date    CHOL 198 11/04/2021    CHOL 250 01/16/2017    HDL 70 11/04/2021          Assessment & Plan        Diagnosis Orders   1. Essential hypertension   BP elevated, however pt reports a lot of current stressors which are fortunately  getting better. For that reason we decided not to make changes in her meds, she is to continue monitoring BP at home and call if still high. lisinopril (PRINIVIL;ZESTRIL) 20 MG tablet   2. Insomnia, unspecified type   Doing well. Stopped trazodone. 3. Chronic midline low back pain with right-sided sciatica   Continue on Tramadol, Gabapentin, tizanidine  tiZANidine (ZANAFLEX) 2 MG tablet    traMADol (ULTRAM) 50 MG tablet   4. Acquired hypothyroidism   TSH normal, continue on Synthroid    5. Gastroesophageal reflux disease without esophagitis   Symptoms controlled, continue on Omeprazole    6. Flank pain   Check UA POCT Urinalysis No Micro (Auto)   7. Abdominal cramping  POCT Urinalysis No Micro (Auto)   8. Acute cystitis without hematuria   POCT UA unremarkable, however the pt is symptomatic , will treat with Keflex cephALEXin (KEFLEX) 500 MG capsule   9. Recurrent major depressive disorder, remission status unspecified (HCC)   Stable    10.  Clear cell adenocarcinoma of kidney, right ([facility] [address]   Follows up with urology and hem-onc                    Completed Refills   Requested Prescriptions     Signed Prescriptions Disp Refills    tiZANidine (ZANAFLEX) 2 MG tablet 90 tablet 1     Sig: TAKE 1 TABLET BY MOUTH 3 TIMES DAILY AS NEEDED (MUSCLE SPASMS)    lisinopril (PRINIVIL;ZESTRIL) 20 MG tablet 90 tablet 3     Sig: TAKE 1 TABLET EVERY DAY    cephALEXin (KEFLEX) 500 MG capsule 21 capsule 0     Sig: Take 1 capsule by mouth 3 times daily for 7 days    traMADol (ULTRAM) 50 MG tablet 90 tablet 1     Sig: Take 1 tablet by mouth 3 times daily as needed for Pain for up to 30 days. Return in about 3 months (around 9/13/2022) for HTN, insomnia, gerd, back pain. Orders Placed This Encounter   Medications    tiZANidine (ZANAFLEX) 2 MG tablet     Sig: TAKE 1 TABLET BY MOUTH 3 TIMES DAILY AS NEEDED (MUSCLE SPASMS)     Dispense:  90 tablet     Refill:  1    lisinopril (PRINIVIL;ZESTRIL) 20 MG tablet     Sig: TAKE 1 TABLET EVERY DAY     Dispense:  90 tablet     Refill:  3    cephALEXin (KEFLEX) 500 MG capsule     Sig: Take 1 capsule by mouth 3 times daily for 7 days     Dispense:  21 capsule     Refill:  0    traMADol (ULTRAM) 50 MG tablet     Sig: Take 1 tablet by mouth 3 times daily as needed for Pain for up to 30 days. Dispense:  90 tablet     Refill:  1     Reduce doses taken as pain becomes manageable     Orders Placed This Encounter   Procedures    POCT Urinalysis No Micro (Auto)         There are no Patient Instructions on file for this visit. Electronically signed by Litzy Lozano MD on 6/13/2022 at 6:27 PM           Completed Refills      Requested Prescriptions     Signed Prescriptions Disp Refills    tiZANidine (ZANAFLEX) 2 MG tablet 90 tablet 1     Sig: TAKE 1 TABLET BY MOUTH 3 TIMES DAILY AS NEEDED (MUSCLE SPASMS)    lisinopril (PRINIVIL;ZESTRIL) 20 MG tablet 90 tablet 3     Sig: TAKE 1 TABLET EVERY DAY    cephALEXin (KEFLEX) 500 MG capsule 21 capsule 0     Sig: Take 1 capsule by mouth 3 times daily for 7 days    traMADol (ULTRAM) 50 MG tablet 90 tablet 1     Sig: Take 1 tablet by mouth 3 times daily as needed for Pain for up to 30 days.

## 2022-07-14 ENCOUNTER — OFFICE VISIT (OUTPATIENT)
Dept: UROLOGY | Age: 72
End: 2022-07-14
Payer: COMMERCIAL

## 2022-07-14 VITALS
SYSTOLIC BLOOD PRESSURE: 122 MMHG | BODY MASS INDEX: 24.27 KG/M2 | DIASTOLIC BLOOD PRESSURE: 70 MMHG | HEIGHT: 63 IN | WEIGHT: 137 LBS

## 2022-07-14 DIAGNOSIS — C64.1 CLEAR CELL ADENOCARCINOMA OF KIDNEY, RIGHT (HCC): Primary | ICD-10-CM

## 2022-07-14 DIAGNOSIS — N95.2 VAGINAL ATROPHY: ICD-10-CM

## 2022-07-14 DIAGNOSIS — R39.15 URGENCY OF MICTURITION: ICD-10-CM

## 2022-07-14 DIAGNOSIS — R35.0 FREQUENCY OF MICTURITION: ICD-10-CM

## 2022-07-14 DIAGNOSIS — N39.0 RECURRENT UTI: ICD-10-CM

## 2022-07-14 DIAGNOSIS — N20.0 RENAL CALCULUS: ICD-10-CM

## 2022-07-14 PROCEDURE — 51798 US URINE CAPACITY MEASURE: CPT | Performed by: UROLOGY

## 2022-07-14 PROCEDURE — G8400 PT W/DXA NO RESULTS DOC: HCPCS | Performed by: UROLOGY

## 2022-07-14 PROCEDURE — G8427 DOCREV CUR MEDS BY ELIG CLIN: HCPCS | Performed by: UROLOGY

## 2022-07-14 PROCEDURE — 99214 OFFICE O/P EST MOD 30 MIN: CPT | Performed by: UROLOGY

## 2022-07-14 PROCEDURE — 1123F ACP DISCUSS/DSCN MKR DOCD: CPT | Performed by: UROLOGY

## 2022-07-14 PROCEDURE — 1090F PRES/ABSN URINE INCON ASSESS: CPT | Performed by: UROLOGY

## 2022-07-14 PROCEDURE — G8420 CALC BMI NORM PARAMETERS: HCPCS | Performed by: UROLOGY

## 2022-07-14 PROCEDURE — 3017F COLORECTAL CA SCREEN DOC REV: CPT | Performed by: UROLOGY

## 2022-07-14 PROCEDURE — 1036F TOBACCO NON-USER: CPT | Performed by: UROLOGY

## 2022-07-14 RX ORDER — ESTRADIOL 0.1 MG/G
CREAM VAGINAL
Qty: 42.5 G | Refills: 3 | Status: SHIPPED | OUTPATIENT
Start: 2022-07-14

## 2022-07-14 ASSESSMENT — ENCOUNTER SYMPTOMS
ABDOMINAL PAIN: 0
WHEEZING: 0
VOMITING: 0
NAUSEA: 0
COLOR CHANGE: 0
SHORTNESS OF BREATH: 0
COUGH: 0
EYE REDNESS: 0
EYE PAIN: 0
BACK PAIN: 0

## 2022-07-14 NOTE — PATIENT INSTRUCTIONS
SURVEY:    You may be receiving a survey from [employer] regarding your visit today. Please complete the survey to enable us to provide the highest quality of care to you and your family. If you cannot score us a very good on any question, please call the office to discuss how we could have made your experience a very good one. Thank you.

## 2022-07-14 NOTE — PROGRESS NOTES
HPI:        Patient is a 81 y.o. female in no acute distress. She is alert and oriented to person, place, and time. History  5/2021 Patient being seen here today as a new patient. Yajaira Salas was referred by primary care for renal masses.  Patient did have a recent CT scan.  This film was independently reviewed.  This does show a 3 cm mass in the right kidney. Rissa Hale is also a 1.7 cm mass in the left kidney.  Patient does also have a small stone in the right kidney.  Patient's mass on the left does appear to be cystic.  I am more concerned about the mass on the right.  Patient has never seen urology in the past. Yajaira Salas has had no recent gross hematuria dysuria.  Patient is a lifetime non-smoker.  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 Yajaira Salas has had no recent unintentional weight loss or decreased appetite.  She reports no flank pain nausea vomiting today.  Patient was having some back pain that prompted an MRI the MRI discovered the renal masses which prompted the CT scan.  No pain today.     6/2021 - Partial right robotic assisted nephrectomy - Dr. Kelly Hassan -[facility] RE[facility] CELL [facility]CINOMA     Urine cultures  10/2021 - UA treated with cipro  9/2021 [facility]  8/2021 [place] [facility]  7/2021 - Klebsiella (outside hospital)   5/2021 -[place] [facility]     Currently  Patient is here today for 6-month follow-up. Patient did have a CT scan performed. This is for 6-month follow-up status post partial nephrectomy. Patient CT scan was independently reviewed today. Patient has no evidence of any local recurrence or new metastatic disease. Patient does have a punctate calculi in the right kidney. Otherwise no significant  abnormalities. Patient is having nocturia x2. She has no current constipation issues. She reports no incontinence. At this point time she has no gross hematuria dysuria. Patient does have a history of frequent UTIs. Patient is otherwise doing well.   She did have recent urinary tract infection. Past Medical History:   Diagnosis Date    Allergic rhinitis 2011    Anxiety 2011    Carpal tunnel syndrome 2011    Depression 2011    Dyslipidemia 2011    Fibromyalgia 2011    Hyperlipidemia     Hypertension 2011    SOB (shortness of breath)     Thyroid disease      Past Surgical History:   Procedure Laterality Date    CARDIAC CATHETERIZATION Left 2017    Dr. Abigail Cano @ [facility]--CAD, 50-60% ostial right CAD with an FFR of 0.94.  60% disease in the proximal LAD with an FFR of 0.92 with bridging of the mid LAD. Normal LV function at 60%. Mildly dilated ascending aorta -will do a CT scan to measure.  CARDIAC CATHETERIZATION Left 2020    Dr. Abigail Cano @ [facility]--Medical therapy     SECTION      x 4    ELBOW SURGERY Right     GALLBLADDER SURGERY      HYSTERECTOMY (CERVIX STATUS UNKNOWN)      total hysterectomy    KIDNEY SURGERY Right 2021    Dr. Kruse Mercury      twisted bowel 1985     Outpatient Encounter Medications as of 2022   Medication Sig Dispense Refill    estradiol (ESTRACE VAGINAL) 0.1 MG/GM vaginal cream Place a pea-sized amount vaginally daily x 2 weeks, then 3 times per week thereafter. Use finger to apply, wash hands after application. DO NOT use plastic applicator. Call office if you stop this medication.  42.5 g 3    FEROSUL 325 (65 Fe) MG tablet TAKE 1 TABLET BY MOUTH ONCE DAILY WITH BREAKFAST      tiZANidine (ZANAFLEX) 2 MG tablet TAKE 1 TABLET BY MOUTH 3 TIMES DAILY AS NEEDED (MUSCLE SPASMS) 90 tablet 1    lisinopril (PRINIVIL;ZESTRIL) 20 MG tablet TAKE 1 TABLET EVERY DAY 90 tablet 3    B Complex Vitamins (VITAMIN B COMPLEX 100 IJ) Take by mouth      Multiple Vitamins-Minerals (CENTRUM SILVER ADULT 50+) TABS Take by mouth       aspirin EC 81 MG EC tablet Take 1 tablet by mouth daily 90 tablet 1    omeprazole (PRILOSEC) 20 MG delayed release capsule Take 1 capsule by mouth 2 times daily (before meals) 60 capsule 1    vitamin D3 (CHOLECALCIFEROL) 25 MCG (1000 UT) TABS tablet TAKE 1 TABLET EVERY DAY 90 tablet 1    metoprolol tartrate (LOPRESSOR) 25 MG tablet TAKE 1/2 TABLET TWICE DAILY (Patient taking differently: Take 25 mg by mouth Take full tablet in am and 1/2 tablet in pm) 90 tablet 3    Zinc 100 MG TABS Take by mouth      Coenzyme Q10 (CO Q-10) 100 MG CAPS TAKE 1 CAPSULE EVERY DAY 90 capsule 1    levothyroxine (SYNTHROID) 50 MCG tablet TAKE 1 TABLET EVERY DAY 90 tablet 1    montelukast (SINGULAIR) 10 MG tablet TAKE 1 TABLET EVERY DAY 90 tablet 1    Ascorbic Acid (VITAMIN C) 250 MG tablet Take 2 tablets by mouth daily 30 tablet 5    vitamin B-6 (PYRIDOXINE) 100 MG tablet Take 1 tablet by mouth daily 30 tablet 5    polycarbophil (FIBER-CAPS) 625 MG tablet Take 1 tablet by mouth daily 30 tablet 4    fluticasone (FLONASE) 50 MCG/ACT nasal spray USE 2 SPRAYS NASALLY EVERY DAY 48 g 1    dicyclomine (BENTYL) 10 MG capsule       Blood Pressure Monitoring (BLOOD PRESSURE CUFF) MISC Use to monitor Blood pressure 1-2 times daily 1 each 0    [DISCONTINUED] estradiol (ESTRACE VAGINAL) 0.1 MG/GM vaginal cream Place a pea-sized amount vaginally daily x 2 weeks, then 3 times per week thereafter. Use finger to apply, wash hands after application. DO NOT use plastic applicator. Call office if you stop this medication. 42.5 g 3    gabapentin (NEURONTIN) 300 MG capsule TAKE 1 CAPSULE THREE TIMES DAILY 270 capsule 1     No facility-administered encounter medications on file as of 7/14/2022.       Current Outpatient Medications on File Prior to Visit   Medication Sig Dispense Refill    FEROSUL 325 (65 Fe) MG tablet TAKE 1 TABLET BY MOUTH ONCE DAILY WITH BREAKFAST      tiZANidine (ZANAFLEX) 2 MG tablet TAKE 1 TABLET BY MOUTH 3 TIMES DAILY AS NEEDED (MUSCLE SPASMS) 90 tablet 1    lisinopril (PRINIVIL;ZESTRIL) 20 MG tablet TAKE 1 TABLET EVERY DAY 90 tablet 3    B Complex Vitamins (VITAMIN B COMPLEX 100 IJ) Take by mouth      Multiple Vitamins-Minerals (CENTRUM SILVER ADULT 50+) TABS Take by mouth       aspirin EC 81 MG EC tablet Take 1 tablet by mouth daily 90 tablet 1    omeprazole (PRILOSEC) 20 MG delayed release capsule Take 1 capsule by mouth 2 times daily (before meals) 60 capsule 1    vitamin D3 (CHOLECALCIFEROL) 25 MCG (1000 UT) TABS tablet TAKE 1 TABLET EVERY DAY 90 tablet 1    metoprolol tartrate (LOPRESSOR) 25 MG tablet TAKE 1/2 TABLET TWICE DAILY (Patient taking differently: Take 25 mg by mouth Take full tablet in am and 1/2 tablet in pm) 90 tablet 3    Zinc 100 MG TABS Take by mouth      Coenzyme Q10 (CO Q-10) 100 MG CAPS TAKE 1 CAPSULE EVERY DAY 90 capsule 1    levothyroxine (SYNTHROID) 50 MCG tablet TAKE 1 TABLET EVERY DAY 90 tablet 1    montelukast (SINGULAIR) 10 MG tablet TAKE 1 TABLET EVERY DAY 90 tablet 1    Ascorbic Acid (VITAMIN C) 250 MG tablet Take 2 tablets by mouth daily 30 tablet 5    vitamin B-6 (PYRIDOXINE) 100 MG tablet Take 1 tablet by mouth daily 30 tablet 5    polycarbophil (FIBER-CAPS) 625 MG tablet Take 1 tablet by mouth daily 30 tablet 4    fluticasone (FLONASE) 50 MCG/ACT nasal spray USE 2 SPRAYS NASALLY EVERY DAY 48 g 1    dicyclomine (BENTYL) 10 MG capsule       Blood Pressure Monitoring (BLOOD PRESSURE CUFF) MISC Use to monitor Blood pressure 1-2 times daily 1 each 0    gabapentin (NEURONTIN) 300 MG capsule TAKE 1 CAPSULE THREE TIMES DAILY 270 capsule 1     No current facility-administered medications on file prior to visit. Patient has no known allergies.   Family History   Problem Relation Age of Onset    Stroke Mother     High Cholesterol Mother     Heart Attack Mother     Heart Attack Father     High Cholesterol Father     High Cholesterol Sister     High Cholesterol Brother      Social History     Tobacco Use   Smoking Status Never Smoker   Smokeless Tobacco Never Used       Social History     Substance and Sexual Activity   Alcohol Use No    Comment: one glass once or twice a month       Review of Systems   Constitutional: Negative for appetite change, chills and fever. Eyes: Negative for pain, redness and visual disturbance. Respiratory: Negative for cough, shortness of breath and wheezing. Cardiovascular: Negative for chest pain and leg swelling. Gastrointestinal: Negative for abdominal pain, nausea and vomiting. Genitourinary: Negative for difficulty urinating, dysuria, flank pain, frequency, hematuria, pelvic pain, vaginal bleeding and vaginal discharge. Musculoskeletal: Negative for back pain, joint swelling and myalgias. Skin: Negative for color change, rash and wound. Neurological: Negative for dizziness, tremors and numbness. Hematological: Negative for adenopathy. Does not bruise/bleed easily. /70 (Site: Left Upper Arm, Position: Sitting, Cuff Size: Medium Adult)   Ht 5' 3\" (1.6 m)   Wt 137 lb (62.1 kg)   LMP  (LMP Unknown)   BMI 24.27 kg/m²       PHYSICAL EXAM:  Constitutional: Patient resting comfortably, in no acute distress. Neuro: Alert and oriented to person place and time. Cranial nerves grossly intact. Psych: Mood and affect normal.  Skin: Warm, dry  HEENT: normocephalic, atraumatic  Lymphatics: No palpable lymphadenopathy  Lungs: Respiratory effort normal, unlabored  Cardiovascular:  Normal peripheral pulses  Abdomen: Soft, non-tender, non-distended with no organomegaly or palpable masses. : No CVA tenderness. Bladder non-tender and not distended. Pelvic: deferred     Lab Results   Component Value Date    BUN 9 11/04/2021     Lab Results   Component Value Date    CREATININE 0.57 11/04/2021       ASSESSMENT:  This is a 81 y.o. female with the following diagnoses:   Diagnosis Orders   1.  Clear cell adenocarcinoma of kidney, right (HCC)  CT ABDOMEN PELVIS W WO CONTRAST Additional Contrast? None    CBC with Auto

## 2022-08-23 DIAGNOSIS — T78.40XD ALLERGY, SUBSEQUENT ENCOUNTER: ICD-10-CM

## 2022-08-23 RX ORDER — MONTELUKAST SODIUM 10 MG/1
TABLET ORAL
Qty: 90 TABLET | Refills: 1 | Status: SHIPPED | OUTPATIENT
Start: 2022-08-23

## 2022-08-23 NOTE — TELEPHONE ENCOUNTER
Health Maintenance   Topic Date Due    DEXA (modify frequency per FRAX score)  Never done    Shingles vaccine (1 of 2) 10/15/2022 (Originally 1/[birth year]/2002)    COVID-19 Vaccine (1) 10/15/2022 (Originally 1950)    Flu vaccine (1) 09/01/2022    Depression Monitoring  06/13/2023    DTaP/Tdap/Td vaccine (2 - Td or Tdap) 02/10/2024    Breast cancer screen  02/17/2024    Lipids  11/04/2026    Colorectal Cancer Screen  06/24/2029    Pneumococcal 65+ years Vaccine  Completed    Hepatitis C screen  Addressed    Hepatitis A vaccine  Aged Out    Hepatitis B vaccine  Aged Out    Hib vaccine  Aged Out    Meningococcal (ACWY) vaccine  Aged Out             (applicable per patient's age: Cancer Screenings, Depression Screening, Fall Risk Screening, Immunizations)    LDL Cholesterol (mg/dL)   Date Value   11/04/2021 115     LDL Calculated (mg/dL)   Date Value   01/16/2017 159     AST (U/L)   Date Value   11/04/2021 21     ALT (U/L)   Date Value   11/04/2021 19     BUN (mg/dL)   Date Value   11/04/2021 9      (goal A1C is < 7)   (goal LDL is <100) need 30-50% reduction from baseline     BP Readings from Last 3 Encounters:   07/14/22 122/70   06/13/22 (!) 140/98   03/01/22 130/76    (goal /80)      All Future Testing planned in [facility]:  Lab Frequency Next Occurrence   CBC Auto Differential Once 11/17/2022   Comprehensive Metabolic Panel Once 14/53/4978   Vitamin D 25 Hydroxy Once 11/17/2022   Lipid Panel Once 11/17/2022   TSH with Reflex Once 11/17/2022   Magnesium Once 11/17/2022   EKG 12 Lead Once 11/17/2022   XR CHEST (2 VW) Once 11/17/2022   CT ABDOMEN PELVIS W WO CONTRAST Additional Contrast? Radiologist Recommendation (renal mass protocol) Once 06/16/2022   BUN & Creatinine Once 06/03/2022   CT ABDOMEN PELVIS W WO CONTRAST Additional Contrast? None Once 01/14/2023   CBC with Auto Differential Once 85/31/9129   Basic Metabolic Panel Once 03/31/3010   XR CHEST STANDARD (2 VW) Once 01/14/2023       Next Visit Date:  Future Appointments   Date Time Provider Linda Merchant   9/13/2022  9:30 [facility]   11/17/2022  9:30 AM Dearl Castleman, MD Derald Profit [facility]   1/12/2023 11:30 AM [facility] CAT SCAN ROOM MW CT [facility] Rad   1/19/2023 10:30 AM SANDHYA Drew urol [facility]            Patient Active Problem List:     Essential hypertension     Allergic rhinitis     Anxiety     Insomnia     Hyperlipidemia     Fibromyalgia syndrome     Recurrent major depressive disorder (HCC)     Chronic low back pain     Lumbar and sacral osteoarthritis     Acquired hypothyroidism     Lymphadenopathy, cervical     Schaefer's esophagus     Renal mass     Abnormal mammogram     Arteriosclerosis of coronary artery     Degeneration of intervertebral disc of lumbar region     History of fundoplication     Neuropathy     Otalgia     Post-operative nausea and vomiting     Shortness of breath     Shoulder pain     Status post coronary artery balloon dilation     Ureteric fistula     Clear cell adenocarcinoma of kidney, right (HCC)     Bursitis of right shoulder     Lumbar radiculopathy     Renal calculus     Vaginal atrophy     Recurrent UTI

## 2022-09-10 DIAGNOSIS — G89.29 CHRONIC MIDLINE LOW BACK PAIN WITH RIGHT-SIDED SCIATICA: ICD-10-CM

## 2022-09-10 DIAGNOSIS — M54.41 CHRONIC MIDLINE LOW BACK PAIN WITH RIGHT-SIDED SCIATICA: ICD-10-CM

## 2022-09-10 DIAGNOSIS — K21.9 GASTROESOPHAGEAL REFLUX DISEASE WITHOUT ESOPHAGITIS: ICD-10-CM

## 2022-09-12 RX ORDER — TIZANIDINE 2 MG/1
TABLET ORAL
Qty: 90 TABLET | Refills: 0 | Status: SHIPPED | OUTPATIENT
Start: 2022-09-12 | End: 2022-10-10

## 2022-09-12 RX ORDER — OMEPRAZOLE 20 MG/1
CAPSULE, DELAYED RELEASE ORAL
Qty: 60 CAPSULE | Refills: 0 | Status: SHIPPED | OUTPATIENT
Start: 2022-09-12

## 2022-09-13 ENCOUNTER — OFFICE VISIT (OUTPATIENT)
Dept: FAMILY MEDICINE CLINIC | Age: 72
End: 2022-09-13
Payer: COMMERCIAL

## 2022-09-13 VITALS
HEART RATE: 57 BPM | SYSTOLIC BLOOD PRESSURE: 142 MMHG | OXYGEN SATURATION: 96 % | BODY MASS INDEX: 25.02 KG/M2 | HEIGHT: 63 IN | WEIGHT: 141.2 LBS | RESPIRATION RATE: 18 BRPM | DIASTOLIC BLOOD PRESSURE: 98 MMHG

## 2022-09-13 DIAGNOSIS — I10 ESSENTIAL HYPERTENSION: Primary | ICD-10-CM

## 2022-09-13 DIAGNOSIS — E03.9 HYPOTHYROIDISM, UNSPECIFIED TYPE: ICD-10-CM

## 2022-09-13 DIAGNOSIS — M47.817 LUMBAR AND SACRAL OSTEOARTHRITIS: ICD-10-CM

## 2022-09-13 DIAGNOSIS — C64.1 CLEAR CELL ADENOCARCINOMA OF KIDNEY, RIGHT (HCC): ICD-10-CM

## 2022-09-13 PROBLEM — M25.519 SHOULDER PAIN: Status: RESOLVED | Noted: 2021-08-09 | Resolved: 2022-09-13

## 2022-09-13 PROBLEM — Z98.890 POST-OPERATIVE NAUSEA AND VOMITING: Status: RESOLVED | Noted: 2021-08-09 | Resolved: 2022-09-13

## 2022-09-13 PROBLEM — H92.09 OTALGIA: Status: RESOLVED | Noted: 2021-08-09 | Resolved: 2022-09-13

## 2022-09-13 PROBLEM — R11.2 POST-OPERATIVE NAUSEA AND VOMITING: Status: RESOLVED | Noted: 2021-08-09 | Resolved: 2022-09-13

## 2022-09-13 PROBLEM — R06.02 SHORTNESS OF BREATH: Status: RESOLVED | Noted: 2021-08-09 | Resolved: 2022-09-13

## 2022-09-13 PROCEDURE — 90471 IMMUNIZATION ADMIN: CPT | Performed by: INTERNAL MEDICINE

## 2022-09-13 PROCEDURE — 90694 VACC AIIV4 NO PRSRV 0.5ML IM: CPT | Performed by: INTERNAL MEDICINE

## 2022-09-13 PROCEDURE — 1123F ACP DISCUSS/DSCN MKR DOCD: CPT | Performed by: INTERNAL MEDICINE

## 2022-09-13 PROCEDURE — 99214 OFFICE O/P EST MOD 30 MIN: CPT | Performed by: INTERNAL MEDICINE

## 2022-09-13 RX ORDER — TRAMADOL HYDROCHLORIDE 50 MG/1
TABLET ORAL
COMMUNITY
Start: 2022-07-23

## 2022-09-13 RX ORDER — LOSARTAN POTASSIUM 100 MG/1
100 TABLET ORAL DAILY
Qty: 90 TABLET | Refills: 1 | Status: SHIPPED | OUTPATIENT
Start: 2022-09-13

## 2022-09-13 ASSESSMENT — ENCOUNTER SYMPTOMS
COUGH: 0
SORE THROAT: 0
ORTHOPNEA: 0
NAUSEA: 0
ABDOMINAL PAIN: 0
BOWEL INCONTINENCE: 0
SHORTNESS OF BREATH: 0
BACK PAIN: 1

## 2022-09-13 NOTE — PATIENT INSTRUCTIONS
SURVEY:    You may be receiving a survey from [employer] regarding your visit today. Please complete the survey to enable us to provide the highest quality of care to you and your family. If you cannot score us a very good on any question, please call the office to discuss how we could have made your experience a very good one. Thank you.   MD Brooklynn Monzon MD Dannette Furrow, MD Alfrieda Lard, MD Leatrice Florence, [facility] [place], [place]

## 2022-09-13 NOTE — PROGRESS NOTES
HPI Notes    Name: German Horton  : 1950         Chief Complaint:     Chief Complaint   Patient presents with    Hypertension     Patient states BP fluctuates alot       History of Present Illness:        Roxanne presents to office to follow-up for hypertension, hypothyroidism, chronic low back pain    States feels more tired lately. Has been busy at home with indoor and outdoor chores. No CP, no SOB. Sometimes feels busy. Blood pressure is staying elevated . Roxanne confirms that she is compliant with her thyroid medication. she denies an increase in fatigue, constipation, increased skin dryness, or increased lower extremity edema. The last TSH was 1.7 on 2021    Hypertension  This is a chronic problem. The current episode started more than 1 year ago. The problem has been gradually worsening since onset. The problem is uncontrolled. Pertinent negatives include no anxiety, chest pain, headaches, orthopnea, palpitations, peripheral edema or shortness of breath. Risk factors for coronary artery disease include dyslipidemia and post-menopausal state. Past treatments include alpha 1 blockers and beta blockers. The current treatment provides significant improvement. Hypertensive end-organ damage includes CAD/MI. There is no history of kidney disease, CVA or heart failure. Back Pain  This is a chronic problem. The current episode started more than 1 year ago. The problem occurs constantly. The problem is unchanged. The pain is present in the lumbar spine and sacro-iliac. The pain radiates to the right thigh and right knee. The pain is moderate. The symptoms are aggravated by position, standing and twisting. Pertinent negatives include no abdominal pain, bladder incontinence, bowel incontinence, chest pain, dysuria, fever, headaches, paresthesias, perianal numbness, tingling or weakness. She has tried NSAIDs, heat and muscle relaxant (Tramadol) for the symptoms.  The treatment provided moderate relief. Past Medical History:     Past Medical History:   Diagnosis Date    Allergic rhinitis 2011    Anxiety 2011    Carpal tunnel syndrome 2011    Depression 2011    Dyslipidemia 2011    Fibromyalgia 2011    Hyperlipidemia     Hypertension 2011    SOB (shortness of breath)     Thyroid disease       Reviewed all health maintenance requirements and orderedappropriate tests  Health Maintenance Due   Topic Date Due    DEXA (modify frequency per FRAX score)  Never done       Past Surgical History:     Past Surgical History:   Procedure Laterality Date    CARDIAC CATHETERIZATION Left 2017    Dr. Tai Kohler @ [facility]--CAD, 50-60% ostial right CAD with an FFR of 0.94.  60% disease in the proximal LAD with an FFR of 0.92 with bridging of the mid LAD. Normal LV function at 60%. Mildly dilated ascending aorta -will do a CT scan to measure. CARDIAC CATHETERIZATION Left 2020    Dr. Tai Kohler @ [facility]--Medical therapy     SECTION      x 4    ELBOW SURGERY Right     GALLBLADDER SURGERY      HYSTERECTOMY (CERVIX STATUS UNKNOWN)      total hysterectomy    KIDNEY SURGERY Right 2021    Dr. Donalee Lundborg      Right    SMALL INTESTINE SURGERY      twisted bowel         Medications:       Prior to Admission medications    Medication Sig Start Date End Date Taking?  Authorizing Provider   traMADol (ULTRAM) 50 MG tablet TAKE 1 TABLET BY MOUTH THREE TIMES DAILY AS NEEDED 22  Yes Historical Provider, MD   losartan (COZAAR) 100 MG tablet Take 1 tablet by mouth daily 22  Yes Jeff Gomez MD   omeprazole (PRILOSEC) 20 MG delayed release capsule TAKE 1 CAPSULE BY MOUTH TWICE DAILY BEFORE MEAL(S) 22  Yes Jeff Gomez MD   tiZANidine (ZANAFLEX) 2 MG tablet TAKE 1 TABLET BY MOUTH THREE TIMES DAILY (MUSCLE  SPASM) 22  Yes Jeff Gomez MD   montelukast (SINGULAIR) 10 MG tablet TAKE 1 TABLET EVERY DAY 22  Yes Gagan Linder MD   estradiol (ESTRACE VAGINAL) 0.1 MG/GM vaginal cream Place a pea-sized amount vaginally daily x 2 weeks, then 3 times per week thereafter. Use finger to apply, wash hands after application. DO NOT use plastic applicator. Call office if you stop this medication.  7/14/22  Yes Tiffanie Bryan MD   B Complex Vitamins (VITAMIN B COMPLEX 100 IJ) Take by mouth   Yes Historical Provider, MD   Multiple Vitamins-Minerals (CENTRUM SILVER ADULT 50+) TABS Take by mouth    Yes Historical Provider, MD   aspirin EC 81 MG EC tablet Take 1 tablet by mouth daily 3/1/22  Yes Gagan Linder MD   vitamin D3 (CHOLECALCIFEROL) 25 MCG (1000 UT) TABS tablet TAKE 1 TABLET EVERY DAY 12/14/21  Yes Gagan Linder MD   metoprolol tartrate (LOPRESSOR) 25 MG tablet TAKE 1/2 TABLET TWICE DAILY  Patient taking differently: Take 25 mg by mouth Take full tablet in am and 1/2 tablet in pm 11/17/21  Yes Osmany Salvador MD   Zinc 100 MG TABS Take by mouth   Yes Historical Provider, MD   Coenzyme Q10 (CO Q-10) 100 MG CAPS TAKE 1 CAPSULE EVERY DAY 10/28/21  Yes Gagan Linder MD   levothyroxine (SYNTHROID) 50 MCG tablet TAKE 1 TABLET EVERY DAY 10/28/21  Yes Gagan Linder MD   Ascorbic Acid (VITAMIN C) 250 MG tablet Take 2 tablets by mouth daily 2/5/21  Yes Gagan Linder MD   vitamin B-6 (PYRIDOXINE) 100 MG tablet Take 1 tablet by mouth daily 2/5/21  Yes Gagan Linder MD   polycarbophil (FIBER-CAPS) 625 MG tablet Take 1 tablet by mouth daily 2/5/21  Yes Gagan Linder MD   fluticasone (FLONASE) 50 MCG/ACT nasal spray USE 2 SPRAYS NASALLY EVERY DAY 12/28/19  Yes Gagan Linder MD   dicyclomine (BENTYL) 10 MG capsule  11/26/19  Yes Historical Provider, MD   Blood Pressure Monitoring (BLOOD PRESSURE CUFF) MISC Use to monitor Blood pressure 1-2 times daily 3/4/19  Yes Gagan Linder MD   gabapentin (NEURONTIN) 300 MG capsule TAKE 1 CAPSULE THREE TIMES DAILY 10/28/21 6/13/22  Gagan Linder MD        Allergies: Patient has no known allergies. Social History:     Tobacco: reports that she has never smoked. She has never used smokeless tobacco.  Alcohol:      reports no history of alcohol use. Drug Use:  reports no history of drug use. Family History:     Family History   Problem Relation Age of Onset    Stroke Mother     High Cholesterol Mother     Heart Attack Mother     Heart Attack Father     High Cholesterol Father     High Cholesterol Sister     High Cholesterol Brother        Review of Systems:         Review of Systems   Constitutional:  Positive for fatigue. Negative for activity change, appetite change, chills and fever. HENT:  Negative for congestion and sore throat. Respiratory:  Negative for cough and shortness of breath. Cardiovascular:  Negative for chest pain, palpitations, orthopnea and leg swelling. Gastrointestinal:  Negative for abdominal pain, bowel incontinence and nausea. Genitourinary:  Negative for bladder incontinence and dysuria. Musculoskeletal:  Positive for arthralgias, back pain and myalgias. Skin:  Negative for rash. Neurological:  Negative for dizziness, tingling, weakness, headaches and paresthesias. Psychiatric/Behavioral:  The patient is not nervous/anxious. Physical Exam:     Vitals:  BP (!) 142/98 (Site: Right Upper Arm)   Pulse 57   Resp 18   Ht 5' 3\" (1.6 m)   Wt 141 lb 3.2 oz (64 kg)   LMP  (LMP Unknown)   SpO2 96%   BMI 25.01 kg/m²       Physical Exam  Vitals reviewed. Constitutional:       General: She is not in acute distress. Appearance: Normal appearance. She is well-developed. HENT:      Head: Normocephalic and atraumatic. Neck:      Thyroid: No thyromegaly. Cardiovascular:      Rate and Rhythm: Normal rate and regular rhythm. Heart sounds: Normal heart sounds. No murmur heard. Pulmonary:      Effort: Pulmonary effort is normal.      Breath sounds: Normal breath sounds. No wheezing or rales.    Abdominal:      General: Bowel sounds are normal. There is no distension. Palpations: Abdomen is soft. There is no mass. Tenderness: There is no abdominal tenderness. Musculoskeletal:         General: Tenderness (lumbo sacral spine area tenderness in the middle) present. Normal range of motion. Right lower leg: No edema. Left lower leg: No edema. Lymphadenopathy:      Cervical: No cervical adenopathy. Skin:     General: Skin is warm and dry. Coloration: Skin is not jaundiced or pale. Findings: No rash. Neurological:      General: No focal deficit present. Mental Status: She is alert and oriented to person, place, and time. Mental status is at baseline. Psychiatric:         Mood and Affect: Mood normal.         Behavior: Behavior normal.         Thought Content:  Thought content normal.         Judgment: Judgment normal.             Data:     Lab Results   Component Value Date/Time     11/04/2021 10:10 AM    K 4.2 11/04/2021 10:10 AM     11/04/2021 10:10 AM    CO2 29 11/04/2021 10:10 AM    BUN 9 11/04/2021 10:10 AM    CREATININE 0.57 11/04/2021 10:10 AM    GLUCOSE 95 11/04/2021 10:10 AM    GLUCOSE 99 01/19/2012 11:13 AM    PROT 6.4 11/04/2021 10:10 AM    PROT 6.5 01/24/2020 12:00 AM    LABALBU 4.1 11/04/2021 10:10 AM    LABALBU 4.4 01/19/2012 11:13 AM    BILITOT 0.71 11/04/2021 10:10 AM    ALKPHOS 105 11/04/2021 10:10 AM    AST 21 11/04/2021 10:10 AM    ALT 19 11/04/2021 10:10 AM     Lab Results   Component Value Date/Time    WBC 7.6 11/04/2021 10:10 AM    RBC 5.70 11/04/2021 10:10 AM    RBC 4.86 01/19/2012 11:13 AM    HGB 15.0 11/04/2021 10:10 AM    HCT 45.7 11/04/2021 10:10 AM    MCV 80.2 11/04/2021 10:10 AM    MCH 26.3 11/04/2021 10:10 AM    MCHC 32.8 11/04/2021 10:10 AM    RDW 15.9 11/04/2021 10:10 AM     11/04/2021 10:10 AM     01/19/2012 11:13 AM    MPV NOT REPORTED 11/04/2021 10:10 AM     Lab Results   Component Value Date/Time    TSH 1.70 11/04/2021 10:10 AM Lab Results   Component Value Date/Time    CHOL 198 11/04/2021 10:10 AM    CHOL 250 01/16/2017 12:00 AM    HDL 70 11/04/2021 10:10 AM          Assessment & Plan        Diagnosis Orders   1. Essential hypertension   Patient's blood pressure is uncontrolled. We decided  to stop lisinopril and try losartan 100 mg daily, she is to continue on metoprolol same dose, monitor blood pressure at home and call if  still elevated losartan (COZAAR) 100 MG tablet      2. Lumbar and sacral osteoarthritis   Patient with chronic low back pain. Overall symptoms stable. She is taking as needed tramadol, Tylenol, occasionally NSAIDs. On muscle relaxants. 3. Hypothyroidism, unspecified type   TSH normal, continue on current dose of Synthroid       4. Clear cell adenocarcinoma of kidney, right [facility])   Patient follows up with heme-onc Dr. Ulysses [facility] in [place]                       Completed Refills   Requested Prescriptions     Signed Prescriptions Disp Refills    losartan (COZAAR) 100 MG tablet 90 tablet 1     Sig: Take 1 tablet by mouth daily     Return in about 3 months (around 12/13/2022) for HTN, back pain, insomnia, gerd. Orders Placed This Encounter   Medications    losartan (COZAAR) 100 MG tablet     Sig: Take 1 tablet by mouth daily     Dispense:  90 tablet     Refill:  1       Orders Placed This Encounter   Procedures    Influenza, FLUAD, (age 72 y+), IM, Preservative Free, 0.5 mL           Patient Instructions     SURVEY:    You may be receiving a survey from [employer] regarding your visit today. Please complete the survey to enable us to provide the highest quality of care to you and your family. If you cannot score us a very good on any question, please call the office to discuss how we could have made your experience a very good one. Thank you.   MD Brodie Monzon MD Dema Sers, MD Melodie Spear, MD Katalina Roberto, PM  [employer], [place], [place]     Electronically signed by Yuri Luis MD on 9/13/2022 at 11:26 AM           Completed Refills      Requested Prescriptions     Signed Prescriptions Disp Refills    losartan (COZAAR) 100 MG tablet 90 tablet 1     Sig: Take 1 tablet by mouth daily

## 2022-09-22 ENCOUNTER — TELEPHONE (OUTPATIENT)
Dept: CARDIOLOGY CLINIC | Age: 72
End: 2022-09-22

## 2022-09-22 RX ORDER — DOXAZOSIN MESYLATE 1 MG/1
1 TABLET ORAL NIGHTLY
Qty: 30 TABLET | Refills: 11 | Status: SHIPPED | OUTPATIENT
Start: 2022-09-22

## 2022-09-22 NOTE — TELEPHONE ENCOUNTER
Roxanne called to state that she has been having elevated BP with some CP and dizziness. She stated that last night while lying down, she started having some light chest pain/pressure and got up to take her BP. It was 198/103 Pulse 60. She stated that she has some SOB. She stated that Dr. Selena Amaya stopped her Lisinopril and started her on Losartan on 9/13. She stated that she took a half Lisinopril 20 mg.  BP came down to 158/86 HR 70. This AM BP was 137/75 prior to meds. She is asking if we should move her appointment sooner than Nov 17 or she is also asking if she should wear a monitor. She stated that when her HR goes up she gets very dizzy and has had the CP/pressure for a few weeks-a couple times a week even while at rest.--Just doesn't feel right. ..per  Roxanne. Mainor Rasmussen

## 2022-10-10 DIAGNOSIS — M54.41 CHRONIC MIDLINE LOW BACK PAIN WITH RIGHT-SIDED SCIATICA: ICD-10-CM

## 2022-10-10 DIAGNOSIS — G89.29 CHRONIC MIDLINE LOW BACK PAIN WITH RIGHT-SIDED SCIATICA: ICD-10-CM

## 2022-10-10 RX ORDER — TIZANIDINE 2 MG/1
TABLET ORAL
Qty: 90 TABLET | Refills: 0 | Status: SHIPPED | OUTPATIENT
Start: 2022-10-10

## 2022-11-10 DIAGNOSIS — G89.29 CHRONIC MIDLINE LOW BACK PAIN WITH RIGHT-SIDED SCIATICA: ICD-10-CM

## 2022-11-10 DIAGNOSIS — K21.9 GASTROESOPHAGEAL REFLUX DISEASE WITHOUT ESOPHAGITIS: ICD-10-CM

## 2022-11-10 DIAGNOSIS — M54.41 CHRONIC MIDLINE LOW BACK PAIN WITH RIGHT-SIDED SCIATICA: ICD-10-CM

## 2022-11-10 RX ORDER — TIZANIDINE 2 MG/1
TABLET ORAL
Qty: 90 TABLET | Refills: 0 | Status: SHIPPED | OUTPATIENT
Start: 2022-11-10 | End: 2022-11-14

## 2022-11-10 RX ORDER — OMEPRAZOLE 20 MG/1
CAPSULE, DELAYED RELEASE ORAL
Qty: 60 CAPSULE | Refills: 0 | Status: SHIPPED | OUTPATIENT
Start: 2022-11-10

## 2022-11-11 ENCOUNTER — HOSPITAL ENCOUNTER (OUTPATIENT)
Dept: GENERAL RADIOLOGY | Age: 72
Discharge: HOME | End: 2022-11-13
Payer: COMMERCIAL

## 2022-11-11 ENCOUNTER — HOSPITAL ENCOUNTER (OUTPATIENT)
Age: 72
Discharge: HOME | End: 2022-11-13
Payer: COMMERCIAL

## 2022-11-11 ENCOUNTER — HOSPITAL ENCOUNTER (OUTPATIENT)
Age: 72
Discharge: HOME | End: 2022-11-11
Payer: COMMERCIAL

## 2022-11-11 DIAGNOSIS — E55.9 VITAMIN D DEFICIENCY, UNSPECIFIED: ICD-10-CM

## 2022-11-11 DIAGNOSIS — I10 ESSENTIAL HYPERTENSION: ICD-10-CM

## 2022-11-11 DIAGNOSIS — E78.2 MIXED HYPERLIPIDEMIA: ICD-10-CM

## 2022-11-11 LAB
ABSOLUTE EOS #: 0.2 K/UL (ref 0–0.4)
ABSOLUTE LYMPH #: 1.1 K/UL (ref 1–4.8)
ABSOLUTE MONO #: 0.1 K/UL (ref 0–1)
ALBUMIN SERPL-MCNC: 3.9 G/DL (ref 3.5–5.2)
ALP BLD-CCNC: 104 U/L (ref 35–104)
ALT SERPL-CCNC: 30 U/L (ref 5–33)
ANION GAP SERPL CALCULATED.3IONS-SCNC: 7 MMOL/L (ref 9–17)
AST SERPL-CCNC: 27 U/L
BASOPHILS # BLD: 0 % (ref 0–2)
BASOPHILS ABSOLUTE: 0 K/UL (ref 0–0.2)
BILIRUB SERPL-MCNC: 0.7 MG/DL (ref 0.3–1.2)
BUN BLDV-MCNC: 13 MG/DL (ref 8–23)
BUN/CREAT BLD: 23 (ref 9–20)
CALCIUM SERPL-MCNC: 9.1 MG/DL (ref 8.6–10.4)
CHLORIDE BLD-SCNC: 103 MMOL/L (ref 98–107)
CHOLESTEROL/HDL RATIO: 3.4
CHOLESTEROL: 192 MG/DL
CO2: 31 MMOL/L (ref 20–31)
CREAT SERPL-MCNC: 0.57 MG/DL (ref 0.5–0.9)
DIFFERENTIAL TYPE: YES
EOSINOPHILS RELATIVE PERCENT: 2 % (ref 0–5)
GFR SERPL CREATININE-BSD FRML MDRD: >60 ML/MIN/1.73M2
GLUCOSE BLD-MCNC: 98 MG/DL (ref 70–99)
HCT VFR BLD CALC: 41 % (ref 36–46)
HDLC SERPL-MCNC: 56 MG/DL
HEMOGLOBIN: 13.9 G/DL (ref 12–16)
LDL CHOLESTEROL: 113 MG/DL (ref 0–130)
LYMPHOCYTES # BLD: 13 % (ref 15–40)
MAGNESIUM: 1.8 MG/DL (ref 1.6–2.6)
MCH RBC QN AUTO: 28.7 PG (ref 26–34)
MCHC RBC AUTO-ENTMCNC: 33.8 G/DL (ref 31–37)
MCV RBC AUTO: 84.8 FL (ref 80–100)
MONOCYTES # BLD: 1 % (ref 4–8)
PATIENT FASTING?: YES
PDW BLD-RTO: 15.3 % (ref 12.1–15.2)
PLATELET # BLD: 538 K/UL (ref 140–450)
POTASSIUM SERPL-SCNC: 3.9 MMOL/L (ref 3.7–5.3)
RBC # BLD: 4.84 M/UL (ref 4–5.2)
SEG NEUTROPHILS: 84 % (ref 47–75)
SEGMENTED NEUTROPHILS ABSOLUTE COUNT: 7.1 K/UL (ref 2.5–7)
SODIUM BLD-SCNC: 141 MMOL/L (ref 135–144)
TOTAL PROTEIN: 6.2 G/DL (ref 6.4–8.3)
TRIGL SERPL-MCNC: 115 MG/DL
TSH SERPL DL<=0.05 MIU/L-ACNC: 1.58 UIU/ML (ref 0.3–5)
VITAMIN D 25-HYDROXY: 45.1 NG/ML
WBC # BLD: 8.5 K/UL (ref 3.5–11)

## 2022-11-11 PROCEDURE — 83735 ASSAY OF MAGNESIUM: CPT

## 2022-11-11 PROCEDURE — 71046 X-RAY EXAM CHEST 2 VIEWS: CPT

## 2022-11-11 PROCEDURE — 85025 COMPLETE CBC W/AUTO DIFF WBC: CPT

## 2022-11-11 PROCEDURE — 80061 LIPID PANEL: CPT

## 2022-11-11 PROCEDURE — 80053 COMPREHEN METABOLIC PANEL: CPT

## 2022-11-11 PROCEDURE — 36415 COLL VENOUS BLD VENIPUNCTURE: CPT

## 2022-11-11 PROCEDURE — 82306 VITAMIN D 25 HYDROXY: CPT

## 2022-11-11 PROCEDURE — 84443 ASSAY THYROID STIM HORMONE: CPT

## 2022-11-11 PROCEDURE — 93005 ELECTROCARDIOGRAM TRACING: CPT

## 2022-11-12 DIAGNOSIS — M54.41 CHRONIC MIDLINE LOW BACK PAIN WITH RIGHT-SIDED SCIATICA: ICD-10-CM

## 2022-11-12 DIAGNOSIS — G89.29 CHRONIC MIDLINE LOW BACK PAIN WITH RIGHT-SIDED SCIATICA: ICD-10-CM

## 2022-11-13 LAB
EKG ATRIAL RATE: 60 BPM
EKG P AXIS: 71 DEGREES
EKG P-R INTERVAL: 172 MS
EKG Q-T INTERVAL: 430 MS
EKG QRS DURATION: 90 MS
EKG QTC CALCULATION (BAZETT): 430 MS
EKG R AXIS: 74 DEGREES
EKG T AXIS: 73 DEGREES
EKG VENTRICULAR RATE: 60 BPM

## 2022-11-13 PROCEDURE — 93010 ELECTROCARDIOGRAM REPORT: CPT | Performed by: INTERNAL MEDICINE

## 2022-11-14 ENCOUNTER — OFFICE VISIT (OUTPATIENT)
Dept: FAMILY MEDICINE CLINIC | Age: 72
End: 2022-11-14
Payer: COMMERCIAL

## 2022-11-14 ENCOUNTER — TELEPHONE (OUTPATIENT)
Dept: FAMILY MEDICINE CLINIC | Age: 72
End: 2022-11-14

## 2022-11-14 VITALS
HEIGHT: 63 IN | OXYGEN SATURATION: 98 % | DIASTOLIC BLOOD PRESSURE: 72 MMHG | HEART RATE: 61 BPM | BODY MASS INDEX: 24.7 KG/M2 | RESPIRATION RATE: 18 BRPM | WEIGHT: 139.4 LBS | SYSTOLIC BLOOD PRESSURE: 122 MMHG

## 2022-11-14 DIAGNOSIS — J21.9 ACUTE BRONCHIOLITIS DUE TO UNSPECIFIED ORGANISM: Primary | ICD-10-CM

## 2022-11-14 DIAGNOSIS — M54.16 LUMBAR RADICULOPATHY: ICD-10-CM

## 2022-11-14 PROCEDURE — 3074F SYST BP LT 130 MM HG: CPT | Performed by: INTERNAL MEDICINE

## 2022-11-14 PROCEDURE — 99213 OFFICE O/P EST LOW 20 MIN: CPT | Performed by: INTERNAL MEDICINE

## 2022-11-14 PROCEDURE — 1123F ACP DISCUSS/DSCN MKR DOCD: CPT | Performed by: INTERNAL MEDICINE

## 2022-11-14 PROCEDURE — 3078F DIAST BP <80 MM HG: CPT | Performed by: INTERNAL MEDICINE

## 2022-11-14 RX ORDER — TRAMADOL HYDROCHLORIDE 50 MG/1
50 TABLET ORAL 3 TIMES DAILY PRN
Qty: 90 TABLET | Refills: 2 | Status: SHIPPED | OUTPATIENT
Start: 2022-11-14 | End: 2022-12-14

## 2022-11-14 RX ORDER — FERROUS SULFATE 325(65) MG
TABLET ORAL
COMMUNITY
Start: 2022-10-11 | End: 2022-11-14

## 2022-11-14 RX ORDER — PREDNISONE 20 MG/1
20 TABLET ORAL 2 TIMES DAILY
Qty: 10 TABLET | Refills: 0 | Status: SHIPPED | OUTPATIENT
Start: 2022-11-14 | End: 2022-11-19

## 2022-11-14 RX ORDER — TIZANIDINE 2 MG/1
TABLET ORAL
Qty: 90 TABLET | Refills: 0 | Status: SHIPPED | OUTPATIENT
Start: 2022-11-14

## 2022-11-14 RX ORDER — AZITHROMYCIN 500 MG/1
500 TABLET, FILM COATED ORAL DAILY
Qty: 5 TABLET | Refills: 0 | Status: SHIPPED | OUTPATIENT
Start: 2022-11-14 | End: 2022-11-19

## 2022-11-14 ASSESSMENT — ENCOUNTER SYMPTOMS
CHEST TIGHTNESS: 0
RHINORRHEA: 1
SORE THROAT: 1
WHEEZING: 0
NAUSEA: 0
SHORTNESS OF BREATH: 0
COUGH: 1
ABDOMINAL PAIN: 0
BACK PAIN: 1
SINUS PRESSURE: 0

## 2022-11-14 NOTE — PROGRESS NOTES
HPI Notes    Name: Arabella Yanez  : 1950         Chief Complaint:     Chief Complaint   Patient presents with    Sinus Problem     Patient has itchy watery eyes, sore throat since last  OTC Meds are not helping       History of Present Illness:        Roxanne presents to office complaining of sore throat, congestion, cough, body aches chills started 5 days ago  Needs refill for her tramadol for her chronic back pain. States she tried gurgling her throat with warm salt water, drinking thaddeus tea, Advil cold and sinus and nothing is helping. Her symptoms are getting worse. Has associated body aches, feels tired all the time. She reports no fever but did experience chills last week. No nausea or vomiting. Appetite is \"so-so. Reports no diarrhea. She did not test herself for COVID. She reports no sick contacts. States his COVID test at home and does not want to go to hospital for testing. She is concerned about possible allergies as last week she was cleaning leaves and has mold allergies. Past Medical History:     Past Medical History:   Diagnosis Date    Allergic rhinitis 2011    Anxiety 2011    Carpal tunnel syndrome 2011    Depression 2011    Dyslipidemia 2011    Fibromyalgia 2011    Hyperlipidemia     Hypertension 2011    SOB (shortness of breath)     Thyroid disease       Reviewed all health maintenance requirements and orderedappropriate tests  Health Maintenance Due   Topic Date Due    DEXA (modify frequency per FRAX score)  Never done       Past Surgical History:     Past Surgical History:   Procedure Laterality Date    CARDIAC CATHETERIZATION Left 2017    Dr. Zaid Johnson @ [facility]--CAD, 50-60% ostial right CAD with an FFR of 0.94.  60% disease in the proximal LAD with an FFR of 0.92 with bridging of the mid LAD. Normal LV function at 60%. Mildly dilated ascending aorta -will do a CT scan to measure.     CARDIAC CATHETERIZATION Left 2020    Dr. Camille Posada @ [facility]--Medical therapy     SECTION      x 4    ELBOW SURGERY Right 1996    GALLBLADDER SURGERY  1975    HYSTERECTOMY (CERVIX STATUS UNKNOWN)      total hysterectomy    KIDNEY SURGERY Right 2021    Dr. Angeline Atkins      Right    SMALL INTESTINE SURGERY      twisted bowel         Medications:       Prior to Admission medications    Medication Sig Start Date End Date Taking? Authorizing Provider   tiZANidine (ZANAFLEX) 2 MG tablet TAKE 1 TABLET BY MOUTH THREE TIMES DAILY FOR MUSCLE SPASM 22  Yes Beny Castañeda MD   predniSONE (DELTASONE) 20 MG tablet Take 1 tablet by mouth 2 times daily for 5 days 22 Yes Beny Castañeda MD   azithromycin (ZITHROMAX) 500 MG tablet Take 1 tablet by mouth daily for 5 days 22 Yes Beny Castañeda MD   traMADol (ULTRAM) 50 MG tablet Take 1 tablet by mouth 3 times daily as needed for Pain for up to 30 days. 22 Yes Beny Castañeda MD   omeprazole (PRILOSEC) 20 MG delayed release capsule TAKE 1 CAPSULE BY MOUTH TWICE DAILY BEFORE MEAL(S) 11/10/22  Yes Beny Castañeda MD   doxazosin (CARDURA) 1 MG tablet Take 1 tablet by mouth nightly 22  Yes Chi Stout MD   losartan (COZAAR) 100 MG tablet Take 1 tablet by mouth daily 22  Yes Beny Castañeda MD   montelukast (SINGULAIR) 10 MG tablet TAKE 1 TABLET EVERY DAY 22  Yes Beny Castañeda MD   estradiol (ESTRACE VAGINAL) 0.1 MG/GM vaginal cream Place a pea-sized amount vaginally daily x 2 weeks, then 3 times per week thereafter. Use finger to apply, wash hands after application. DO NOT use plastic applicator. Call office if you stop this medication.  22  Yes Pepe Fournier MD   B Complex Vitamins (VITAMIN B COMPLEX 100 IJ) Take by mouth   Yes Historical Provider, MD   Multiple Vitamins-Minerals (CENTRUM SILVER ADULT 50+) TABS Take by mouth    Yes Historical Provider, MD   aspirin EC 81 MG EC tablet Take 1 tablet by mouth daily 3/1/22  Yes Jacquelin White MD   vitamin D3 (CHOLECALCIFEROL) 25 MCG (1000 UT) TABS tablet TAKE 1 TABLET EVERY DAY 12/14/21  Yes Jacquelin White MD   metoprolol tartrate (LOPRESSOR) 25 MG tablet TAKE 1/2 TABLET TWICE DAILY  Patient taking differently: Take 25 mg by mouth Take full tablet in am and 1/2 tablet in pm 11/17/21  Yes Karli Paiz MD   Zinc 100 MG TABS Take by mouth   Yes Historical Provider, MD   Coenzyme Q10 (CO Q-10) 100 MG CAPS TAKE 1 CAPSULE EVERY DAY 10/28/21  Yes Jacquelin White MD   levothyroxine (SYNTHROID) 50 MCG tablet TAKE 1 TABLET EVERY DAY 10/28/21  Yes Jacquelin White MD   Ascorbic Acid (VITAMIN C) 250 MG tablet Take 2 tablets by mouth daily 2/5/21  Yes Jacquelin White MD   vitamin B-6 (PYRIDOXINE) 100 MG tablet Take 1 tablet by mouth daily 2/5/21  Yes Jacquelin White MD   polycarbophil (FIBER-CAPS) 625 MG tablet Take 1 tablet by mouth daily 2/5/21  Yes Jacquelin White MD   fluticasone (FLONASE) 50 MCG/ACT nasal spray USE 2 SPRAYS NASALLY EVERY DAY 12/28/19  Yes Jacquelin White MD   dicyclomine (BENTYL) 10 MG capsule  11/26/19  Yes Historical Provider, MD   Blood Pressure Monitoring (BLOOD PRESSURE CUFF) MISC Use to monitor Blood pressure 1-2 times daily 3/4/19  Yes Jacquelin White MD   gabapentin (NEURONTIN) 300 MG capsule TAKE 1 CAPSULE THREE TIMES DAILY 10/28/21 6/13/22  Jacquelin White MD        Allergies:       Patient has no known allergies. Social History:     Tobacco: reports that she has never smoked. She has never used smokeless tobacco.  Alcohol:      reports no history of alcohol use. Drug Use:  reports no history of drug use.     Family History:     Family History   Problem Relation Age of Onset    Stroke Mother     High Cholesterol Mother     Heart Attack Mother     Heart Attack Father     High Cholesterol Father     High Cholesterol Sister     High Cholesterol Brother        Review of Systems:         Review of Systems Constitutional:  Positive for activity change, appetite change, chills and fatigue. Negative for fever. HENT:  Positive for congestion, ear pain, rhinorrhea and sore throat. Negative for postnasal drip and sinus pressure. Respiratory:  Positive for cough. Negative for chest tightness, shortness of breath and wheezing. Cardiovascular:  Negative for chest pain, palpitations and leg swelling. Gastrointestinal:  Negative for abdominal pain and nausea. Genitourinary:  Negative for dysuria. Musculoskeletal:  Positive for arthralgias and back pain. Skin:  Negative for rash. Neurological:  Negative for dizziness and headaches. Psychiatric/Behavioral:  The patient is not nervous/anxious. Physical Exam:     Vitals:  /72 (Site: Right Upper Arm, Position: Sitting, Cuff Size: Small Adult)   Pulse 61   Resp 18   Ht 5' 3\" (1.6 m)   Wt 139 lb 6.4 oz (63.2 kg)   LMP  (LMP Unknown)   SpO2 98%   BMI 24.69 kg/m²       Physical Exam  Vitals reviewed. Constitutional:       General: She is not in acute distress. Appearance: Normal appearance. She is well-developed. She is not ill-appearing. HENT:      Head: Normocephalic and atraumatic. Right Ear: Tympanic membrane, ear canal and external ear normal.      Left Ear: Tympanic membrane, ear canal and external ear normal.      Nose: Congestion and rhinorrhea present. Mouth/Throat:      Mouth: Mucous membranes are moist.      Pharynx: Posterior oropharyngeal erythema present. No oropharyngeal exudate. Eyes:      General: No scleral icterus. Right eye: Discharge present. Left eye: Discharge present. Pupils: Pupils are equal, round, and reactive to light. Neck:      Thyroid: No thyromegaly. Cardiovascular:      Rate and Rhythm: Normal rate and regular rhythm. Heart sounds: Normal heart sounds. No murmur heard. Pulmonary:      Effort: Pulmonary effort is normal.      Breath sounds: Normal breath sounds.  No wheezing, rhonchi or rales. Abdominal:      General: Bowel sounds are normal. There is no distension. Palpations: Abdomen is soft. There is no mass. Tenderness: There is no abdominal tenderness. Musculoskeletal:         General: Normal range of motion. Right lower leg: No edema. Left lower leg: No edema. Lymphadenopathy:      Cervical: No cervical adenopathy. Skin:     General: Skin is warm and dry. Findings: No rash. Neurological:      General: No focal deficit present. Mental Status: She is alert and oriented to person, place, and time. Mental status is at baseline. Psychiatric:         Mood and Affect: Mood normal.         Behavior: Behavior normal.         Thought Content:  Thought content normal.             Data:     Lab Results   Component Value Date/Time     11/11/2022 09:01 AM    K 3.9 11/11/2022 09:01 AM     11/11/2022 09:01 AM    CO2 31 11/11/2022 09:01 AM    BUN 13 11/11/2022 09:01 AM    CREATININE 0.57 11/11/2022 09:01 AM    GLUCOSE 98 11/11/2022 09:01 AM    GLUCOSE 99 01/19/2012 11:13 AM    PROT 6.2 11/11/2022 09:01 AM    PROT 6.5 01/24/2020 12:00 AM    LABALBU 3.9 11/11/2022 09:01 AM    LABALBU 4.4 01/19/2012 11:13 AM    BILITOT 0.7 11/11/2022 09:01 AM    ALKPHOS 104 11/11/2022 09:01 AM    AST 27 11/11/2022 09:01 AM    ALT 30 11/11/2022 09:01 AM     Lab Results   Component Value Date/Time    WBC 8.5 11/11/2022 09:01 AM    RBC 4.84 11/11/2022 09:01 AM    RBC 4.86 01/19/2012 11:13 AM    HGB 13.9 11/11/2022 09:01 AM    HCT 41.0 11/11/2022 09:01 AM    MCV 84.8 11/11/2022 09:01 AM    MCH 28.7 11/11/2022 09:01 AM    MCHC 33.8 11/11/2022 09:01 AM    RDW 15.3 11/11/2022 09:01 AM     11/11/2022 09:01 AM     01/19/2012 11:13 AM    MPV NOT REPORTED 11/04/2021 10:10 AM     Lab Results   Component Value Date/Time    TSH 1.58 11/11/2022 09:01 AM     Lab Results   Component Value Date/Time    CHOL 192 11/11/2022 09:01 AM    CHOL 250 01/16/2017 12:00 AM    [facility] 56 11/11/2022 09:01 AM          Assessment & Plan        Diagnosis Orders   1. Acute bronchiolitis due to unspecified organism   Prescribed prednisone and Zithromax course for possible acute bronchitis. I also suspect that her symptoms could be related to viral illness and suggested that she does COVID test at home and notify us if it is positive. Follow-up in 1 week if no improvement predniSONE (DELTASONE) 20 MG tablet    azithromycin (ZITHROMAX) 500 MG tablet      2. Lumbar radiculopathy   Refill tramadol. traMADol (ULTRAM) 50 MG tablet                      Completed Refills   Requested Prescriptions     Signed Prescriptions Disp Refills    predniSONE (DELTASONE) 20 MG tablet 10 tablet 0     Sig: Take 1 tablet by mouth 2 times daily for 5 days    azithromycin (ZITHROMAX) 500 MG tablet 5 tablet 0     Sig: Take 1 tablet by mouth daily for 5 days    traMADol (ULTRAM) 50 MG tablet 90 tablet 2     Sig: Take 1 tablet by mouth 3 times daily as needed for Pain for up to 30 days. No follow-ups on file. Orders Placed This Encounter   Medications    predniSONE (DELTASONE) 20 MG tablet     Sig: Take 1 tablet by mouth 2 times daily for 5 days     Dispense:  10 tablet     Refill:  0    azithromycin (ZITHROMAX) 500 MG tablet     Sig: Take 1 tablet by mouth daily for 5 days     Dispense:  5 tablet     Refill:  0    traMADol (ULTRAM) 50 MG tablet     Sig: Take 1 tablet by mouth 3 times daily as needed for Pain for up to 30 days. Dispense:  90 tablet     Refill:  2     Reduce doses taken as pain becomes manageable     No orders of the defined types were placed in this encounter. There are no Patient Instructions on file for this visit.     Electronically signed by Jabier Gan MD on 11/14/2022 at 3:13 PM           Completed Refills      Requested Prescriptions     Signed Prescriptions Disp Refills    predniSONE (DELTASONE) 20 MG tablet 10 tablet 0     Sig: Take 1 tablet by mouth 2 times daily for 5 days    azithromycin (ZITHROMAX) 500 MG tablet 5 tablet 0     Sig: Take 1 tablet by mouth daily for 5 days    traMADol (ULTRAM) 50 MG tablet 90 tablet 2     Sig: Take 1 tablet by mouth 3 times daily as needed for Pain for up to 30 days.

## 2022-11-14 NOTE — TELEPHONE ENCOUNTER
----- Message from Lam Mcadams sent at 11/14/2022 12:31 PM EST -----  Subject: Message to Provider    QUESTIONS  Information for Provider? calling about patient prescription. Milvia Easley ---------------------------------------------------------------------------  --------------  Nikos LAMAR  [phone]; OK to leave message on voicemail  ---------------------------------------------------------------------------  --------------  SCRIPT ANSWERS  Relationship to Patient? Guardian  Representative Name? Daniel Montalvo  Is the Representative on the appropriate HIPAA document in Epic?  Yes

## 2022-11-14 NOTE — TELEPHONE ENCOUNTER
----- Message from Carmita Tucker sent at 11/14/2022 12:31 PM EST -----  Subject: Message to Provider    QUESTIONS  Information for Provider? calling about patient prescription. Taryn Mao ---------------------------------------------------------------------------  --------------  Kevin RAYMUNDO  [phone]; OK to leave message on voicemail  ---------------------------------------------------------------------------  --------------  SCRIPT ANSWERS  Relationship to Patient? Guardian  Representative Name? Abhi Rojo  Is the Representative on the appropriate HIPAA document in Epic?  Yes

## 2022-11-17 ENCOUNTER — OFFICE VISIT (OUTPATIENT)
Dept: CARDIOLOGY CLINIC | Age: 72
End: 2022-11-17
Payer: COMMERCIAL

## 2022-11-17 VITALS
OXYGEN SATURATION: 96 % | SYSTOLIC BLOOD PRESSURE: 140 MMHG | HEART RATE: 76 BPM | BODY MASS INDEX: 24.8 KG/M2 | WEIGHT: 140 LBS | DIASTOLIC BLOOD PRESSURE: 70 MMHG

## 2022-11-17 DIAGNOSIS — I10 ESSENTIAL HYPERTENSION: ICD-10-CM

## 2022-11-17 DIAGNOSIS — E78.2 MIXED HYPERLIPIDEMIA: Primary | ICD-10-CM

## 2022-11-17 DIAGNOSIS — Z95.5 STATUS POST CORONARY ARTERY BALLOON DILATION: ICD-10-CM

## 2022-11-17 DIAGNOSIS — R00.2 PALPITATIONS: ICD-10-CM

## 2022-11-17 PROCEDURE — 3074F SYST BP LT 130 MM HG: CPT | Performed by: INTERNAL MEDICINE

## 2022-11-17 PROCEDURE — 1123F ACP DISCUSS/DSCN MKR DOCD: CPT | Performed by: INTERNAL MEDICINE

## 2022-11-17 PROCEDURE — 3078F DIAST BP <80 MM HG: CPT | Performed by: INTERNAL MEDICINE

## 2022-11-17 PROCEDURE — 99214 OFFICE O/P EST MOD 30 MIN: CPT | Performed by: INTERNAL MEDICINE

## 2022-11-17 NOTE — LETTER
Staci Cotter M.D. [address] [place] [address]  [phone]          2022          Dav Tang MD  [facility], [address] [place] [address]      RE:   Aly Bazzi  :  1950      Dear Dr. Claudia Holder:    CHIEF COMPLAINT:  1.  Mild coronary artery disease. 2.  Short episodes of atrial tachycardia up to 150 beats per minute. HISTORY OF PRESENT ILLNESS:  I had the pleasure of seeing Mrs. Roxanne Stubbs in our office on 2022. She is a pleasant 71-year-old female who had a stress test in , which was abnormal, which resulted in a cardiac catheterization on 2017, that showed 60% disease in the ostium of the right coronary artery, 60% LAD, mild circumflex with an FFR of 0.94 in the right coronary artery, 0.92 in the LAD, 0.91 in diagonal, medical therapy recommended. I repeated a catheterization on 2020, because of chest discomfort. She had 60% ostial RCA, LAD of 30% to 40% disease, circumflex looked good with EF 60%. She had a right nephrectomy approximately a year ago due to a 3 cm mass, which was renal cell carcinoma with no metastasis, and she has had no chemoradiation. She has had no hospitalization or procedure since I saw her a year ago. She does have thrombocytopenia and has been seeing Dr. Kristina Tran. Her renal cell carcinoma is followed by Dr. Pat Lewis, which has showed no reoccurrence. She continues to have episodes of \"heart racing. \"  Her blood pressure has been high. She has twinges of chest pain not related to her activity. Her episodes of tachycardia occurred 2 to 3 times a week associated with some dizziness. She had a bone biopsy in . She has had no syncope or near syncope. She has had no extended episodes of tachycardia, but they generally last for 3 to 4 minutes. CARDIAC RISK FACTORS:  Known CAD:  Positive.   Hypertension: Positive. Hyperlipidemia:  Positive. Other family members:  Positive. Peripheral vascular disease:  Negative. Smoking:  Negative. Diabetes:  Negative. MEDICATIONS AT THIS TIME:  She is on aspirin 81 mg daily, CoQ10 daily, Cardura 1 mg nightly, Estrace vaginal cream, Flonase p.r.n., Neurontin 300 mg t.i.d., Synthroid 50 mcg daily, Cozaar 100 mg daily, metoprolol 25 mg one in the morning and half a tablet in the evening, Singulair 10 mg daily, Prilosec 20 mg b.i.d., Zanaflex 2 mg t.i.d., Ultram p.r.n., vitamin B6 100 mg daily, vitamin D 1000 units daily, zinc 100 mg daily. PAST MEDICAL AND SURGICAL HISTORY:  1. Hypertension, very labile, has been elevated recently. 2.  Hyperlipidemia. 3.  Four C-sections in , , , and .  4.  Right shoulder surgery in  and .  5.  Hysterectomy in .  6.  Depression. 7.  Right elbow surgery in .  8.  Cholecystectomy in .  9.  Rotator cuff repair. 10.  Catheterization on 2017. 11.  Last catheterization on 2020. 12.  Renal cell carcinoma with partial right nephrectomy, no metastasis. FAMILY HISTORY:  Mother  of CVA at [de-identified]. Father  of MI at 71. One son had an MI by the name of Karlene Mendiola. SOCIAL HISTORY:  She is 81years old, retired from [employer], six children with one son, Karlene Mendiola, who is a patient of ours. She adopted a girl by the name of Hari Brooks who is 5years old doing well. Her , Jeremi Mcgee, passed away in 10/2020. She does not smoke or drink alcohol. REVIEW OF SYSTEMS:  Cardiac as above. Other systems reviewed including constitutional, eyes, ears, nose and throat, cardiovascular, respiratory, GI, , musculoskeletal, integumentary, neurologic, psychiatric, endocrine, hematologic and allergic/immunologic are negative except for what is described above. No weight loss or weight gain. No change in bowel habits. No blood in stools. No fevers, sweats or chills.     PHYSICAL EXAMINATION:  VITAL SIGNS:  Her blood pressure was 140/70 with a heart rate of 76 and regular. Respiratory rate 18. O2 saturation 97%. Weight 140 pounds. GENERAL:  She is a pleasant 72-year-old female. Denied pain. She was oriented to person, place and time. Answered questions appropriately. SKIN:  No unusual skin changes. HEENT:  The pupils are equally round and intact. Mucous membranes were dry. NECK:  No JVD. Good carotid pulses. No carotid bruits. No lymphadenopathy or thyromegaly. CARDIOVASCULAR EXAM:  S1 and S2 were normal.  No S3 or S4. Soft systolic blowing type murmur. No diastolic murmur. PMI was normal.  No lift, thrust, or pericardial friction rub. LUNGS:  Clear to auscultation and percussion. ABDOMEN:  Soft and nontender. Good bowel sounds. EXTREMITIES:  Good femoral pulses. Good pedal pulses. No pedal edema. Skin was warm and dry. No calf tenderness. Nail beds pink. Good cap refill. PULSES:  Bilateral symmetrical radial, brachial and carotid pulses. No carotid bruits. Good femoral and pedal pulses. NEUROLOGIC EXAM:  Within normal limits. PSYCHIATRIC EXAM:  Within normal limits. LABORATORY DATA:  Her sodium was 141, potassium 3.9, BUN 13, creatinine 0.57, GFR greater than 60. Calcium was 9.1. Cholesterol 192, HDL 56, , triglycerides 115. ALT was 30, AST was 27. Her TSH was 1.58. Vitamin D 45.1. White count 8.5, hemoglobin 13.9 with a platelet count 894,741. EKG showed normal sinus rhythm and was normal.    Chest x-ray was unremarkable. Event recorder or Holter monitor in 11/2021, showed multiple short episodes of atrial tachycardia up to 150 beats per minute. IMPRESSION:  1.  Multiple episodes of what appeared to be atrial tachycardia at 150 beats per minute happening 2 to 3 times per week. 2.  Coronary artery disease status post catheterization on 12/20/2017, showing 60% disease in all major vessels, normal LV function.   3.  Repeat catheterization on 07/17/2020, which was unchanged, normal LV function. 4.  Strong family history of coronary artery disease. 5.  Very labile hypertension, elevated somewhat recently. 6.  History of depression. 7.  Palpitations consistent with atrial tachycardia at 150 beats per minute. PLAN:  1. We will increase metoprolol to 25 mg b.i.d.  2.  She will take her blood pressure daily at different times of the day and call in 2 weeks. 3.  Encourage her to get a Kardia device to monitor her tachycardia at home. DISCUSSION:  Mrs. Cong Peoples [place], is doing well. However, she has had elevated blood pressures in the 140 to 150 range at home. She also has episodes of her palpitations, which historically have been atrial tachycardia at 150 beats per minute. I have increased her metoprolol to 25 mg b.i.d. She will take her blood pressure daily at different times of the day for 2 weeks and call us with the results. I did encourage her to get a Kardia device to better monitor her rhythm. When she would have her palpitations, she should take an EKG strip and send it to us for me to review to better assess her tachycardia. Her coronary artery disease is stable. There is no indication, of course, to do any testing such as stress test.  I will plan on seeing her in 1 year. Thank you very much for allowing me the privilege of seeing Mrs. Roxanne Peoples. If you have any questions on my thoughts, please do not hesitate to contact me.     Sincerely,        Lizzy Cuellar    D: 11/19/2022 7:58:22     T: 11/19/2022 11:47:23     [email or web address]  Job#: [id number]   Doc#: [id number]

## 2022-11-17 NOTE — PROGRESS NOTES
Ov DR Zaid Johnson 1 year follow up   C/o episode heart racing. Took bp very high  Has twinges chest pain. Has had several episode heart racing   2-3 times a week. dizziness  Diaphoresis when having fast heart beats  Started  few months ago. Last several hours. Recently episode allergy flare up   On antibiotics and prednisone. No sob. No hospitalizations  Did have a bone bx in June   Pt tried oral chemo med unable  To tolerate pt want no other meds. To increase metoprolol 25 mg to   1 full tab bid. Take bp daily different times of   The day call in 2 weeks with bp's  Encouraged to get [address] [employer] mobile.      See in 1 year

## 2022-11-17 NOTE — PATIENT INSTRUCTIONS
Take blood pressure daily different times of days for 2 weeks  And call. Increase metoprolol 25 mg to full tab twice a day.

## 2022-12-01 ENCOUNTER — OFFICE VISIT (OUTPATIENT)
Dept: FAMILY MEDICINE CLINIC | Age: 72
End: 2022-12-01
Payer: COMMERCIAL

## 2022-12-01 VITALS
DIASTOLIC BLOOD PRESSURE: 85 MMHG | SYSTOLIC BLOOD PRESSURE: 192 MMHG | HEIGHT: 63 IN | BODY MASS INDEX: 24.27 KG/M2 | RESPIRATION RATE: 18 BRPM | HEART RATE: 67 BPM | WEIGHT: 137 LBS | OXYGEN SATURATION: 98 %

## 2022-12-01 DIAGNOSIS — I10 ESSENTIAL HYPERTENSION: ICD-10-CM

## 2022-12-01 DIAGNOSIS — J30.9 ALLERGIC RHINITIS, UNSPECIFIED SEASONALITY, UNSPECIFIED TRIGGER: ICD-10-CM

## 2022-12-01 DIAGNOSIS — J21.9 ACUTE BRONCHIOLITIS DUE TO UNSPECIFIED ORGANISM: Primary | ICD-10-CM

## 2022-12-01 PROCEDURE — 99214 OFFICE O/P EST MOD 30 MIN: CPT | Performed by: INTERNAL MEDICINE

## 2022-12-01 PROCEDURE — 3074F SYST BP LT 130 MM HG: CPT | Performed by: INTERNAL MEDICINE

## 2022-12-01 PROCEDURE — 1123F ACP DISCUSS/DSCN MKR DOCD: CPT | Performed by: INTERNAL MEDICINE

## 2022-12-01 PROCEDURE — 3078F DIAST BP <80 MM HG: CPT | Performed by: INTERNAL MEDICINE

## 2022-12-01 RX ORDER — FEXOFENADINE HCL 180 MG/1
180 TABLET ORAL DAILY
Qty: 90 TABLET | Refills: 1 | Status: SHIPPED | OUTPATIENT
Start: 2022-12-01

## 2022-12-01 RX ORDER — ALBUTEROL SULFATE 90 UG/1
2 AEROSOL, METERED RESPIRATORY (INHALATION) 4 TIMES DAILY PRN
Qty: 54 G | Refills: 1 | Status: SHIPPED | OUTPATIENT
Start: 2022-12-01

## 2022-12-01 RX ORDER — HYDRALAZINE HYDROCHLORIDE 50 MG/1
50 TABLET, FILM COATED ORAL 2 TIMES DAILY
Qty: 60 TABLET | Refills: 3 | Status: SHIPPED | OUTPATIENT
Start: 2022-12-01

## 2022-12-01 RX ORDER — LEVOFLOXACIN 500 MG/1
500 TABLET, FILM COATED ORAL DAILY
Qty: 7 TABLET | Refills: 0 | Status: SHIPPED | OUTPATIENT
Start: 2022-12-01 | End: 2022-12-08

## 2022-12-01 RX ORDER — OMEPRAZOLE 40 MG/1
CAPSULE, DELAYED RELEASE ORAL
COMMUNITY
Start: 2022-11-14

## 2022-12-01 ASSESSMENT — ENCOUNTER SYMPTOMS
SHORTNESS OF BREATH: 0
ABDOMINAL PAIN: 0
SINUS PRESSURE: 1
SORE THROAT: 1
NAUSEA: 0
COUGH: 1

## 2022-12-01 NOTE — PROGRESS NOTES
HPI Notes    Name: Rufino Ortez  : 1950         Chief Complaint:     Chief Complaint   Patient presents with    Cough     Patient has had a chest cold 3 plus weeks, fatigue, cough     Hypertension     Patient has not taken her meds for the day and her BP is high       History of Present Illness:        Roxanne presents to office for evaluation of cough, congestion, sore throat and uncontrolled HTN. She was seen in our office for her cold symptoms on . Was prescribed Zithromax, Prednisone. Says it helped a little and now symptoms came back . States has a history of allergies. She is not sure if her symptoms are related to an infection versus allergies. BP remains uncontrolled. Was seen by Dr. Jazzy Woods on . Lopressor changed to 25 mg bid . BP still high. Cough  This is a recurrent problem. The current episode started 1 to 4 weeks ago. The problem has been unchanged. The problem occurs every few minutes. The cough is Productive of sputum. Associated symptoms include headaches, nasal congestion and a sore throat. Pertinent negatives include no chest pain, chills, ear congestion, ear pain, fever, rash or shortness of breath. Nothing aggravates the symptoms. She has tried oral steroids, OTC cough suppressant and leukotriene antagonists (Antibiotics) for the symptoms. The treatment provided no relief. There is no history of asthma. Hypertension  This is a chronic problem. The current episode started more than 1 year ago. The problem has been gradually worsening since onset. The problem is uncontrolled. Associated symptoms include headaches. Pertinent negatives include no chest pain, palpitations or shortness of breath. Past treatments include beta blockers, angiotensin blockers and alpha 1 blockers. There are no compliance problems.           Past Medical History:     Past Medical History:   Diagnosis Date    Allergic rhinitis 2011    Anxiety 2011    Carpal tunnel syndrome 2011 Depression 2011    Dyslipidemia 2011    Fibromyalgia 2011    Hyperlipidemia     Hypertension 2011    SOB (shortness of breath)     Thyroid disease       Reviewed all health maintenance requirements and orderedappropriate tests  Health Maintenance Due   Topic Date Due    DEXA (modify frequency per FRAX score)  Never done       Past Surgical History:     Past Surgical History:   Procedure Laterality Date    CARDIAC CATHETERIZATION Left 2017    Dr. Adenike Horowitz @ [facility]--CAD, 50-60% ostial right CAD with an FFR of 0.94.  60% disease in the proximal LAD with an FFR of 0.92 with bridging of the mid LAD. Normal LV function at 60%. Mildly dilated ascending aorta -will do a CT scan to measure. CARDIAC CATHETERIZATION Left 2020    Dr. Adenike Horowitz @ [facility]--Medical therapy     SECTION      x 4    ELBOW SURGERY Right     GALLBLADDER SURGERY      HYSTERECTOMY (CERVIX STATUS UNKNOWN)      total hysterectomy    KIDNEY SURGERY Right 2021    Dr. Marlee Berrios      Right    SMALL INTESTINE SURGERY      twisted bowel         Medications:       Prior to Admission medications    Medication Sig Start Date End Date Taking?  Authorizing Provider   omeprazole (PRILOSEC) 40 MG delayed release capsule TAKE 1 CAPSULE BY MOUTH ONCE DAILY 22  Yes Historical Provider, MD   levoFLOXacin (LEVAQUIN) 500 MG tablet Take 1 tablet by mouth daily for 7 days 22 Yes Zi Malloy MD   albuterol sulfate HFA (VENTOLIN HFA) 108 (90 Base) MCG/ACT inhaler Inhale 2 puffs into the lungs 4 times daily as needed for Wheezing 22  Yes Zi Malloy MD   fexofenadine (ALLEGRA) 180 MG tablet Take 1 tablet by mouth daily 22  Yes Zi Malloy MD   hydrALAZINE (APRESOLINE) 50 MG tablet Take 1 tablet by mouth in the morning and at bedtime 22  Yes Zi Malloy MD   metoprolol tartrate (LOPRESSOR) 25 MG tablet Take 1 tablet by mouth 2 times daily 11/28/22  Yes Joe Lema MD   tiZANidine (ZANAFLEX) 2 MG tablet TAKE 1 TABLET BY MOUTH THREE TIMES DAILY FOR MUSCLE SPASM 11/14/22  Yes Esme Calhoun MD   traMADol (ULTRAM) 50 MG tablet Take 1 tablet by mouth 3 times daily as needed for Pain for up to 30 days. 11/14/22 12/14/22 Yes Esme Calhoun MD   omeprazole (PRILOSEC) 20 MG delayed release capsule TAKE 1 CAPSULE BY MOUTH TWICE DAILY BEFORE MEAL(S) 11/10/22  Yes Esme Calhoun MD   doxazosin (CARDURA) 1 MG tablet Take 1 tablet by mouth nightly 9/22/22  Yes Joe Lema MD   losartan (COZAAR) 100 MG tablet Take 1 tablet by mouth daily 9/13/22  Yes Esme Calhoun MD   montelukast (SINGULAIR) 10 MG tablet TAKE 1 TABLET EVERY DAY 8/23/22  Yes Esme Calhoun MD   estradiol (ESTRACE VAGINAL) 0.1 MG/GM vaginal cream Place a pea-sized amount vaginally daily x 2 weeks, then 3 times per week thereafter. Use finger to apply, wash hands after application. DO NOT use plastic applicator. Call office if you stop this medication.  7/14/22  Yes Lamont Lainez MD   B Complex Vitamins (VITAMIN B COMPLEX 100 IJ) Take by mouth   Yes Historical Provider, MD   Multiple Vitamins-Minerals (CENTRUM SILVER ADULT 50+) TABS Take by mouth    Yes Historical Provider, MD   aspirin EC 81 MG EC tablet Take 1 tablet by mouth daily 3/1/22  Yes Esme Calhoun MD   vitamin D3 (CHOLECALCIFEROL) 25 MCG (1000 UT) TABS tablet TAKE 1 TABLET EVERY DAY 12/14/21  Yes Esme Calhoun MD   Zinc 100 MG TABS Take by mouth   Yes Historical Provider, MD   Coenzyme Q10 (CO Q-10) 100 MG CAPS TAKE 1 CAPSULE EVERY DAY 10/28/21  Yes Esme Calhoun MD   levothyroxine (SYNTHROID) 50 MCG tablet TAKE 1 TABLET EVERY DAY 10/28/21  Yes Esme Calhoun MD   Ascorbic Acid (VITAMIN C) 250 MG tablet Take 2 tablets by mouth daily 2/5/21  Yes Esme Calhoun MD   vitamin B-6 (PYRIDOXINE) 100 MG tablet Take 1 tablet by mouth daily 2/5/21  Yes Esme Calhoun MD   polycarbophil (FIBER-CAPS) 625 MG tablet Take 1 tablet by mouth daily 2/5/21  Yes Cierra Mckinney MD   fluticasone [facility]) 50 MCG/ACT nasal spray USE 2 SPRAYS NASALLY EVERY DAY 12/28/19  Yes Cierra Mckinney MD   dicyclomine (BENTYL) 10 MG capsule  11/26/19  Yes Historical Provider, MD   Blood Pressure Monitoring (BLOOD PRESSURE CUFF) MISC Use to monitor Blood pressure 1-2 times daily 3/4/19  Yes Cierra Mckinney MD   gabapentin (NEURONTIN) 300 MG capsule TAKE 1 CAPSULE THREE TIMES DAILY 10/28/21 11/17/22  Cierra Mckinney MD        Allergies:       Patient has no known allergies. Social History:     Tobacco: reports that she has never smoked. She has never used smokeless tobacco.  Alcohol:      reports no history of alcohol use. Drug Use:  reports no history of drug use. Family History:     Family History   Problem Relation Age of Onset    Stroke Mother     High Cholesterol Mother     Heart Attack Mother     Heart Attack Father     High Cholesterol Father     High Cholesterol Sister     High Cholesterol Brother        Review of Systems:         Review of Systems   Constitutional:  Negative for chills, fatigue and fever. HENT:  Positive for congestion, sinus pressure and sore throat. Negative for ear pain. Respiratory:  Positive for cough. Negative for shortness of breath. Cardiovascular:  Negative for chest pain and palpitations. Gastrointestinal:  Negative for abdominal pain and nausea. Genitourinary:  Negative for dysuria. Skin:  Negative for rash. Neurological:  Positive for headaches. Negative for dizziness. Psychiatric/Behavioral:  The patient is not nervous/anxious. Physical Exam:     Vitals:  BP (!) 192/85 (Site: Left Upper Arm, Position: Sitting, Cuff Size: Small Adult)   Pulse 67   Resp 18   Ht 5' 3\" (1.6 m)   Wt 137 lb (62.1 kg)   LMP  (LMP Unknown)   SpO2 98%   BMI 24.27 kg/m²       Physical Exam  Vitals reviewed. Constitutional:       General: She is not in acute distress.      Appearance: Normal appearance. She is well-developed. She is not ill-appearing. HENT:      Head: Normocephalic and atraumatic. Right Ear: Tympanic membrane, ear canal and external ear normal.      Left Ear: Tympanic membrane, ear canal and external ear normal.      Nose: Congestion and rhinorrhea present. Mouth/Throat:      Mouth: Mucous membranes are moist.      Pharynx: Oropharynx is clear. No oropharyngeal exudate or posterior oropharyngeal erythema. Eyes:      General:         Right eye: No discharge. Left eye: No discharge. Conjunctiva/sclera: Conjunctivae normal.   Neck:      Thyroid: No thyromegaly. Cardiovascular:      Rate and Rhythm: Normal rate and regular rhythm. Heart sounds: Normal heart sounds. No murmur heard. Pulmonary:      Effort: Pulmonary effort is normal.      Breath sounds: Normal breath sounds. No wheezing, rhonchi or rales. Abdominal:      General: Bowel sounds are normal. There is no distension. Palpations: Abdomen is soft. There is no mass. Tenderness: There is no abdominal tenderness. Musculoskeletal:         General: Normal range of motion. Right lower leg: No edema. Left lower leg: No edema. Lymphadenopathy:      Cervical: No cervical adenopathy. Skin:     General: Skin is warm and dry. Coloration: Skin is not jaundiced or pale. Findings: No rash. Neurological:      General: No focal deficit present. Mental Status: She is alert and oriented to person, place, and time. Mental status is at baseline.    Psychiatric:         Mood and Affect: Mood normal.         Behavior: Behavior normal.             Data:     Lab Results   Component Value Date/Time     11/11/2022 09:01 AM    K 3.9 11/11/2022 09:01 AM     11/11/2022 09:01 AM    CO2 31 11/11/2022 09:01 AM    BUN 13 11/11/2022 09:01 AM    CREATININE 0.57 11/11/2022 09:01 AM    GLUCOSE 98 11/11/2022 09:01 AM    GLUCOSE 99 01/19/2012 11:13 AM    PROT 6.2 11/11/2022 4 times daily as needed for Wheezing    fexofenadine (ALLEGRA) 180 MG tablet 90 tablet 1     Sig: Take 1 tablet by mouth daily    hydrALAZINE (APRESOLINE) 50 MG tablet 60 tablet 3     Sig: Take 1 tablet by mouth in the morning and at bedtime     No follow-ups on file. Orders Placed This Encounter   Medications    levoFLOXacin (LEVAQUIN) 500 MG tablet     Sig: Take 1 tablet by mouth daily for 7 days     Dispense:  7 tablet     Refill:  0    albuterol sulfate HFA (VENTOLIN HFA) 108 (90 Base) MCG/ACT inhaler     Sig: Inhale 2 puffs into the lungs 4 times daily as needed for Wheezing     Dispense:  54 g     Refill:  1    fexofenadine (ALLEGRA) 180 MG tablet     Sig: Take 1 tablet by mouth daily     Dispense:  90 tablet     Refill:  1    hydrALAZINE (APRESOLINE) 50 MG tablet     Sig: Take 1 tablet by mouth in the morning and at bedtime     Dispense:  60 tablet     Refill:  3     Orders Placed This Encounter   Procedures    XR CHEST (2 VW)     Standing Status:   Future     Standing Expiration Date:   12/1/2023         There are no Patient Instructions on file for this visit.     Electronically signed by Sonali Draper MD on 12/1/2022 at 4:44 PM           Completed Refills      Requested Prescriptions     Signed Prescriptions Disp Refills    levoFLOXacin (LEVAQUIN) 500 MG tablet 7 tablet 0     Sig: Take 1 tablet by mouth daily for 7 days    albuterol sulfate HFA (VENTOLIN HFA) 108 (90 Base) MCG/ACT inhaler 54 g 1     Sig: Inhale 2 puffs into the lungs 4 times daily as needed for Wheezing    fexofenadine (ALLEGRA) 180 MG tablet 90 tablet 1     Sig: Take 1 tablet by mouth daily    hydrALAZINE (APRESOLINE) 50 MG tablet 60 tablet 3     Sig: Take 1 tablet by mouth in the morning and at bedtime

## 2022-12-10 DIAGNOSIS — K21.9 GASTROESOPHAGEAL REFLUX DISEASE WITHOUT ESOPHAGITIS: ICD-10-CM

## 2022-12-12 RX ORDER — OMEPRAZOLE 40 MG/1
CAPSULE, DELAYED RELEASE ORAL
Qty: 30 CAPSULE | Refills: 0 | Status: SHIPPED | OUTPATIENT
Start: 2022-12-12

## 2022-12-13 ENCOUNTER — OFFICE VISIT (OUTPATIENT)
Dept: FAMILY MEDICINE CLINIC | Age: 72
End: 2022-12-13
Payer: COMMERCIAL

## 2022-12-13 VITALS
DIASTOLIC BLOOD PRESSURE: 72 MMHG | SYSTOLIC BLOOD PRESSURE: 132 MMHG | WEIGHT: 142 LBS | RESPIRATION RATE: 18 BRPM | HEIGHT: 63 IN | BODY MASS INDEX: 25.16 KG/M2 | OXYGEN SATURATION: 97 % | HEART RATE: 59 BPM

## 2022-12-13 DIAGNOSIS — G89.29 CHRONIC MIDLINE LOW BACK PAIN WITH RIGHT-SIDED SCIATICA: ICD-10-CM

## 2022-12-13 DIAGNOSIS — E03.9 ACQUIRED HYPOTHYROIDISM: ICD-10-CM

## 2022-12-13 DIAGNOSIS — M54.16 LUMBAR RADICULOPATHY: ICD-10-CM

## 2022-12-13 DIAGNOSIS — I10 ESSENTIAL HYPERTENSION: Primary | ICD-10-CM

## 2022-12-13 DIAGNOSIS — M54.41 CHRONIC MIDLINE LOW BACK PAIN WITH RIGHT-SIDED SCIATICA: ICD-10-CM

## 2022-12-13 DIAGNOSIS — K21.9 GASTROESOPHAGEAL REFLUX DISEASE WITHOUT ESOPHAGITIS: ICD-10-CM

## 2022-12-13 PROCEDURE — 1123F ACP DISCUSS/DSCN MKR DOCD: CPT | Performed by: INTERNAL MEDICINE

## 2022-12-13 PROCEDURE — 3074F SYST BP LT 130 MM HG: CPT | Performed by: INTERNAL MEDICINE

## 2022-12-13 PROCEDURE — 3078F DIAST BP <80 MM HG: CPT | Performed by: INTERNAL MEDICINE

## 2022-12-13 PROCEDURE — 99214 OFFICE O/P EST MOD 30 MIN: CPT | Performed by: INTERNAL MEDICINE

## 2022-12-13 ASSESSMENT — ENCOUNTER SYMPTOMS
NAUSEA: 0
SORE THROAT: 0
SHORTNESS OF BREATH: 0
COUGH: 0
BACK PAIN: 1
ABDOMINAL PAIN: 0

## 2022-12-13 NOTE — PATIENT INSTRUCTIONS
SURVEY:    You may be receiving a survey from [employer] regarding your visit today. Please complete the survey to enable us to provide the highest quality of care to you and your family. If you cannot score us a very good on any question, please call the office to discuss how we could have made your experience a very good one. Thank you.   MD Sana Monzon MD Jacelyn Burton, MD Darolyn Goodnight, DO Zoila Martell, YOLANDA [place], [facility]

## 2022-12-13 NOTE — PROGRESS NOTES
HPI Notes    Name: Bobbette Sicard  : 1950         Chief Complaint:     Chief Complaint   Patient presents with    Hypertension     Patient has no complaints    Gastroesophageal Reflux    Back Pain       History of Present Illness:        Roxanne presents to office to follow-up for hypertension, GERD, chronic low back pain, Hypothyroidism     States doing well. Blood pressure has been better after her viral illness resolved and she never took Hydralazine prescribed during her last OV. Roxanne confirms that she is compliant with her  thyroid medication. She denies an increase in fatigue, constipation, increased skin dryness, or increased lower extremity edema. The last TSH  was 1.58 on 22. Hypertension  This is a chronic problem. The current episode started more than 1 year ago. The problem is unchanged. The problem is controlled. Pertinent negatives include no chest pain, headaches, neck pain, palpitations, peripheral edema or shortness of breath. Past treatments include beta blockers, alpha 1 blockers and angiotensin blockers. The current treatment provides significant improvement. There are no compliance problems. Hypertensive end-organ damage includes CAD/MI. There is no history of CVA or PVD. Back Pain  This is a chronic problem. The current episode started more than 1 year ago. The problem occurs intermittently. The problem is unchanged. The pain is present in the lumbar spine and sacro-iliac. The quality of the pain is described as cramping and aching. The pain radiates to the right thigh, right foot and right knee. The pain is moderate. The symptoms are aggravated by position, standing and twisting. Associated symptoms include numbness (right foot). Pertinent negatives include no abdominal pain, chest pain, dysuria, fever, headaches, paresthesias or weakness. Treatments tried: Tramadol, Tylenol, Gabapentin. The treatment provided significant relief.    Gastroesophageal Reflux  She reports no abdominal pain, no chest pain, no coughing, no nausea or no sore throat. This is a chronic problem. The current episode started more than 1 year ago. The problem occurs rarely. The symptoms are aggravated by certain foods. Pertinent negatives include no fatigue. Risk factors include hiatal hernia. She has tried a PPI and an antacid for the symptoms. The treatment provided significant relief. Past invasive treatments include gastroplication. Past Medical History:     Past Medical History:   Diagnosis Date    Allergic rhinitis 2011    Anxiety 2011    Carpal tunnel syndrome 2011    Depression 2011    Dyslipidemia 2011    Fibromyalgia 2011    Hyperlipidemia     Hypertension 2011    SOB (shortness of breath)     Thyroid disease       Reviewed all health maintenance requirements and orderedappropriate tests  Health Maintenance Due   Topic Date Due    DEXA (modify frequency per FRAX score)  Never done       Past Surgical History:     Past Surgical History:   Procedure Laterality Date    CARDIAC CATHETERIZATION Left 2017    Dr. Jose Raul Mishra @ [facility]--CAD, 50-60% ostial right CAD with an FFR of 0.94.  60% disease in the proximal LAD with an FFR of 0.92 with bridging of the mid LAD. Normal LV function at 60%. Mildly dilated ascending aorta -will do a CT scan to measure. CARDIAC CATHETERIZATION Left 2020    Dr. Jose Raul Mishra @ [facility]--Medical therapy     SECTION      x 4    ELBOW SURGERY Right     GALLBLADDER SURGERY      HYSTERECTOMY (CERVIX STATUS UNKNOWN)      total hysterectomy    KIDNEY SURGERY Right 2021    Dr. Sierra Lewis      Right    SMALL INTESTINE SURGERY      twisted bowel         Medications:       Prior to Admission medications    Medication Sig Start Date End Date Taking?  Authorizing Provider   omeprazole (PRILOSEC) 40 MG delayed release capsule Take 1 capsule by mouth once daily 22  Yes Mariana Mayorga Gordo Mock MD   albuterol sulfate HFA (VENTOLIN HFA) 108 (90 Base) MCG/ACT inhaler Inhale 2 puffs into the lungs 4 times daily as needed for Wheezing 12/1/22  Yes Fredis Tom MD   fexofenadine (ALLEGRA) 180 MG tablet Take 1 tablet by mouth daily 12/1/22  Yes Fredis Tom MD   metoprolol tartrate (LOPRESSOR) 25 MG tablet Take 1 tablet by mouth 2 times daily 11/28/22  Yes Harshad Chirinos MD   tiZANidine (ZANAFLEX) 2 MG tablet TAKE 1 TABLET BY MOUTH THREE TIMES DAILY FOR MUSCLE SPASM 11/14/22  Yes Fredis Tom MD   traMADol (ULTRAM) 50 MG tablet Take 1 tablet by mouth 3 times daily as needed for Pain for up to 30 days. 11/14/22 12/14/22 Yes Fredis Tom MD   doxazosin (CARDURA) 1 MG tablet Take 1 tablet by mouth nightly 9/22/22  Yes Harshad Chirinos MD   losartan (COZAAR) 100 MG tablet Take 1 tablet by mouth daily 9/13/22  Yes Fredis Tom MD   montelukast (SINGULAIR) 10 MG tablet TAKE 1 TABLET EVERY DAY 8/23/22  Yes Fredis Tom MD   estradiol (ESTRACE VAGINAL) 0.1 MG/GM vaginal cream Place a pea-sized amount vaginally daily x 2 weeks, then 3 times per week thereafter. Use finger to apply, wash hands after application. DO NOT use plastic applicator. Call office if you stop this medication.  7/14/22  Yes Bea Del Toro MD   B Complex Vitamins (VITAMIN B COMPLEX 100 IJ) Take by mouth   Yes Historical Provider, MD   Multiple Vitamins-Minerals (CENTRUM SILVER ADULT 50+) TABS Take by mouth    Yes Historical Provider, MD   aspirin EC 81 MG EC tablet Take 1 tablet by mouth daily 3/1/22  Yes Fredis Tom MD   vitamin D3 (CHOLECALCIFEROL) 25 MCG (1000 UT) TABS tablet TAKE 1 TABLET EVERY DAY 12/14/21  Yes Fredis Tom MD   Zinc 100 MG TABS Take by mouth   Yes Historical Provider, MD   Coenzyme Q10 (CO Q-10) 100 MG CAPS TAKE 1 CAPSULE EVERY DAY 10/28/21  Yes Fredis Tom MD   levothyroxine (SYNTHROID) 50 MCG tablet TAKE 1 TABLET EVERY DAY 10/28/21  Yes Fredis Tom MD   Ascorbic Acid (VITAMIN C) 250 MG tablet Take 2 tablets by mouth daily 2/5/21  Yes Jet Lawson MD   vitamin B-6 (PYRIDOXINE) 100 MG tablet Take 1 tablet by mouth daily 2/5/21  Yes Jet Lawson MD   polycarbophil (FIBER-CAPS) 625 MG tablet Take 1 tablet by mouth daily 2/5/21  Yes Jet Lawson MD   fluticasone (FLONASE) 50 MCG/ACT nasal spray USE 2 SPRAYS NASALLY EVERY DAY 12/28/19  Yes Jet Lawson MD   dicyclomine (BENTYL) 10 MG capsule  11/26/19  Yes Historical Provider, MD   Blood Pressure Monitoring (BLOOD PRESSURE CUFF) MISC Use to monitor Blood pressure 1-2 times daily 3/4/19  Yes Jet Lawson MD   gabapentin (NEURONTIN) 300 MG capsule TAKE 1 CAPSULE THREE TIMES DAILY 10/28/21 11/17/22  Jet Lawson MD        Allergies:       Patient has no known allergies. Social History:     Tobacco: reports that she has never smoked. She has never used smokeless tobacco.  Alcohol:      reports no history of alcohol use. Drug Use:  reports no history of drug use. Family History:     Family History   Problem Relation Age of Onset    Stroke Mother     High Cholesterol Mother     Heart Attack Mother     Heart Attack Father     High Cholesterol Father     High Cholesterol Sister     High Cholesterol Brother        Review of Systems:         Review of Systems   Constitutional:  Negative for activity change, appetite change, chills, fatigue and fever. HENT:  Negative for congestion and sore throat. Respiratory:  Negative for cough and shortness of breath. Cardiovascular:  Negative for chest pain and palpitations. Gastrointestinal:  Negative for abdominal pain and nausea. Genitourinary:  Negative for dysuria. Musculoskeletal:  Positive for back pain. Negative for gait problem, joint swelling and neck pain. Skin:  Negative for rash. Neurological:  Positive for numbness (right foot). Negative for dizziness, weakness, headaches and paresthesias.    Psychiatric/Behavioral:  The patient is not nervous/anxious. Physical Exam:     Vitals:  /72 (Site: Right Upper Arm, Position: Sitting, Cuff Size: Small Adult)   Pulse 59   Resp 18   Ht 5' 3\" (1.6 m)   Wt 142 lb (64.4 kg)   LMP  (LMP Unknown)   SpO2 97%   BMI 25.15 kg/m²       Physical Exam  Vitals reviewed. Constitutional:       General: She is not in acute distress. Appearance: Normal appearance. She is well-developed. HENT:      Head: Normocephalic and atraumatic. Right Ear: External ear normal.      Left Ear: External ear normal.   Neck:      Thyroid: No thyromegaly. Cardiovascular:      Rate and Rhythm: Normal rate and regular rhythm. Heart sounds: Normal heart sounds. No murmur heard. Pulmonary:      Effort: Pulmonary effort is normal.      Breath sounds: Normal breath sounds. No wheezing or rales. Abdominal:      General: Bowel sounds are normal. There is no distension. Palpations: Abdomen is soft. There is no mass. Tenderness: There is no abdominal tenderness. Musculoskeletal:         General: Normal range of motion. Right lower leg: No edema. Left lower leg: No edema. Lymphadenopathy:      Cervical: No cervical adenopathy. Skin:     General: Skin is warm and dry. Coloration: Skin is not pale. Findings: No rash. Neurological:      General: No focal deficit present. Mental Status: She is alert and oriented to person, place, and time. Mental status is at baseline. Psychiatric:         Mood and Affect: Mood normal.         Behavior: Behavior normal.         Thought Content:  Thought content normal.             Data:     Lab Results   Component Value Date/Time     11/11/2022 09:01 AM    K 3.9 11/11/2022 09:01 AM     11/11/2022 09:01 AM    CO2 31 11/11/2022 09:01 AM    BUN 13 11/11/2022 09:01 AM    CREATININE 0.57 11/11/2022 09:01 AM    GLUCOSE 98 11/11/2022 09:01 AM    GLUCOSE 99 01/19/2012 11:13 AM    PROT 6.2 11/11/2022 09:01 AM    PROT 6.5 01/24/2020 12:00 AM    LABALBU 3.9 11/11/2022 09:01 AM    LABALBU 4.4 01/19/2012 11:13 AM    BILITOT 0.7 11/11/2022 09:01 AM    ALKPHOS 104 11/11/2022 09:01 AM    AST 27 11/11/2022 09:01 AM    ALT 30 11/11/2022 09:01 AM     Lab Results   Component Value Date/Time    WBC 8.5 11/11/2022 09:01 AM    RBC 4.84 11/11/2022 09:01 AM    RBC 4.86 01/19/2012 11:13 AM    HGB 13.9 11/11/2022 09:01 AM    HCT 41.0 11/11/2022 09:01 AM    MCV 84.8 11/11/2022 09:01 AM    MCH 28.7 11/11/2022 09:01 AM    MCHC 33.8 11/11/2022 09:01 AM    RDW 15.3 11/11/2022 09:01 AM     11/11/2022 09:01 AM     01/19/2012 11:13 AM    MPV NOT REPORTED 11/04/2021 10:10 AM     Lab Results   Component Value Date/Time    TSH 1.58 11/11/2022 09:01 AM     Lab Results   Component Value Date/Time    CHOL 192 11/11/2022 09:01 AM    CHOL 250 01/16/2017 12:00 AM    HDL 56 11/11/2022 09:01 AM          Assessment & Plan        Diagnosis Orders   1. Essential hypertension   Blood pressure is controlled, continue on current medications       2. Gastroesophageal reflux disease without esophagitis   Symptoms stable, continue on Omeprazole       3. Acquired hypothyroidism   TSH normal, continue same dose Synthroid        4. Chronic midline low back pain with right-sided sciatica   Problem is stable, continue on  Tramadol prn, Gabapentin, Tylenol . 5. Lumbar radiculopathy                        Completed Refills   Requested Prescriptions      No prescriptions requested or ordered in this encounter     No follow-ups on file. No orders of the defined types were placed in this encounter. No orders of the defined types were placed in this encounter. Patient Instructions     SURVEY:    You may be receiving a survey from [employer] regarding your visit today. Please complete the survey to enable us to provide the highest quality of care to you and your family.     If you cannot score us a very good on any question, please call the office to discuss how we could have made your experience a very good one. Thank you.   MD Jacklyn Monzon MD Lorre Imam, MD Elgin Butts, DO Zoila Cavazos, [facility] [place] [facility] [place], [facility]    Electronically signed by Yogesh Washington MD on 12/13/2022 at 10:51 AM           Completed Refills      Requested Prescriptions      No prescriptions requested or ordered in this encounter

## 2022-12-15 DIAGNOSIS — E03.9 ACQUIRED HYPOTHYROIDISM: ICD-10-CM

## 2022-12-15 RX ORDER — LEVOTHYROXINE SODIUM 0.05 MG/1
TABLET ORAL
Qty: 90 TABLET | Refills: 1 | Status: SHIPPED | OUTPATIENT
Start: 2022-12-15

## 2023-01-09 DIAGNOSIS — K21.9 GASTROESOPHAGEAL REFLUX DISEASE WITHOUT ESOPHAGITIS: ICD-10-CM

## 2023-01-09 DIAGNOSIS — G89.29 CHRONIC MIDLINE LOW BACK PAIN WITH RIGHT-SIDED SCIATICA: ICD-10-CM

## 2023-01-09 DIAGNOSIS — M54.41 CHRONIC MIDLINE LOW BACK PAIN WITH RIGHT-SIDED SCIATICA: ICD-10-CM

## 2023-01-09 DIAGNOSIS — M79.7 FIBROMYALGIA SYNDROME: ICD-10-CM

## 2023-01-09 RX ORDER — GABAPENTIN 300 MG/1
300 CAPSULE ORAL 3 TIMES DAILY
Qty: 270 CAPSULE | Refills: 0 | Status: SHIPPED | OUTPATIENT
Start: 2023-01-09 | End: 2023-04-09

## 2023-01-09 RX ORDER — TIZANIDINE 2 MG/1
TABLET ORAL
Qty: 90 TABLET | Refills: 0 | Status: SHIPPED | OUTPATIENT
Start: 2023-01-09

## 2023-01-09 RX ORDER — OMEPRAZOLE 40 MG/1
CAPSULE, DELAYED RELEASE ORAL
Qty: 30 CAPSULE | Refills: 0 | Status: SHIPPED | OUTPATIENT
Start: 2023-01-09

## 2023-01-10 ENCOUNTER — OFFICE VISIT (OUTPATIENT)
Dept: FAMILY MEDICINE CLINIC | Age: 73
End: 2023-01-10
Payer: COMMERCIAL

## 2023-01-10 VITALS
RESPIRATION RATE: 18 BRPM | WEIGHT: 138.2 LBS | OXYGEN SATURATION: 96 % | SYSTOLIC BLOOD PRESSURE: 115 MMHG | HEIGHT: 63 IN | HEART RATE: 57 BPM | DIASTOLIC BLOOD PRESSURE: 78 MMHG | BODY MASS INDEX: 24.49 KG/M2

## 2023-01-10 DIAGNOSIS — R30.0 DYSURIA: ICD-10-CM

## 2023-01-10 DIAGNOSIS — N39.0 RECURRENT UTI: Primary | ICD-10-CM

## 2023-01-10 LAB
BILIRUBIN, POC: NORMAL
BLOOD URINE, POC: NORMAL
CLARITY, POC: NORMAL
COLOR, POC: YELLOW
GLUCOSE URINE, POC: NORMAL
KETONES, POC: NORMAL
LEUKOCYTE EST, POC: NORMAL
NITRITE, POC: NORMAL
PH, POC: 7.5
PROTEIN, POC: NORMAL
SPECIFIC GRAVITY, POC: 1.01
UROBILINOGEN, POC: 0.2

## 2023-01-10 PROCEDURE — 3078F DIAST BP <80 MM HG: CPT | Performed by: INTERNAL MEDICINE

## 2023-01-10 PROCEDURE — 1123F ACP DISCUSS/DSCN MKR DOCD: CPT | Performed by: INTERNAL MEDICINE

## 2023-01-10 PROCEDURE — 3074F SYST BP LT 130 MM HG: CPT | Performed by: INTERNAL MEDICINE

## 2023-01-10 PROCEDURE — 81002 URINALYSIS NONAUTO W/O SCOPE: CPT | Performed by: INTERNAL MEDICINE

## 2023-01-10 PROCEDURE — 99213 OFFICE O/P EST LOW 20 MIN: CPT | Performed by: INTERNAL MEDICINE

## 2023-01-10 RX ORDER — CEPHALEXIN 500 MG/1
500 CAPSULE ORAL 2 TIMES DAILY
Qty: 14 CAPSULE | Refills: 0 | Status: SHIPPED | OUTPATIENT
Start: 2023-01-10 | End: 2023-01-17

## 2023-01-10 ASSESSMENT — PATIENT HEALTH QUESTIONNAIRE - PHQ9
2. FEELING DOWN, DEPRESSED OR HOPELESS: 0
7. TROUBLE CONCENTRATING ON THINGS, SUCH AS READING THE NEWSPAPER OR WATCHING TELEVISION: 0
SUM OF ALL RESPONSES TO PHQ QUESTIONS 1-9: 0
SUM OF ALL RESPONSES TO PHQ QUESTIONS 1-9: 0
9. THOUGHTS THAT YOU WOULD BE BETTER OFF DEAD, OR OF HURTING YOURSELF: 0
2. FEELING DOWN, DEPRESSED OR HOPELESS: 0
SUM OF ALL RESPONSES TO PHQ9 QUESTIONS 1 & 2: 0
SUM OF ALL RESPONSES TO PHQ QUESTIONS 1-9: 0
SUM OF ALL RESPONSES TO PHQ QUESTIONS 1-9: 0
SUM OF ALL RESPONSES TO PHQ9 QUESTIONS 1 & 2: 0
SUM OF ALL RESPONSES TO PHQ QUESTIONS 1-9: 0
8. MOVING OR SPEAKING SO SLOWLY THAT OTHER PEOPLE COULD HAVE NOTICED. OR THE OPPOSITE, BEING SO FIGETY OR RESTLESS THAT YOU HAVE BEEN MOVING AROUND A LOT MORE THAN USUAL: 0
SUM OF ALL RESPONSES TO PHQ QUESTIONS 1-9: 0
1. LITTLE INTEREST OR PLEASURE IN DOING THINGS: 0
1. LITTLE INTEREST OR PLEASURE IN DOING THINGS: 0
5. POOR APPETITE OR OVEREATING: 0
SUM OF ALL RESPONSES TO PHQ QUESTIONS 1-9: 0
4. FEELING TIRED OR HAVING LITTLE ENERGY: 0
10. IF YOU CHECKED OFF ANY PROBLEMS, HOW DIFFICULT HAVE THESE PROBLEMS MADE IT FOR YOU TO DO YOUR WORK, TAKE CARE OF THINGS AT HOME, OR GET ALONG WITH OTHER PEOPLE: 0
3. TROUBLE FALLING OR STAYING ASLEEP: 0
6. FEELING BAD ABOUT YOURSELF - OR THAT YOU ARE A FAILURE OR HAVE LET YOURSELF OR YOUR FAMILY DOWN: 0
SUM OF ALL RESPONSES TO PHQ QUESTIONS 1-9: 0

## 2023-01-10 ASSESSMENT — ENCOUNTER SYMPTOMS
SHORTNESS OF BREATH: 0
NAUSEA: 0
ABDOMINAL PAIN: 0
SORE THROAT: 0
COUGH: 0

## 2023-01-10 NOTE — PATIENT INSTRUCTIONS
SURVEY:    You may be receiving a survey from [employer] regarding your visit today. Please complete the survey to enable us to provide the highest quality of care to you and your family. If you cannot score us a very good on any question, please call the office to discuss how we could have made your experience a very good one. Thank you.   MD Mayte Monzon MD Lelia Blacksmith, MD Erminia Brazil, DO Zoila Hendrix, YOLANDA Osuna, [facility]

## 2023-01-10 NOTE — PROGRESS NOTES
HPI Notes    Name: Shay Lopez  : 1950         Chief Complaint:     Chief Complaint   Patient presents with    Urinary Tract Infection         History of Present Illness:        Roxanne presents to office for evaluation for possible UTI. She had UTI before and symptoms are similar to previous       Dysuria   This is a new problem. The current episode started in the past 7 days. The problem occurs every urination. The problem has been gradually worsening. The quality of the pain is described as aching. The pain is mild. There has been no fever. Associated symptoms include frequency and urgency. Pertinent negatives include no chills, flank pain, hematuria, nausea or possible pregnancy. She has tried increased fluids for the symptoms. The treatment provided no relief. Her past medical history is significant for recurrent UTIs. Past Medical History:     Past Medical History:   Diagnosis Date    Allergic rhinitis 2011    Anxiety 2011    Carpal tunnel syndrome 2011    Depression 2011    Dyslipidemia 2011    Fibromyalgia 2011    Hyperlipidemia     Hypertension 2011    SOB (shortness of breath)     Thyroid disease       Reviewed all health maintenance requirements and orderedappropriate tests  Health Maintenance Due   Topic Date Due    DEXA (modify frequency per FRAX score)  Never done       Past Surgical History:     Past Surgical History:   Procedure Laterality Date    CARDIAC CATHETERIZATION Left 2017    Dr. Ramon Alan @ [facility]--CAD, 50-60% ostial right CAD with an FFR of 0.94.  60% disease in the proximal LAD with an FFR of 0.92 with bridging of the mid LAD. Normal LV function at 60%. Mildly dilated ascending aorta -will do a CT scan to measure.     CARDIAC CATHETERIZATION Left 2020    Dr. Ramon Alan @ [facility]--Medical therapy     SECTION      x [facility] (CERVIX STATUS UNKNOWN) total hysterectomy    KIDNEY SURGERY Right 06/01/2021    Dr. Yosef Mary      twisted bowel 1985        Medications:       Prior to Admission medications    Medication Sig Start Date End Date Taking? Authorizing Provider   cephALEXin (KEFLEX) 500 MG capsule Take 1 capsule by mouth 2 times daily for 7 days 1/10/23 1/17/23 Yes Renny Crenshaw MD   tiZANidine (ZANAFLEX) 2 MG tablet TAKE 1 TABLET BY MOUTH THREE TIMES DAILY FOR MUSCLE SPASM 1/9/23  Yes Renny Crenshaw MD   omeprazole (PRILOSEC) 40 MG delayed release capsule Take 1 capsule by mouth once daily 1/9/23  Yes Renny Crenshaw MD   gabapentin (NEURONTIN) 300 MG capsule Take 1 capsule by mouth 3 times daily for 90 days. 1/9/23 4/9/23 Yes Renny Crenshaw MD   levothyroxine (SYNTHROID) 50 MCG tablet TAKE 1 TABLET EVERY DAY 12/15/22  Yes Renny Crenshaw MD   albuterol sulfate HFA (VENTOLIN HFA) 108 (90 Base) MCG/ACT inhaler Inhale 2 puffs into the lungs 4 times daily as needed for Wheezing 12/1/22  Yes Renny Crenshaw MD   fexofenadine (ALLEGRA) 180 MG tablet Take 1 tablet by mouth daily 12/1/22  Yes Renny Crenshaw MD   metoprolol tartrate (LOPRESSOR) 25 MG tablet Take 1 tablet by mouth 2 times daily 11/28/22  Yes Chalino Floyd MD   doxazosin (CARDURA) 1 MG tablet Take 1 tablet by mouth nightly 9/22/22  Yes Chalino Floyd MD   losartan (COZAAR) 100 MG tablet Take 1 tablet by mouth daily 9/13/22  Yes Renny Crenshaw MD   montelukast (SINGULAIR) 10 MG tablet TAKE 1 TABLET EVERY DAY 8/23/22  Yes Renny Crenshaw MD   estradiol (ESTRACE VAGINAL) 0.1 MG/GM vaginal cream Place a pea-sized amount vaginally daily x 2 weeks, then 3 times per week thereafter. Use finger to apply, wash hands after application. DO NOT use plastic applicator. Call office if you stop this medication.  7/14/22  Yes Dexter Webster MD   B Complex Vitamins (VITAMIN B COMPLEX 100 IJ) Take by mouth   Yes Historical Provider, MD   Multiple Vitamins-Minerals (CENTRUM SILVER ADULT 50+) TABS Take by mouth    Yes Historical Provider, MD   aspirin EC 81 MG EC tablet Take 1 tablet by mouth daily 3/1/22  Yes Lázaro Robles MD   vitamin D3 (CHOLECALCIFEROL) 25 MCG (1000 UT) TABS tablet TAKE 1 TABLET EVERY DAY 12/14/21  Yes Lázaro Robles MD   Zinc 100 MG TABS Take by mouth   Yes Historical Provider, MD   Coenzyme Q10 (CO Q-10) 100 MG CAPS TAKE 1 CAPSULE EVERY DAY 10/28/21  Yes Lázaro Robles MD   Ascorbic Acid (VITAMIN C) 250 MG tablet Take 2 tablets by mouth daily 2/5/21  Yes Lázaro Robles MD   vitamin B-6 (PYRIDOXINE) 100 MG tablet Take 1 tablet by mouth daily 2/5/21  Yes Lázaro Robles MD   polycarbophil (FIBER-CAPS) 625 MG tablet Take 1 tablet by mouth daily 2/5/21  Yes Lázaro Robles MD   fluticasone (FLONASE) 50 MCG/ACT nasal spray USE 2 SPRAYS NASALLY EVERY DAY 12/28/19  Yes Lázaro Robles MD   dicyclomine (BENTYL) 10 MG capsule  11/26/19  Yes Historical Provider, MD   Blood Pressure Monitoring (BLOOD PRESSURE CUFF) MISC Use to monitor Blood pressure 1-2 times daily 3/4/19  Yes Lázaro Robles MD        Allergies:       Patient has no known allergies. Social History:     Tobacco: reports that she has never smoked. She has never used smokeless tobacco.  Alcohol:      reports no history of alcohol use. Drug Use:  reports no history of drug use. Family History:     Family History   Problem Relation Age of Onset    Stroke Mother     High Cholesterol Mother     Heart Attack Mother     Heart Attack Father     High Cholesterol Father     High Cholesterol Sister     High Cholesterol Brother        Review of Systems:         Review of Systems   Constitutional:  Negative for chills and fever. HENT:  Negative for congestion and sore throat. Respiratory:  Negative for cough and shortness of breath. Cardiovascular:  Negative for chest pain and palpitations. Gastrointestinal:  Negative for abdominal pain and nausea. Genitourinary:  Positive for dysuria, frequency and urgency. Negative for flank pain and hematuria. Skin:  Negative for rash. Neurological:  Negative for headaches. Psychiatric/Behavioral:  The patient is not nervous/anxious. Physical Exam:     Vitals:  /78 (Site: Right Upper Arm, Position: Sitting, Cuff Size: Small Adult)   Pulse 57   Resp 18   Ht 5' 3\" (1.6 m)   Wt 138 lb 3.2 oz (62.7 kg)   LMP  (LMP Unknown)   SpO2 96%   BMI 24.48 kg/m²       Physical Exam  Vitals reviewed. Constitutional:       General: She is not in acute distress. Appearance: She is well-developed. HENT:      Head: Normocephalic and atraumatic. Right Ear: External ear normal.      Left Ear: External ear normal.      Nose: Congestion and rhinorrhea present. Eyes:      General:         Right eye: No discharge. Left eye: No discharge. Neck:      Thyroid: No thyromegaly. Cardiovascular:      Rate and Rhythm: Normal rate and regular rhythm. Heart sounds: Normal heart sounds. No murmur heard. Pulmonary:      Effort: Pulmonary effort is normal.      Breath sounds: Normal breath sounds. No wheezing or rales. Abdominal:      General: Bowel sounds are normal. There is no distension. Palpations: Abdomen is soft. There is no mass. Tenderness: There is no abdominal tenderness. Musculoskeletal:         General: Normal range of motion. Right lower leg: No edema. Left lower leg: No edema. Lymphadenopathy:      Cervical: No cervical adenopathy. Skin:     General: Skin is warm and dry. Coloration: Skin is not pale. Findings: No rash. Neurological:      General: No focal deficit present. Mental Status: She is alert and oriented to person, place, and time. Psychiatric:         Mood and Affect: Mood normal.         Behavior: Behavior normal.         Thought Content:  Thought content normal.             Data:     Lab Results   Component Value Date/Time    NA 141 11/11/2022 09:01 AM    K 3.9 11/11/2022 09:01 AM     11/11/2022 09:01 AM    CO2 31 11/11/2022 09:01 AM    BUN 13 11/11/2022 09:01 AM    CREATININE 0.57 11/11/2022 09:01 AM    GLUCOSE 98 11/11/2022 09:01 AM    GLUCOSE 99 01/19/2012 11:13 AM    PROT 6.2 11/11/2022 09:01 AM    PROT 6.5 01/24/2020 12:00 AM    LABALBU 3.9 11/11/2022 09:01 AM    LABALBU 4.4 01/19/2012 11:13 AM    BILITOT 0.7 11/11/2022 09:01 AM    ALKPHOS 104 11/11/2022 09:01 AM    AST 27 11/11/2022 09:01 AM    ALT 30 11/11/2022 09:01 AM     Lab Results   Component Value Date/Time    WBC 8.5 11/11/2022 09:01 AM    RBC 4.84 11/11/2022 09:01 AM    RBC 4.86 01/19/2012 11:13 AM    HGB 13.9 11/11/2022 09:01 AM    HCT 41.0 11/11/2022 09:01 AM    MCV 84.8 11/11/2022 09:01 AM    MCH 28.7 11/11/2022 09:01 AM    MCHC 33.8 11/11/2022 09:01 AM    RDW 15.3 11/11/2022 09:01 AM     11/11/2022 09:01 AM     01/19/2012 11:13 AM    MPV NOT REPORTED 11/04/2021 10:10 AM     Lab Results   Component Value Date/Time    TSH 1.58 11/11/2022 09:01 AM     Lab Results   Component Value Date/Time    CHOL 192 11/11/2022 09:01 AM    CHOL 250 01/16/2017 12:00 AM    HDL 56 11/11/2022 09:01 AM          Assessment & Plan        Diagnosis Orders   1. Recurrent UTI   Prescribed Keflex, recommended good hydration, as needed Tylenol or NSAIDs for mild lower abdominal and back pain. Follow-up if needed cephALEXin (KEFLEX) 500 MG capsule      2. Dysuria  POCT Urinalysis no Micro                      Completed Refills   Requested Prescriptions     Signed Prescriptions Disp Refills    cephALEXin (KEFLEX) 500 MG capsule 14 capsule 0     Sig: Take 1 capsule by mouth 2 times daily for 7 days     No follow-ups on file.      Orders Placed This Encounter   Medications    cephALEXin (KEFLEX) 500 MG capsule     Sig: Take 1 capsule by mouth 2 times daily for 7 days     Dispense:  14 capsule     Refill:  0       Orders Placed This Encounter   Procedures    POCT Urinalysis no Micro Patient Instructions     SURVEY:    You may be receiving a survey from [employer] regarding your visit today. Please complete the survey to enable us to provide the highest quality of care to you and your family. If you cannot score us a very good on any question, please call the office to discuss how we could have made your experience a very good one. Thank you.   MD Alex Monzon MD Delvin Dam, MD Bridget Gentle, DO Zoila Hendrix, PM  [facility] [place] [facility], [place], [place]  [place], [place]  [facility]      Electronically signed by Devan Gillespie MD on 1/10/2023 at 1:46 PM           Completed Refills      Requested Prescriptions     Signed Prescriptions Disp Refills    cephALEXin (KEFLEX) 500 MG capsule 14 capsule 0     Sig: Take 1 capsule by mouth 2 times daily for 7 days

## 2023-01-12 ENCOUNTER — HOSPITAL ENCOUNTER (OUTPATIENT)
Dept: CT IMAGING | Age: 73
Discharge: HOME | End: 2023-01-14
Payer: MEDICARE

## 2023-01-12 DIAGNOSIS — C64.1 CLEAR CELL ADENOCARCINOMA OF KIDNEY, RIGHT (HCC): ICD-10-CM

## 2023-01-12 PROCEDURE — 74178 CT ABD&PLV WO CNTR FLWD CNTR: CPT

## 2023-01-12 PROCEDURE — 6360000004 HC RX CONTRAST MEDICATION: Performed by: UROLOGY

## 2023-01-12 RX ADMIN — IOPAMIDOL 75 ML: 755 INJECTION, SOLUTION INTRAVENOUS at 12:14

## 2023-02-01 LAB
BASOPHILS ABSOLUTE: 0.06 /ΜL
BASOPHILS RELATIVE PERCENT: 0.7 %
BUN BLDV-MCNC: 11 MG/DL
CALCIUM SERPL-MCNC: 9.2 MG/DL
CHLORIDE BLD-SCNC: 105 MMOL/L
CO2: NORMAL
CREAT SERPL-MCNC: 0.62 MG/DL
EGFR: 95
EOSINOPHILS ABSOLUTE: 0.2 /ΜL
EOSINOPHILS RELATIVE PERCENT: 2.5 %
GLUCOSE BLD-MCNC: 91 MG/DL
HCT VFR BLD CALC: 45.9 % (ref 36–46)
HEMOGLOBIN: 14.7 G/DL (ref 12–16)
LYMPHOCYTES ABSOLUTE: 2.01 /ΜL
LYMPHOCYTES RELATIVE PERCENT: 25 %
MCH RBC QN AUTO: 27.9 PG
MCHC RBC AUTO-ENTMCNC: 32 G/DL
MCV RBC AUTO: 87.3 FL
MONOCYTES ABSOLUTE: 0.47 /ΜL
MONOCYTES RELATIVE PERCENT: 5.8 %
NEUTROPHILS ABSOLUTE: 5.29 /ΜL
NEUTROPHILS RELATIVE PERCENT: 65.9 %
PDW BLD-RTO: 14.2 %
PLATELET # BLD: 660 K/ΜL
PMV BLD AUTO: 9.5 FL
POTASSIUM SERPL-SCNC: 4.4 MMOL/L
RBC # BLD: 5.26 10^6/ΜL
SODIUM BLD-SCNC: 139 MMOL/L
WBC # BLD: 8.04 10^3/ML

## 2023-02-06 DIAGNOSIS — C64.1 CLEAR CELL ADENOCARCINOMA OF KIDNEY, RIGHT (HCC): ICD-10-CM

## 2023-02-08 DIAGNOSIS — K21.9 GASTROESOPHAGEAL REFLUX DISEASE WITHOUT ESOPHAGITIS: ICD-10-CM

## 2023-02-08 RX ORDER — OMEPRAZOLE 40 MG/1
CAPSULE, DELAYED RELEASE ORAL
Qty: 30 CAPSULE | Refills: 0 | Status: SHIPPED | OUTPATIENT
Start: 2023-02-08

## 2023-02-09 ENCOUNTER — OFFICE VISIT (OUTPATIENT)
Dept: UROLOGY | Age: 73
End: 2023-02-09

## 2023-02-09 VITALS
WEIGHT: 138 LBS | HEIGHT: 63 IN | BODY MASS INDEX: 24.45 KG/M2 | SYSTOLIC BLOOD PRESSURE: 142 MMHG | DIASTOLIC BLOOD PRESSURE: 78 MMHG

## 2023-02-09 DIAGNOSIS — C64.1 CLEAR CELL ADENOCARCINOMA OF KIDNEY, RIGHT (HCC): Primary | ICD-10-CM

## 2023-02-09 DIAGNOSIS — N28.89 RENAL MASS: ICD-10-CM

## 2023-02-09 DIAGNOSIS — N95.2 VAGINAL ATROPHY: ICD-10-CM

## 2023-02-09 DIAGNOSIS — T78.40XD ALLERGY, SUBSEQUENT ENCOUNTER: ICD-10-CM

## 2023-02-09 DIAGNOSIS — N39.0 RECURRENT UTI: ICD-10-CM

## 2023-02-09 DIAGNOSIS — N20.0 RENAL CALCULUS: ICD-10-CM

## 2023-02-09 RX ORDER — MONTELUKAST SODIUM 10 MG/1
TABLET ORAL
Qty: 90 TABLET | Refills: 0 | Status: SHIPPED | OUTPATIENT
Start: 2023-02-09

## 2023-02-09 RX ORDER — ESTRADIOL 0.1 MG/G
CREAM VAGINAL
Qty: 42.5 G | Refills: 3 | Status: SHIPPED | OUTPATIENT
Start: 2023-02-09

## 2023-02-09 ASSESSMENT — ENCOUNTER SYMPTOMS
APNEA: 0
CONSTIPATION: 0
EYE REDNESS: 0
SHORTNESS OF BREATH: 0
COLOR CHANGE: 0
COUGH: 0
NAUSEA: 0
BACK PAIN: 0
WHEEZING: 0
ABDOMINAL PAIN: 0
VOMITING: 0

## 2023-02-09 NOTE — PROGRESS NOTES
HPI:    Patient is a 72 y.o. female in no acute distress. She is alert and oriented to person, place, and time. History  5/2021 Patient being seen here today as a new patient. Patient was referred by primary care for renal masses. Patient did have a recent CT scan. This film was independently reviewed. This does show a 3 cm mass in the right kidney. There is also a 1.7 cm mass in the left kidney. Patient does also have a small stone in the right kidney. Patient's mass on the left does appear to be cystic. I am more concerned about the mass on the right. Patient has never seen urology in the past.  Patient has had no recent gross hematuria dysuria. Patient is a lifetime non-smoker. 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 Patient has had no recent unintentional weight loss or decreased appetite. She reports no flank pain nausea vomiting today. Patient was having some back pain that prompted an MRI the MRI discovered the renal masses which prompted the CT scan. No pain today. 6/2021 - Partial right robotic assisted nephrectomy - Dr. Diane Shirley RENAL CELL CARCINOMA     Urine cultures  10/2021 - UA treated with cipro  9/2021 - Klebsiella  8/2021 - Klebsiella  7/2021 - Klebsiella (outside hospital)   5/2021 - Klebsiella     Today:  Patient is here today for 6-month follow-up. Patient did have a CT scan performed. 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 Patient CT scan was independently reviewed today. Patient has no evidence of any local recurrence or new metastatic disease. There is punctate renal calculi bilaterally on radiology read patient does have a stable 1.4 cm left inferior pole kidney nodule. Otherwise no significant  abnormalities. Patient had a BUN of 11 and creatinine of 0.62. Patient has a platelet count of 722, hemoglobin of 14.9 and a white blood cell count of 8.04.   She is being followed by hematology oncology. She denies any spontaneous stone passage over the last year. Nocturia x 1-2. She has no current constipation issues. She reports no incontinence. Denies gross hematuria and dysuria. Patient does have a history of frequent UTIs. At last visit we had her resume her vaginal estrogen for recurrent urinary tract infection. She states that since her last visit she has been treated 3 times for urinary tract infection. We did review lab work in the chart. There were no urine culture sent. All 3 point-of-care urines had negative nitrites and leukocytes. Past Medical History:   Diagnosis Date    Allergic rhinitis 2011    Anxiety 2011    Carpal tunnel syndrome 2011    Depression 2011    Dyslipidemia 2011    Fibromyalgia 2011    Hyperlipidemia     Hypertension 2011    SOB (shortness of breath)     Thyroid disease      Past Surgical History:   Procedure Laterality Date    CARDIAC CATHETERIZATION Left 2017    Dr. Newell Bamberger @ [facility]--CAD, 50-60% ostial right CAD with an FFR of 0.94.  60% disease in the proximal LAD with an FFR of 0.92 with bridging of the mid LAD. Normal LV function at 60%. Mildly dilated ascending aorta -will do a CT scan to measure. CARDIAC CATHETERIZATION Left 2020    Dr. Newell Bamberger @ [facility]--Medical therapy     SECTION      x 4    1 [facility] (CERVIX STATUS UNKNOWN)      total hysterectomy    KIDNEY SURGERY Right 2021    Dr. Sanna Villanueva      twisted bowel 1985     Outpatient Encounter Medications as of 2023   Medication Sig Dispense Refill    montelukast (SINGULAIR) 10 MG tablet Take 1 tablet by mouth once daily 90 tablet 0    estradiol (ESTRACE VAGINAL) 0.1 MG/GM vaginal cream Place a pea-sized amount vaginally 3 times per week. Use finger to apply, wash hands after application.  DO NOT use plastic applicator. Call office if you stop this medication. 42.5 g 3    omeprazole (PRILOSEC) 40 MG delayed release capsule Take 1 capsule by mouth once daily 30 capsule 0    tiZANidine (ZANAFLEX) 2 MG tablet TAKE 1 TABLET BY MOUTH THREE TIMES DAILY FOR MUSCLE SPASM 90 tablet 0    gabapentin (NEURONTIN) 300 MG capsule Take 1 capsule by mouth 3 times daily for 90 days. 270 capsule 0    levothyroxine (SYNTHROID) 50 MCG tablet TAKE 1 TABLET EVERY DAY 90 tablet 1    albuterol sulfate HFA (VENTOLIN HFA) 108 (90 Base) MCG/ACT inhaler Inhale 2 puffs into the lungs 4 times daily as needed for Wheezing 54 g 1    fexofenadine (ALLEGRA) 180 MG tablet Take 1 tablet by mouth daily 90 tablet 1    metoprolol tartrate (LOPRESSOR) 25 MG tablet Take 1 tablet by mouth 2 times daily 180 tablet 3    doxazosin (CARDURA) 1 MG tablet Take 1 tablet by mouth nightly 30 tablet 11    losartan (COZAAR) 100 MG tablet Take 1 tablet by mouth daily 90 tablet 1    B Complex Vitamins (VITAMIN B COMPLEX 100 IJ) Take by mouth      Multiple Vitamins-Minerals (CENTRUM SILVER ADULT 50+) TABS Take by mouth       aspirin EC 81 MG EC tablet Take 1 tablet by mouth daily 90 tablet 1    vitamin D3 (CHOLECALCIFEROL) 25 MCG (1000 UT) TABS tablet TAKE 1 TABLET EVERY DAY 90 tablet 1    Zinc 100 MG TABS Take by mouth      Ascorbic Acid (VITAMIN C) 250 MG tablet Take 2 tablets by mouth daily 30 tablet 5    vitamin B-6 (PYRIDOXINE) 100 MG tablet Take 1 tablet by mouth daily 30 tablet 5    polycarbophil (FIBER-CAPS) 625 MG tablet Take 1 tablet by mouth daily 30 tablet 4    fluticasone (FLONASE) 50 MCG/ACT nasal spray USE 2 SPRAYS NASALLY EVERY DAY 48 g 1    dicyclomine (BENTYL) 10 MG capsule       Blood Pressure Monitoring (BLOOD PRESSURE CUFF) MISC Use to monitor Blood pressure 1-2 times daily 1 each 0    [DISCONTINUED] estradiol (ESTRACE VAGINAL) 0.1 MG/GM vaginal cream Place a pea-sized amount vaginally daily x 2 weeks, then 3 times per week thereafter.  Use finger to apply, wash hands after application. DO NOT use plastic applicator. Call office if you stop this medication. 42.5 g 3    Coenzyme Q10 (CO Q-10) 100 MG CAPS TAKE 1 CAPSULE EVERY DAY (Patient not taking: Reported on 2/9/2023) 90 capsule 1     No facility-administered encounter medications on file as of 2/9/2023. Current Outpatient Medications on File Prior to Visit   Medication Sig Dispense Refill    omeprazole (PRILOSEC) 40 MG delayed release capsule Take 1 capsule by mouth once daily 30 capsule 0    tiZANidine (ZANAFLEX) 2 MG tablet TAKE 1 TABLET BY MOUTH THREE TIMES DAILY FOR MUSCLE SPASM 90 tablet 0    gabapentin (NEURONTIN) 300 MG capsule Take 1 capsule by mouth 3 times daily for 90 days.  270 capsule 0    levothyroxine (SYNTHROID) 50 MCG tablet TAKE 1 TABLET EVERY DAY 90 tablet 1    albuterol sulfate HFA (VENTOLIN HFA) 108 (90 Base) MCG/ACT inhaler Inhale 2 puffs into the lungs 4 times daily as needed for Wheezing 54 g 1    fexofenadine (ALLEGRA) 180 MG tablet Take 1 tablet by mouth daily 90 tablet 1    metoprolol tartrate (LOPRESSOR) 25 MG tablet Take 1 tablet by mouth 2 times daily 180 tablet 3    doxazosin (CARDURA) 1 MG tablet Take 1 tablet by mouth nightly 30 tablet 11    losartan (COZAAR) 100 MG tablet Take 1 tablet by mouth daily 90 tablet 1    B Complex Vitamins (VITAMIN B COMPLEX 100 IJ) Take by mouth      Multiple Vitamins-Minerals (CENTRUM SILVER ADULT 50+) TABS Take by mouth       aspirin EC 81 MG EC tablet Take 1 tablet by mouth daily 90 tablet 1    vitamin D3 (CHOLECALCIFEROL) 25 MCG (1000 UT) TABS tablet TAKE 1 TABLET EVERY DAY 90 tablet 1    Zinc 100 MG TABS Take by mouth      Ascorbic Acid (VITAMIN C) 250 MG tablet Take 2 tablets by mouth daily 30 tablet 5    vitamin B-6 (PYRIDOXINE) 100 MG tablet Take 1 tablet by mouth daily 30 tablet 5    polycarbophil (FIBER-CAPS) 625 MG tablet Take 1 tablet by mouth daily 30 tablet 4    fluticasone (FLONASE) 50 MCG/ACT nasal spray USE 2 SPRAYS NASALLY EVERY DAY 48 g 1    dicyclomine (BENTYL) 10 MG capsule       Blood Pressure Monitoring (BLOOD PRESSURE CUFF) MISC Use to monitor Blood pressure 1-2 times daily 1 each 0    Coenzyme Q10 (CO Q-10) 100 MG CAPS TAKE 1 CAPSULE EVERY DAY (Patient not taking: Reported on 2/9/2023) 90 capsule 1     No current facility-administered medications on file prior to visit. Patient has no known allergies. Family History   Problem Relation Age of Onset    Stroke Mother     High Cholesterol Mother     Heart Attack Mother     Heart Attack Father     High Cholesterol Father     High Cholesterol Sister     High Cholesterol Brother      Social History     Tobacco Use   Smoking Status Never   Smokeless Tobacco Never       Social History     Substance and Sexual Activity   Alcohol Use No    Comment: one glass once or twice a month       Review of Systems   Constitutional:  Negative for appetite change, chills and fever. Eyes:  Negative for redness and visual disturbance. Respiratory:  Negative for apnea, cough, shortness of breath and wheezing. Cardiovascular:  Negative for chest pain and leg swelling. Gastrointestinal:  Negative for abdominal pain, constipation, nausea and vomiting. Genitourinary:  Negative for difficulty urinating, dyspareunia, dysuria, enuresis, flank pain, frequency, hematuria, pelvic pain, urgency, vaginal bleeding and vaginal discharge. Musculoskeletal:  Negative for back pain, joint swelling and myalgias. Skin:  Negative for color change, rash and wound. Neurological:  Negative for dizziness, tremors and numbness. Hematological:  Negative for adenopathy. Does not bruise/bleed easily. Psychiatric/Behavioral:  Negative for sleep disturbance.       BP (!) 142/78 (Site: Left Upper Arm, Position: Sitting, Cuff Size: Medium Adult)   Ht 5' 3\" (1.6 m)   Wt 138 lb (62.6 kg)   LMP  (LMP Unknown)   BMI 24.45 kg/m²       PHYSICAL EXAM:  Constitutional: Patient resting comfortably, in no acute distress. Neuro: Alert and oriented to person place and time. Psych: Mood and affect normal.  HEENT: normocephalic, atraumatic  Lungs: Respiratory effort normal, unlabored  Abdomen: Soft, non-tender, non-distended   : No CVA tenderness. Pelvic: deferred     Lab Results   Component Value Date    BUN 11 02/01/2023     Lab Results   Component Value Date    CREATININE 0.62 02/01/2023       ASSESSMENT:   Diagnosis Orders   1. Clear cell adenocarcinoma of kidney, right (HCC)  CT ABDOMEN PELVIS W WO CONTRAST Additional Contrast? None    XR CHEST (2 VW)    CBC with Auto Differential    Basic Metabolic Panel      2. Vaginal atrophy  estradiol (ESTRACE VAGINAL) 0.1 MG/GM vaginal cream      3. Renal mass  CT ABDOMEN PELVIS W WO CONTRAST Additional Contrast? None    XR CHEST (2 VW)    CBC with Auto Differential    Basic Metabolic Panel      4. Renal calculus        5. Recurrent UTI  estradiol (ESTRACE VAGINAL) 0.1 MG/GM vaginal cream        PLAN:  We will repeat CT, chest x-ray and lab work in 6 months. We are checking this for her left renal lesion as well as her history of partial nephrectomy on the right for clear-cell renal carcinoma. We did urge her to notify our office if she does have symptoms of urinary tract infection, as we will run a urinalysis and culture. Patient instructed to drink at least 80 ounces of \"good fluids\" daily (water, juice, Gatoraide, milk) and to avoid/minimize intake of \"bad fluids\" (soda pop, coffee, tea, alcohol, energy drinks). Also advised to avoid excessive consumption of sodium and animal protein to decrease risk of recurrent kidney stones.     Continue vaginal estrogen 3 days a week    Follow-up in 6 months with imaging and labs

## 2023-02-09 NOTE — PATIENT INSTRUCTIONS
SURVEY:    You may be receiving a survey from [employer] regarding your visit today. Please complete the survey to enable us to provide the highest quality of care to you and your family. If you cannot score us a very good on any question, please call the office to discuss how we could have made your experience a very good one. Thank you.

## 2023-03-10 DIAGNOSIS — K21.9 GASTROESOPHAGEAL REFLUX DISEASE WITHOUT ESOPHAGITIS: ICD-10-CM

## 2023-03-10 DIAGNOSIS — M54.41 CHRONIC MIDLINE LOW BACK PAIN WITH RIGHT-SIDED SCIATICA: ICD-10-CM

## 2023-03-10 DIAGNOSIS — G89.29 CHRONIC MIDLINE LOW BACK PAIN WITH RIGHT-SIDED SCIATICA: ICD-10-CM

## 2023-03-10 DIAGNOSIS — I10 ESSENTIAL HYPERTENSION: ICD-10-CM

## 2023-03-10 RX ORDER — OMEPRAZOLE 40 MG/1
CAPSULE, DELAYED RELEASE ORAL
Qty: 30 CAPSULE | Refills: 0 | Status: SHIPPED | OUTPATIENT
Start: 2023-03-10

## 2023-03-10 RX ORDER — LOSARTAN POTASSIUM 100 MG/1
TABLET ORAL
Qty: 90 TABLET | Refills: 0 | Status: SHIPPED | OUTPATIENT
Start: 2023-03-10

## 2023-03-10 RX ORDER — TIZANIDINE 2 MG/1
TABLET ORAL
Qty: 90 TABLET | Refills: 0 | Status: SHIPPED | OUTPATIENT
Start: 2023-03-10

## 2023-03-13 ENCOUNTER — OFFICE VISIT (OUTPATIENT)
Dept: FAMILY MEDICINE CLINIC | Age: 73
End: 2023-03-13

## 2023-03-13 VITALS
HEART RATE: 63 BPM | DIASTOLIC BLOOD PRESSURE: 88 MMHG | WEIGHT: 145.2 LBS | BODY MASS INDEX: 25.73 KG/M2 | OXYGEN SATURATION: 95 % | RESPIRATION RATE: 18 BRPM | SYSTOLIC BLOOD PRESSURE: 168 MMHG | HEIGHT: 63 IN

## 2023-03-13 DIAGNOSIS — G89.29 CHRONIC MIDLINE LOW BACK PAIN WITH RIGHT-SIDED SCIATICA: ICD-10-CM

## 2023-03-13 DIAGNOSIS — M54.16 LUMBAR RADICULOPATHY: ICD-10-CM

## 2023-03-13 DIAGNOSIS — M54.41 CHRONIC MIDLINE LOW BACK PAIN WITH RIGHT-SIDED SCIATICA: ICD-10-CM

## 2023-03-13 DIAGNOSIS — M25.511 CHRONIC RIGHT SHOULDER PAIN: ICD-10-CM

## 2023-03-13 DIAGNOSIS — E03.9 ACQUIRED HYPOTHYROIDISM: ICD-10-CM

## 2023-03-13 DIAGNOSIS — G89.29 CHRONIC RIGHT SHOULDER PAIN: ICD-10-CM

## 2023-03-13 DIAGNOSIS — Z71.89 COUNSELING FOR LIVING WILL: ICD-10-CM

## 2023-03-13 DIAGNOSIS — I10 ESSENTIAL HYPERTENSION: Primary | ICD-10-CM

## 2023-03-13 RX ORDER — HYDRALAZINE HYDROCHLORIDE 50 MG/1
TABLET, FILM COATED ORAL
COMMUNITY
Start: 2023-02-26

## 2023-03-13 RX ORDER — TRAMADOL HYDROCHLORIDE 50 MG/1
50 TABLET ORAL 3 TIMES DAILY PRN
Qty: 90 TABLET | Refills: 2 | Status: SHIPPED | OUTPATIENT
Start: 2023-03-13 | End: 2023-04-12

## 2023-03-13 SDOH — ECONOMIC STABILITY: FOOD INSECURITY: WITHIN THE PAST 12 MONTHS, THE FOOD YOU BOUGHT JUST DIDN'T LAST AND YOU DIDN'T HAVE MONEY TO GET MORE.: NEVER TRUE

## 2023-03-13 SDOH — ECONOMIC STABILITY: INCOME INSECURITY: HOW HARD IS IT FOR YOU TO PAY FOR THE VERY BASICS LIKE FOOD, HOUSING, MEDICAL CARE, AND HEATING?: NOT HARD AT ALL

## 2023-03-13 SDOH — ECONOMIC STABILITY: HOUSING INSECURITY
IN THE LAST 12 MONTHS, WAS THERE A TIME WHEN YOU DID NOT HAVE A STEADY PLACE TO SLEEP OR SLEPT IN A SHELTER (INCLUDING NOW)?: NO

## 2023-03-13 SDOH — ECONOMIC STABILITY: FOOD INSECURITY: WITHIN THE PAST 12 MONTHS, YOU WORRIED THAT YOUR FOOD WOULD RUN OUT BEFORE YOU GOT MONEY TO BUY MORE.: NEVER TRUE

## 2023-03-13 ASSESSMENT — ENCOUNTER SYMPTOMS
ABDOMINAL PAIN: 0
BACK PAIN: 1
BOWEL INCONTINENCE: 0
SHORTNESS OF BREATH: 0
COUGH: 0
NAUSEA: 0
SORE THROAT: 0

## 2023-03-13 ASSESSMENT — PATIENT HEALTH QUESTIONNAIRE - PHQ9
5. POOR APPETITE OR OVEREATING: 0
6. FEELING BAD ABOUT YOURSELF - OR THAT YOU ARE A FAILURE OR HAVE LET YOURSELF OR YOUR FAMILY DOWN: 0
7. TROUBLE CONCENTRATING ON THINGS, SUCH AS READING THE NEWSPAPER OR WATCHING TELEVISION: 0
3. TROUBLE FALLING OR STAYING ASLEEP: 0
SUM OF ALL RESPONSES TO PHQ QUESTIONS 1-9: 0
SUM OF ALL RESPONSES TO PHQ9 QUESTIONS 1 & 2: 0
2. FEELING DOWN, DEPRESSED OR HOPELESS: 0
9. THOUGHTS THAT YOU WOULD BE BETTER OFF DEAD, OR OF HURTING YOURSELF: 0
1. LITTLE INTEREST OR PLEASURE IN DOING THINGS: 0
SUM OF ALL RESPONSES TO PHQ QUESTIONS 1-9: 0
SUM OF ALL RESPONSES TO PHQ QUESTIONS 1-9: 0
8. MOVING OR SPEAKING SO SLOWLY THAT OTHER PEOPLE COULD HAVE NOTICED. OR THE OPPOSITE, BEING SO FIGETY OR RESTLESS THAT YOU HAVE BEEN MOVING AROUND A LOT MORE THAN USUAL: 0
4. FEELING TIRED OR HAVING LITTLE ENERGY: 0
SUM OF ALL RESPONSES TO PHQ QUESTIONS 1-9: 0
10. IF YOU CHECKED OFF ANY PROBLEMS, HOW DIFFICULT HAVE THESE PROBLEMS MADE IT FOR YOU TO DO YOUR WORK, TAKE CARE OF THINGS AT HOME, OR GET ALONG WITH OTHER PEOPLE: 0

## 2023-03-13 ASSESSMENT — LIFESTYLE VARIABLES
HOW MANY STANDARD DRINKS CONTAINING ALCOHOL DO YOU HAVE ON A TYPICAL DAY: PATIENT DOES NOT DRINK
HOW OFTEN DO YOU HAVE A DRINK CONTAINING ALCOHOL: NEVER

## 2023-03-13 NOTE — PROGRESS NOTES
HPI Notes    Name: Hailee Holm  : 1950         Chief Complaint:     Chief Complaint   Patient presents with    Medicare AWV     Patient states her BP has been high for awhile now and would like to discuss her meds. Hypertension    Back Pain    Hypothyroidism       History of Present Illness:        Roxanne presents to office to follow-up for hypertension, chronic low back pain, hypothyroidism and annual wellness visit. Roxanne confirms that she is compliant with her thyroid medication. She denies an increase in fatigue, constipation, increased skin dryness, or increased lower extremity edema. The last TSH was 1.58 on 22. States her blood pressure has been staying high but she has not been taking her Hydralazine. She reports gaining about 10 lbs in 3 months. She is also c/o right shoulder pain. Reports h/o work related injury some 20 years ago for which she had a surgery, PT, injections. The pain is getting worse, unable to raise her arm above her shoulder, has a lot of pain, hears a pop sound. Had MRI shoulder in [place] on 3/16/21 revealin. Marked supraspinatus tendinosis with a 7 mm wide partial-thickness undersurface tear of the far anterior distal portion supraspinatus tendon, without retraction or muscular atrophy. This tear is new compared to the prior exam from 2012. 2. Mild-moderate infraspinatus tendinosis and subscapularis tendinosis, without tear. The teres minor tendon is normal.   3. Mild-moderate tendinosis of the long head biceps tendon has increased from the prior exam, without evidence of tear. 4. Moderate-size glenohumeral joint effusion, new compared to the prior MRI.    5. Moderate-large volume of fluid distending the long head biceps tendon sheath could be related to the joint effusion or could reflect superimposed tenosynovitis of the long head biceps tendon, increased from the prior exam.   6. Degeneration and fraying of the posterior and superior glenoid labrum. There may be a small nondisplaced partial-thickness tear of the anteromedial aspect of the anterior glenoid labrum. 7. Moderate osteoarthritis of the acromioclavicular joint with moderate bony and soft tissue hypertrophy along the undersurface of this joint, resulting in narrowing the supraspinatus outlet, not significantly changed. 8. Mild subacromial/subdeltoid bursitis. 9. Mild osteoarthritis of the glenohumeral joint, new or increased from 04/06/2012. [facility]         Hypertension  This is a chronic problem. The current episode started more than 1 year ago. The problem has been gradually worsening since onset. The problem is uncontrolled. Associated symptoms include headaches. Pertinent negatives include no anxiety, chest pain, palpitations, peripheral edema or shortness of breath. There are no associated agents to hypertension. Past treatments include angiotensin blockers, beta blockers and alpha 1 blockers. The current treatment provides moderate improvement. There are no compliance problems. Hypertensive end-organ damage includes CAD/MI. There is no history of CVA or heart failure. Back Pain  This is a chronic problem. The current episode started more than 1 year ago. The problem occurs daily. The problem is unchanged. The pain is present in the lumbar spine. The quality of the pain is described as aching and stabbing. The pain radiates to the right thigh and right knee. The pain is moderate. The symptoms are aggravated by standing, twisting and bending. Associated symptoms include headaches, numbness and tingling. Pertinent negatives include no abdominal pain, bladder incontinence, bowel incontinence, chest pain, dysuria or fever. Risk factors include poor posture. Treatments tried: tramadol, Tylenol, gabapentin. The treatment provided moderate relief. Shoulder Pain   The pain is present in the right shoulder and right arm. This is a chronic problem.  The current episode started more than 1 year ago. There has been a history of trauma. The problem occurs constantly. The problem has been gradually worsening. The quality of the pain is described as aching. The pain is at a severity of 6/10. Associated symptoms include an inability to bear weight, joint locking, a limited range of motion, numbness and tingling. Pertinent negatives include no fever, itching or joint swelling. The symptoms are aggravated by activity. She has tried acetaminophen (Tramadol) for the symptoms. The treatment provided mild relief. Past Medical History:     Past Medical History:   Diagnosis Date    Allergic rhinitis 2011    Anxiety 2011    Carpal tunnel syndrome 2011    Depression 2011    Dyslipidemia 2011    Fibromyalgia 2011    Hyperlipidemia     Hypertension 2011    SOB (shortness of breath)     Thyroid disease       Reviewed all health maintenance requirements and orderedappropriate tests  Health Maintenance Due   Topic Date Due    DEXA (modify frequency per FRAX score)  Never done       Past Surgical History:     Past Surgical History:   Procedure Laterality Date    CARDIAC CATHETERIZATION Left 2017    Dr. Scott Tsai @ [facility]--CAD, 50-60% ostial right CAD with an FFR of 0.94.  60% disease in the proximal LAD with an FFR of 0.92 with bridging of the mid LAD. Normal LV function at 60%. Mildly dilated ascending aorta -will do a CT scan to measure. CARDIAC CATHETERIZATION Left 2020    Dr. Scott Tsai @ [facility]--Medical therapy     SECTION      x 4    ELBOW SURGERY Right     GALLBLADDER SURGERY      HYSTERECTOMY (CERVIX STATUS UNKNOWN)      total hysterectomy    KIDNEY SURGERY Right 2021    Dr. Debora Mendoza      Right    SMALL INTESTINE SURGERY      twisted bowel         Medications:       Prior to Admission medications    Medication Sig Start Date End Date Taking?  Authorizing Provider   hydrALAZINE (APRESOLINE) 50 MG tablet TAKE 1 TABLET BY MOUTH IN THE MORNING AND AT BEDTIME 2/26/23  Yes Historical Provider, MD   traMADol (ULTRAM) 50 MG tablet Take 1 tablet by mouth 3 times daily as needed for Pain for up to 30 days. 3/13/23 4/12/23 Yes Case Carey MD   losartan (COZAAR) 100 MG tablet Take 1 tablet by mouth once daily 3/10/23  Yes Case Carey MD   tiZANidine (ZANAFLEX) 2 MG tablet TAKE 1 TABLET BY MOUTH THREE TIMES DAILY FOR MUSCLE SPASM 3/10/23  Yes Case Carey MD   omeprazole (PRILOSEC) 40 MG delayed release capsule Take 1 capsule by mouth once daily 3/10/23  Yes Case Carey MD   montelukast (SINGULAIR) 10 MG tablet Take 1 tablet by mouth once daily 2/9/23  Yes Case Carey MD   estradiol (ESTRACE VAGINAL) 0.1 MG/GM vaginal cream Place a pea-sized amount vaginally 3 times per week. Use finger to apply, wash hands after application. DO NOT use plastic applicator. Call office if you stop this medication. 2/9/23  Yes Sb Oglesby PA-C   gabapentin (NEURONTIN) 300 MG capsule Take 1 capsule by mouth 3 times daily for 90 days.  1/9/23 4/9/23 Yes Case Carey MD   levothyroxine (SYNTHROID) 50 MCG tablet TAKE 1 TABLET EVERY DAY 12/15/22  Yes Case Carey MD   albuterol sulfate HFA (VENTOLIN HFA) 108 (90 Base) MCG/ACT inhaler Inhale 2 puffs into the lungs 4 times daily as needed for Wheezing 12/1/22  Yes Case Carey MD   fexofenadine (ALLEGRA) 180 MG tablet Take 1 tablet by mouth daily 12/1/22  Yes Case Carey MD   metoprolol tartrate (LOPRESSOR) 25 MG tablet Take 1 tablet by mouth 2 times daily 11/28/22  Yes Damien López MD   doxazosin (CARDURA) 1 MG tablet Take 1 tablet by mouth nightly 9/22/22  Yes Damien López MD   B Complex Vitamins (VITAMIN B COMPLEX 100 IJ) Take by mouth   Yes Historical Provider, MD   Multiple Vitamins-Minerals (CENTRUM SILVER ADULT 50+) TABS Take by mouth    Yes Historical Provider, MD   aspirin EC 81 MG EC tablet Take 1 tablet by mouth daily 3/1/22  Yes Shruthi Meng MD   vitamin D3 (CHOLECALCIFEROL) 25 MCG (1000 UT) TABS tablet TAKE 1 TABLET EVERY DAY 12/14/21  Yes Shruthi Meng MD   Zinc 100 MG TABS Take by mouth   Yes Historical Provider, MD   Ascorbic Acid (VITAMIN C) 250 MG tablet Take 2 tablets by mouth daily 2/5/21  Yes Shruthi Meng MD   vitamin B-6 (PYRIDOXINE) 100 MG tablet Take 1 tablet by mouth daily 2/5/21  Yes Shruthi Meng MD   polycarbophil (FIBER-CAPS) 625 MG tablet Take 1 tablet by mouth daily 2/5/21  Yes Shruthi Meng MD   fluticasone (FLONASE) 50 MCG/ACT nasal spray USE 2 SPRAYS NASALLY EVERY DAY 12/28/19  Yes Shruthi Meng MD   dicyclomine (BENTYL) 10 MG capsule  11/26/19  Yes Historical Provider, MD   Blood Pressure Monitoring (BLOOD PRESSURE CUFF) MISC Use to monitor Blood pressure 1-2 times daily 3/4/19  Yes Shruthi Meng MD        Allergies:       Patient has no known allergies. Social History:     Tobacco: reports that she has never smoked. She has never used smokeless tobacco.  Alcohol:      reports no history of alcohol use. Drug Use:  reports no history of drug use. Family History:     Family History   Problem Relation Age of Onset    Stroke Mother     High Cholesterol Mother     Heart Attack Mother     Heart Attack Father     High Cholesterol Father     High Cholesterol Sister     High Cholesterol Brother        Review of Systems:         Review of Systems   Constitutional:  Positive for unexpected weight change (weight gain). Negative for chills and fever. HENT:  Negative for congestion and sore throat. Respiratory:  Negative for cough and shortness of breath. Cardiovascular:  Negative for chest pain and palpitations. Gastrointestinal:  Negative for abdominal pain, bowel incontinence and nausea. Genitourinary:  Negative for bladder incontinence and dysuria. Musculoskeletal:  Positive for arthralgias, back pain and myalgias. Skin:  Negative for itching and rash. Neurological:  Positive for tingling, numbness and headaches. Negative for dizziness. Psychiatric/Behavioral:  The patient is not nervous/anxious. Physical Exam:     Vitals:  BP (!) 168/88 (Site: Right Upper Arm, Position: Sitting, Cuff Size: Medium Adult)   Pulse 63   Resp 18   Ht 5' 3\" (1.6 m)   Wt 145 lb 3.2 oz (65.9 kg)   LMP  (LMP Unknown)   SpO2 95%   BMI 25.72 kg/m²       Physical Exam  Vitals reviewed. Constitutional:       General: She is not in acute distress. Appearance: Normal appearance. She is well-developed. HENT:      Head: Normocephalic and atraumatic. Neck:      Thyroid: No thyromegaly. Cardiovascular:      Rate and Rhythm: Normal rate and regular rhythm. Heart sounds: Normal heart sounds. No murmur heard. Pulmonary:      Effort: Pulmonary effort is normal.      Breath sounds: Normal breath sounds. No wheezing or rales. Abdominal:      General: Bowel sounds are normal. There is no distension. Palpations: Abdomen is soft. There is no mass. Tenderness: There is no abdominal tenderness. Musculoskeletal:         General: Tenderness (lumbosacral area tenderness. right shoulder ROM limited due to reported pain) present. No swelling. Right lower leg: No edema. Left lower leg: No edema. Lymphadenopathy:      Cervical: No cervical adenopathy. Skin:     General: Skin is warm and dry. Findings: No rash. Neurological:      General: No focal deficit present. Mental Status: She is alert and oriented to person, place, and time.    Psychiatric:         Mood and Affect: Mood normal.         Behavior: Behavior normal.             Data:     Lab Results   Component Value Date/Time     02/01/2023 12:30 PM    K 4.4 02/01/2023 12:30 PM     02/01/2023 12:30 PM    CO2 31 11/11/2022 09:01 AM    BUN 11 02/01/2023 12:30 PM    CREATININE 0.62 02/01/2023 12:30 PM    GLUCOSE 91 02/01/2023 12:30 PM    GLUCOSE 99 01/19/2012 11:13 AM    PROT 6.2 11/11/2022 09:01 AM    PROT 6.5 01/24/2020 12:00 AM    LABALBU 3.9 11/11/2022 09:01 AM    LABALBU 4.4 01/19/2012 11:13 AM    BILITOT 0.7 11/11/2022 09:01 AM    ALKPHOS 104 11/11/2022 09:01 AM    AST 27 11/11/2022 09:01 AM    ALT 30 11/11/2022 09:01 AM     Lab Results   Component Value Date/Time    WBC 8.04 02/01/2023 12:31 PM    RBC 5.26 02/01/2023 12:31 PM    RBC 4.86 01/19/2012 11:13 AM    HGB 14.7 02/01/2023 12:31 PM    HCT 45.9 02/01/2023 12:31 PM    MCV 87.3 02/01/2023 12:31 PM    MCH 27.9 02/01/2023 12:31 PM    MCHC 32.0 02/01/2023 12:31 PM    RDW 14.2 02/01/2023 12:31 PM     02/01/2023 12:31 PM     01/19/2012 11:13 AM    MPV 9.5 02/01/2023 12:31 PM     Lab Results   Component Value Date/Time    TSH 1.58 11/11/2022 09:01 AM     Lab Results   Component Value Date/Time    CHOL 192 11/11/2022 09:01 AM    CHOL 250 01/16/2017 12:00 AM    HDL 56 11/11/2022 09:01 AM          Assessment & Plan        Diagnosis Orders   1. Essential hypertension   Blood pressure is uncontrolled. She has not been taking hydralazine prescribed during her last office visit. Advised to take it 25 mg twice daily in addition to losartan, Lopressor and Cardura       2. Acquired hypothyroidism   She has used right, continue on Synthroid 50 mcg daily       3. Chronic midline low back pain with right-sided sciatica   Overall the problem is stable, she is on as needed tramadol, Tylenol       4. Lumbar radiculopathy  traMADol (ULTRAM) 50 MG tablet      5. Chronic right shoulder pain   Will refer to orthopedic surgeon for evaluation for possible injections/surgical intervention External Referral To Orthopedic Surgery      6. Counseling for living will  [facility] Referral to [address] [place] [address] Specialist                      Completed Refills   Requested Prescriptions     Signed Prescriptions Disp Refills    traMADol (ULTRAM) 50 MG tablet 90 tablet 2     Sig: Take 1 tablet by mouth 3 times daily as needed for Pain for up to 30 days.      Return in about 3 months (around 6/13/2023) for back pain, HTN, gerd, hyperlipidemia.     Orders Placed This Encounter   Medications    traMADol (ULTRAM) 50 MG tablet     Sig: Take 1 tablet by mouth 3 times daily as needed for Pain for up to 30 days.     Dispense:  90 tablet     Refill:  2     Reduce doses taken as pain becomes manageable       Orders Placed This Encounter   Procedures    External Referral To Orthopedic Surgery     Referral Priority:   Routine     Referral Type:   Eval and Treat     Referral Reason:   Specialty Services Required     Referred to Provider:   Florian Tafoya MD     Requested Specialty:   Orthopedic Surgery     Number of Visits Requested:   1    [facility] Referral to [facility] Clinical Specialist     Referral Priority:   Routine     Referral Type:   Other     Referral Reason:   Specialty Services Required     Number of Visits Requested:   1           There are no Patient Instructions on file for this visit.    Electronically signed by Trenton Claudio MD on 3/13/2023 at 1:43 PM           Completed Refills      Requested Prescriptions     Signed Prescriptions Disp Refills    traMADol (ULTRAM) 50 MG tablet 90 tablet 2     Sig: Take 1 tablet by mouth 3 times daily as needed for Pain for up to 30 days.         Medicare Annual Wellness Visit    Roxanne Stubbs is here for Medicare AWV (Patient states her BP has been high for awhile now and would like to discuss her meds.), Hypertension, Back Pain, and Hypothyroidism    Assessment & Plan   Essential hypertension  Acquired hypothyroidism  Chronic midline low back pain with right-sided sciatica  Lumbar radiculopathy  -     traMADol (ULTRAM) 50 MG tablet; Take 1 tablet by mouth 3 times daily as needed for Pain for up to 30 days., Disp-90 tablet, R-2Normal  Chronic right shoulder pain  -     External Referral To Orthopedic Surgery  Counseling for living will  -     [facility] Referral to [facility] Clinical Specialist    Recommendations for Preventive Services Due: see  orders and patient instructions/AVS.  Recommended screening schedule for the next 5-10 years is provided to the patient in written form: see Patient Instructions/AVS.     Return in about 3 months (around 6/13/2023) for back pain, HTN, gerd, hyperlipidemia. Subjective     HTN, back pain, hypothyroidism  Patient's complete Health Risk Assessment and screening values have been reviewed and are found in Flowsheets. The following problems were reviewed today and where indicated follow up appointments were made and/or referrals ordered. Positive Risk Factor Screenings with Interventions:                Opioid Risk: (Low risk score <55) Opioid risk score: 15    Patient is low risk for opioid use disorder or overdose. Last PDMP Karen Fernandez as Reviewed:  Review User Review Instant Review Result   Cayla Timmons 3/13/2023  9:02 AM     Reviewed PDMP [1]     Last Controlled Substance Monitoring Documentation      [address] Office Visit from 11/5/2019 in [facility]   Periodic Controlled Substance Monitoring No signs of potential drug abuse or diversion identified. filed at 11/05/2019 8008               Dentist Screen:  Have you seen the dentist within the past year?: (!) No  Intervention:  Patient comments: patient has dentures  Patient declines any further evaluation or treatment     Vision Screen:  Do you have difficulty driving, watching TV, or doing any of your daily activities because of your eyesight?: No  Have you had an eye exam within the past year?: (!) No  No results found.   Interventions:   Patient encouraged to make appointment with their eye specialist    Safety:  Do you have either shower bars, grab bars, non-slip mats or non-slip surfaces in your shower or bathtub?: (!) No  Do all of your stairways have a railing or banister?: (!) No  Interventions:  Patient comments: she feels she does not need them      Advanced Directives:  Do you have a Living Will?: (!) No  Intervention:  has NO advanced directive  - referred to ACP Coordinator                            Objective   Vitals:    03/13/23 1105 03/13/23 1107   BP: (!) 168/88 (!) 168/88   Site: Right Upper Arm Right Upper Arm   Position: Sitting Sitting   Cuff Size: Medium Adult Medium Adult   Pulse: 63    Resp: 18    SpO2: 95%    Weight: 145 lb 3.2 oz (65.9 kg)    Height: 5' 3\" (1.6 m)       Body mass index is 25.72 kg/m². General Appearance: alert and oriented to person, place and time, well developed and well- nourished, in no acute distress  Skin: warm and dry, no rash   Head: normocephalic and atraumatic  Eyes: pupils equal, round, and reactive to light,conjunctivae normal  ENT: tympanic membrane, external ear and ear canal normal bilaterally, nose without deformity, nasal mucosa and turbinates normal without polyps  Neck: supple and non-tender without mass, no thyromegaly or thyroid nodules, no cervical lymphadenopathy  Pulmonary/Chest: clear to auscultation bilaterally- no wheezes, rales or rhonchi, normal air movement, no respiratory distress  Cardiovascular: normal rate, regular rhythm, normal S1 and S2, no murmurs, rubs, clicks, or gallops, distal pulses intact  Abdomen: soft, non-tender, non-distended, normal bowel sounds  Extremities: no cyanosis, clubbing or edema  Musculoskeletal: Lumbosacral spine tenderness, right shoulder range of motion limited  Neurologic: reflexes normal and symmetric, no cranial nerve deficit, gait, coordination and speech normal       No Known Allergies  Prior to Visit Medications    Medication Sig Taking? Authorizing Provider   hydrALAZINE (APRESOLINE) 50 MG tablet TAKE 1 TABLET BY MOUTH IN THE MORNING AND AT BEDTIME Yes Historical Provider, MD   traMADol (ULTRAM) 50 MG tablet Take 1 tablet by mouth 3 times daily as needed for Pain for up to 30 days.  Yes Devan Gillespie MD   losartan (COZAAR) 100 MG tablet Take 1 tablet by mouth once daily Yes Devan Gillespie MD tiZANidine (ZANAFLEX) 2 MG tablet TAKE 1 TABLET BY MOUTH THREE TIMES DAILY FOR MUSCLE SPASM Yes Abdulaziz England MD   omeprazole (PRILOSEC) 40 MG delayed release capsule Take 1 capsule by mouth once daily Yes Abdulaziz England MD   montelukast (SINGULAIR) 10 MG tablet Take 1 tablet by mouth once daily Yes Abdulaziz England MD   estradiol (ESTRACE VAGINAL) 0.1 MG/GM vaginal cream Place a pea-sized amount vaginally 3 times per week. Use finger to apply, wash hands after application. DO NOT use plastic applicator. Call office if you stop this medication. Yes Nafisa Oglesby PA-C   gabapentin (NEURONTIN) 300 MG capsule Take 1 capsule by mouth 3 times daily for 90 days.  Yes Abdulaziz England MD   levothyroxine (SYNTHROID) 50 MCG tablet TAKE 1 TABLET EVERY DAY Yes Abdulaziz England MD   albuterol sulfate HFA (VENTOLIN HFA) 108 (90 Base) MCG/ACT inhaler Inhale 2 puffs into the lungs 4 times daily as needed for Wheezing Yes Abdulaziz England MD   fexofenadine (ALLEGRA) 180 MG tablet Take 1 tablet by mouth daily Yes Abdulaziz England MD   metoprolol tartrate (LOPRESSOR) 25 MG tablet Take 1 tablet by mouth 2 times daily Yes Fadumo Andrea MD   doxazosin (CARDURA) 1 MG tablet Take 1 tablet by mouth nightly Yes Fadumo Andrea MD   B Complex Vitamins (VITAMIN B COMPLEX 100 IJ) Take by mouth Yes Historical Provider, MD   Multiple Vitamins-Minerals (CENTRUM SILVER ADULT 50+) TABS Take by mouth  Yes Historical Provider, MD   aspirin EC 81 MG EC tablet Take 1 tablet by mouth daily Yes Abdulaziz England MD   vitamin D3 (CHOLECALCIFEROL) 25 MCG (1000 UT) TABS tablet TAKE 1 TABLET EVERY DAY Yes Abdulaziz England MD   Zinc 100 MG TABS Take by mouth Yes Historical Provider, MD   Ascorbic Acid (VITAMIN C) 250 MG tablet Take 2 tablets by mouth daily Yes Abdulaziz England MD   vitamin B-6 (PYRIDOXINE) 100 MG tablet Take 1 tablet by mouth daily Yes Abdulaziz England MD   polycarbophil (FIBER-CAPS) 625 MG tablet Take 1 tablet by mouth daily Yes Capo Hernandez MD   fluticasone (FLONASE) 50 MCG/ACT nasal spray USE 2 SPRAYS NASALLY EVERY DAY Yes Capo Hernandez MD   dicyclomine (BENTYL) 10 MG capsule  Yes Historical Provider, MD   Blood Pressure Monitoring (BLOOD PRESSURE CUFF) MISC Use to monitor Blood pressure 1-2 times daily Yes Capo Hernandez MD       CareTeam (Including outside providers/suppliers regularly involved in providing care):   Patient Care Team:  Capo Hernandez MD as PCP - General (Hospitalist)  Capo Hernandez MD as PCP - Empaneled Provider     Reviewed and updated this visit:  Tobacco  Allergies  Meds  Problems  Med Hx  Surg Hx  Soc Hx  Fam Hx

## 2023-03-14 ENCOUNTER — CLINICAL DOCUMENTATION (OUTPATIENT)
Dept: SPIRITUAL SERVICES | Age: 73
End: 2023-03-14

## 2023-03-14 NOTE — ACP (ADVANCE CARE PLANNING)
Advance Care Planning   Ambulatory ACP Specialist Patient Outreach    Date:  3/14/2023  ACP Specialist:  Elisha Yee    Outreach call to patient in follow-up to ACP Specialist referral from: Robinette Claude, MD    [x] PCP  [] Provider   [] Ambulatory Care Management [] Other for Reason:    [x] Advance Directive Assistance  [] Code Status Discussion  [] Complete Portable DNR Order  [] Discuss Goals of Care  [] Complete POST/MOST  [] Early ACP Decision-Making  [] Other    Date Referral Received:3/13/23    Today's Outreach:  [x] First   [] Second  [] Third                               First outreach made by [x]  phone  [] email []   Eli Nutritiont     Intervention:  [] Spoke with Patient  [x] Left VM requesting return call      Outcome: Left detailed VM for Patient to return call. Next Step:   [] ACP scheduled conversation  [x] Outreach again in one week               [] Email / Mail ACP Info Sheets  [] Email / Mail Advance Directive            [] Close Referral. Routing closure to referring provider/staff and to ACP Specialist . [] Closure Letter mailed to Patient with Invitation to Contact ACP Specialist if/when ready.     Thank you for this referral.

## 2023-03-21 ENCOUNTER — CLINICAL DOCUMENTATION (OUTPATIENT)
Dept: SPIRITUAL SERVICES | Age: 73
End: 2023-03-21

## 2023-03-21 NOTE — ACP (ADVANCE CARE PLANNING)
Advance Care Planning   Ambulatory ACP Specialist Patient Outreach    Date:  3/21/2023  ACP Specialist:  Shubham Steele    Outreach call to patient in follow-up to ACP Specialist referral from: Estella Bence, MD    [x] PCP  [] Provider   [] Ambulatory Care Management [] Other for Reason:    [x] Advance Directive Assistance  [] Code Status Discussion  [] Complete Portable DNR Order  [] Discuss Goals of Care  [] Complete POST/MOST  [] Early ACP Decision-Making  [] Other    Date Referral Received:3/13/23    Today's Outreach:  [] First   [x] Second  [] Third                               Second outreach made by [x]  phone  [] email []   [employer]     Intervention:  [x] Spoke with Patient  [] Left VM requesting return call      Outcome: Scheduled ACP Conversation Call for Monday March 27th at 1pm.       Next Step:   [x] ACP scheduled conversation  [] Outreach again in one week               [] Email / Mail [address]  [] Email / Mail Advance Directive            [] Close Referral. Routing closure to referring provider/staff and to ACP Specialist . [] Closure Letter mailed to Patient with Invitation to Contact ACP Specialist if/when ready.     Thank you for this referral.

## 2023-03-23 ENCOUNTER — OFFICE VISIT (OUTPATIENT)
Dept: FAMILY MEDICINE CLINIC | Age: 73
End: 2023-03-23
Payer: MEDICARE

## 2023-03-23 VITALS
WEIGHT: 141 LBS | HEIGHT: 63 IN | OXYGEN SATURATION: 92 % | SYSTOLIC BLOOD PRESSURE: 177 MMHG | DIASTOLIC BLOOD PRESSURE: 88 MMHG | BODY MASS INDEX: 24.98 KG/M2 | HEART RATE: 84 BPM | RESPIRATION RATE: 18 BRPM

## 2023-03-23 DIAGNOSIS — F33.9 RECURRENT MAJOR DEPRESSIVE DISORDER, REMISSION STATUS UNSPECIFIED (HCC): ICD-10-CM

## 2023-03-23 DIAGNOSIS — F41.0 ANXIETY ATTACK: Primary | ICD-10-CM

## 2023-03-23 PROCEDURE — 99213 OFFICE O/P EST LOW 20 MIN: CPT | Performed by: INTERNAL MEDICINE

## 2023-03-23 PROCEDURE — 1123F ACP DISCUSS/DSCN MKR DOCD: CPT | Performed by: INTERNAL MEDICINE

## 2023-03-23 PROCEDURE — 3079F DIAST BP 80-89 MM HG: CPT | Performed by: INTERNAL MEDICINE

## 2023-03-23 PROCEDURE — 3077F SYST BP >= 140 MM HG: CPT | Performed by: INTERNAL MEDICINE

## 2023-03-23 RX ORDER — DIPHENHYDRAMINE HCL 50 MG
CAPSULE ORAL
COMMUNITY
Start: 2023-03-18

## 2023-03-23 RX ORDER — ALPRAZOLAM 0.5 MG/1
0.25 TABLET ORAL 3 TIMES DAILY PRN
Qty: 45 TABLET | Refills: 0 | Status: SHIPPED | OUTPATIENT
Start: 2023-03-23 | End: 2023-04-22

## 2023-03-23 ASSESSMENT — ENCOUNTER SYMPTOMS
COUGH: 0
BACK PAIN: 1
SHORTNESS OF BREATH: 0
ABDOMINAL PAIN: 0
NAUSEA: 0
SORE THROAT: 0

## 2023-03-23 NOTE — PROGRESS NOTES
Health--Medical therapy     SECTION      x 4    ELBOW SURGERY Right     GALLBLADDER SURGERY  1975    HYSTERECTOMY (CERVIX STATUS UNKNOWN)      total hysterectomy    KIDNEY SURGERY Right 2021    Dr. Sanna Villanueva      twisted bowel         Medications:       Prior to Admission medications    Medication Sig Start Date End Date Taking? Authorizing Provider   diphenhydrAMINE (BENADRYL) 50 MG capsule Take 1 tablet p.o. every 6 hours as needed itching/swelling. . 3/18/23  Yes Historical Provider, MD   ALPRAZolam Tigre Ozuna) 0.5 MG tablet Take 0.5 tablets by mouth 3 times daily as needed for Sleep for up to 30 days. Max Daily Amount: 0.75 mg 3/23/23 4/22/23 Yes Jordon Hdez MD   hydrALAZINE (APRESOLINE) 50 MG tablet TAKE 1 TABLET BY MOUTH IN THE MORNING AND AT BEDTIME 23  Yes Historical Provider, MD   traMADol (ULTRAM) 50 MG tablet Take 1 tablet by mouth 3 times daily as needed for Pain for up to 30 days. 3/13/23 4/12/23 Yes Jordon Hdez MD   losartan (COZAAR) 100 MG tablet Take 1 tablet by mouth once daily 3/10/23  Yes Jordon Hdez MD   tiZANidine (ZANAFLEX) 2 MG tablet TAKE 1 TABLET BY MOUTH THREE TIMES DAILY FOR MUSCLE SPASM 3/10/23  Yes Jordon Hdez MD   omeprazole (PRILOSEC) 40 MG delayed release capsule Take 1 capsule by mouth once daily 3/10/23  Yes Jordon Hdez MD   montelukast (SINGULAIR) 10 MG tablet Take 1 tablet by mouth once daily 23  Yes Jordon Hdez MD   estradiol (ESTRACE VAGINAL) 0.1 MG/GM vaginal cream Place a pea-sized amount vaginally 3 times per week. Use finger to apply, wash hands after application. DO NOT use plastic applicator. Call office if you stop this medication. 23  Yes Sb Oglesby PA-C   gabapentin (NEURONTIN) 300 MG capsule Take 1 capsule by mouth 3 times daily for 90 days.  23 Yes Jordon Hdez MD   levothyroxine (SYNTHROID) 50 MCG tablet TAKE 1 TABLET

## 2023-03-27 ENCOUNTER — CLINICAL DOCUMENTATION (OUTPATIENT)
Dept: SPIRITUAL SERVICES | Age: 73
End: 2023-03-27

## 2023-03-27 RX ORDER — HYDRALAZINE HYDROCHLORIDE 50 MG/1
TABLET, FILM COATED ORAL
Qty: 60 TABLET | Refills: 0 | Status: SHIPPED | OUTPATIENT
Start: 2023-03-27

## 2023-03-27 NOTE — ACP (ADVANCE CARE PLANNING)
Advance Care Planning   Ambulatory ACP Specialist Patient Outreach    Date:  3/27/2023  ACP Specialist:  MAGDALENA Jacome    Outreach call to patient in follow-up to ACP Specialist referral from: Mannie Meeks MD    [x] PCP  [] Provider   [] Ambulatory Care Management [] Other for Reason:    [x] Advance Directive Assistance  [] Code Status Discussion  [] Complete Portable DNR Order  [] Discuss Goals of Care  [] Complete POST/MOST  [] Early ACP Decision-Making  [] Other    Date Referral Received:03/13/2023    Today's Outreach:  [] First   [] Second  [x] Third                               Third outreach made by [x]  phone  [] email []   [employer]     Intervention:  [x] Spoke with Patient  [] Left VM requesting return call      Outcome:Placed call to pt for scheduled ACP conversation. Pt answered and is unable to meet as she states she has company at time of call. Offered to reschedule and pt is agreeable for SW to call her later in week and preferable in the morning. Will plan to call pt Friday March 31 at 9am. Will continue to follow. Next Step:   [x] ACP scheduled conversation  [x] Outreach again by end of week (Friday 3/31)            [] Email / Mail [address]  [] Email / Mail Advance Directive            [] Close Referral. Routing closure to referring provider/staff and to ACP Specialist . [] Closure Letter mailed to Patient with Invitation to Contact ACP Specialist if/when ready. Thank you for this referral.    03/31/2023 ADDENDUM:  Placed call to pt for planned ACP visit. No answer; left VM. I will plan to make another outreach in about 1 week to see if pt is interested in ACP conversation. Will continue to follow.

## 2023-04-05 DIAGNOSIS — K21.9 GASTROESOPHAGEAL REFLUX DISEASE WITHOUT ESOPHAGITIS: ICD-10-CM

## 2023-04-05 DIAGNOSIS — M54.41 CHRONIC MIDLINE LOW BACK PAIN WITH RIGHT-SIDED SCIATICA: ICD-10-CM

## 2023-04-05 DIAGNOSIS — G89.29 CHRONIC MIDLINE LOW BACK PAIN WITH RIGHT-SIDED SCIATICA: ICD-10-CM

## 2023-04-05 RX ORDER — TIZANIDINE 2 MG/1
TABLET ORAL
Qty: 90 TABLET | Refills: 0 | Status: SHIPPED | OUTPATIENT
Start: 2023-04-05

## 2023-04-05 RX ORDER — OMEPRAZOLE 40 MG/1
CAPSULE, DELAYED RELEASE ORAL
Qty: 30 CAPSULE | Refills: 0 | Status: SHIPPED | OUTPATIENT
Start: 2023-04-05

## 2023-04-06 DIAGNOSIS — F41.0 ANXIETY ATTACK: ICD-10-CM

## 2023-04-06 RX ORDER — ALPRAZOLAM 0.5 MG/1
0.25 TABLET ORAL 3 TIMES DAILY PRN
Qty: 45 TABLET | Refills: 0 | Status: SHIPPED | OUTPATIENT
Start: 2023-04-06 | End: 2023-05-06

## 2023-04-07 ENCOUNTER — CLINICAL DOCUMENTATION (OUTPATIENT)
Dept: SPIRITUAL SERVICES | Age: 73
End: 2023-04-07

## 2023-04-07 NOTE — ACP (ADVANCE CARE PLANNING)
Advance Care Planning   Ambulatory ACP Specialist Patient Outreach    Date:  4/7/2023  ACP Specialist:  MAGDALENA Jensen    Outreach call to patient in follow-up to ACP Specialist referral from: Jet Lawson MD    [x] PCP  [] Provider   [] Ambulatory Care Management [] Other for Reason:    [x] Advance Directive Assistance  [] Code Status Discussion  [] Complete Portable DNR Order  [] Discuss Goals of Care  [] Complete POST/MOST  [] Early ACP Decision-Making  [] Other    Date Referral Received:03/13/2023    Today's Outreach:  [] First   [] Second  [] Third [x] [facility] outreach made by [x]  phone  [x] email []   [email or web address]     Intervention:  [] Spoke with Patient  [x] Left VM requesting return call      Outcome: Made another outreach attempt to offer ACP support. No answer; left VM on \"Roxanne\" mailbox. Sent closure letter as per process. Referral to be closed. Next Step:   [] ACP scheduled conversation  [] Outreach again in one week               [] Email / Mail ACP Info Sheets  [] Email / Mail Advance Directive            [x] Close Referral. Routing closure to referring provider/staff and to ACP Specialist . [x] Closure Letter mailed to Patient with Invitation to Contact ACP Specialist if/when ready.     Thank you for this referral.

## 2023-05-06 DIAGNOSIS — T78.40XD ALLERGY, SUBSEQUENT ENCOUNTER: ICD-10-CM

## 2023-05-08 RX ORDER — MONTELUKAST SODIUM 10 MG/1
TABLET ORAL
Qty: 90 TABLET | Refills: 0 | Status: SHIPPED | OUTPATIENT
Start: 2023-05-08

## 2023-05-24 DIAGNOSIS — M79.7 FIBROMYALGIA SYNDROME: ICD-10-CM

## 2023-05-24 RX ORDER — GABAPENTIN 300 MG/1
CAPSULE ORAL
Qty: 270 CAPSULE | Refills: 1 | Status: SHIPPED | OUTPATIENT
Start: 2023-05-24 | End: 2023-06-23

## 2023-06-04 DIAGNOSIS — I10 ESSENTIAL HYPERTENSION: ICD-10-CM

## 2023-06-04 DIAGNOSIS — E03.9 ACQUIRED HYPOTHYROIDISM: ICD-10-CM

## 2023-06-05 RX ORDER — LOSARTAN POTASSIUM 100 MG/1
TABLET ORAL
Qty: 90 TABLET | Refills: 0 | Status: SHIPPED | OUTPATIENT
Start: 2023-06-05

## 2023-06-05 RX ORDER — LEVOTHYROXINE SODIUM 0.05 MG/1
TABLET ORAL
Qty: 90 TABLET | Refills: 0 | Status: SHIPPED | OUTPATIENT
Start: 2023-06-05

## 2023-06-26 ENCOUNTER — OFFICE VISIT (OUTPATIENT)
Dept: FAMILY MEDICINE CLINIC | Age: 73
End: 2023-06-26
Payer: MEDICARE

## 2023-06-26 VITALS
RESPIRATION RATE: 18 BRPM | SYSTOLIC BLOOD PRESSURE: 118 MMHG | BODY MASS INDEX: 25.02 KG/M2 | DIASTOLIC BLOOD PRESSURE: 78 MMHG | OXYGEN SATURATION: 93 % | WEIGHT: 141.2 LBS | HEIGHT: 63 IN | HEART RATE: 102 BPM

## 2023-06-26 DIAGNOSIS — F11.20 OPIOID DEPENDENCE WITH CURRENT USE (HCC): ICD-10-CM

## 2023-06-26 DIAGNOSIS — M54.16 LUMBAR RADICULOPATHY: Primary | ICD-10-CM

## 2023-06-26 DIAGNOSIS — K21.9 GASTROESOPHAGEAL REFLUX DISEASE WITHOUT ESOPHAGITIS: ICD-10-CM

## 2023-06-26 DIAGNOSIS — I10 ESSENTIAL HYPERTENSION: ICD-10-CM

## 2023-06-26 PROCEDURE — 1123F ACP DISCUSS/DSCN MKR DOCD: CPT | Performed by: INTERNAL MEDICINE

## 2023-06-26 PROCEDURE — 3078F DIAST BP <80 MM HG: CPT | Performed by: INTERNAL MEDICINE

## 2023-06-26 PROCEDURE — 3074F SYST BP LT 130 MM HG: CPT | Performed by: INTERNAL MEDICINE

## 2023-06-26 PROCEDURE — 99214 OFFICE O/P EST MOD 30 MIN: CPT | Performed by: INTERNAL MEDICINE

## 2023-06-26 RX ORDER — TRAMADOL HYDROCHLORIDE 50 MG/1
TABLET ORAL
COMMUNITY
Start: 2023-04-21 | End: 2023-06-26

## 2023-06-26 RX ORDER — TRAMADOL HYDROCHLORIDE 50 MG/1
TABLET ORAL
Status: CANCELLED | OUTPATIENT
Start: 2023-06-26

## 2023-06-26 RX ORDER — PREDNISONE 20 MG/1
40 TABLET ORAL DAILY
Qty: 14 TABLET | Refills: 0 | Status: SHIPPED | OUTPATIENT
Start: 2023-06-26 | End: 2023-07-03

## 2023-06-26 RX ORDER — TRAMADOL HYDROCHLORIDE 50 MG/1
50 TABLET ORAL 3 TIMES DAILY PRN
Qty: 90 TABLET | Refills: 0 | Status: SHIPPED | OUTPATIENT
Start: 2023-06-26 | End: 2023-07-26

## 2023-06-26 ASSESSMENT — ENCOUNTER SYMPTOMS
GLOBUS SENSATION: 0
SHORTNESS OF BREATH: 0
BELCHING: 0
COUGH: 0
NAUSEA: 0
HEARTBURN: 1
ABDOMINAL PAIN: 0
BACK PAIN: 1
SORE THROAT: 0

## 2023-07-29 DIAGNOSIS — T78.40XD ALLERGY, SUBSEQUENT ENCOUNTER: ICD-10-CM

## 2023-07-31 RX ORDER — MONTELUKAST SODIUM 10 MG/1
TABLET ORAL
Qty: 90 TABLET | Refills: 0 | Status: SHIPPED | OUTPATIENT
Start: 2023-07-31

## 2023-08-21 DIAGNOSIS — M54.16 LUMBAR RADICULOPATHY: ICD-10-CM

## 2023-08-21 RX ORDER — TRAMADOL HYDROCHLORIDE 50 MG/1
TABLET ORAL
Qty: 90 TABLET | Refills: 1 | Status: SHIPPED | OUTPATIENT
Start: 2023-08-21 | End: 2023-09-20

## 2023-09-11 DIAGNOSIS — E03.9 ACQUIRED HYPOTHYROIDISM: ICD-10-CM

## 2023-09-12 RX ORDER — LEVOTHYROXINE SODIUM 0.05 MG/1
TABLET ORAL
Qty: 90 TABLET | Refills: 0 | Status: SHIPPED | OUTPATIENT
Start: 2023-09-12

## 2023-09-19 DIAGNOSIS — I10 ESSENTIAL HYPERTENSION: ICD-10-CM

## 2023-09-19 RX ORDER — LOSARTAN POTASSIUM 100 MG/1
TABLET ORAL
Qty: 90 TABLET | Refills: 0 | Status: SHIPPED | OUTPATIENT
Start: 2023-09-19

## 2023-09-26 ENCOUNTER — OFFICE VISIT (OUTPATIENT)
Dept: FAMILY MEDICINE CLINIC | Age: 73
End: 2023-09-26
Payer: MEDICARE

## 2023-09-26 VITALS
HEART RATE: 66 BPM | OXYGEN SATURATION: 97 % | DIASTOLIC BLOOD PRESSURE: 72 MMHG | WEIGHT: 144.2 LBS | RESPIRATION RATE: 18 BRPM | HEIGHT: 63 IN | BODY MASS INDEX: 25.55 KG/M2 | SYSTOLIC BLOOD PRESSURE: 120 MMHG

## 2023-09-26 DIAGNOSIS — I10 ESSENTIAL HYPERTENSION: Primary | ICD-10-CM

## 2023-09-26 DIAGNOSIS — K21.9 GASTROESOPHAGEAL REFLUX DISEASE WITHOUT ESOPHAGITIS: ICD-10-CM

## 2023-09-26 DIAGNOSIS — E03.9 ACQUIRED HYPOTHYROIDISM: ICD-10-CM

## 2023-09-26 DIAGNOSIS — M54.16 LUMBAR RADICULOPATHY: ICD-10-CM

## 2023-09-26 DIAGNOSIS — Z23 NEED FOR INFLUENZA VACCINATION: ICD-10-CM

## 2023-09-26 DIAGNOSIS — L98.9 SKIN LESION OF SCALP: ICD-10-CM

## 2023-09-26 PROCEDURE — 90694 VACC AIIV4 NO PRSRV 0.5ML IM: CPT | Performed by: INTERNAL MEDICINE

## 2023-09-26 PROCEDURE — 99214 OFFICE O/P EST MOD 30 MIN: CPT | Performed by: INTERNAL MEDICINE

## 2023-09-26 PROCEDURE — 3078F DIAST BP <80 MM HG: CPT | Performed by: INTERNAL MEDICINE

## 2023-09-26 PROCEDURE — 3074F SYST BP LT 130 MM HG: CPT | Performed by: INTERNAL MEDICINE

## 2023-09-26 PROCEDURE — G0008 ADMIN INFLUENZA VIRUS VAC: HCPCS | Performed by: INTERNAL MEDICINE

## 2023-09-26 PROCEDURE — 1123F ACP DISCUSS/DSCN MKR DOCD: CPT | Performed by: INTERNAL MEDICINE

## 2023-09-26 RX ORDER — OMEPRAZOLE 40 MG/1
40 CAPSULE, DELAYED RELEASE ORAL DAILY
Qty: 90 CAPSULE | Refills: 1 | Status: SHIPPED | OUTPATIENT
Start: 2023-09-26

## 2023-09-26 RX ORDER — TRAMADOL HYDROCHLORIDE 50 MG/1
50 TABLET ORAL EVERY 6 HOURS PRN
Qty: 90 TABLET | Refills: 1 | Status: SHIPPED | OUTPATIENT
Start: 2023-09-26 | End: 2023-11-10

## 2023-09-26 ASSESSMENT — ENCOUNTER SYMPTOMS
SORE THROAT: 0
NAUSEA: 0
BACK PAIN: 1
ABDOMINAL PAIN: 0
SHORTNESS OF BREATH: 0
BOWEL INCONTINENCE: 0
COUGH: 0

## 2023-09-26 NOTE — PROGRESS NOTES
HPI Notes    Name: Chalino Ford  : 1950         Chief Complaint:     Chief Complaint   Patient presents with    Check-Up     Patient is having a biopsy on her left kidney next [facility]. , patient is feeling good she states     Referral - General     Patient would like a referral for derm,otologist for brown spots on head and back. History of Present Illness:        Roxanne presents to office to follow up for chronic low back pain, HTN, Hypothyroidism, GERD    She has a h/o renal cell cancer. Follows up with Dr. Deepti Best. Roxanne confirms that she is compliant with her thyroid medication. She denies an increase in fatigue, constipation, increased skin dryness, or increased lower extremity edema. The last TSH 1.58 22. She also reports having a lesion on the left temple area that she noticed about 6 months ago. It keeps growing and she would like to be referred to a dermatologist     Back Pain  This is a chronic problem. The current episode started more than 1 year ago. The problem occurs daily. The problem is unchanged. The pain is present in the lumbar spine and sacro-iliac. The quality of the pain is described as aching and shooting. The pain does not radiate. The pain is moderate. Pertinent negatives include no abdominal pain, bladder incontinence, bowel incontinence, chest pain, dysuria, fever or headaches. She has tried muscle relaxant (Tramadol, gabapentin) for the symptoms. The treatment provided significant relief. Hypertension  This is a chronic problem. The current episode started more than 1 year ago. The problem is unchanged. The problem is controlled. Associated symptoms include neck pain. Pertinent negatives include no chest pain, headaches, palpitations, peripheral edema or shortness of breath. There are no associated agents to hypertension. Risk factors for coronary artery disease include post-menopausal state and family history.  Past treatments include

## 2023-09-26 NOTE — PROGRESS NOTES
After obtaining consent, and per orders of Dr. Maddie Bangura, injection of Influenza Vaccine given in Right quadriceps by Eugene Perez MA. Patient instructed to remain in clinic for 20 minutes afterwards, and to report any adverse reaction to me immediately.

## 2023-10-03 ENCOUNTER — APPOINTMENT (OUTPATIENT)
Dept: URBAN - METROPOLITAN AREA CLINIC 204 | Age: 73
Setting detail: DERMATOLOGY
End: 2023-10-03

## 2023-10-03 DIAGNOSIS — L82.1 OTHER SEBORRHEIC KERATOSIS: ICD-10-CM

## 2023-10-03 DIAGNOSIS — L90.5 SCAR CONDITIONS AND FIBROSIS OF SKIN: ICD-10-CM

## 2023-10-03 DIAGNOSIS — L73.8 OTHER SPECIFIED FOLLICULAR DISORDERS: ICD-10-CM

## 2023-10-03 DIAGNOSIS — L57.8 OTHER SKIN CHANGES DUE TO CHRONIC EXPOSURE TO NONIONIZING RADIATION: ICD-10-CM

## 2023-10-03 DIAGNOSIS — L85.3 XEROSIS CUTIS: ICD-10-CM

## 2023-10-03 DIAGNOSIS — D18.0 HEMANGIOMA: ICD-10-CM

## 2023-10-03 PROBLEM — D18.01 HEMANGIOMA OF SKIN AND SUBCUTANEOUS TISSUE: Status: ACTIVE | Noted: 2023-10-03

## 2023-10-03 PROCEDURE — OTHER ADDITIONAL NOTES: OTHER

## 2023-10-03 PROCEDURE — OTHER COUNSELING: OTHER

## 2023-10-03 PROCEDURE — 99203 OFFICE O/P NEW LOW 30 MIN: CPT

## 2023-10-03 PROCEDURE — OTHER MIPS QUALITY: OTHER

## 2023-10-03 ASSESSMENT — LOCATION SIMPLE DESCRIPTION DERM
LOCATION SIMPLE: CHEST
LOCATION SIMPLE: UPPER BACK
LOCATION SIMPLE: ABDOMEN
LOCATION SIMPLE: LEFT FOREHEAD

## 2023-10-03 ASSESSMENT — LOCATION DETAILED DESCRIPTION DERM
LOCATION DETAILED: PERIUMBILICAL SKIN
LOCATION DETAILED: LEFT INFERIOR FOREHEAD
LOCATION DETAILED: LEFT LATERAL SUPERIOR CHEST
LOCATION DETAILED: SUPERIOR THORACIC SPINE
LOCATION DETAILED: RIGHT RIB CAGE
LOCATION DETAILED: SUBXIPHOID

## 2023-10-03 ASSESSMENT — LOCATION ZONE DERM
LOCATION ZONE: TRUNK
LOCATION ZONE: FACE

## 2023-10-03 NOTE — PROCEDURE: ADDITIONAL NOTES
Additional Notes: Patient will check with insurance for destruction.
Detail Level: Simple
Render Risk Assessment In Note?: no
Additional Notes: Cerave anti itch cream or Sarna itch cream

## 2023-10-03 NOTE — PROCEDURE: MIPS QUALITY
Quality 111:Pneumonia Vaccination Status For Older Adults: Patient did not receive any pneumococcal conjugate or polysaccharide vaccine on or after their 60th birthday and before the end of the measurement period
Quality 226: Preventive Care And Screening: Tobacco Use: Screening And Cessation Intervention: Patient screened for tobacco use and is an ex/non-smoker
Detail Level: Detailed
Quality 110: Preventive Care And Screening: Influenza Immunization: Influenza Immunization Administered during Influenza season
Quality 47: Advance Care Plan: Advance Care Planning discussed and documented in the medical record; patient did not wish or was not able to name a surrogate decision maker or provide an advance care plan.
Quality 130: Documentation Of Current Medications In The Medical Record: Current Medications Documented

## 2023-10-23 DIAGNOSIS — T78.40XD ALLERGY, SUBSEQUENT ENCOUNTER: ICD-10-CM

## 2023-10-23 RX ORDER — MONTELUKAST SODIUM 10 MG/1
TABLET ORAL
Qty: 90 TABLET | Refills: 0 | Status: SHIPPED | OUTPATIENT
Start: 2023-10-23

## 2023-10-27 ENCOUNTER — HOSPITAL ENCOUNTER (OUTPATIENT)
Age: 73
Discharge: HOME | End: 2023-10-27
Payer: MEDICARE

## 2023-10-27 ENCOUNTER — HOSPITAL ENCOUNTER (OUTPATIENT)
Age: 73
End: 2023-10-27
Payer: MEDICARE

## 2023-10-27 ENCOUNTER — HOSPITAL ENCOUNTER (OUTPATIENT)
Dept: GENERAL RADIOLOGY | Age: 73
End: 2023-10-27
Payer: MEDICARE

## 2023-10-27 DIAGNOSIS — R00.2 PALPITATIONS: ICD-10-CM

## 2023-10-27 DIAGNOSIS — E78.2 MIXED HYPERLIPIDEMIA: ICD-10-CM

## 2023-10-27 DIAGNOSIS — Z95.5 STATUS POST CORONARY ARTERY BALLOON DILATION: ICD-10-CM

## 2023-10-27 DIAGNOSIS — C64.1 CLEAR CELL ADENOCARCINOMA OF KIDNEY, RIGHT (HCC): ICD-10-CM

## 2023-10-27 DIAGNOSIS — I10 ESSENTIAL HYPERTENSION: ICD-10-CM

## 2023-10-27 DIAGNOSIS — N28.89 RENAL MASS: ICD-10-CM

## 2023-10-27 LAB
ALBUMIN SERPL-MCNC: 4.1 G/DL (ref 3.5–5.2)
ALP SERPL-CCNC: 91 U/L (ref 35–104)
ALT SERPL-CCNC: 37 U/L (ref 5–33)
ANION GAP SERPL CALCULATED.3IONS-SCNC: 4 MMOL/L (ref 9–17)
AST SERPL-CCNC: 28 U/L
BASOPHILS # BLD: 0.06 K/UL (ref 0–0.2)
BASOPHILS NFR BLD: 1 % (ref 0–2)
BILIRUB SERPL-MCNC: 0.8 MG/DL (ref 0.3–1.2)
BUN SERPL-MCNC: 12 MG/DL (ref 8–23)
BUN/CREAT SERPL: 20 (ref 9–20)
CALCIUM SERPL-MCNC: 9.1 MG/DL (ref 8.6–10.4)
CHLORIDE SERPL-SCNC: 103 MMOL/L (ref 98–107)
CHOLEST SERPL-MCNC: 197 MG/DL (ref 0–199)
CHOLESTEROL/HDL RATIO: 3
CO2 SERPL-SCNC: 32 MMOL/L (ref 20–31)
CREAT SERPL-MCNC: 0.6 MG/DL (ref 0.5–0.9)
EOSINOPHIL # BLD: 0.28 K/UL (ref 0–0.4)
EOSINOPHILS RELATIVE PERCENT: 4 % (ref 0–5)
ERYTHROCYTE [DISTWIDTH] IN BLOOD BY AUTOMATED COUNT: 15.6 % (ref 12.1–15.2)
GFR SERPL CREATININE-BSD FRML MDRD: >60 ML/MIN/1.73M2
GLUCOSE SERPL-MCNC: 93 MG/DL (ref 70–99)
HCT VFR BLD AUTO: 41 % (ref 36–46)
HDLC SERPL-MCNC: 67 MG/DL
HGB BLD-MCNC: 13.6 G/DL (ref 12–16)
IMM GRANULOCYTES # BLD AUTO: 0.02 K/UL (ref 0–0.3)
IMM GRANULOCYTES NFR BLD: 0 % (ref 0–5)
LDLC SERPL CALC-MCNC: 114 MG/DL (ref 0–100)
LYMPHOCYTES NFR BLD: 1.61 K/UL (ref 1–4.8)
LYMPHOCYTES RELATIVE PERCENT: 21 % (ref 15–40)
MAGNESIUM SERPL-MCNC: 2.1 MG/DL (ref 1.6–2.6)
MCH RBC QN AUTO: 28.2 PG (ref 26–34)
MCHC RBC AUTO-ENTMCNC: 33.2 G/DL (ref 31–37)
MCV RBC AUTO: 85.1 FL (ref 80–100)
MONOCYTES NFR BLD: 0.45 K/UL (ref 0–1)
MONOCYTES NFR BLD: 6 % (ref 4–8)
NEUTROPHILS NFR BLD: 69 % (ref 47–75)
NEUTS SEG NFR BLD: 5.44 K/UL (ref 2.5–7)
PATIENT FASTING?: YES
PLATELET # BLD AUTO: 530 K/UL (ref 140–450)
PMV BLD AUTO: 9.1 FL (ref 6–12)
POTASSIUM SERPL-SCNC: 4 MMOL/L (ref 3.7–5.3)
PROT SERPL-MCNC: 6.3 G/DL (ref 6.4–8.3)
RBC # BLD AUTO: 4.82 M/UL (ref 4–5.2)
SODIUM SERPL-SCNC: 139 MMOL/L (ref 135–144)
TRIGL SERPL-MCNC: 82 MG/DL (ref 0–149)
TSH SERPL DL<=0.05 MIU/L-ACNC: 4.41 UIU/ML (ref 0.3–5)
VLDLC SERPL CALC-MCNC: 16 MG/DL
WBC OTHER # BLD: 7.9 K/UL (ref 3.5–11)

## 2023-10-27 PROCEDURE — 84443 ASSAY THYROID STIM HORMONE: CPT

## 2023-10-27 PROCEDURE — 85025 COMPLETE CBC W/AUTO DIFF WBC: CPT

## 2023-10-27 PROCEDURE — 80053 COMPREHEN METABOLIC PANEL: CPT

## 2023-10-27 PROCEDURE — 71046 X-RAY EXAM CHEST 2 VIEWS: CPT

## 2023-10-27 PROCEDURE — 36415 COLL VENOUS BLD VENIPUNCTURE: CPT

## 2023-10-27 PROCEDURE — 80061 LIPID PANEL: CPT

## 2023-10-27 PROCEDURE — 83735 ASSAY OF MAGNESIUM: CPT

## 2023-10-29 LAB
EKG ATRIAL RATE: 59 BPM
EKG P AXIS: 54 DEGREES
EKG P-R INTERVAL: 176 MS
EKG Q-T INTERVAL: 434 MS
EKG QRS DURATION: 90 MS
EKG QTC CALCULATION (BAZETT): 429 MS
EKG R AXIS: 22 DEGREES
EKG T AXIS: 61 DEGREES
EKG VENTRICULAR RATE: 59 BPM

## 2023-10-30 NOTE — RESULT ENCOUNTER NOTE
Patient has no upcoming appointment with us, patient follows closely with hematology oncology. 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

## 2023-11-11 DIAGNOSIS — M79.7 FIBROMYALGIA SYNDROME: ICD-10-CM

## 2023-11-13 ENCOUNTER — OFFICE VISIT (OUTPATIENT)
Dept: CARDIOLOGY CLINIC | Age: 73
End: 2023-11-13

## 2023-11-13 VITALS
WEIGHT: 142 LBS | HEART RATE: 60 BPM | DIASTOLIC BLOOD PRESSURE: 80 MMHG | SYSTOLIC BLOOD PRESSURE: 180 MMHG | BODY MASS INDEX: 25.15 KG/M2 | OXYGEN SATURATION: 98 %

## 2023-11-13 DIAGNOSIS — R00.2 PALPITATIONS: ICD-10-CM

## 2023-11-13 DIAGNOSIS — E78.2 MIXED HYPERLIPIDEMIA: Primary | ICD-10-CM

## 2023-11-13 DIAGNOSIS — R07.9 CHEST PAIN, UNSPECIFIED TYPE: ICD-10-CM

## 2023-11-13 DIAGNOSIS — Z95.5 STATUS POST CORONARY ARTERY BALLOON DILATION: ICD-10-CM

## 2023-11-13 DIAGNOSIS — E55.9 VITAMIN D DEFICIENCY, UNSPECIFIED: ICD-10-CM

## 2023-11-13 DIAGNOSIS — I10 ESSENTIAL HYPERTENSION: ICD-10-CM

## 2023-11-13 RX ORDER — DOXAZOSIN MESYLATE 1 MG/1
1 TABLET ORAL 2 TIMES DAILY
Qty: 60 TABLET | Refills: 11 | Status: SHIPPED | OUTPATIENT
Start: 2023-11-13

## 2023-11-13 RX ORDER — GABAPENTIN 300 MG/1
CAPSULE ORAL
Qty: 270 CAPSULE | Refills: 0 | Status: SHIPPED | OUTPATIENT
Start: 2023-11-13 | End: 2023-12-13

## 2023-11-13 NOTE — PROGRESS NOTES
Ov DR Wade Saravia 1 year follow up   C/o dizziness and fatigue   Pain between shoulder blades  If over exerts sits down   Son Rose Omalley passed away in  March. Adopted daughter is 8. Will do lexiscan and call   With results    Will increase doxazosin  To 1 mg bid   Call in 2 weeks with bp    Will see in 1 year.

## 2023-11-30 ENCOUNTER — HOSPITAL ENCOUNTER (OUTPATIENT)
Dept: NUCLEAR MEDICINE | Age: 73
Discharge: HOME | End: 2023-12-02
Attending: INTERNAL MEDICINE
Payer: MEDICARE

## 2023-11-30 ENCOUNTER — HOSPITAL ENCOUNTER (OUTPATIENT)
Age: 73
Discharge: HOME | End: 2023-12-02
Attending: INTERNAL MEDICINE
Payer: MEDICARE

## 2023-11-30 VITALS — DIASTOLIC BLOOD PRESSURE: 79 MMHG | HEART RATE: 74 BPM | SYSTOLIC BLOOD PRESSURE: 170 MMHG

## 2023-11-30 DIAGNOSIS — R07.9 CHEST PAIN, UNSPECIFIED TYPE: ICD-10-CM

## 2023-11-30 PROCEDURE — 6360000002 HC RX W HCPCS: Performed by: INTERNAL MEDICINE

## 2023-11-30 PROCEDURE — 3430000000 HC RX DIAGNOSTIC RADIOPHARMACEUTICAL: Performed by: INTERNAL MEDICINE

## 2023-11-30 PROCEDURE — 93017 CV STRESS TEST TRACING ONLY: CPT

## 2023-11-30 PROCEDURE — A9500 TC99M SESTAMIBI: HCPCS | Performed by: INTERNAL MEDICINE

## 2023-11-30 PROCEDURE — 2580000003 HC RX 258: Performed by: INTERNAL MEDICINE

## 2023-11-30 PROCEDURE — 78452 HT MUSCLE IMAGE SPECT MULT: CPT

## 2023-11-30 RX ORDER — TETRAKIS(2-METHOXYISOBUTYLISOCYANIDE)COPPER(I) TETRAFLUOROBORATE 1 MG/ML
30 INJECTION, POWDER, LYOPHILIZED, FOR SOLUTION INTRAVENOUS
Status: COMPLETED | OUTPATIENT
Start: 2023-11-30 | End: 2023-11-30

## 2023-11-30 RX ORDER — SODIUM CHLORIDE 0.9 % (FLUSH) 0.9 %
10 SYRINGE (ML) INJECTION PRN
Status: ACTIVE | OUTPATIENT
Start: 2023-11-30 | End: 2023-11-30

## 2023-11-30 RX ORDER — REGADENOSON 0.08 MG/ML
0.4 INJECTION, SOLUTION INTRAVENOUS
Status: COMPLETED | OUTPATIENT
Start: 2023-11-30 | End: 2023-11-30

## 2023-11-30 RX ORDER — AMINOPHYLLINE 25 MG/ML
50 INJECTION, SOLUTION INTRAVENOUS PRN
Status: DISPENSED | OUTPATIENT
Start: 2023-11-30 | End: 2023-11-30

## 2023-11-30 RX ORDER — TETRAKIS(2-METHOXYISOBUTYLISOCYANIDE)COPPER(I) TETRAFLUOROBORATE 1 MG/ML
10 INJECTION, POWDER, LYOPHILIZED, FOR SOLUTION INTRAVENOUS
Status: COMPLETED | OUTPATIENT
Start: 2023-11-30 | End: 2023-11-30

## 2023-11-30 RX ADMIN — REGADENOSON 0.4 MG: 0.08 INJECTION, SOLUTION INTRAVENOUS at 08:56

## 2023-11-30 RX ADMIN — TETRAKIS(2-METHOXYISOBUTYLISOCYANIDE)COPPER(I) TETRAFLUOROBORATE 30 MILLICURIE: 1 INJECTION, POWDER, LYOPHILIZED, FOR SOLUTION INTRAVENOUS at 08:55

## 2023-11-30 RX ADMIN — SODIUM CHLORIDE, PRESERVATIVE FREE 10 ML: 5 INJECTION INTRAVENOUS at 08:56

## 2023-11-30 RX ADMIN — TETRAKIS(2-METHOXYISOBUTYLISOCYANIDE)COPPER(I) TETRAFLUOROBORATE 10 MILLICURIE: 1 INJECTION, POWDER, LYOPHILIZED, FOR SOLUTION INTRAVENOUS at 07:49

## 2023-11-30 NOTE — PROGRESS NOTES
EKG and BP results 216/86 given to Dr. Carmelo Nicole prior to [employer] start.   Dr. Carmelo Nicole gave ok to proceed with test.

## 2023-12-03 LAB
NUC STRESS EJECTION FRACTION: 70 %
STRESS TARGET HR: 149 BPM
TID: 0.97

## 2023-12-04 ENCOUNTER — TELEPHONE (OUTPATIENT)
Dept: CARDIOLOGY CLINIC | Age: 73
End: 2023-12-04

## 2023-12-04 NOTE — TELEPHONE ENCOUNTER
Left message on voicemail to call office for results of stress test.    Per Dr. Frida Davis looks good.

## 2023-12-07 DIAGNOSIS — E03.9 ACQUIRED HYPOTHYROIDISM: ICD-10-CM

## 2023-12-07 RX ORDER — LEVOTHYROXINE SODIUM 0.05 MG/1
TABLET ORAL
Qty: 90 TABLET | Refills: 0 | Status: SHIPPED | OUTPATIENT
Start: 2023-12-07

## 2024-01-23 DIAGNOSIS — T78.40XD ALLERGY, SUBSEQUENT ENCOUNTER: ICD-10-CM

## 2024-01-23 RX ORDER — MONTELUKAST SODIUM 10 MG/1
TABLET ORAL
Qty: 90 TABLET | Refills: 0 | Status: SHIPPED | OUTPATIENT
Start: 2024-01-23

## 2024-02-12 DIAGNOSIS — M79.7 FIBROMYALGIA SYNDROME: ICD-10-CM

## 2024-02-12 RX ORDER — GABAPENTIN 300 MG/1
CAPSULE ORAL
Qty: 270 CAPSULE | Refills: 0 | Status: SHIPPED | OUTPATIENT
Start: 2024-02-12 | End: 2024-03-13

## 2024-03-01 ENCOUNTER — OFFICE VISIT (OUTPATIENT)
Dept: FAMILY MEDICINE CLINIC | Age: 74
End: 2024-03-01
Payer: MEDICARE

## 2024-03-01 VITALS
BODY MASS INDEX: 24.95 KG/M2 | OXYGEN SATURATION: 97 % | RESPIRATION RATE: 18 BRPM | SYSTOLIC BLOOD PRESSURE: 120 MMHG | HEIGHT: 63 IN | HEART RATE: 81 BPM | DIASTOLIC BLOOD PRESSURE: 77 MMHG | WEIGHT: 140.8 LBS

## 2024-03-01 DIAGNOSIS — F33.9 RECURRENT MAJOR DEPRESSIVE DISORDER, REMISSION STATUS UNSPECIFIED (HCC): ICD-10-CM

## 2024-03-01 DIAGNOSIS — J01.90 ACUTE BACTERIAL SINUSITIS: ICD-10-CM

## 2024-03-01 DIAGNOSIS — E03.9 ACQUIRED HYPOTHYROIDISM: ICD-10-CM

## 2024-03-01 DIAGNOSIS — Z12.31 BREAST CANCER SCREENING BY MAMMOGRAM: ICD-10-CM

## 2024-03-01 DIAGNOSIS — M54.16 LUMBAR RADICULOPATHY: ICD-10-CM

## 2024-03-01 DIAGNOSIS — C64.1 CLEAR CELL ADENOCARCINOMA OF KIDNEY, RIGHT (HCC): ICD-10-CM

## 2024-03-01 DIAGNOSIS — I10 ESSENTIAL HYPERTENSION: Primary | ICD-10-CM

## 2024-03-01 DIAGNOSIS — F41.9 ANXIETY: ICD-10-CM

## 2024-03-01 DIAGNOSIS — Z00.00 MEDICARE ANNUAL WELLNESS VISIT, SUBSEQUENT: ICD-10-CM

## 2024-03-01 DIAGNOSIS — B96.89 ACUTE BACTERIAL SINUSITIS: ICD-10-CM

## 2024-03-01 DIAGNOSIS — F11.20 OPIOID DEPENDENCE WITH CURRENT USE (HCC): ICD-10-CM

## 2024-03-01 PROCEDURE — G8400 PT W/DXA NO RESULTS DOC: HCPCS | Performed by: INTERNAL MEDICINE

## 2024-03-01 PROCEDURE — 3074F SYST BP LT 130 MM HG: CPT | Performed by: INTERNAL MEDICINE

## 2024-03-01 PROCEDURE — 3017F COLORECTAL CA SCREEN DOC REV: CPT | Performed by: INTERNAL MEDICINE

## 2024-03-01 PROCEDURE — G8427 DOCREV CUR MEDS BY ELIG CLIN: HCPCS | Performed by: INTERNAL MEDICINE

## 2024-03-01 PROCEDURE — 1123F ACP DISCUSS/DSCN MKR DOCD: CPT | Performed by: INTERNAL MEDICINE

## 2024-03-01 PROCEDURE — 1090F PRES/ABSN URINE INCON ASSESS: CPT | Performed by: INTERNAL MEDICINE

## 2024-03-01 PROCEDURE — G0439 PPPS, SUBSEQ VISIT: HCPCS | Performed by: INTERNAL MEDICINE

## 2024-03-01 PROCEDURE — 1036F TOBACCO NON-USER: CPT | Performed by: INTERNAL MEDICINE

## 2024-03-01 PROCEDURE — G8420 CALC BMI NORM PARAMETERS: HCPCS | Performed by: INTERNAL MEDICINE

## 2024-03-01 PROCEDURE — G8484 FLU IMMUNIZE NO ADMIN: HCPCS | Performed by: INTERNAL MEDICINE

## 2024-03-01 PROCEDURE — 3078F DIAST BP <80 MM HG: CPT | Performed by: INTERNAL MEDICINE

## 2024-03-01 PROCEDURE — 99214 OFFICE O/P EST MOD 30 MIN: CPT | Performed by: INTERNAL MEDICINE

## 2024-03-01 RX ORDER — TRAMADOL HYDROCHLORIDE 50 MG/1
TABLET ORAL
COMMUNITY
Start: 2023-11-28 | End: 2024-03-01 | Stop reason: SDUPTHER

## 2024-03-01 RX ORDER — CEPHALEXIN 500 MG/1
500 CAPSULE ORAL 2 TIMES DAILY
Qty: 14 CAPSULE | Refills: 0 | Status: SHIPPED | OUTPATIENT
Start: 2024-03-01 | End: 2024-03-08

## 2024-03-01 RX ORDER — TRAMADOL HYDROCHLORIDE 50 MG/1
50 TABLET ORAL 3 TIMES DAILY PRN
Qty: 90 TABLET | Refills: 2 | Status: SHIPPED | OUTPATIENT
Start: 2024-03-01 | End: 2024-05-30

## 2024-03-01 ASSESSMENT — PATIENT HEALTH QUESTIONNAIRE - PHQ9
SUM OF ALL RESPONSES TO PHQ QUESTIONS 1-9: 0
8. MOVING OR SPEAKING SO SLOWLY THAT OTHER PEOPLE COULD HAVE NOTICED. OR THE OPPOSITE, BEING SO FIGETY OR RESTLESS THAT YOU HAVE BEEN MOVING AROUND A LOT MORE THAN USUAL: 0
3. TROUBLE FALLING OR STAYING ASLEEP: 0
SUM OF ALL RESPONSES TO PHQ QUESTIONS 1-9: 0
SUM OF ALL RESPONSES TO PHQ9 QUESTIONS 1 & 2: 0
6. FEELING BAD ABOUT YOURSELF - OR THAT YOU ARE A FAILURE OR HAVE LET YOURSELF OR YOUR FAMILY DOWN: 0
1. LITTLE INTEREST OR PLEASURE IN DOING THINGS: 0
SUM OF ALL RESPONSES TO PHQ QUESTIONS 1-9: 0
7. TROUBLE CONCENTRATING ON THINGS, SUCH AS READING THE NEWSPAPER OR WATCHING TELEVISION: 0
SUM OF ALL RESPONSES TO PHQ QUESTIONS 1-9: 0
5. POOR APPETITE OR OVEREATING: 0
2. FEELING DOWN, DEPRESSED OR HOPELESS: 0
9. THOUGHTS THAT YOU WOULD BE BETTER OFF DEAD, OR OF HURTING YOURSELF: 0
4. FEELING TIRED OR HAVING LITTLE ENERGY: 0
10. IF YOU CHECKED OFF ANY PROBLEMS, HOW DIFFICULT HAVE THESE PROBLEMS MADE IT FOR YOU TO DO YOUR WORK, TAKE CARE OF THINGS AT HOME, OR GET ALONG WITH OTHER PEOPLE: 0

## 2024-03-01 ASSESSMENT — ENCOUNTER SYMPTOMS
ORTHOPNEA: 0
BACK PAIN: 1
SINUS COMPLAINT: 1
NAUSEA: 0
SORE THROAT: 0
COUGH: 1
RHINORRHEA: 1
ABDOMINAL PAIN: 0
BOWEL INCONTINENCE: 0
SINUS PRESSURE: 1
SHORTNESS OF BREATH: 0

## 2024-03-01 ASSESSMENT — LIFESTYLE VARIABLES
HOW MANY STANDARD DRINKS CONTAINING ALCOHOL DO YOU HAVE ON A TYPICAL DAY: 1 OR 2
HOW OFTEN DO YOU HAVE A DRINK CONTAINING ALCOHOL: MONTHLY OR LESS

## 2024-03-01 NOTE — PROGRESS NOTES
HPI Notes    Name: Roxanne Stubbs  : 1950         Chief Complaint:     Chief Complaint   Patient presents with    Medicare AWV     Patient is 2 plus weeks with a head cold she can not get cleared up.    Hypertension    Back Pain    hypothyrodism       History of Present Illness:        Roxanne presents to office to follow up for HTN, Hypothyroidism, chronic back pain and AWV  Also c/o sinus congestion for the past 2 weeks .    Roxanne confirms that she is compliant with thyroid medication. She  denies an increase in fatigue, constipation, increased skin dryness, or increased lower extremity edema.  The last TSH was 4.41 on 10/27/23  .     Hypertension  This is a chronic problem. The current episode started more than 1 year ago. The problem is unchanged. The problem is controlled. Pertinent negatives include no anxiety, chest pain, headaches, neck pain, orthopnea, palpitations, peripheral edema or shortness of breath. There are no associated agents to hypertension. Risk factors for coronary artery disease include post-menopausal state and dyslipidemia. Past treatments include angiotensin blockers, diuretics, beta blockers, alpha 1 blockers and direct vasodilators. The current treatment provides significant improvement. There are no compliance problems.  Hypertensive end-organ damage includes CAD/MI. There is no history of angina or CVA.   Back Pain  This is a chronic problem. The current episode started more than 1 year ago. The problem occurs daily. The problem is unchanged. The pain is present in the lumbar spine and sacro-iliac. The pain is moderate. The symptoms are aggravated by bending and position. Pertinent negatives include no abdominal pain, bladder incontinence, bowel incontinence, chest pain, dysuria, fever, headaches, paresis or paresthesias. She has tried home exercises (Tylenol, Gabapentin, prn Tramadol) for the symptoms. The treatment provided significant relief.   Sinus Problem  This is a new

## 2024-03-01 NOTE — PATIENT INSTRUCTIONS
of your medical history including lifestyle, illnesses that may run in your family, and various assessments and screenings as appropriate.    After reviewing your medical record and screening and assessments performed today your provider may have ordered immunizations, labs, imaging, and/or referrals for you.  A list of these orders (if applicable) as well as your Preventive Care list are included within your After Visit Summary for your review.    Other Preventive Recommendations:    A preventive eye exam performed by an eye specialist is recommended every 1-2 years to screen for glaucoma; cataracts, macular degeneration, and other eye disorders.  A preventive dental visit is recommended every 6 months.  Try to get at least 150 minutes of exercise per week or 10,000 steps per day on a pedometer .  Order or download the FREE \"Exercise & Physical Activity: Your Everyday Guide\" from [employer]. Call 1-340.691.4164 or search [employer] online.  You need 2408-9392 mg of calcium and 0399-3077 IU of vitamin D per day. It is possible to meet your calcium requirement with diet alone, but a vitamin D supplement is usually necessary to meet this goal.  When exposed to the sun, use a sunscreen that protects against both UVA and UVB radiation with an SPF of 30 or greater. Reapply every 2 to 3 hours or after sweating, drying off with a towel, or swimming.  Always wear a seat belt when traveling in a car. Always wear a helmet when riding a bicycle or motorcycle.

## 2024-03-03 DIAGNOSIS — E03.9 ACQUIRED HYPOTHYROIDISM: ICD-10-CM

## 2024-03-04 RX ORDER — LEVOTHYROXINE SODIUM 0.05 MG/1
TABLET ORAL
Qty: 90 TABLET | Refills: 0 | Status: SHIPPED | OUTPATIENT
Start: 2024-03-04

## 2024-03-14 DIAGNOSIS — I10 ESSENTIAL HYPERTENSION: ICD-10-CM

## 2024-03-14 RX ORDER — LOSARTAN POTASSIUM 100 MG/1
TABLET ORAL
Qty: 90 TABLET | Refills: 0 | Status: SHIPPED | OUTPATIENT
Start: 2024-03-14

## 2024-04-19 DIAGNOSIS — T78.40XD ALLERGY, SUBSEQUENT ENCOUNTER: ICD-10-CM

## 2024-04-19 RX ORDER — MONTELUKAST SODIUM 10 MG/1
TABLET ORAL
Qty: 90 TABLET | Refills: 0 | Status: SHIPPED | OUTPATIENT
Start: 2024-04-19

## 2024-05-09 DIAGNOSIS — M79.7 FIBROMYALGIA SYNDROME: ICD-10-CM

## 2024-05-09 RX ORDER — GABAPENTIN 300 MG/1
CAPSULE ORAL
Qty: 270 CAPSULE | Refills: 0 | Status: SHIPPED | OUTPATIENT
Start: 2024-05-09 | End: 2024-06-08

## 2024-05-23 ENCOUNTER — OFFICE VISIT (OUTPATIENT)
Dept: FAMILY MEDICINE CLINIC | Age: 74
End: 2024-05-23
Payer: MEDICARE

## 2024-05-23 VITALS
DIASTOLIC BLOOD PRESSURE: 74 MMHG | OXYGEN SATURATION: 97 % | SYSTOLIC BLOOD PRESSURE: 118 MMHG | HEIGHT: 63 IN | WEIGHT: 138 LBS | RESPIRATION RATE: 18 BRPM | HEART RATE: 56 BPM | BODY MASS INDEX: 24.45 KG/M2

## 2024-05-23 DIAGNOSIS — L23.7 POISON IVY: Primary | ICD-10-CM

## 2024-05-23 PROCEDURE — 3074F SYST BP LT 130 MM HG: CPT | Performed by: INTERNAL MEDICINE

## 2024-05-23 PROCEDURE — 1123F ACP DISCUSS/DSCN MKR DOCD: CPT | Performed by: INTERNAL MEDICINE

## 2024-05-23 PROCEDURE — 3078F DIAST BP <80 MM HG: CPT | Performed by: INTERNAL MEDICINE

## 2024-05-23 PROCEDURE — 99213 OFFICE O/P EST LOW 20 MIN: CPT | Performed by: INTERNAL MEDICINE

## 2024-05-23 RX ORDER — PREDNISONE 20 MG/1
TABLET ORAL
Qty: 15 TABLET | Refills: 0 | Status: SHIPPED | OUTPATIENT
Start: 2024-05-23

## 2024-05-23 RX ORDER — MELOXICAM 7.5 MG/1
7.5 TABLET ORAL DAILY
COMMUNITY
Start: 2024-05-02 | End: 2024-07-01

## 2024-05-23 SDOH — ECONOMIC STABILITY: FOOD INSECURITY: WITHIN THE PAST 12 MONTHS, YOU WORRIED THAT YOUR FOOD WOULD RUN OUT BEFORE YOU GOT MONEY TO BUY MORE.: NEVER TRUE

## 2024-05-23 SDOH — ECONOMIC STABILITY: INCOME INSECURITY: HOW HARD IS IT FOR YOU TO PAY FOR THE VERY BASICS LIKE FOOD, HOUSING, MEDICAL CARE, AND HEATING?: NOT HARD AT ALL

## 2024-05-23 SDOH — ECONOMIC STABILITY: FOOD INSECURITY: WITHIN THE PAST 12 MONTHS, THE FOOD YOU BOUGHT JUST DIDN'T LAST AND YOU DIDN'T HAVE MONEY TO GET MORE.: NEVER TRUE

## 2024-05-23 ASSESSMENT — ENCOUNTER SYMPTOMS
SORE THROAT: 0
ABDOMINAL PAIN: 0
NAUSEA: 0
COUGH: 0
SHORTNESS OF BREATH: 0

## 2024-05-23 NOTE — PATIENT INSTRUCTIONS
SURVEY:    You may be receiving a survey from [facility] regarding your visit today.    Please complete the survey to enable us to provide the highest quality of care to you and your family.    If you cannot score us a very good on any question, please call the office to discuss how we could have made your experience a very good one.    Thank you.  [facility]  MD Michelle Solis, MD Ramone Oreilly, DO  Gaudencio Joyce, MD Tatiana Velez, APRN-CNP  Zoila Chacko, Practice Manager  Leonie, CMA  Hanna, CCMA  Thomsa, CMA  Lissett, CMA  Kashif, PSC   Amirah, LPN

## 2024-05-23 NOTE — PROGRESS NOTES
Cholesterol Brother        Review of Systems:         Review of Systems   Constitutional:  Negative for chills and fever.   HENT:  Negative for congestion and sore throat.    Respiratory:  Negative for cough and shortness of breath.    Cardiovascular:  Negative for chest pain and palpitations.   Gastrointestinal:  Negative for abdominal pain and nausea.   Genitourinary:  Negative for dysuria.   Skin:  Positive for rash.   Neurological:  Negative for dizziness and headaches.   Psychiatric/Behavioral:  The patient is not nervous/anxious.          Physical Exam:     Vitals:  /74 (Site: Right Upper Arm, Position: Sitting, Cuff Size: Medium Adult)   Pulse 56   Resp 18   Ht 1.6 m (5' 3\")   Wt 62.6 kg (138 lb)   LMP  (LMP Unknown)   SpO2 97%   BMI 24.45 kg/m²       Physical Exam  Vitals reviewed.   Constitutional:       General: She is not in acute distress.     Appearance: Normal appearance. She is well-developed.   HENT:      Head: Normocephalic and atraumatic.   Neck:      Thyroid: No thyromegaly.   Cardiovascular:      Rate and Rhythm: Normal rate and regular rhythm.      Heart sounds: Normal heart sounds. No murmur heard.  Pulmonary:      Effort: Pulmonary effort is normal.      Breath sounds: Normal breath sounds. No wheezing or rales.   Abdominal:      General: Bowel sounds are normal. There is no distension.      Palpations: Abdomen is soft. There is no mass.      Tenderness: There is no abdominal tenderness.   Musculoskeletal:         General: Normal range of motion.      Right lower leg: No edema.      Left lower leg: No edema.   Lymphadenopathy:      Cervical: No cervical adenopathy.   Skin:     General: Skin is warm and dry.      Coloration: Skin is not jaundiced or pale.      Findings: Rash (multiple areas of erythematous rash on lower legs, arms, neck, chest) present.   Neurological:      General: No focal deficit present.      Mental Status: She is alert and oriented to person, place, and time.

## 2024-05-30 DIAGNOSIS — E03.9 ACQUIRED HYPOTHYROIDISM: ICD-10-CM

## 2024-05-30 RX ORDER — LEVOTHYROXINE SODIUM 0.05 MG/1
TABLET ORAL
Qty: 90 TABLET | Refills: 0 | Status: SHIPPED | OUTPATIENT
Start: 2024-05-30

## 2024-05-31 ENCOUNTER — OFFICE VISIT (OUTPATIENT)
Dept: FAMILY MEDICINE CLINIC | Age: 74
End: 2024-05-31
Payer: MEDICARE

## 2024-05-31 VITALS
BODY MASS INDEX: 24.24 KG/M2 | HEART RATE: 62 BPM | OXYGEN SATURATION: 97 % | DIASTOLIC BLOOD PRESSURE: 78 MMHG | RESPIRATION RATE: 18 BRPM | WEIGHT: 136.8 LBS | SYSTOLIC BLOOD PRESSURE: 115 MMHG | HEIGHT: 63 IN

## 2024-05-31 DIAGNOSIS — G89.29 CHRONIC MIDLINE LOW BACK PAIN WITH RIGHT-SIDED SCIATICA: ICD-10-CM

## 2024-05-31 DIAGNOSIS — E03.9 ACQUIRED HYPOTHYROIDISM: ICD-10-CM

## 2024-05-31 DIAGNOSIS — M54.41 CHRONIC MIDLINE LOW BACK PAIN WITH RIGHT-SIDED SCIATICA: ICD-10-CM

## 2024-05-31 DIAGNOSIS — I10 ESSENTIAL HYPERTENSION: Primary | ICD-10-CM

## 2024-05-31 DIAGNOSIS — M47.817 LUMBAR AND SACRAL OSTEOARTHRITIS: ICD-10-CM

## 2024-05-31 DIAGNOSIS — K21.9 GASTROESOPHAGEAL REFLUX DISEASE WITHOUT ESOPHAGITIS: ICD-10-CM

## 2024-05-31 PROCEDURE — 1123F ACP DISCUSS/DSCN MKR DOCD: CPT | Performed by: INTERNAL MEDICINE

## 2024-05-31 PROCEDURE — 3074F SYST BP LT 130 MM HG: CPT | Performed by: INTERNAL MEDICINE

## 2024-05-31 PROCEDURE — 3078F DIAST BP <80 MM HG: CPT | Performed by: INTERNAL MEDICINE

## 2024-05-31 PROCEDURE — 99214 OFFICE O/P EST MOD 30 MIN: CPT | Performed by: INTERNAL MEDICINE

## 2024-05-31 RX ORDER — TIZANIDINE 2 MG/1
2 TABLET ORAL EVERY 8 HOURS PRN
Qty: 90 TABLET | Refills: 0 | Status: SHIPPED | OUTPATIENT
Start: 2024-05-31

## 2024-05-31 RX ORDER — OMEPRAZOLE 40 MG/1
40 CAPSULE, DELAYED RELEASE ORAL DAILY
Qty: 90 CAPSULE | Refills: 1 | Status: SHIPPED | OUTPATIENT
Start: 2024-05-31

## 2024-05-31 RX ORDER — TRAMADOL HYDROCHLORIDE 50 MG/1
50 TABLET ORAL 3 TIMES DAILY PRN
Qty: 90 TABLET | Refills: 2 | Status: SHIPPED | OUTPATIENT
Start: 2024-05-31 | End: 2024-08-29

## 2024-05-31 ASSESSMENT — ENCOUNTER SYMPTOMS
NAUSEA: 0
COUGH: 0
SHORTNESS OF BREATH: 0
BOWEL INCONTINENCE: 0
BACK PAIN: 1
ABDOMINAL PAIN: 0
SORE THROAT: 0

## 2024-05-31 NOTE — PROGRESS NOTES
7.9 10/27/2023 08:41 AM    RBC 4.82 10/27/2023 08:41 AM    RBC 4.86 01/19/2012 11:13 AM    HGB 13.6 10/27/2023 08:41 AM    HCT 41.0 10/27/2023 08:41 AM    MCV 85.1 10/27/2023 08:41 AM    MCH 28.2 10/27/2023 08:41 AM    MCHC 33.2 10/27/2023 08:41 AM    RDW 15.6 10/27/2023 08:41 AM     10/27/2023 08:41 AM     01/19/2012 11:13 AM    MPV 9.1 10/27/2023 08:41 AM     Lab Results   Component Value Date/Time    TSH 4.41 10/27/2023 08:41 AM     Lab Results   Component Value Date/Time    CHOL 197 10/27/2023 08:41 AM    CHOL 250 01/16/2017 12:00 AM     10/27/2023 08:41 AM     01/16/2017 12:00 AM    HDL 67 10/27/2023 08:41 AM          Assessment & Plan        Diagnosis Orders   1. Essential hypertension   Blood pressure is well controlled, continue on current meds. We discussed lowering Losartan to 50 mg/day if BP runs low.        2. Acquired hypothyroidism   TSH nl, continue same dose Synthroid       3. Lumbar and sacral osteoarthritis        4. Chronic midline low back pain with right-sided sciatica   Overall pain is manageable, on Tramadol, gabapentin. Occasionally takes Tylenol and Ibuprofen .        5. Gastroesophageal reflux disease without esophagitis   Doing well on Omeprazole. Advised to avoid NSAIDs.                        Completed Refills   Requested Prescriptions     Signed Prescriptions Disp Refills    omeprazole (PRILOSEC) 40 MG delayed release capsule 90 capsule 1     Sig: Take 1 capsule by mouth daily    tiZANidine (ZANAFLEX) 2 MG tablet 90 tablet 0     Sig: Take 1 tablet by mouth every 8 hours as needed (muscle spasm)    traMADol (ULTRAM) 50 MG tablet 90 tablet 2     Sig: Take 1 tablet by mouth 3 times daily as needed for Pain for up to 90 days. Max Daily Amount: 150 mg     Return in about 3 months (around 8/31/2024) for HTN, back pain, gerd.     Orders Placed This Encounter   Medications    omeprazole (PRILOSEC) 40 MG delayed release capsule     Sig: Take 1 capsule by mouth daily

## 2024-05-31 NOTE — PATIENT INSTRUCTIONS
SURVEY:    You may be receiving a survey from [facility] regarding your visit today.    Please complete the survey to enable us to provide the highest quality of care to you and your family.    If you cannot score us a very good on any question, please call the office to discuss how we could have made your experience a very good one.    Thank you.  [facility]  MD Michelle Solis, MD Ramone Oreilly, DO  Gaudencio Joyce, MD Tatiana Velez, APRN-CNP  Zoila Chacko, Practice Manager  Leonie, CMA  Hanna, CCMA  Thomas, CMA  Lissett, CMA  Kashif, PSC   Amirah, LPN

## 2024-06-07 DIAGNOSIS — I10 ESSENTIAL HYPERTENSION: ICD-10-CM

## 2024-06-07 RX ORDER — LOSARTAN POTASSIUM 100 MG/1
TABLET ORAL
Qty: 90 TABLET | Refills: 0 | Status: SHIPPED | OUTPATIENT
Start: 2024-06-07

## 2024-07-18 DIAGNOSIS — T78.40XD ALLERGY, SUBSEQUENT ENCOUNTER: ICD-10-CM

## 2024-07-18 RX ORDER — MONTELUKAST SODIUM 10 MG/1
TABLET ORAL
Qty: 90 TABLET | Refills: 1 | Status: SHIPPED | OUTPATIENT
Start: 2024-07-18

## 2024-08-10 DIAGNOSIS — M79.7 FIBROMYALGIA SYNDROME: ICD-10-CM

## 2024-08-12 RX ORDER — GABAPENTIN 300 MG/1
CAPSULE ORAL
Qty: 270 CAPSULE | Refills: 0 | Status: SHIPPED | OUTPATIENT
Start: 2024-08-12 | End: 2024-09-11

## 2024-08-24 DIAGNOSIS — E03.9 ACQUIRED HYPOTHYROIDISM: ICD-10-CM

## 2024-08-26 RX ORDER — LEVOTHYROXINE SODIUM 50 UG/1
TABLET ORAL
Qty: 90 TABLET | Refills: 0 | Status: SHIPPED | OUTPATIENT
Start: 2024-08-26

## 2024-08-30 ENCOUNTER — OFFICE VISIT (OUTPATIENT)
Dept: FAMILY MEDICINE CLINIC | Age: 74
End: 2024-08-30
Payer: MEDICARE

## 2024-08-30 VITALS
DIASTOLIC BLOOD PRESSURE: 77 MMHG | OXYGEN SATURATION: 97 % | WEIGHT: 136.4 LBS | BODY MASS INDEX: 24.17 KG/M2 | SYSTOLIC BLOOD PRESSURE: 108 MMHG | RESPIRATION RATE: 18 BRPM | HEIGHT: 63 IN | HEART RATE: 69 BPM

## 2024-08-30 DIAGNOSIS — I10 ESSENTIAL HYPERTENSION: Primary | ICD-10-CM

## 2024-08-30 DIAGNOSIS — M79.7 FIBROMYALGIA SYNDROME: ICD-10-CM

## 2024-08-30 DIAGNOSIS — M54.41 CHRONIC MIDLINE LOW BACK PAIN WITH RIGHT-SIDED SCIATICA: ICD-10-CM

## 2024-08-30 DIAGNOSIS — G89.29 CHRONIC MIDLINE LOW BACK PAIN WITH RIGHT-SIDED SCIATICA: ICD-10-CM

## 2024-08-30 PROCEDURE — 1123F ACP DISCUSS/DSCN MKR DOCD: CPT | Performed by: INTERNAL MEDICINE

## 2024-08-30 PROCEDURE — 3074F SYST BP LT 130 MM HG: CPT | Performed by: INTERNAL MEDICINE

## 2024-08-30 PROCEDURE — 99214 OFFICE O/P EST MOD 30 MIN: CPT | Performed by: INTERNAL MEDICINE

## 2024-08-30 PROCEDURE — 3078F DIAST BP <80 MM HG: CPT | Performed by: INTERNAL MEDICINE

## 2024-08-30 RX ORDER — TRAMADOL HYDROCHLORIDE 50 MG/1
50 TABLET ORAL EVERY 6 HOURS PRN
COMMUNITY
Start: 2024-08-07 | End: 2024-08-30 | Stop reason: SDUPTHER

## 2024-08-30 RX ORDER — TRAMADOL HYDROCHLORIDE 50 MG/1
50 TABLET ORAL 3 TIMES DAILY PRN
Qty: 90 TABLET | Refills: 2 | Status: SHIPPED | OUTPATIENT
Start: 2024-08-30 | End: 2024-11-28

## 2024-08-30 ASSESSMENT — ENCOUNTER SYMPTOMS
SHORTNESS OF BREATH: 0
BLURRED VISION: 0
BOWEL INCONTINENCE: 0
BACK PAIN: 1

## 2024-08-30 NOTE — PATIENT INSTRUCTIONS
SURVEY:    You may be receiving a survey from [facility] regarding your visit today.    Please complete the survey to enable us to provide the highest quality of care to you and your family.    If you cannot score us a very good on any question, please call the office to discuss how we could have made your experience a very good one.    Thank you.  [facility]  MD Michelle Solis, MD Ramone Oreilly, DO  Gaudencio Joyce, MD Tatiana Velez, APRN-CNP  Zoila Chacko, Practice Manager  Leonie, CMA  Hanna, CCMA  Thomas, CMA  Lissett, CMA  Kashif, PSC   Amirah, LPN

## 2024-08-30 NOTE — PROGRESS NOTES
HPI Notes    Name: Roxanne Stubbs  : 1950         Chief Complaint:     Chief Complaint   Patient presents with    Hypertension     Patient states all is well, takes meds as prescribed.    Back Pain     Patient states she thinks her anxiety may be triggering her pain more, which comes with extreme fatigue       History of Present Illness:        Roxanne presents to office to follow up for  HTN, chronic back pain, fibromyalgia    She has no complaints today    Hypertension  This is a chronic problem. The current episode started more than 1 year ago. The problem is unchanged. The problem is controlled. Pertinent negatives include no blurred vision, chest pain, peripheral edema or shortness of breath. Past treatments include angiotensin blockers, beta blockers, direct vasodilators and alpha 1 blockers. The current treatment provides significant improvement.   Back Pain  This is a chronic problem. The current episode started more than 1 year ago. The problem occurs daily. The problem has been gradually improving since onset. The pain is present in the lumbar spine and sacro-iliac. The quality of the pain is described as aching, burning and stabbing. The pain does not radiate. The pain is moderate. The symptoms are aggravated by bending and position. Pertinent negatives include no bladder incontinence, bowel incontinence, chest pain, paresis, paresthesias or perianal numbness. She has tried NSAIDs, heat and home exercises (Tramadol) for the symptoms. The treatment provided significant relief.   Chronic Pain  This is a chronic problem. The current episode started more than 1 year ago. The problem occurs 2 to 4 times per day. The problem is unchanged. Pain location: \"Everywhere. Pertinent negatives include no chest pain or shortness of breath. Treatments tried: Gabapentin. The treatment provided significant relief. There is no swelling present. She has been Behaving normally. Her past medical history is significant  10/27/2023 08:41 AM    AST 28 10/27/2023 08:41 AM    ALT 37 10/27/2023 08:41 AM     Lab Results   Component Value Date/Time    WBC 7.9 10/27/2023 08:41 AM    RBC 4.82 10/27/2023 08:41 AM    RBC 4.86 01/19/2012 11:13 AM    HGB 13.6 10/27/2023 08:41 AM    HCT 41.0 10/27/2023 08:41 AM    MCV 85.1 10/27/2023 08:41 AM    MCH 28.2 10/27/2023 08:41 AM    MCHC 33.2 10/27/2023 08:41 AM    RDW 15.6 10/27/2023 08:41 AM     10/27/2023 08:41 AM     01/19/2012 11:13 AM    MPV 9.1 10/27/2023 08:41 AM     Lab Results   Component Value Date/Time    TSH 4.41 10/27/2023 08:41 AM     Lab Results   Component Value Date/Time    CHOL 197 10/27/2023 08:41 AM    CHOL 250 01/16/2017 12:00 AM     10/27/2023 08:41 AM     01/16/2017 12:00 AM    HDL 67 10/27/2023 08:41 AM          Assessment & Plan        Diagnosis Orders   1. Essential hypertension   Blood pressure is well-controlled, on multiple medications, continue on current regimen without changes       2. Chronic midline low back pain with right-sided sciatica   Pain is controlled.  Takes tramadol as needed.  Sometimes takes Tylenol and NSAIDs trying to avoid tramadol if possible.  Compliant with treatment and regular office visits traMADol (ULTRAM) 50 MG tablet      3. Fibromyalgia syndrome   Overall doing well.  States has good days and bad days.  Gabapentin helping with pain                       Completed Refills   Requested Prescriptions     Signed Prescriptions Disp Refills    traMADol (ULTRAM) 50 MG tablet 90 tablet 2     Sig: Take 1 tablet by mouth 3 times daily as needed for Pain for up to 90 days. Max Daily Amount: 150 mg     Return in about 3 months (around 11/30/2024) for back pain.     Orders Placed This Encounter   Medications    traMADol (ULTRAM) 50 MG tablet     Sig: Take 1 tablet by mouth 3 times daily as needed for Pain for up to 90 days. Max Daily Amount: 150 mg     Dispense:  90 tablet     Refill:  2     Reduce doses taken as pain becomes

## 2024-09-04 DIAGNOSIS — I10 ESSENTIAL HYPERTENSION: ICD-10-CM

## 2024-09-04 RX ORDER — LOSARTAN POTASSIUM 100 MG/1
TABLET ORAL
Qty: 90 TABLET | Refills: 1 | Status: SHIPPED | OUTPATIENT
Start: 2024-09-04

## 2024-10-14 RX ORDER — DOXAZOSIN 1 MG/1
TABLET ORAL
Qty: 60 TABLET | Refills: 0 | Status: SHIPPED | OUTPATIENT
Start: 2024-10-14

## 2024-10-29 RX ORDER — METOPROLOL TARTRATE 25 MG/1
25 TABLET, FILM COATED ORAL 2 TIMES DAILY
Qty: 180 TABLET | Refills: 0 | Status: SHIPPED | OUTPATIENT
Start: 2024-10-29

## 2024-11-06 ENCOUNTER — HOSPITAL ENCOUNTER (OUTPATIENT)
Age: 74
Discharge: HOME | End: 2024-11-08
Payer: MEDICARE

## 2024-11-06 ENCOUNTER — HOSPITAL ENCOUNTER (OUTPATIENT)
Dept: GENERAL RADIOLOGY | Age: 74
Discharge: HOME | End: 2024-11-08
Payer: MEDICARE

## 2024-11-06 ENCOUNTER — HOSPITAL ENCOUNTER (OUTPATIENT)
Age: 74
Discharge: HOME | End: 2024-11-06
Payer: MEDICARE

## 2024-11-06 DIAGNOSIS — R00.2 PALPITATIONS: ICD-10-CM

## 2024-11-06 DIAGNOSIS — Z95.5 STATUS POST CORONARY ARTERY BALLOON DILATION: ICD-10-CM

## 2024-11-06 DIAGNOSIS — I10 ESSENTIAL HYPERTENSION: ICD-10-CM

## 2024-11-06 DIAGNOSIS — M79.7 FIBROMYALGIA SYNDROME: ICD-10-CM

## 2024-11-06 DIAGNOSIS — E78.2 MIXED HYPERLIPIDEMIA: ICD-10-CM

## 2024-11-06 DIAGNOSIS — E55.9 VITAMIN D DEFICIENCY, UNSPECIFIED: ICD-10-CM

## 2024-11-06 LAB
25(OH)D3 SERPL-MCNC: 57.6 NG/ML (ref 30–100)
ALBUMIN SERPL-MCNC: 3.8 G/DL (ref 3.5–5.2)
ALP SERPL-CCNC: 102 U/L (ref 35–104)
ALT SERPL-CCNC: 26 U/L (ref 5–33)
ANION GAP SERPL CALCULATED.3IONS-SCNC: 5 MMOL/L (ref 9–17)
AST SERPL-CCNC: 23 U/L
BASOPHILS # BLD: 0.05 K/UL (ref 0–0.2)
BASOPHILS NFR BLD: 1 % (ref 0–2)
BILIRUB SERPL-MCNC: 0.6 MG/DL (ref 0.3–1.2)
BUN SERPL-MCNC: 10 MG/DL (ref 8–23)
BUN/CREAT SERPL: 17 (ref 9–20)
CALCIUM SERPL-MCNC: 9.1 MG/DL (ref 8.6–10.4)
CHLORIDE SERPL-SCNC: 103 MMOL/L (ref 98–107)
CHOLEST SERPL-MCNC: 176 MG/DL (ref 0–199)
CHOLESTEROL/HDL RATIO: 3.4
CO2 SERPL-SCNC: 33 MMOL/L (ref 20–31)
CREAT SERPL-MCNC: 0.6 MG/DL (ref 0.5–0.9)
EOSINOPHIL # BLD: 0.21 K/UL (ref 0–0.4)
EOSINOPHILS RELATIVE PERCENT: 3 % (ref 0–5)
ERYTHROCYTE [DISTWIDTH] IN BLOOD BY AUTOMATED COUNT: 16.5 % (ref 12.1–15.2)
GFR, ESTIMATED: >90 ML/MIN/1.73M2
GLUCOSE SERPL-MCNC: 86 MG/DL (ref 70–99)
HCT VFR BLD AUTO: 39.6 % (ref 36–46)
HDLC SERPL-MCNC: 52 MG/DL
HGB BLD-MCNC: 12.9 G/DL (ref 12–16)
IMM GRANULOCYTES # BLD AUTO: 0.03 K/UL (ref 0–0.3)
IMM GRANULOCYTES NFR BLD: 0 % (ref 0–5)
LDLC SERPL CALC-MCNC: 96 MG/DL (ref 0–100)
LYMPHOCYTES NFR BLD: 1.76 K/UL (ref 1–4.8)
LYMPHOCYTES RELATIVE PERCENT: 24 % (ref 15–40)
MAGNESIUM SERPL-MCNC: 2 MG/DL (ref 1.6–2.6)
MCH RBC QN AUTO: 27.3 PG (ref 26–34)
MCHC RBC AUTO-ENTMCNC: 32.6 G/DL (ref 31–37)
MCV RBC AUTO: 83.9 FL (ref 80–100)
MONOCYTES NFR BLD: 0.38 K/UL (ref 0–1)
MONOCYTES NFR BLD: 5 % (ref 4–8)
NEUTROPHILS NFR BLD: 67 % (ref 47–75)
NEUTS SEG NFR BLD: 4.99 K/UL (ref 2.5–7)
PLATELET # BLD AUTO: 525 K/UL (ref 140–450)
PMV BLD AUTO: 9.2 FL (ref 6–12)
POTASSIUM SERPL-SCNC: 4 MMOL/L (ref 3.7–5.3)
PROT SERPL-MCNC: 5.8 G/DL (ref 6.4–8.3)
RBC # BLD AUTO: 4.72 M/UL (ref 4–5.2)
SODIUM SERPL-SCNC: 141 MMOL/L (ref 135–144)
TRIGL SERPL-MCNC: 139 MG/DL
TSH SERPL DL<=0.05 MIU/L-ACNC: 2.62 UIU/ML (ref 0.3–5)
VLDLC SERPL CALC-MCNC: 28 MG/DL (ref 1–30)
WBC OTHER # BLD: 7.4 K/UL (ref 3.5–11)

## 2024-11-06 PROCEDURE — 85025 COMPLETE CBC W/AUTO DIFF WBC: CPT

## 2024-11-06 PROCEDURE — 82306 VITAMIN D 25 HYDROXY: CPT

## 2024-11-06 PROCEDURE — 84443 ASSAY THYROID STIM HORMONE: CPT

## 2024-11-06 PROCEDURE — 80053 COMPREHEN METABOLIC PANEL: CPT

## 2024-11-06 PROCEDURE — 83735 ASSAY OF MAGNESIUM: CPT

## 2024-11-06 PROCEDURE — 71046 X-RAY EXAM CHEST 2 VIEWS: CPT

## 2024-11-06 PROCEDURE — 80061 LIPID PANEL: CPT

## 2024-11-06 PROCEDURE — 93005 ELECTROCARDIOGRAM TRACING: CPT

## 2024-11-06 PROCEDURE — 36415 COLL VENOUS BLD VENIPUNCTURE: CPT

## 2024-11-06 RX ORDER — GABAPENTIN 300 MG/1
CAPSULE ORAL
Qty: 270 CAPSULE | Refills: 0 | Status: SHIPPED | OUTPATIENT
Start: 2024-11-06 | End: 2024-12-06

## 2024-11-07 LAB
EKG ATRIAL RATE: 56 BPM
EKG P AXIS: 61 DEGREES
EKG P-R INTERVAL: 180 MS
EKG Q-T INTERVAL: 442 MS
EKG QRS DURATION: 96 MS
EKG QTC CALCULATION (BAZETT): 426 MS
EKG R AXIS: 38 DEGREES
EKG T AXIS: 66 DEGREES
EKG VENTRICULAR RATE: 56 BPM

## 2024-11-11 ENCOUNTER — OFFICE VISIT (OUTPATIENT)
Dept: CARDIOLOGY CLINIC | Age: 74
End: 2024-11-11

## 2024-11-11 VITALS
BODY MASS INDEX: 23.91 KG/M2 | HEART RATE: 64 BPM | DIASTOLIC BLOOD PRESSURE: 74 MMHG | SYSTOLIC BLOOD PRESSURE: 170 MMHG | RESPIRATION RATE: 16 BRPM | OXYGEN SATURATION: 98 % | WEIGHT: 135 LBS

## 2024-11-11 DIAGNOSIS — I20.89 ANGINA AT REST (HCC): ICD-10-CM

## 2024-11-11 DIAGNOSIS — E78.2 MIXED HYPERLIPIDEMIA: ICD-10-CM

## 2024-11-11 DIAGNOSIS — E55.9 VITAMIN D DEFICIENCY, UNSPECIFIED: ICD-10-CM

## 2024-11-11 DIAGNOSIS — I10 ESSENTIAL HYPERTENSION: ICD-10-CM

## 2024-11-11 DIAGNOSIS — R07.9 CHEST PAIN, UNSPECIFIED TYPE: Primary | ICD-10-CM

## 2024-11-11 RX ORDER — DOXAZOSIN 1 MG/1
1 TABLET ORAL 2 TIMES DAILY
Qty: 180 TABLET | Refills: 3 | Status: SHIPPED | OUTPATIENT
Start: 2024-11-11

## 2024-11-11 RX ORDER — METOPROLOL TARTRATE 25 MG/1
25 TABLET, FILM COATED ORAL 2 TIMES DAILY
Qty: 180 TABLET | Refills: 3 | Status: SHIPPED | OUTPATIENT
Start: 2024-11-11

## 2024-11-11 RX ORDER — DOXAZOSIN 1 MG/1
TABLET ORAL
Qty: 60 TABLET | Refills: 0 | Status: SHIPPED | OUTPATIENT
Start: 2024-11-11 | End: 2024-11-11 | Stop reason: SDUPTHER

## 2024-11-11 NOTE — PATIENT INSTRUCTIONS
Will start Cardiac Rehab  Will hold spot for Cardiac cath on January 17, 2025  No change in medications.  Will follow up in January 2025.

## 2024-11-12 NOTE — PROGRESS NOTES
Ilir Baxter CNP 1 year follow up   Occ chest pain and sob  When laying down chest feels  Heavy.   Occ dizziness   C/o tiredness.  No hospitalizations or   Procedures since seen.    Seeing oncologist   02/14/2024 - Lalo Hou.     
140 to 150 range.  8.  Depression.  9.  Palpitations, which were quite well controlled.  10.  Dizziness with normal heart rate and normal blood pressure.     PLAN:        Will start Cardiac Rehab  Will hold spot for Cardiac cath on January 17, 2025  No change in medications.  Will follow up in January 2025.    Finally, I recommended that she continue her current medications and follow up with you as previously scheduled.     DISCUSSION:        Reviewed findings with Dr. Harvey and then Ms. Stubbs was also evaluated by Dr. Harvey.      With her recent increase in shortness of breath, occasional chest pain with and without activity and loss of energy, we offered her two options, Cardiac Rehab to see how she does with supervised exercise or proceed with a cardiac cath to define her anatomy.  Ms. Stubbs opted to try Cardiac Rehab first.     We told  Ms. Stubbs to call our office if she has any problems, but otherwise we asked her to follow up in January 2025 and will hold spot for cardiac cath to be done on January 17, 2025. However, we would be happy to see her sooner should the need arise.      Thank you very much for allowing us the privilege of seeing Ms. Stubbs.  If you have any questions on our  thoughts, please do not hesitate to contact us.     Sincerely,  CARMELITA Clark - CNP  [facility]  [address] [place] [id number]  Phone: [phone], Fax: [phone]

## 2024-11-13 DIAGNOSIS — I20.9 ANGINA, CLASS III (HCC): Primary | ICD-10-CM

## 2024-11-18 ENCOUNTER — HOSPITAL ENCOUNTER (OUTPATIENT)
Dept: CARDIAC REHAB | Age: 74
Setting detail: THERAPIES SERIES
Discharge: HOME | End: 2024-11-18
Payer: MEDICARE

## 2024-11-18 VITALS — WEIGHT: 139.2 LBS | BODY MASS INDEX: 24.66 KG/M2 | HEIGHT: 63 IN

## 2024-11-18 ASSESSMENT — PATIENT HEALTH QUESTIONNAIRE - PHQ9
4. FEELING TIRED OR HAVING LITTLE ENERGY: SEVERAL DAYS
3. TROUBLE FALLING OR STAYING ASLEEP: NEARLY EVERY DAY
8. MOVING OR SPEAKING SO SLOWLY THAT OTHER PEOPLE COULD HAVE NOTICED. OR THE OPPOSITE, BEING SO FIGETY OR RESTLESS THAT YOU HAVE BEEN MOVING AROUND A LOT MORE THAN USUAL: NOT AT ALL
SUM OF ALL RESPONSES TO PHQ QUESTIONS 1-9: 4
SUM OF ALL RESPONSES TO PHQ QUESTIONS 1-9: 4
10. IF YOU CHECKED OFF ANY PROBLEMS, HOW DIFFICULT HAVE THESE PROBLEMS MADE IT FOR YOU TO DO YOUR WORK, TAKE CARE OF THINGS AT HOME, OR GET ALONG WITH OTHER PEOPLE: SOMEWHAT DIFFICULT
9. THOUGHTS THAT YOU WOULD BE BETTER OFF DEAD, OR OF HURTING YOURSELF: NOT AT ALL
SUM OF ALL RESPONSES TO PHQ QUESTIONS 1-9: 4
SUM OF ALL RESPONSES TO PHQ QUESTIONS 1-9: 4
5. POOR APPETITE OR OVEREATING: NOT AT ALL
7. TROUBLE CONCENTRATING ON THINGS, SUCH AS READING THE NEWSPAPER OR WATCHING TELEVISION: NOT AT ALL
6. FEELING BAD ABOUT YOURSELF - OR THAT YOU ARE A FAILURE OR HAVE LET YOURSELF OR YOUR FAMILY DOWN: NOT AT ALL

## 2024-11-18 ASSESSMENT — EJECTION FRACTION: EF_VALUE: 60

## 2024-11-18 NOTE — PROGRESS NOTES
Cardiac Rehabilitation   Physician Order Form    Roxanne Stubbs  1950  [id number]  11/18/2024    [x] Phase 2 ECG Monitored Cardiac Rehabilitation    [] MI   [] Percutaneous Coronary Intervention          [] Other:   [] CABG  [] Heart Valve Repair/Replaced   [x] Stable Angina [] Heart Failure:     Onset Date: 11/11/24                    Cardiac Education Goals: (see individualized treatment plan for specific goals, progression & compliance)    [x] Hypertension [x] Physical Inactivity  [x] Cardiac A&P    [] Heart Failure [x] Medications                       [x] Coping w/ Anxiety / Depression  [] Diabetes  [x] Weight Management [x] Angina  [x] Hyperlipidemia       [x] Home Exercise             [x] Stress Reduction and Relaxation   [x] Medications           [] Smoking Cessation                                                Prescribed Exercise Plan:    Target Hr: 84-96       Duration: 31 - 60 Minutes  Frequency: 3 Days per week  Initial Met Level: 2.5-3.5  Limitations:  [x] Back Pain  Where? CHRONIC-CONSTANT LBP \"SORE / ACHE\" W/ INTERMITTENT RT LEG RADICULAR S/S   [x] Joint discomfort Where? \"RT SH NEEDS REPLACEMENT / CHRONIC-INTERMITTENT SORE TO ACHE    Modalities:  [x]Treadmill   [x] UBE  [x] Seated Stepper  [x] Rowing Machine  [x] Weights/therabands  [] Total Body Ergometer    Aerobic exercise to total 31-60 minutes. Progressing by 1-2 minutes per week and/or 1-2 levels per week per patient tolerance using various modalities; according to Bk Scale 12-16 and THR  Introduce 8-12 bilateral UE and LE progressive resistance exercises at 1-3 sets per lift, on 2-3 non-consecutive days using weights/ ORANGE  therabands AND WTS TO 8-24 # for 8-15 reps to progressive overload by increasing resistance once reps progressed to at least 15 reps on at least 2 occasions                 Per patient symptoms use:  Appropriate ACLS Algorhythm for Cardiac Events.  Nitroglycerine 0.4mg SLq 5mins X 3 for angina pain.  12 lead EKG

## 2024-11-18 NOTE — PROGRESS NOTES
Cardiac Rehab Initial History and Assessment    Roxanne Stubbs   1950  [id number]  2024    Pre-cert Verification: PT VERBALIZES UNDERSTANDING & ACCEPTANCE OF OWN FINANCIAL RESPONSIBILITY FOR OOP NOT COVERED     Primary Diagnosis: STABLE ANGINA  Onset: 24  Living Will: [] Yes   [x] No  On File: [] Yes   [x] No   [] N/A  Durable Power of : [] Yes   [x] No  **PT DECLINES INFO ON LIVING WILL  Medical History  Past Medical History:   Diagnosis Date    Allergic rhinitis 2011    Anxiety 2011    CAD (coronary artery disease)     MATTHEW KIDNEY TUMORS    Cancer (HCC)     Carpal tunnel syndrome 2011    Depression 2011    Dyslipidemia 2011    Fibromyalgia 2011    Hyperlipidemia     Hypertension 2011    SOB (shortness of breath)     Thyroid disease      Past Surgical History:   Procedure Laterality Date    CARDIAC CATHETERIZATION Left 2017    Dr. Harvey @ [facility]--CAD, 50-60% ostial right CAD with an FFR of 0.94.  60% disease in the proximal LAD with an FFR of 0.92 with bridging of the mid LAD.  Normal LV function at 60%. Mildly dilated ascending aorta -will do a CT scan to measure.    CARDIAC CATHETERIZATION Left 2020    Dr. Harvey @ [facility]--Medical therapy     SECTION      x 4    ELBOW SURGERY Right     GALLBLADDER SURGERY  1975    HYSTERECTOMY (CERVIX STATUS UNKNOWN)      total hysterectomy    KIDNEY SURGERY Right 2021    Dr. Vieyra- [place]    ROTATOR CUFF REPAIR      Right    SMALL INTESTINE SURGERY      twisted bowel 1985       Family History  Family History   Problem Relation Age of Onset    Stroke Mother     High Cholesterol Mother     Heart Attack Mother     Heart Attack Father     High Cholesterol Father     High Cholesterol Sister     High Cholesterol Brother     High Blood Pressure Other          Symptoms:  1. Angina   [] None                                  [x] Loss of Energy / Fatigue   [x] Tightness   [x]

## 2024-11-18 NOTE — FLOWSHEET NOTE
moderate intensity;Aerobic activity 30 + minutes/day  5 days/week   Patient Treatment Goal PT WILL BE ABLE TO ADV EX CAP >0.5-1.0 METS W/IN MSK TOLERANCE IN THE NEXT 30 DAYS   Goal Status Initial   Progress Towards Goal READY TO BEGIN   Psychosocial Assessment   Stages of Change Preparation;Action   PHQ-9 Total Score 4   Psychosocial Intervention   Interventions No intervention indicated   Stress Management Techniques Uses guided meditation;Practices deep breathing;Maintains physical exercise and healthy nutrition;Manages social media time;Connects with others   Consults   (N/A)   Resources Provided Other (comment)  (RELAXATION EX H/Os FORTHCOMING)   Currently Taking Psychotropic Meds No   Medication Changes No   Psychosocial Education   Education Attended class;Benefits of CPR completion;Cardiac meds;Coping techniques;Environmental triggers;Impact self care behaviors on health;Relaxation techniques   Psychosocial Goals   Patient Target Goals Assess presence or absence of depression using a valid screening tool;Demonstrates appropriate interaction with others;Engages in self-care behaviors;Maximizes coping skills;Positive support group   Patient Treatment Goal PT WILL BE ABLE TO REDUCE SUBJECTIVE S/S OF DEPRESSION AND ANXIETY IN THE NEXT 30 DAYS AND ACHIEVE A 50 50% REDUCTION OF PHQ-9 & HUEY-7 SCORE BY D/C.   Goal Status Initial   Progress Towards Goal READY TO BEGIN   Other Core Component - Tobacco Cessation   History of Tobacco Use No   Nutrition Assessment   Stages of Change Preparation;Action   Nutrition Assessment Tool Other (comment)  (REAPS SURVEY SC=25 (SOME WAYS EATING HABIT COULD BE HEALTHIER))   Current Diet \"COULD BE BETTER\"   Barriers to Heart Healthy Diet NONE REPORTED   Alcohol No   Height  1.6 m (5' 3\")   Weight  63.1 kg (139 lb 3.2 oz)   BMI 24.71   Waist Circumference (In) 31.0   Other Core Component - Weight Management   BMI <18.5 or > 24.9, Managed as Core Component No   Other Core Component -

## 2024-11-19 ENCOUNTER — HOSPITAL ENCOUNTER (OUTPATIENT)
Dept: CARDIAC REHAB | Age: 74
Setting detail: THERAPIES SERIES
Discharge: HOME | End: 2024-11-19
Payer: MEDICARE

## 2024-11-19 PROCEDURE — 93798 PHYS/QHP OP CAR RHAB W/ECG: CPT

## 2024-11-20 DIAGNOSIS — K21.9 GASTROESOPHAGEAL REFLUX DISEASE WITHOUT ESOPHAGITIS: ICD-10-CM

## 2024-11-20 DIAGNOSIS — E03.9 ACQUIRED HYPOTHYROIDISM: ICD-10-CM

## 2024-11-20 RX ORDER — LEVOTHYROXINE SODIUM 50 UG/1
TABLET ORAL
Qty: 90 TABLET | Refills: 0 | Status: SHIPPED | OUTPATIENT
Start: 2024-11-20

## 2024-11-20 RX ORDER — OMEPRAZOLE 40 MG/1
40 CAPSULE, DELAYED RELEASE ORAL DAILY
Qty: 90 CAPSULE | Refills: 0 | Status: SHIPPED | OUTPATIENT
Start: 2024-11-20

## 2024-11-21 ENCOUNTER — HOSPITAL ENCOUNTER (OUTPATIENT)
Dept: CARDIAC REHAB | Age: 74
Setting detail: THERAPIES SERIES
Discharge: HOME | End: 2024-11-21
Payer: MEDICARE

## 2024-11-21 PROCEDURE — 93798 PHYS/QHP OP CAR RHAB W/ECG: CPT

## 2024-11-25 ENCOUNTER — APPOINTMENT (OUTPATIENT)
Dept: CARDIAC REHAB | Age: 74
End: 2024-11-25
Payer: MEDICARE

## 2024-11-26 ENCOUNTER — HOSPITAL ENCOUNTER (OUTPATIENT)
Dept: CARDIAC REHAB | Age: 74
Setting detail: THERAPIES SERIES
Discharge: HOME | End: 2024-11-26
Payer: MEDICARE

## 2024-11-26 PROCEDURE — 93798 PHYS/QHP OP CAR RHAB W/ECG: CPT

## 2024-12-02 ENCOUNTER — HOSPITAL ENCOUNTER (OUTPATIENT)
Dept: CARDIAC REHAB | Age: 74
Setting detail: THERAPIES SERIES
Discharge: HOME | End: 2024-12-02
Payer: MEDICARE

## 2024-12-02 PROCEDURE — 93798 PHYS/QHP OP CAR RHAB W/ECG: CPT

## 2024-12-03 ENCOUNTER — HOSPITAL ENCOUNTER (OUTPATIENT)
Dept: CARDIAC REHAB | Age: 74
Setting detail: THERAPIES SERIES
Discharge: HOME | End: 2024-12-03
Payer: MEDICARE

## 2024-12-03 PROCEDURE — 93798 PHYS/QHP OP CAR RHAB W/ECG: CPT

## 2024-12-03 NOTE — PROGRESS NOTES
GALLBLADDER SURGERY  1975    HYSTERECTOMY (CERVIX STATUS UNKNOWN)      total hysterectomy    [facility]PA[facility]      Right    SMALL INTESTINE SURGERY      twisted bowel 1985        Medications:       Prior to Admission medications    Medication Sig Start Date End Date Taking? Authorizing Provider   tiZANidine (ZANAFLEX) 2 MG tablet Take 1 tablet by mouth every 8 hours as needed (muscle spasm) 12/4/24  Yes Trenton Claudio MD   traMADol (ULTRAM) 50 MG tablet Take 1 tablet by mouth every 6 hours as needed for Pain for up to 90 days. Max Daily Amount: 200 mg 12/4/24 3/4/25 Yes Trenton Claudio MD   omeprazole (PRILOSEC) 40 MG delayed release capsule Take 1 capsule by mouth once daily 11/20/24  Yes Trenton Claudio MD   levothyroxine (SYNTHROID) 50 MCG tablet Take 1 tablet by mouth once daily 11/20/24  Yes Trenton Claudio MD   doxazosin (CARDURA) 1 MG tablet Take 1 tablet by mouth in the morning and at bedtime 11/11/24  Yes Cindy Baxter APRN - CNP   metoprolol tartrate (LOPRESSOR) 25 MG tablet Take 1 tablet by mouth 2 times daily 11/11/24  Yes Cindy Baxter APRN - CNP   gabapentin (NEURONTIN) 300 MG capsule TAKE 1 CAPSULE BY MOUTH THREE TIMES DAILY 11/6/24 12/6/24 Yes Trenton Claudio MD   losartan (COZAAR) 100 MG tablet Take 1 tablet by mouth once daily 9/4/24  Yes Trenton Claudio MD   montelukast (SINGULAIR) 10 MG tablet Take 1 tablet by mouth once daily 7/18/24  Yes Trenton Claudio MD   hydrALAZINE (APRESOLINE) 50 MG tablet TAKE 1 TABLET BY MOUTH IN THE MORNING AND AT BEDTIME 3/27/23  Yes Trenton Claudio MD   fexofenadine (ALLEGRA) 180 MG tablet Take 1 tablet by mouth daily 12/1/22  Yes Trenton Claudio MD   B Complex Vitamins (VITAMIN B COMPLEX 100 IJ) Take by mouth   Yes ProviderErasmo MD   Multiple Vitamins-Minerals (CENTRUM SILVER ADULT 50+) TABS Take by mouth    Yes Erasmo Boyle MD   aspirin EC 81 MG EC tablet Take 1

## 2024-12-04 ENCOUNTER — OFFICE VISIT (OUTPATIENT)
Dept: FAMILY MEDICINE CLINIC | Age: 74
End: 2024-12-04

## 2024-12-04 VITALS
WEIGHT: 153 LBS | OXYGEN SATURATION: 98 % | HEART RATE: 68 BPM | BODY MASS INDEX: 27.11 KG/M2 | SYSTOLIC BLOOD PRESSURE: 138 MMHG | DIASTOLIC BLOOD PRESSURE: 74 MMHG | HEIGHT: 63 IN

## 2024-12-04 DIAGNOSIS — M54.16 LUMBAR RADICULOPATHY: ICD-10-CM

## 2024-12-04 DIAGNOSIS — E03.9 ACQUIRED HYPOTHYROIDISM: ICD-10-CM

## 2024-12-04 DIAGNOSIS — G89.29 CHRONIC MIDLINE LOW BACK PAIN WITH RIGHT-SIDED SCIATICA: ICD-10-CM

## 2024-12-04 DIAGNOSIS — I10 ESSENTIAL HYPERTENSION: Primary | ICD-10-CM

## 2024-12-04 DIAGNOSIS — K21.9 GASTROESOPHAGEAL REFLUX DISEASE WITHOUT ESOPHAGITIS: ICD-10-CM

## 2024-12-04 DIAGNOSIS — M54.41 CHRONIC MIDLINE LOW BACK PAIN WITH RIGHT-SIDED SCIATICA: ICD-10-CM

## 2024-12-04 RX ORDER — TRAMADOL HYDROCHLORIDE 50 MG/1
50 TABLET ORAL EVERY 6 HOURS PRN
Qty: 120 TABLET | Refills: 2 | Status: SHIPPED | OUTPATIENT
Start: 2024-12-04 | End: 2025-03-04

## 2024-12-04 RX ORDER — TIZANIDINE 2 MG/1
2 TABLET ORAL EVERY 8 HOURS PRN
Qty: 90 TABLET | Refills: 0 | Status: SHIPPED | OUTPATIENT
Start: 2024-12-04

## 2024-12-04 ASSESSMENT — ENCOUNTER SYMPTOMS
COUGH: 0
SORE THROAT: 0
ABDOMINAL PAIN: 0
NAUSEA: 0
BACK PAIN: 1
SHORTNESS OF BREATH: 0

## 2024-12-05 ENCOUNTER — HOSPITAL ENCOUNTER (OUTPATIENT)
Dept: CARDIAC REHAB | Age: 74
Setting detail: THERAPIES SERIES
Discharge: HOME | End: 2024-12-05
Payer: MEDICARE

## 2024-12-05 PROCEDURE — 93798 PHYS/QHP OP CAR RHAB W/ECG: CPT

## 2024-12-09 ENCOUNTER — APPOINTMENT (OUTPATIENT)
Dept: CARDIAC REHAB | Age: 74
End: 2024-12-09
Payer: MEDICARE

## 2024-12-10 ENCOUNTER — HOSPITAL ENCOUNTER (OUTPATIENT)
Dept: CARDIAC REHAB | Age: 74
Setting detail: THERAPIES SERIES
End: 2024-12-10
Payer: MEDICARE

## 2024-12-12 ENCOUNTER — APPOINTMENT (OUTPATIENT)
Dept: CARDIAC REHAB | Age: 74
End: 2024-12-12
Payer: MEDICARE

## 2024-12-16 ENCOUNTER — HOSPITAL ENCOUNTER (OUTPATIENT)
Dept: CARDIAC REHAB | Age: 74
Setting detail: THERAPIES SERIES
Discharge: HOME | End: 2024-12-16
Payer: MEDICARE

## 2024-12-16 NOTE — PROGRESS NOTES
12/16/24 0744   Treatment Diagnosis   Treatment Diagnosis 1 Angina   Angina Date 11/11/24   Referral Date 11/11/24   Significant Cardiovascular History   (KNOWN CAD SINCE 2017)   Individual Treatment Plan   ITP Visit Type Re-assessment   ITP Next Review Date 01/13/25   Visit #/Total Visits 6/36   EF% 60 %   Risk Stratification Low   Supplemental Oxygen   Oxygen Use No   Exercise Assessment   Stages of Change Preparation;Action   Assisted Devices None  (LOW FALL RISK  / IVAN 0)   Test Exercise tolerance test   Data Measured Before Test   Heart Rate 79   Resting Blood Pressure 136/76   Data Measured During Test   RPD 3   Data Measured at Peak   Peak Heart Rate 98   Peak Blood Pressure 144/70   Peak RPE 13   Data Measured at 5 Minutes After Test   Heart Rate 71   Blood Pressure 122/66   Exercise Intervention/Prescription   Mode Treadmill;Bike;Stepper;Ergometer;Rower;Resistance training   Frequency per week 3-5   Duration Per Session 30-60   Intensity METS       3.5-4.5   RPE 12-15   Progression 0.5-1.0 METS / 30 DAYS   Symptoms with Exercise Shortness of breath;Leg pain;Angina   Target Heart Rate 78-96   Resistance Training Yes   Exercise Activity at Home   Type WALKING / RESISTIVE BANDS / STRETCHING   Frequency @ LEAST 2 DAYS / WK   Duration 30-60   Resistance Training Yes   Exercise Education   Education Attended class;Self pulse;Exercise safety;Signs/symptoms to report;RPE scale;Equipment orientation;Warm up/cool down;Physically active   Exercise Goals   Patient Target Goals Individual exercise RX;Maintain exercise and activity at a moderate intensity;Aerobic activity 30 + minutes/day  5 days/week   Patient Treatment Goal PT WILL BE ABLE TO ADV EX CAP >0.5-1.0 METS W/IN MSK TOLERANCE IN THE NEXT 30 DAYS   Goal Status In progress   Progress Towards Goal pt avg mets increased to 3.6 in last 30 days   Psychosocial Assessment   Stages of Change Action   Psychosocial Intervention   Interventions No intervention

## 2024-12-17 ENCOUNTER — APPOINTMENT (OUTPATIENT)
Dept: CARDIAC REHAB | Age: 74
End: 2024-12-17
Payer: MEDICARE

## 2024-12-19 ENCOUNTER — APPOINTMENT (OUTPATIENT)
Dept: CARDIAC REHAB | Age: 74
End: 2024-12-19
Payer: MEDICARE

## 2024-12-23 ENCOUNTER — APPOINTMENT (OUTPATIENT)
Dept: CARDIAC REHAB | Age: 74
End: 2024-12-23
Payer: MEDICARE

## 2024-12-24 ENCOUNTER — APPOINTMENT (OUTPATIENT)
Dept: CARDIAC REHAB | Age: 74
End: 2024-12-24
Payer: MEDICARE

## 2024-12-26 ENCOUNTER — APPOINTMENT (OUTPATIENT)
Dept: CARDIAC REHAB | Age: 74
End: 2024-12-26
Payer: MEDICARE

## 2024-12-30 ENCOUNTER — APPOINTMENT (OUTPATIENT)
Dept: CARDIAC REHAB | Age: 74
End: 2024-12-30
Payer: MEDICARE

## 2024-12-31 ENCOUNTER — APPOINTMENT (OUTPATIENT)
Dept: CARDIAC REHAB | Age: 74
End: 2024-12-31
Payer: MEDICARE

## 2025-01-06 ENCOUNTER — TELEPHONE (OUTPATIENT)
Dept: CARDIOLOGY CLINIC | Age: 75
End: 2025-01-06

## 2025-01-06 ENCOUNTER — HOSPITAL ENCOUNTER (OUTPATIENT)
Age: 75
Discharge: HOME | End: 2025-01-06
Payer: COMMERCIAL

## 2025-01-06 ENCOUNTER — NURSE ONLY (OUTPATIENT)
Dept: CARDIOLOGY CLINIC | Age: 75
End: 2025-01-06
Payer: COMMERCIAL

## 2025-01-06 ENCOUNTER — TELEPHONE (OUTPATIENT)
Dept: FAMILY MEDICINE CLINIC | Age: 75
End: 2025-01-06

## 2025-01-06 DIAGNOSIS — R06.02 SOB (SHORTNESS OF BREATH): ICD-10-CM

## 2025-01-06 DIAGNOSIS — R09.81 CONGESTION OF NASAL SINUS: ICD-10-CM

## 2025-01-06 DIAGNOSIS — R07.9 CHEST PAIN, UNSPECIFIED TYPE: Primary | ICD-10-CM

## 2025-01-06 DIAGNOSIS — R07.9 CHEST PAIN, UNSPECIFIED TYPE: ICD-10-CM

## 2025-01-06 DIAGNOSIS — Z01.818 PRE-OP TESTING: ICD-10-CM

## 2025-01-06 DIAGNOSIS — E78.2 MIXED HYPERLIPIDEMIA: ICD-10-CM

## 2025-01-06 LAB
ALBUMIN SERPL-MCNC: 3.8 G/DL (ref 3.5–5.2)
ALP SERPL-CCNC: 145 U/L (ref 35–104)
ALT SERPL-CCNC: 23 U/L (ref 5–33)
ANION GAP SERPL CALCULATED.3IONS-SCNC: 6 MMOL/L (ref 9–17)
AST SERPL-CCNC: 26 U/L
BASOPHILS # BLD: 0.06 K/UL (ref 0–0.2)
BASOPHILS NFR BLD: 1 % (ref 0–2)
BILIRUB SERPL-MCNC: 0.5 MG/DL (ref 0.3–1.2)
BUN SERPL-MCNC: 11 MG/DL (ref 8–23)
BUN/CREAT SERPL: 18 (ref 9–20)
CALCIUM SERPL-MCNC: 9.2 MG/DL (ref 8.6–10.4)
CHLORIDE SERPL-SCNC: 102 MMOL/L (ref 98–107)
CHOLEST SERPL-MCNC: 186 MG/DL (ref 0–199)
CHOLESTEROL/HDL RATIO: 3
CO2 SERPL-SCNC: 31 MMOL/L (ref 20–31)
CREAT SERPL-MCNC: 0.6 MG/DL (ref 0.5–0.9)
EOSINOPHIL # BLD: 0.21 K/UL (ref 0–0.4)
EOSINOPHILS RELATIVE PERCENT: 3 % (ref 0–5)
ERYTHROCYTE [DISTWIDTH] IN BLOOD BY AUTOMATED COUNT: 15.6 % (ref 12.1–15.2)
FLUAV AG SPEC QL: NEGATIVE
FLUBV AG SPEC QL: NEGATIVE
GFR, ESTIMATED: >90 ML/MIN/1.73M2
GLUCOSE SERPL-MCNC: 87 MG/DL (ref 70–99)
HCT VFR BLD AUTO: 41.5 % (ref 36–46)
HDLC SERPL-MCNC: 62 MG/DL
HGB BLD-MCNC: 13.6 G/DL (ref 12–16)
IMM GRANULOCYTES # BLD AUTO: 0.02 K/UL (ref 0–0.3)
IMM GRANULOCYTES NFR BLD: 0 % (ref 0–5)
LDLC SERPL CALC-MCNC: 107 MG/DL (ref 0–100)
LYMPHOCYTES NFR BLD: 1.06 K/UL (ref 1–4.8)
LYMPHOCYTES RELATIVE PERCENT: 15 % (ref 15–40)
MCH RBC QN AUTO: 26.6 PG (ref 26–34)
MCHC RBC AUTO-ENTMCNC: 32.8 G/DL (ref 31–37)
MCV RBC AUTO: 81.1 FL (ref 80–100)
MONOCYTES NFR BLD: 0.44 K/UL (ref 0–1)
MONOCYTES NFR BLD: 6 % (ref 4–8)
NEUTROPHILS NFR BLD: 75 % (ref 47–75)
NEUTS SEG NFR BLD: 5.13 K/UL (ref 2.5–7)
PLATELET # BLD AUTO: 511 K/UL (ref 140–450)
PMV BLD AUTO: 9 FL (ref 6–12)
POTASSIUM SERPL-SCNC: 4 MMOL/L (ref 3.7–5.3)
PROT SERPL-MCNC: 6.3 G/DL (ref 6.4–8.3)
RBC # BLD AUTO: 5.12 M/UL (ref 4–5.2)
SARS-COV-2 RDRP RESP QL NAA+PROBE: DETECTED
SODIUM SERPL-SCNC: 139 MMOL/L (ref 135–144)
SPECIMEN DESCRIPTION: ABNORMAL
TRIGL SERPL-MCNC: 85 MG/DL
VLDLC SERPL CALC-MCNC: 17 MG/DL (ref 1–30)
WBC OTHER # BLD: 6.9 K/UL (ref 3.5–11)

## 2025-01-06 PROCEDURE — 1159F MED LIST DOCD IN RCRD: CPT | Performed by: NURSE PRACTITIONER

## 2025-01-06 PROCEDURE — 80061 LIPID PANEL: CPT

## 2025-01-06 PROCEDURE — 87635 SARS-COV-2 COVID-19 AMP PRB: CPT

## 2025-01-06 PROCEDURE — 87804 INFLUENZA ASSAY W/OPTIC: CPT

## 2025-01-06 PROCEDURE — 36415 COLL VENOUS BLD VENIPUNCTURE: CPT

## 2025-01-06 PROCEDURE — 85025 COMPLETE CBC W/AUTO DIFF WBC: CPT

## 2025-01-06 PROCEDURE — 99212 OFFICE O/P EST SF 10 MIN: CPT | Performed by: NURSE PRACTITIONER

## 2025-01-06 PROCEDURE — 93005 ELECTROCARDIOGRAM TRACING: CPT

## 2025-01-06 PROCEDURE — 1123F ACP DISCUSS/DSCN MKR DOCD: CPT | Performed by: NURSE PRACTITIONER

## 2025-01-06 PROCEDURE — 1160F RVW MEDS BY RX/DR IN RCRD: CPT | Performed by: NURSE PRACTITIONER

## 2025-01-06 PROCEDURE — 80053 COMPREHEN METABOLIC PANEL: CPT

## 2025-01-06 NOTE — TELEPHONE ENCOUNTER
Notified of lab results and positive Covid-19. Advised to increase fluids, rest, saline nasal spray and OTC Tylenol and ibuprofen PRN. May use fluticasone nasal spray and nasal lavage. Advised to self quarantine and wear mask if out.  Asked if she can take Paxlovid, advised that interferes with her doxazosin, tramadol and fluticasone.  Will call her PCP to see if she would order for her.

## 2025-01-06 NOTE — TELEPHONE ENCOUNTER
Patient tested positive for COVID 1/6/25, Cardiology office called and states patient would like Paxlovid, cardiology advised patient that interferes with her doxazosin, tramadol and fluticasone but requested PCP to review it, please advise.

## 2025-01-06 NOTE — PROGRESS NOTES
[facility]  [address] [place] [address]  Phone: [phone], Fax: [phone]     Patient: Roxanne Stubbs  : 1950  Date of Visit: 2025    REASON FOR VISIT / CONSULTATION: Chest Pain, Shortness of Breath, and Fatigue      History of Present Illness:        Dear Trenton Claudio MD    We had the pleasure of seeing Roxanne Stubbs in our office today. Ms. Stubbs is a pleasant 74 y.o. female had an abnormal stress test in , which resulted in a cardiac catheterization on 2017, that showed 60% disease in the ostium of the right coronary artery, 60% LAD, mild circumflex with an FFR of 0.94 in the right coronary artery, 0.92 in the LAD, 0.91 in the diagonal, medical therapy recommended.     Dr. Harvey repeated a cardiac catheterization on 2020, because of chest discomfort and she had 60% ostial RCA, LAD of 30% to 40% disease, circumflex unremarkable with an EF of 60%.     She had a right nephrectomy two years ago and had a 3 cm mass removed, which was renal cell carcinoma.     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 Mccord.     She has had no hospitalizations, ER visits or procedures since last visit.    She completed 2 weeks of Cardiac Rehab but discontinued due to having to pay $35 per day.  She has not noticed any difference in symptoms.  She did not have any chest pain while in Cardiac Rehab.    She denies any abdominal pain, nausea or vomiting.  No PND, orthopnea or pedal edema.  Denies any fainting spells but does have occasional dizziness with getting up and bending over.  She is tired all the time.   She is walking well but has shortness of breath with activity which is new for her.  Denies any nosebleeds, blood in urine or stool or unusual bruising.    She started yesterday with nasal congestion, nasal drainage and not feeling well.  Denies sinus pain or pressure.

## 2025-01-07 ENCOUNTER — HOSPITAL ENCOUNTER (OUTPATIENT)
Dept: CARDIAC REHAB | Age: 75
Setting detail: THERAPIES SERIES
Discharge: HOME | End: 2025-01-07

## 2025-01-09 LAB
EKG ATRIAL RATE: 67 BPM
EKG P AXIS: 73 DEGREES
EKG P-R INTERVAL: 158 MS
EKG Q-T INTERVAL: 410 MS
EKG QRS DURATION: 86 MS
EKG QTC CALCULATION (BAZETT): 433 MS
EKG R AXIS: 79 DEGREES
EKG T AXIS: 77 DEGREES
EKG VENTRICULAR RATE: 67 BPM

## 2025-01-09 PROCEDURE — 93010 ELECTROCARDIOGRAM REPORT: CPT | Performed by: INTERNAL MEDICINE

## 2025-01-09 NOTE — FLOWSHEET NOTE
01/09/25 0726   Treatment Diagnosis   Treatment Diagnosis 1 Angina   Angina Date 11/11/24   Referral Date 11/11/24   Significant Cardiovascular History   (KNOWN CAD SINCE 2017)   Individual Treatment Plan   ITP Visit Type Discharge, did not complete program   ITP Next Review Date   (N/A)   Visit #/Total Visits 6/36   EF% 60 %   Risk Stratification Low   Supplemental Oxygen   Oxygen Use No   Exercise Assessment   Stages of Change Relapse   Assisted Devices None  (LOW FALL RISK  / IVAN 0)   Test Exercise tolerance test   Data Measured Before Test   Heart Rate   (N/A)   Data Measured During Test   RPD 3   Data Measured at Peak   Peak RPE 13   Exercise Intervention/Prescription   Mode Treadmill;Bike;Stepper;Ergometer;Rower;Resistance training   Frequency per week 3-5   Duration Per Session 30-60   Intensity METS       3.5-4.5   RPE 12-15   Progression 0.5-1.0 METS / 30 DAYS   Symptoms with Exercise Shortness of breath;Leg pain;Angina   Target Heart Rate 78-96   Resistance Training Yes   Exercise Activity at Home   Type WALKING / RESISTIVE BANDS / STRETCHING   Frequency @ LEAST 2 DAYS / WK   Duration 30-60   Resistance Training Yes   Exercise Education   Education Attended class;Self pulse;Exercise safety;Signs/symptoms to report;RPE scale;Equipment orientation;Warm up/cool down;Physically active   Exercise Goals   Patient Target Goals Individual exercise RX;Maintain exercise and activity at a moderate intensity;Aerobic activity 30 + minutes/day  5 days/week   Patient Treatment Goal PT WILL BE ABLE TO ADV EX CAP >0.5-1.0 METS W/IN MSK TOLERANCE IN THE NEXT 30 DAYS   Psychosocial Assessment   Stages of Change Action   Psychosocial Intervention   Interventions No intervention indicated   Stress Management Techniques Uses guided meditation;Practices deep breathing;Maintains physical exercise and healthy nutrition;Manages social media time;Connects with others   Consults   (N/A)   Resources Provided Other

## 2025-01-16 DIAGNOSIS — T78.40XD ALLERGY, SUBSEQUENT ENCOUNTER: ICD-10-CM

## 2025-01-16 RX ORDER — MONTELUKAST SODIUM 10 MG/1
TABLET ORAL
Qty: 90 TABLET | Refills: 0 | Status: SHIPPED | OUTPATIENT
Start: 2025-01-16

## 2025-01-17 ENCOUNTER — PROCEDURE VISIT (OUTPATIENT)
Dept: CARDIOLOGY CLINIC | Age: 75
End: 2025-01-17

## 2025-01-17 DIAGNOSIS — I10 ESSENTIAL HYPERTENSION: Primary | ICD-10-CM

## 2025-01-17 DIAGNOSIS — R09.81 CONGESTION OF NASAL SINUS: ICD-10-CM

## 2025-01-17 DIAGNOSIS — R06.02 SOB (SHORTNESS OF BREATH): ICD-10-CM

## 2025-01-22 ENCOUNTER — TELEPHONE (OUTPATIENT)
Dept: CARDIOLOGY CLINIC | Age: 75
End: 2025-01-22

## 2025-01-22 NOTE — TELEPHONE ENCOUNTER
Pt called following ptca   No c/o will see in 2 mths   Refuses cardiac rehab   Stating she has to pay  $45 each time she comes  Will try and walk at home

## 2025-02-04 DIAGNOSIS — M79.7 FIBROMYALGIA SYNDROME: ICD-10-CM

## 2025-02-04 RX ORDER — GABAPENTIN 300 MG/1
CAPSULE ORAL
Qty: 270 CAPSULE | Refills: 0 | Status: SHIPPED | OUTPATIENT
Start: 2025-02-04 | End: 2025-03-06

## 2025-02-14 DIAGNOSIS — E03.9 ACQUIRED HYPOTHYROIDISM: ICD-10-CM

## 2025-02-14 DIAGNOSIS — K21.9 GASTROESOPHAGEAL REFLUX DISEASE WITHOUT ESOPHAGITIS: ICD-10-CM

## 2025-02-14 RX ORDER — OMEPRAZOLE 40 MG/1
40 CAPSULE, DELAYED RELEASE ORAL DAILY
Qty: 90 CAPSULE | Refills: 0 | Status: SHIPPED | OUTPATIENT
Start: 2025-02-14

## 2025-02-14 RX ORDER — LEVOTHYROXINE SODIUM 50 UG/1
TABLET ORAL
Qty: 90 TABLET | Refills: 0 | Status: SHIPPED | OUTPATIENT
Start: 2025-02-14

## 2025-03-04 ENCOUNTER — OFFICE VISIT (OUTPATIENT)
Dept: FAMILY MEDICINE CLINIC | Age: 75
End: 2025-03-04

## 2025-03-04 VITALS
HEART RATE: 68 BPM | OXYGEN SATURATION: 96 % | BODY MASS INDEX: 24.49 KG/M2 | HEIGHT: 63 IN | DIASTOLIC BLOOD PRESSURE: 84 MMHG | WEIGHT: 138.2 LBS | SYSTOLIC BLOOD PRESSURE: 128 MMHG | RESPIRATION RATE: 18 BRPM

## 2025-03-04 DIAGNOSIS — M54.41 CHRONIC MIDLINE LOW BACK PAIN WITH RIGHT-SIDED SCIATICA: ICD-10-CM

## 2025-03-04 DIAGNOSIS — Z00.00 MEDICARE ANNUAL WELLNESS VISIT, SUBSEQUENT: ICD-10-CM

## 2025-03-04 DIAGNOSIS — I10 ESSENTIAL HYPERTENSION: Primary | ICD-10-CM

## 2025-03-04 DIAGNOSIS — G89.29 CHRONIC MIDLINE LOW BACK PAIN WITH RIGHT-SIDED SCIATICA: ICD-10-CM

## 2025-03-04 DIAGNOSIS — Z12.31 BREAST CANCER SCREENING BY MAMMOGRAM: ICD-10-CM

## 2025-03-04 DIAGNOSIS — F32.A DEPRESSION, UNSPECIFIED DEPRESSION TYPE: ICD-10-CM

## 2025-03-04 DIAGNOSIS — K21.9 GASTROESOPHAGEAL REFLUX DISEASE WITHOUT ESOPHAGITIS: ICD-10-CM

## 2025-03-04 DIAGNOSIS — I10 ESSENTIAL HYPERTENSION: ICD-10-CM

## 2025-03-04 RX ORDER — TRAMADOL HYDROCHLORIDE 50 MG/1
50 TABLET ORAL EVERY 6 HOURS PRN
Qty: 120 TABLET | Refills: 2 | Status: SHIPPED | OUTPATIENT
Start: 2025-03-04 | End: 2025-06-02

## 2025-03-04 RX ORDER — CLOPIDOGREL BISULFATE 75 MG/1
75 TABLET ORAL DAILY
COMMUNITY

## 2025-03-04 RX ORDER — CITALOPRAM HYDROBROMIDE 20 MG/1
20 TABLET ORAL DAILY
Qty: 30 TABLET | Refills: 3 | Status: SHIPPED | OUTPATIENT
Start: 2025-03-04

## 2025-03-04 RX ORDER — LOSARTAN POTASSIUM 100 MG/1
TABLET ORAL
Qty: 90 TABLET | Refills: 0 | Status: SHIPPED | OUTPATIENT
Start: 2025-03-04

## 2025-03-04 SDOH — ECONOMIC STABILITY: FOOD INSECURITY: WITHIN THE PAST 12 MONTHS, THE FOOD YOU BOUGHT JUST DIDN'T LAST AND YOU DIDN'T HAVE MONEY TO GET MORE.: NEVER TRUE

## 2025-03-04 SDOH — ECONOMIC STABILITY: FOOD INSECURITY: WITHIN THE PAST 12 MONTHS, YOU WORRIED THAT YOUR FOOD WOULD RUN OUT BEFORE YOU GOT MONEY TO BUY MORE.: NEVER TRUE

## 2025-03-04 ASSESSMENT — PATIENT HEALTH QUESTIONNAIRE - PHQ9
1. LITTLE INTEREST OR PLEASURE IN DOING THINGS: SEVERAL DAYS
7. TROUBLE CONCENTRATING ON THINGS, SUCH AS READING THE NEWSPAPER OR WATCHING TELEVISION: NOT AT ALL
SUM OF ALL RESPONSES TO PHQ QUESTIONS 1-9: 3
5. POOR APPETITE OR OVEREATING: NOT AT ALL
9. THOUGHTS THAT YOU WOULD BE BETTER OFF DEAD, OR OF HURTING YOURSELF: NOT AT ALL
3. TROUBLE FALLING OR STAYING ASLEEP: NOT AT ALL
10. IF YOU CHECKED OFF ANY PROBLEMS, HOW DIFFICULT HAVE THESE PROBLEMS MADE IT FOR YOU TO DO YOUR WORK, TAKE CARE OF THINGS AT HOME, OR GET ALONG WITH OTHER PEOPLE: SOMEWHAT DIFFICULT
8. MOVING OR SPEAKING SO SLOWLY THAT OTHER PEOPLE COULD HAVE NOTICED. OR THE OPPOSITE, BEING SO FIGETY OR RESTLESS THAT YOU HAVE BEEN MOVING AROUND A LOT MORE THAN USUAL: NOT AT ALL
4. FEELING TIRED OR HAVING LITTLE ENERGY: SEVERAL DAYS
6. FEELING BAD ABOUT YOURSELF - OR THAT YOU ARE A FAILURE OR HAVE LET YOURSELF OR YOUR FAMILY DOWN: NOT AT ALL
SUM OF ALL RESPONSES TO PHQ QUESTIONS 1-9: 3
2. FEELING DOWN, DEPRESSED OR HOPELESS: SEVERAL DAYS

## 2025-03-04 ASSESSMENT — ENCOUNTER SYMPTOMS
BACK PAIN: 1
ABDOMINAL PAIN: 1
BELCHING: 1
HEARTBURN: 1
NAUSEA: 0
SORE THROAT: 0
COUGH: 0
BOWEL INCONTINENCE: 0
SHORTNESS OF BREATH: 0

## 2025-03-04 NOTE — PROGRESS NOTES
the same as for younger adults. But older adults do have concerns that younger adults do not. Older adults may have problems with gum disease and decay on the roots of their teeth. They may need missing teeth replaced or broken fillings fixed. Or they may have dentures that need to be cared for. Some older adults may have trouble holding a toothbrush.  You can help remind the person you are caring for to brush and floss their teeth or to clean their dentures. In some cases, you may need to do the brushing and other dental care tasks. People who have trouble using their hands or who have dementia may need this extra help.  How can you help with dental care?  Normal dental care  To keep the teeth and gums healthy:  Brush the teeth with fluoride toothpaste twice a day--in the morning and at night--and floss at least once a day. Plaque can quickly build up on the teeth of older adults.  Watch for the signs of gum disease. These signs include gums that bleed after brushing or after eating hard foods, such as apples.  See a dentist regularly. Many experts recommend checkups every 6 months.  Keep the dentist up to date on any new medications the person is taking.  Encourage a balanced diet that includes whole grains, vegetables, and fruits, and that is low in saturated fat and sodium.  Encourage the person you're caring for not to use tobacco products. They can affect dental and general health.  Many older adults have a fixed income and feel that they can't afford dental care. But most towns and cities have programs in which dentists help older adults by lowering fees. Contact your area's public health offices or  for information about dental care in your area.  Using a toothbrush  Older adults with arthritis sometimes have trouble brushing their teeth because they can't easily hold the toothbrush. Their hands and fingers may be stiff, painful, or weak. If this is the case, you can:  Offer an electric

## 2025-03-04 NOTE — PATIENT INSTRUCTIONS
liability for your use of this information.         Learning About Dental Care for Older Adults  Dental care for older adults: Overview  Dental care for older people is much the same as for younger adults. But older adults do have concerns that younger adults do not. Older adults may have problems with gum disease and decay on the roots of their teeth. They may need missing teeth replaced or broken fillings fixed. Or they may have dentures that need to be cared for. Some older adults may have trouble holding a toothbrush.  You can help remind the person you are caring for to brush and floss their teeth or to clean their dentures. In some cases, you may need to do the brushing and other dental care tasks. People who have trouble using their hands or who have dementia may need this extra help.  How can you help with dental care?  Normal dental care  To keep the teeth and gums healthy:  Brush the teeth with fluoride toothpaste twice a day--in the morning and at night--and floss at least once a day. Plaque can quickly build up on the teeth of older adults.  Watch for the signs of gum disease. These signs include gums that bleed after brushing or after eating hard foods, such as apples.  See a dentist regularly. Many experts recommend checkups every 6 months.  Keep the dentist up to date on any new medications the person is taking.  Encourage a balanced diet that includes whole grains, vegetables, and fruits, and that is low in saturated fat and sodium.  Encourage the person you're caring for not to use tobacco products. They can affect dental and general health.  Many older adults have a fixed income and feel that they can't afford dental care. But most [facility] and [facility] have programs in which dentists help older adults by lowering fees. Contact your area's public health offices or  for information about dental care in your area.  Using a toothbrush  Older adults with arthritis sometimes have trouble

## 2025-03-06 ENCOUNTER — HOSPITAL ENCOUNTER (OUTPATIENT)
Dept: MAMMOGRAPHY | Age: 75
Discharge: HOME | End: 2025-03-08
Attending: INTERNAL MEDICINE
Payer: COMMERCIAL

## 2025-03-06 DIAGNOSIS — Z12.31 BREAST CANCER SCREENING BY MAMMOGRAM: ICD-10-CM

## 2025-03-06 PROCEDURE — 77063 BREAST TOMOSYNTHESIS BI: CPT

## 2025-03-12 ENCOUNTER — RESULTS FOLLOW-UP (OUTPATIENT)
Dept: MAMMOGRAPHY | Age: 75
End: 2025-03-12

## 2025-03-12 NOTE — TELEPHONE ENCOUNTER
----- Message from Dr. Trenton Claudio MD sent at 3/11/2025  3:57 PM EDT -----  Mammogram is negative  ----- Message -----  From: Jackson Cochran Incoming Radiant Results From [facility]  Sent: 3/11/2025   3:05 PM EDT  To: Trenton Claudio MD

## 2025-04-01 ENCOUNTER — OFFICE VISIT (OUTPATIENT)
Dept: FAMILY MEDICINE CLINIC | Age: 75
End: 2025-04-01
Payer: MEDICARE

## 2025-04-01 VITALS
SYSTOLIC BLOOD PRESSURE: 107 MMHG | WEIGHT: 134 LBS | DIASTOLIC BLOOD PRESSURE: 65 MMHG | HEART RATE: 100 BPM | HEIGHT: 63 IN | RESPIRATION RATE: 18 BRPM | BODY MASS INDEX: 23.74 KG/M2 | OXYGEN SATURATION: 96 %

## 2025-04-01 DIAGNOSIS — F32.A DEPRESSION, UNSPECIFIED DEPRESSION TYPE: Primary | ICD-10-CM

## 2025-04-01 DIAGNOSIS — F41.9 ANXIETY: ICD-10-CM

## 2025-04-01 PROCEDURE — G8400 PT W/DXA NO RESULTS DOC: HCPCS | Performed by: INTERNAL MEDICINE

## 2025-04-01 PROCEDURE — 1159F MED LIST DOCD IN RCRD: CPT | Performed by: INTERNAL MEDICINE

## 2025-04-01 PROCEDURE — 3017F COLORECTAL CA SCREEN DOC REV: CPT | Performed by: INTERNAL MEDICINE

## 2025-04-01 PROCEDURE — 1036F TOBACCO NON-USER: CPT | Performed by: INTERNAL MEDICINE

## 2025-04-01 PROCEDURE — 1123F ACP DISCUSS/DSCN MKR DOCD: CPT | Performed by: INTERNAL MEDICINE

## 2025-04-01 PROCEDURE — 99214 OFFICE O/P EST MOD 30 MIN: CPT | Performed by: INTERNAL MEDICINE

## 2025-04-01 PROCEDURE — G8427 DOCREV CUR MEDS BY ELIG CLIN: HCPCS | Performed by: INTERNAL MEDICINE

## 2025-04-01 PROCEDURE — 3078F DIAST BP <80 MM HG: CPT | Performed by: INTERNAL MEDICINE

## 2025-04-01 PROCEDURE — 3074F SYST BP LT 130 MM HG: CPT | Performed by: INTERNAL MEDICINE

## 2025-04-01 PROCEDURE — G8420 CALC BMI NORM PARAMETERS: HCPCS | Performed by: INTERNAL MEDICINE

## 2025-04-01 PROCEDURE — 1090F PRES/ABSN URINE INCON ASSESS: CPT | Performed by: INTERNAL MEDICINE

## 2025-04-01 RX ORDER — HYDROXYZINE PAMOATE 25 MG/1
25 CAPSULE ORAL 3 TIMES DAILY PRN
Qty: 90 CAPSULE | Refills: 0 | Status: SHIPPED | OUTPATIENT
Start: 2025-04-01 | End: 2025-05-01

## 2025-04-01 ASSESSMENT — ENCOUNTER SYMPTOMS
COUGH: 0
SORE THROAT: 0
SHORTNESS OF BREATH: 0
ABDOMINAL PAIN: 0
NAUSEA: 0

## 2025-04-01 NOTE — PROGRESS NOTES
HPI Notes    Name: Roxanne Stubbs  : 1950         Chief Complaint:     Chief Complaint   Patient presents with    Follow-up     Patient states new RX is working well, she is feeling much better.       History of Present Illness:        HPI    Roxanne presents as a follow up on her depression.  Current medication for depression includes Celexa.  Roxanne has been on this medication for about one month  .  The medication is  being tolerated well.  Since starting the antidepressant the symptoms of depression have improved.  Roxanne  is not in counciling.  Roxanne denies suicidal ideation.   She says she feels anxious at times and would like to take something to help     Past Medical History:     Past Medical History:   Diagnosis Date    Allergic rhinitis 2011    Anxiety 2011    CAD (coronary artery disease)     MATTHEW KIDNEY TUMORS    Cancer (HCC)     Carpal tunnel syndrome 2011    Depression 2011    Dyslipidemia 2011    Fibromyalgia 2011    Hyperlipidemia     Hypertension 2011    SOB (shortness of breath)     Thyroid disease       Reviewed all health maintenance requirements and orderedappropriate tests  Health Maintenance Due   Topic Date Due    Shingles vaccine (1 of 2) Never done    DEXA (modify frequency per FRAX score)  Never done    DTaP/Tdap/Td vaccine (2 - Td or Tdap) 02/10/2024    COVID-19 Vaccine (1 - - season) Never done       Past Surgical History:     Past Surgical History:   Procedure Laterality Date    BREAST BIOPSY Right     CARDIAC CATHETERIZATION Left 2017    Dr. Harvey @ [facility]--CAD, 50-60% ostial right CAD with an FFR of 0.94.  60% disease in the proximal LAD with an FFR of 0.92 with bridging of the mid LAD.  Normal LV function at 60%. Mildly dilated ascending aorta -will do a CT scan to measure.    CARDIAC CATHETERIZATION Left 2020    Dr. Harvey @ [facility]--Medical therapy     SECTION      x 4    ELBOW SURGERY Right

## 2025-04-02 ENCOUNTER — OFFICE VISIT (OUTPATIENT)
Dept: CARDIOLOGY CLINIC | Age: 75
End: 2025-04-02
Payer: MEDICARE

## 2025-04-02 VITALS — OXYGEN SATURATION: 98 % | HEART RATE: 51 BPM | SYSTOLIC BLOOD PRESSURE: 134 MMHG | DIASTOLIC BLOOD PRESSURE: 69 MMHG

## 2025-04-02 DIAGNOSIS — E78.2 MIXED HYPERLIPIDEMIA: ICD-10-CM

## 2025-04-02 DIAGNOSIS — I10 ESSENTIAL HYPERTENSION: Primary | ICD-10-CM

## 2025-04-02 DIAGNOSIS — E55.9 VITAMIN D DEFICIENCY, UNSPECIFIED: ICD-10-CM

## 2025-04-02 DIAGNOSIS — R06.02 SOB (SHORTNESS OF BREATH): ICD-10-CM

## 2025-04-02 DIAGNOSIS — R07.9 CHEST PAIN, UNSPECIFIED TYPE: ICD-10-CM

## 2025-04-02 PROCEDURE — 3078F DIAST BP <80 MM HG: CPT | Performed by: INTERNAL MEDICINE

## 2025-04-02 PROCEDURE — 3017F COLORECTAL CA SCREEN DOC REV: CPT | Performed by: INTERNAL MEDICINE

## 2025-04-02 PROCEDURE — G8428 CUR MEDS NOT DOCUMENT: HCPCS | Performed by: INTERNAL MEDICINE

## 2025-04-02 PROCEDURE — 99214 OFFICE O/P EST MOD 30 MIN: CPT | Performed by: INTERNAL MEDICINE

## 2025-04-02 PROCEDURE — G8400 PT W/DXA NO RESULTS DOC: HCPCS | Performed by: INTERNAL MEDICINE

## 2025-04-02 PROCEDURE — 1123F ACP DISCUSS/DSCN MKR DOCD: CPT | Performed by: INTERNAL MEDICINE

## 2025-04-02 PROCEDURE — 3075F SYST BP GE 130 - 139MM HG: CPT | Performed by: INTERNAL MEDICINE

## 2025-04-02 PROCEDURE — G8420 CALC BMI NORM PARAMETERS: HCPCS | Performed by: INTERNAL MEDICINE

## 2025-04-02 PROCEDURE — 1090F PRES/ABSN URINE INCON ASSESS: CPT | Performed by: INTERNAL MEDICINE

## 2025-04-02 PROCEDURE — 1036F TOBACCO NON-USER: CPT | Performed by: INTERNAL MEDICINE

## 2025-04-08 ENCOUNTER — TELEPHONE (OUTPATIENT)
Dept: CARDIAC REHAB | Age: 75
End: 2025-04-08

## 2025-04-08 NOTE — TELEPHONE ENCOUNTER
Left message with Roxanne letter her know we had completed her pre cert for cardiac rehab regarding her new insurance provider.    In the message I made her aware that according to the representative we spoke that that she would incur no out of pocket cost for cardiac rehab. (Reference number [id number])    In the message I requested a call back to [phone] when and if she is ready to get rescheduled.

## 2025-04-09 NOTE — PROGRESS NOTES
Ov Dr. Harvey for 2 month   Post ptca   Not in CR d/t cost   Has new ins now- will check out cost now   No chest pain   No palpations   No sob   No new issues per pt         Follow up 6 months  With testing   
and an unremarkable circumflex, normal LV function.  Last catheterization on January 17th, 2025, that showed 90% ostial RCA disease with 40% LAD and diagonal disease, normal circumflex.  EF 60% with stenting of the ostial right coronary artery with a 3.5 x 12 mm drug-eluting Synergy stent.  Strong family history of coronary artery disease.  Hypertension, well controlled.  Depression, well controlled.  Palpitations, well controlled.  Renal cell carcinoma, status post partial nephrectomy two and half years ago with partial right nephrectomy with recurrence in both left and right kidney, being watched by Dr. Mccord.  Functional class 1.    PLAN:    No change in medication.  She is going to check cardiac rehab now that she has new insurance.  Follow up in 6 months with testing.    DISCUSSION:  Roxanne overall is doing excellent.  She has had a good result from the angioplasty of her ostial right coronary artery.    I am glad that she can check out cardiac rehab as I think this would be very helpful for her.    I made no changes in her medications.  Her risk factors are very nicely modified.  I will plan on seeing her in 6 months.    Thank you very much for allowing me the privilege of seeing Mrs. Stubbs.  If you have any questions on my thoughts, please do not hesitate to contact     me.    Sincerely,          JUAN LAYNE MD      D:  04/07/2025 10:19:29     T:  04/08/2025 02:43:58     ORTIZ/MAXIMINO  Job #:  [id number]     Doc#:  [id number]

## 2025-04-12 DIAGNOSIS — T78.40XD ALLERGY, SUBSEQUENT ENCOUNTER: ICD-10-CM

## 2025-04-14 RX ORDER — MONTELUKAST SODIUM 10 MG/1
10 TABLET ORAL DAILY
Qty: 90 TABLET | Refills: 0 | Status: SHIPPED | OUTPATIENT
Start: 2025-04-14

## 2025-04-15 ENCOUNTER — HOSPITAL ENCOUNTER (OUTPATIENT)
Dept: CARDIAC REHAB | Age: 75
Setting detail: THERAPIES SERIES
Discharge: HOME | End: 2025-04-15

## 2025-04-15 ASSESSMENT — NEW YORK HEART ASSOCIATION (NYHA) CLASSIFICATION: NYHA FUNCTIONAL CLASS: NO NYHA CLASS OR UNABLE TO DETERMINE

## 2025-04-15 NOTE — PROGRESS NOTES
Cardiac Rehab Initial History and Assessment    Roxanne Stubbs   1950  [id number]  4/15/2025    Pre-cert Verification: PRE-AUTHORIZATION CALL TO [facility] SPEAKING TO RENÉE ACE TOLD NO COPAY WITH 100% COVERAGE / PT VERBALIZES UNDERSTANDING & ACCEPTANCE OF OWN FINANCIAL RESPONSIBILITY FOR OOP NOT COVERED      Primary Diagnosis: PCI  Onset: 27  Living Will: [] Yes   [x] No  On File: [] Yes   [x] No   [] N/A  Durable Power of : [] Yes   [x] No  Pt requests Living Will information.: [] Yes   [x] No    Medical History  Past Medical History:   Diagnosis Date    Allergic rhinitis 2011    Anxiety 2011    CAD (coronary artery disease)     MATTHEW KIDNEY TUMORS    Cancer (HCC)     Carpal tunnel syndrome 2011    Depression 2011    Dyslipidemia 2011    Fibromyalgia 2011    Hyperlipidemia     Hypertension 2011    SOB (shortness of breath)     Thyroid disease      Past Surgical History:   Procedure Laterality Date    BREAST BIOPSY Right     CARDIAC CATHETERIZATION Left 2017    Dr. Harvey @ [facility]--CAD, 50-60% ostial right CAD with an FFR of 0.94.  60% disease in the proximal LAD with an FFR of 0.92 with bridging of the mid LAD.  Normal LV function at 60%. Mildly dilated ascending aorta -will do a CT scan to measure.    CARDIAC CATHETERIZATION Left 2020    Dr. Harvey @ [facility]--Medical therapy     SECTION      x 4    ELBOW SURGERY Right     GALLBLADDER SURGERY      HYSTERECTOMY (CERVIX STATUS UNKNOWN)      total hysterectomy   age 40    KIDNEY SURGERY Right 2021    Dr. Vieyra- [place]    ROTATOR CUFF REPAIR      Right    SMALL INTESTINE SURGERY      twisted bowel 1985       Family History  Family History   Problem Relation Age of Onset    Stroke Mother     High Cholesterol Mother     Heart Attack Mother     Heart Attack Father     High Cholesterol Father     High Cholesterol Sister     Breast Cancer Sister     High Cholesterol Brother

## 2025-04-15 NOTE — PROGRESS NOTES
Cardiac Rehabilitation   Physician Order Form    Roxanne Stubbs  1950  [id number]  4/15/2025    [x] Phase 2 ECG Monitored Cardiac Rehabilitation    [] MI   [x] Percutaneous Coronary Intervention          [] Other:   [] CABG  [] Heart Valve Repair/Replaced   [] Stable Angina [] Heart Failure:     Onset Date: 1/17/25                    Cardiac Education Goals: (see individualized treatment plan for specific goals, progression & compliance)    [x] Hypertension [x] Physical Inactivity  [x] Cardiac A&P    [] Heart Failure [x] Medications                       [x] Coping w/ Anxiety / Depression  [] Diabetes  [x] Weight Management [x] Angina  [x] Hyperlipidemia       [x] Home Exercise             [x] Stress Reduction and Relaxation   [x] Medications           [] Smoking Cessation                                                Prescribed Exercise Plan:    Target Hr: 72-90       Duration: 31 - 60 Minutes  Frequency: 3 Days per week  Initial Met Level: 3.0-3.9  Limitations: CHRONIC-CONSTANT RT SHOULDER \"ACHE-SHARP\"    Modalities:  [x]Treadmill   [x] UBE  [x] Seated Stepper  [x] Rowing Machine  [x] Weights/therabands  [] Total Body Ergometer    Aerobic exercise to total 31-60 minutes. Progressing by 1-2 minutes per week and/or 1-2 levels per week per patient tolerance using various modalities; according to Bk Scale 12-16 and THR  Introduce 8-12 bilateral UE and LE progressive resistance exercises at 1-3 sets per lift, on 2-3 non-consecutive days using weights/ ORANGE  therabands AND WTS TO 8-24 # for 8-15 reps to progressive overload by increasing resistance once reps progressed to at least 15 reps on at least 2 occasions                 Per patient symptoms use:  Appropriate ACLS Algorhythm for Cardiac Events.  Nitroglycerine 0.4mg SLq 5mins X 3 for angina pain.  12 lead EKG for c/o chest pain or change in rhythm.  Nasal O2 for SaO2 <90% or symptoms warranted.  Blood sugar monitoring for Hyper/Hypoglycemia

## 2025-04-15 NOTE — FLOWSHEET NOTE
04/15/25 1332   Treatment Diagnosis   Treatment Diagnosis 1 PCI   PCI Date 01/17/25   Referral Date 11/13/24   NYHA Heart Failure Classification No NYHA class or unable to determine   Co-morbidities   (RENAL CANCER W/ HX OF RT NEPHRECTOMY)   Individual Treatment Plan   ITP Visit Type Initial assessment   ITP Next Review Date 05/13/25   Visit #/Total Visits 0/36   EF% 60 %   Risk Stratification Low   Supplemental Oxygen   Oxygen Use No   Exercise Assessment   Stages of Change Preparation;Action   Assisted Devices None  (LOW FALL RISK)   Data Measured Before Test   Heart Rate 57   Resting Blood Pressure 126/68   O2 Device Room air   Data Measured During Test   Indicate Mode of RPE 3 minutes   Modality Treadmill   Treadmill Speed 2.2 mph   Treadmill Grade 12 %   METS 5.9   Symptoms Fatigue   Problems While Exercising NONE   Describe Type of Pain You Are Having PT DENIES PAIN   Blood Pressure 134/58   SpO2 97 %   RPE 14   RPD 3   Data Measured at Peak   Peak Heart Rate 74   Peak Blood Pressure 134/58   Peak RPE 14   SpO2 97   Data Measured at 5 Minutes After Test   Heart Rate 54   Blood Pressure 122/66   Comments GOOD OVERALL TOLERANCE TO SUBMAX TM GXT   Exercise Intervention/Prescription   Mode Treadmill;Bike;Stepper;Ergometer;Rower;Resistance training   Frequency per week 3-5   Duration Per Session 30-60   Intensity METS       3.0-4.0   RPE 12-15   Progression PROGRESSIVELY INCREASE WORKLOADS WHEN WORKLOAD FALLS BELOW INTENSITY THRESHOLDS PER EX Rx   Symptoms with Exercise Shortness of breath;Leg pain   Target Heart Rate 72-90   Resistance Training Yes   Exercise Activity at Home   Type WALKING / RESISTIVE BANDS   Frequency @ LEAST 2 D/WK   Duration 20-60   Resistance Training Yes   Exercise Education   Education Exercise safety;Signs/symptoms to report;Warm up/cool down;Equipment orientation;RPE scale   Exercise Goals   Patient Target Goals Individual exercise RX;Maintain exercise and activity at a moderate

## 2025-04-21 ENCOUNTER — HOSPITAL ENCOUNTER (OUTPATIENT)
Dept: CARDIAC REHAB | Age: 75
Setting detail: THERAPIES SERIES
Discharge: HOME | End: 2025-04-21
Payer: MEDICARE

## 2025-04-21 PROCEDURE — 93798 PHYS/QHP OP CAR RHAB W/ECG: CPT

## 2025-04-22 ENCOUNTER — HOSPITAL ENCOUNTER (OUTPATIENT)
Dept: CARDIAC REHAB | Age: 75
Setting detail: THERAPIES SERIES
Discharge: HOME | End: 2025-04-22
Payer: MEDICARE

## 2025-04-22 PROCEDURE — 93798 PHYS/QHP OP CAR RHAB W/ECG: CPT

## 2025-04-24 ENCOUNTER — HOSPITAL ENCOUNTER (OUTPATIENT)
Dept: CARDIAC REHAB | Age: 75
Setting detail: THERAPIES SERIES
End: 2025-04-24
Payer: MEDICARE

## 2025-04-28 ENCOUNTER — HOSPITAL ENCOUNTER (OUTPATIENT)
Dept: CARDIAC REHAB | Age: 75
Setting detail: THERAPIES SERIES
End: 2025-04-28
Payer: MEDICARE

## 2025-04-28 DIAGNOSIS — F41.9 ANXIETY: ICD-10-CM

## 2025-04-28 RX ORDER — HYDROXYZINE PAMOATE 25 MG/1
CAPSULE ORAL
Qty: 90 CAPSULE | Refills: 0 | Status: SHIPPED | OUTPATIENT
Start: 2025-04-28

## 2025-04-29 ENCOUNTER — HOSPITAL ENCOUNTER (OUTPATIENT)
Dept: CARDIAC REHAB | Age: 75
Setting detail: THERAPIES SERIES
End: 2025-04-29
Payer: MEDICARE

## 2025-05-05 ENCOUNTER — TELEPHONE (OUTPATIENT)
Dept: CARDIAC REHAB | Age: 75
End: 2025-05-05

## 2025-05-05 NOTE — TELEPHONE ENCOUNTER
Left message requesting a call back to follow up with patient since she has missed rehab since 4/22/25.    Requested call back to [phone].

## 2025-05-06 DIAGNOSIS — M79.7 FIBROMYALGIA SYNDROME: ICD-10-CM

## 2025-05-06 RX ORDER — GABAPENTIN 300 MG/1
300 CAPSULE ORAL 3 TIMES DAILY
Qty: 270 CAPSULE | Refills: 0 | Status: SHIPPED | OUTPATIENT
Start: 2025-05-06 | End: 2025-06-05

## 2025-05-10 DIAGNOSIS — K21.9 GASTROESOPHAGEAL REFLUX DISEASE WITHOUT ESOPHAGITIS: ICD-10-CM

## 2025-05-10 DIAGNOSIS — E03.9 ACQUIRED HYPOTHYROIDISM: ICD-10-CM

## 2025-05-12 RX ORDER — OMEPRAZOLE 40 MG/1
40 CAPSULE, DELAYED RELEASE ORAL DAILY
Qty: 90 CAPSULE | Refills: 0 | Status: SHIPPED | OUTPATIENT
Start: 2025-05-12

## 2025-05-12 RX ORDER — LEVOTHYROXINE SODIUM 50 UG/1
50 TABLET ORAL DAILY
Qty: 90 TABLET | Refills: 0 | Status: SHIPPED | OUTPATIENT
Start: 2025-05-12

## 2025-05-15 ENCOUNTER — HOSPITAL ENCOUNTER (OUTPATIENT)
Dept: CARDIAC REHAB | Age: 75
Setting detail: THERAPIES SERIES
Discharge: HOME | End: 2025-05-15

## 2025-05-15 ASSESSMENT — NEW YORK HEART ASSOCIATION (NYHA) CLASSIFICATION: NYHA FUNCTIONAL CLASS: NO NYHA CLASS OR UNABLE TO DETERMINE

## 2025-05-15 NOTE — PROGRESS NOTES
Patient discharged due to lack of attendance and failure to return phone messages when we attempted to reach out to inquire about lack of attendance.       05/15/25 0734   Treatment Diagnosis   Treatment Diagnosis 1 PCI   Angina Date 11/11/24   PCI Date 01/17/25   Referral Date 11/13/24   Significant Cardiovascular History   (KNOWN CAD SINCE 2017)   NYHA Heart Failure Classification No NYHA class or unable to determine   Co-morbidities   (RENAL CANCER W/ HX OF RT NEPHRECTOMY)   Individual Treatment Plan   ITP Visit Type Discharge, did not complete program   ITP Next Review Date 05/13/25   Visit #/Total Visits 3/36   EF% 60 %   Risk Stratification Low   Supplemental Oxygen   Oxygen Use No   Exercise Assessment   Stages of Change Relapse   Assisted Devices None  (LOW FALL RISK)   Test Exercise tolerance test   Data Measured Before Test   Heart Rate 61   Resting Blood Pressure 148/68   Data Measured at Peak   Peak Heart Rate 95   Peak Blood Pressure 142/62   Peak RPE 3   Data Measured at 5 Minutes After Test   Heart Rate 65   Blood Pressure 140/64   Comments GOOD OVERALL TOLERANCE TO SUBMAX TM GXT   Exercise Intervention/Prescription   Mode Treadmill;Bike;Stepper;Ergometer;Rower;Resistance training   Frequency per week 3-5   Duration Per Session 30-60   Intensity METS       3.5-4.5 avg mets   Progression PROGRESSIVELY INCREASE WORKLOADS WHEN WORKLOAD FALLS BELOW INTENSITY THRESHOLDS PER EX Rx   Symptoms with Exercise Shortness of breath;Leg pain   Target Heart Rate 72-90   Resistance Training Yes   RPE 12-15   Exercise Activity at Home   Type WALKING / RESISTIVE BANDS   Frequency @ LEAST 2 D/WK   Duration 20-60   Resistance Training Yes   Exercise Education   Education Exercise safety;Signs/symptoms to report;Warm up/cool down;Equipment orientation;RPE scale   Exercise Goals   Patient Target Goals Individual exercise RX;Maintain exercise and activity at a moderate intensity;Aerobic activity 30 + minutes/day  5

## 2025-05-26 DIAGNOSIS — F41.9 ANXIETY: ICD-10-CM

## 2025-05-27 RX ORDER — HYDROXYZINE PAMOATE 25 MG/1
CAPSULE ORAL
Qty: 90 CAPSULE | Refills: 0 | Status: SHIPPED | OUTPATIENT
Start: 2025-05-27

## 2025-05-28 DIAGNOSIS — I10 ESSENTIAL HYPERTENSION: ICD-10-CM

## 2025-05-28 RX ORDER — LOSARTAN POTASSIUM 100 MG/1
100 TABLET ORAL DAILY
Qty: 90 TABLET | Refills: 0 | Status: SHIPPED | OUTPATIENT
Start: 2025-05-28

## 2025-06-03 ENCOUNTER — OFFICE VISIT (OUTPATIENT)
Dept: FAMILY MEDICINE CLINIC | Age: 75
End: 2025-06-03
Payer: MEDICARE

## 2025-06-03 VITALS
SYSTOLIC BLOOD PRESSURE: 122 MMHG | RESPIRATION RATE: 18 BRPM | HEIGHT: 63 IN | OXYGEN SATURATION: 97 % | HEART RATE: 76 BPM | WEIGHT: 132.8 LBS | DIASTOLIC BLOOD PRESSURE: 82 MMHG | BODY MASS INDEX: 23.53 KG/M2

## 2025-06-03 DIAGNOSIS — I10 ESSENTIAL HYPERTENSION: Primary | ICD-10-CM

## 2025-06-03 DIAGNOSIS — M54.16 LUMBAR RADICULOPATHY: ICD-10-CM

## 2025-06-03 DIAGNOSIS — M54.41 CHRONIC MIDLINE LOW BACK PAIN WITH RIGHT-SIDED SCIATICA: ICD-10-CM

## 2025-06-03 DIAGNOSIS — M79.7 FIBROMYALGIA SYNDROME: ICD-10-CM

## 2025-06-03 DIAGNOSIS — C64.1 CLEAR CELL ADENOCARCINOMA OF KIDNEY, RIGHT (HCC): ICD-10-CM

## 2025-06-03 DIAGNOSIS — F41.9 ANXIETY: ICD-10-CM

## 2025-06-03 DIAGNOSIS — F32.A DEPRESSION, UNSPECIFIED DEPRESSION TYPE: ICD-10-CM

## 2025-06-03 DIAGNOSIS — F11.20 OPIOID DEPENDENCE WITH CURRENT USE (HCC): ICD-10-CM

## 2025-06-03 DIAGNOSIS — G89.29 CHRONIC MIDLINE LOW BACK PAIN WITH RIGHT-SIDED SCIATICA: ICD-10-CM

## 2025-06-03 PROCEDURE — 1036F TOBACCO NON-USER: CPT | Performed by: INTERNAL MEDICINE

## 2025-06-03 PROCEDURE — 1090F PRES/ABSN URINE INCON ASSESS: CPT | Performed by: INTERNAL MEDICINE

## 2025-06-03 PROCEDURE — 3017F COLORECTAL CA SCREEN DOC REV: CPT | Performed by: INTERNAL MEDICINE

## 2025-06-03 PROCEDURE — 3074F SYST BP LT 130 MM HG: CPT | Performed by: INTERNAL MEDICINE

## 2025-06-03 PROCEDURE — G8420 CALC BMI NORM PARAMETERS: HCPCS | Performed by: INTERNAL MEDICINE

## 2025-06-03 PROCEDURE — 3079F DIAST BP 80-89 MM HG: CPT | Performed by: INTERNAL MEDICINE

## 2025-06-03 PROCEDURE — G8400 PT W/DXA NO RESULTS DOC: HCPCS | Performed by: INTERNAL MEDICINE

## 2025-06-03 PROCEDURE — G8427 DOCREV CUR MEDS BY ELIG CLIN: HCPCS | Performed by: INTERNAL MEDICINE

## 2025-06-03 PROCEDURE — 99214 OFFICE O/P EST MOD 30 MIN: CPT | Performed by: INTERNAL MEDICINE

## 2025-06-03 PROCEDURE — 1159F MED LIST DOCD IN RCRD: CPT | Performed by: INTERNAL MEDICINE

## 2025-06-03 PROCEDURE — 1123F ACP DISCUSS/DSCN MKR DOCD: CPT | Performed by: INTERNAL MEDICINE

## 2025-06-03 RX ORDER — CITALOPRAM HYDROBROMIDE 20 MG/1
20 TABLET ORAL DAILY
Qty: 90 TABLET | Refills: 1 | Status: SHIPPED | OUTPATIENT
Start: 2025-06-03

## 2025-06-03 RX ORDER — TRAMADOL HYDROCHLORIDE 50 MG/1
50 TABLET ORAL EVERY 6 HOURS PRN
COMMUNITY
Start: 2025-05-13 | End: 2025-06-03 | Stop reason: SDUPTHER

## 2025-06-03 RX ORDER — METOPROLOL TARTRATE 25 MG/1
25 TABLET, FILM COATED ORAL 2 TIMES DAILY
Qty: 180 TABLET | Refills: 3 | Status: SHIPPED | OUTPATIENT
Start: 2025-06-03

## 2025-06-03 RX ORDER — GABAPENTIN 300 MG/1
300 CAPSULE ORAL 3 TIMES DAILY
Qty: 270 CAPSULE | Refills: 0 | Status: SHIPPED | OUTPATIENT
Start: 2025-06-03 | End: 2025-07-03

## 2025-06-03 RX ORDER — TRAMADOL HYDROCHLORIDE 50 MG/1
50 TABLET ORAL EVERY 6 HOURS PRN
Qty: 120 TABLET | Refills: 2 | Status: SHIPPED | OUTPATIENT
Start: 2025-06-03 | End: 2025-09-01

## 2025-06-03 RX ORDER — CETIRIZINE HYDROCHLORIDE 10 MG/1
10 TABLET ORAL DAILY
Qty: 30 TABLET | Refills: 5 | Status: SHIPPED | OUTPATIENT
Start: 2025-06-03

## 2025-06-03 RX ORDER — PREDNISONE 20 MG/1
20 TABLET ORAL DAILY
Qty: 5 TABLET | Refills: 0 | Status: SHIPPED | OUTPATIENT
Start: 2025-06-03 | End: 2025-06-08

## 2025-06-03 RX ORDER — ROSUVASTATIN CALCIUM 10 MG/1
10 TABLET, COATED ORAL DAILY
COMMUNITY
Start: 2025-05-04

## 2025-06-03 RX ORDER — TIZANIDINE 2 MG/1
2 TABLET ORAL EVERY 8 HOURS PRN
Qty: 90 TABLET | Refills: 2 | Status: SHIPPED | OUTPATIENT
Start: 2025-06-03

## 2025-06-03 RX ORDER — CEPHALEXIN 500 MG/1
500 CAPSULE ORAL 2 TIMES DAILY
Qty: 10 CAPSULE | Refills: 0 | Status: SHIPPED | OUTPATIENT
Start: 2025-06-03 | End: 2025-06-08

## 2025-06-03 ASSESSMENT — ENCOUNTER SYMPTOMS
ORTHOPNEA: 0
SINUS PRESSURE: 0
SHORTNESS OF BREATH: 0
ABDOMINAL PAIN: 0
BLURRED VISION: 0
BOWEL INCONTINENCE: 0
WHEEZING: 0
DIARRHEA: 1
RECTAL PAIN: 0
RHINORRHEA: 0
SINUS PAIN: 0
COUGH: 0
SORE THROAT: 1
CHEST TIGHTNESS: 0
BACK PAIN: 1
NAUSEA: 0
CONSTIPATION: 0

## 2025-06-03 NOTE — PATIENT INSTRUCTIONS
SURVEY:    You may be receiving a survey from [facility] regarding your visit today.    Please complete the survey to enable us to provide the highest quality of care to you and your family.    If you cannot score us a very good on any question, please call the office to discuss how we could have made your experience a very good one.    Thank you.    [facility]  MD Ramone Solis, DO Tracey Guardado, CNP  Gaudencio Joyce, MD Tatiana Velez, APRN-CNP  Zoila Chacko, Practice Manager  Teodora, TETO Ferreira, CCMDARIO Guzman, CMA  Lissett, CMA  Kashif, PSC   Amirah, LPN  Holly, CMA

## 2025-06-03 NOTE — PROGRESS NOTES
pain.     Orders Placed This Encounter   Medications    traMADol (ULTRAM) 50 MG tablet     Sig: Take 1 tablet by mouth every 6 hours as needed for Pain for up to 90 days. Max Daily Amount: 200 mg     Dispense:  120 tablet     Refill:  2     Reduce doses taken as pain becomes manageable    tiZANidine (ZANAFLEX) 2 MG tablet     Sig: Take 1 tablet by mouth every 8 hours as needed (muscle spasm)     Dispense:  90 tablet     Refill:  2    citalopram (CELEXA) 20 MG tablet     Sig: Take 1 tablet by mouth daily     Dispense:  90 tablet     Refill:  1    gabapentin (NEURONTIN) 300 MG capsule     Sig: Take 1 capsule by mouth 3 times daily for 30 days.     Dispense:  270 capsule     Refill:  0    metoprolol tartrate (LOPRESSOR) 25 MG tablet     Sig: Take 1 tablet by mouth 2 times daily     Dispense:  180 tablet     Refill:  3    cetirizine (ZYRTEC) 10 MG tablet     Sig: Take 1 tablet by mouth daily     Dispense:  30 tablet     Refill:  5    cephALEXin (KEFLEX) 500 MG capsule     Sig: Take 1 capsule by mouth 2 times daily for 5 days     Dispense:  10 capsule     Refill:  0     No orders of the defined types were placed in this encounter.        Patient Instructions     SURVEY:    You may be receiving a survey from [facility] regarding your visit today.    Please complete the survey to enable us to provide the highest quality of care to you and your family.    If you cannot score us a very good on any question, please call the office to discuss how we could have made your experience a very good one.    Thank you.    [facility]  MD Ramone Solis, DO Tracey Guardado, MD Tatiana Navarrete, APRN-CNP  Zoila Chacko, Practice Manager  Teodora, TETO Ferreira, CARLTON Guzman, CMA  Lissett, J CARLOS Rowland, VINCE Macario, ELLEN Barrera, J CARLOS       Electronically signed by Trenton Claudio MD on 6/3/2025 at 10:13 AM           Completed Refills      Requested Prescriptions     Signed

## 2025-06-22 DIAGNOSIS — F41.9 ANXIETY: ICD-10-CM

## 2025-06-23 RX ORDER — HYDROXYZINE PAMOATE 25 MG/1
CAPSULE ORAL
Qty: 90 CAPSULE | Refills: 0 | Status: SHIPPED | OUTPATIENT
Start: 2025-06-23

## 2025-07-08 DIAGNOSIS — T78.40XD ALLERGY, SUBSEQUENT ENCOUNTER: ICD-10-CM

## 2025-07-08 RX ORDER — MONTELUKAST SODIUM 10 MG/1
10 TABLET ORAL DAILY
Qty: 90 TABLET | Refills: 0 | Status: SHIPPED | OUTPATIENT
Start: 2025-07-08

## 2025-07-24 DIAGNOSIS — F41.9 ANXIETY: ICD-10-CM

## 2025-07-24 RX ORDER — HYDROXYZINE PAMOATE 25 MG/1
CAPSULE ORAL
Qty: 90 CAPSULE | Refills: 0 | Status: SHIPPED | OUTPATIENT
Start: 2025-07-24

## 2025-08-06 DIAGNOSIS — E03.9 ACQUIRED HYPOTHYROIDISM: ICD-10-CM

## 2025-08-06 DIAGNOSIS — K21.9 GASTROESOPHAGEAL REFLUX DISEASE WITHOUT ESOPHAGITIS: ICD-10-CM

## 2025-08-06 RX ORDER — OMEPRAZOLE 40 MG/1
40 CAPSULE, DELAYED RELEASE ORAL DAILY
Qty: 90 CAPSULE | Refills: 0 | Status: SHIPPED | OUTPATIENT
Start: 2025-08-06

## 2025-08-06 RX ORDER — LEVOTHYROXINE SODIUM 50 UG/1
50 TABLET ORAL DAILY
Qty: 90 TABLET | Refills: 0 | Status: SHIPPED | OUTPATIENT
Start: 2025-08-06

## 2025-08-22 DIAGNOSIS — I10 ESSENTIAL HYPERTENSION: ICD-10-CM

## 2025-08-22 RX ORDER — LOSARTAN POTASSIUM 100 MG/1
100 TABLET ORAL DAILY
Qty: 90 TABLET | Refills: 0 | Status: SHIPPED | OUTPATIENT
Start: 2025-08-22

## 2025-09-04 ENCOUNTER — OFFICE VISIT (OUTPATIENT)
Dept: FAMILY MEDICINE CLINIC | Age: 75
End: 2025-09-04

## 2025-09-04 VITALS
HEIGHT: 63 IN | RESPIRATION RATE: 18 BRPM | WEIGHT: 132 LBS | BODY MASS INDEX: 23.39 KG/M2 | HEART RATE: 68 BPM | DIASTOLIC BLOOD PRESSURE: 74 MMHG | SYSTOLIC BLOOD PRESSURE: 118 MMHG | OXYGEN SATURATION: 97 %

## 2025-09-04 DIAGNOSIS — M47.817 LUMBAR AND SACRAL OSTEOARTHRITIS: ICD-10-CM

## 2025-09-04 DIAGNOSIS — M79.7 FIBROMYALGIA SYNDROME: ICD-10-CM

## 2025-09-04 DIAGNOSIS — M54.41 CHRONIC MIDLINE LOW BACK PAIN WITH RIGHT-SIDED SCIATICA: ICD-10-CM

## 2025-09-04 DIAGNOSIS — G89.29 CHRONIC MIDLINE LOW BACK PAIN WITH RIGHT-SIDED SCIATICA: ICD-10-CM

## 2025-09-04 DIAGNOSIS — G47.00 INSOMNIA, UNSPECIFIED TYPE: ICD-10-CM

## 2025-09-04 DIAGNOSIS — I10 ESSENTIAL HYPERTENSION: Primary | ICD-10-CM

## 2025-09-04 RX ORDER — ROSUVASTATIN CALCIUM 10 MG/1
10 TABLET, COATED ORAL DAILY
COMMUNITY
Start: 2025-01-17

## 2025-09-04 RX ORDER — TRAMADOL HYDROCHLORIDE 50 MG/1
50 TABLET ORAL 3 TIMES DAILY PRN
Qty: 90 TABLET | Refills: 2 | Status: SHIPPED | OUTPATIENT
Start: 2025-09-04 | End: 2025-12-03

## 2025-09-04 RX ORDER — GABAPENTIN 300 MG/1
300 CAPSULE ORAL 3 TIMES DAILY
Qty: 270 CAPSULE | Refills: 0 | Status: SHIPPED | OUTPATIENT
Start: 2025-09-04 | End: 2025-10-04

## 2025-09-04 RX ORDER — ROPINIROLE 0.5 MG/1
0.5 TABLET, FILM COATED ORAL
Qty: 30 TABLET | Refills: 0 | Status: SHIPPED | OUTPATIENT
Start: 2025-09-04

## 2025-09-04 ASSESSMENT — ENCOUNTER SYMPTOMS
COLOR CHANGE: 0
SORE THROAT: 0
BOWEL INCONTINENCE: 0
ABDOMINAL PAIN: 0
DIARRHEA: 0
COUGH: 0
BACK PAIN: 1
NAUSEA: 0
BLOOD IN STOOL: 0